# Patient Record
Sex: FEMALE | Race: WHITE | Employment: OTHER | URBAN - METROPOLITAN AREA
[De-identification: names, ages, dates, MRNs, and addresses within clinical notes are randomized per-mention and may not be internally consistent; named-entity substitution may affect disease eponyms.]

---

## 2017-03-27 ENCOUNTER — OFFICE VISIT (OUTPATIENT)
Dept: CARDIOLOGY CLINIC | Age: 82
End: 2017-03-27

## 2017-03-27 VITALS
SYSTOLIC BLOOD PRESSURE: 122 MMHG | OXYGEN SATURATION: 98 % | RESPIRATION RATE: 18 BRPM | WEIGHT: 164.3 LBS | DIASTOLIC BLOOD PRESSURE: 68 MMHG | HEIGHT: 63 IN | BODY MASS INDEX: 29.11 KG/M2 | HEART RATE: 94 BPM

## 2017-03-27 DIAGNOSIS — E78.00 HYPERCHOLESTEREMIA: ICD-10-CM

## 2017-03-27 DIAGNOSIS — I25.10 CORONARY ARTERY DISEASE INVOLVING NATIVE CORONARY ARTERY OF NATIVE HEART WITHOUT ANGINA PECTORIS: ICD-10-CM

## 2017-03-27 DIAGNOSIS — I47.29 PAROXYSMAL VENTRICULAR TACHYCARDIA: ICD-10-CM

## 2017-03-27 DIAGNOSIS — I48.91 ATRIAL FIBRILLATION, UNSPECIFIED TYPE (HCC): Primary | ICD-10-CM

## 2017-03-27 DIAGNOSIS — I10 ESSENTIAL HYPERTENSION: ICD-10-CM

## 2017-03-27 DIAGNOSIS — I48.0 PAF (PAROXYSMAL ATRIAL FIBRILLATION) (HCC): ICD-10-CM

## 2017-03-27 NOTE — PROGRESS NOTES
Julissa Green NP  Subjective/HPI:     Juan Brewster is a 80 y.o. female is here for routine f/u. The patient denies chest pain/ shortness of breath, orthopnea, PND, LE edema, palpitations, syncope, presyncope. Patient reports she has been feeling in her usual state of health with the exception of recurrent gastrointestinal irritation which she has been taking Carafate for long duration. SUMMARY:  Left ventricle: Systolic function was normal. Ejection fraction was  estimated in the range of 55 % to 60 %. Doppler parameters were consistent  with abnormal left ventricular relaxation (grade 1 diastolic dysfunction). Left atrium: The atrium was mildly to moderately dilated. Mitral valve: There was moderate annular calcification. The findings were  consistent with very mild stenosis. There was mild to moderate  regurgitation. Aortic valve: Cannot R/O bicuspid Aortic valve from this study due to  calcification. Bicuspid Aortic valve was noted on the study done 1/13/13. Leaflets exhibited mild to moderate calcification and sclerosis without  stenosis. Tricuspid valve: There was mild to moderate regurgitation. There was mild  to moderate pulmonary hypertension.     PCP Provider  Gemma Aggarwal MD  Past Medical History:   Diagnosis Date    Arthritis     Cancer [facility])     breast    (leg,ear,arm-skin cancer)    GERD (gastroesophageal reflux disease)     Hypertension     Other ill-defined conditions(799.89)     glaucoma    Other ill-defined conditions(799.89)     cellulitis left arm    Other ill-defined conditions(799.89)     carpal tunnel syndrome left hand    Other ill-defined conditions(799.89)     vertigo      Past Surgical History:   Procedure Laterality Date    CARDIAC CATHETERIZATION  1/10/2013         HX APPENDECTOMY      HX CARPAL TUNNEL RELEASE      left hand    HX CATARACT REMOVAL      bilateral    HX COLONOSCOPY  4/2012    HX KNEE ARTHROSCOPY      left    HX MASTECTOMY bilateral    HX OTHER SURGICAL      Basal cell skin cancer removed from left leg, left arm and neck    HX OTHER SURGICAL      cyst removed from left thigh    HX OREN AND BSO      HX TONSILLECTOMY      VASCULAR SURGERY PROCEDURE UNLIST      vascular blockages removed. Allergies   Allergen Reactions    Augmentin [Amoxicillin-Pot Clavulanate] Hives and Itching    Doxycycline Hives and Itching    Keflex [Cephalexin] Hives and Itching    Monodox [Doxycycline Monohydrate] Other (comments)    Sulfa (Sulfonamide Antibiotics) Rash      Family History   Problem Relation Age of Onset    Heart Disease Mother     Heart Disease Father       age 48    Heart Disease Brother     Stroke Brother     Cancer Sister      Colon Cancer    Diabetes Sister     Heart Disease Sister     Heart Disease Sister     Diabetes Sister     Heart Disease Son       age 52    Other Brother      MVA    Heart Disease Brother       Current Outpatient Prescriptions   Medication Sig    hydroCHLOROthiazide (MICROZIDE) 12.5 mg capsule TAKE 1 CAPSULE ORALLY DAILY    rosuvastatin (CRESTOR) 20 mg tablet TAKE 1 TABLET NIGHTLY    ALPHAGAN P 0.1 % ophthalmic solution INSTILL 1 DROP INTO BOTH EYES TWICE A DAY AS DIRECTED    sucralfate (CARAFATE) 100 mg/mL suspension Take  by mouth four (4) times daily.  nebivolol (BYSTOLIC) 5 mg tablet Take 1 Tab by mouth daily. Reduced due to slow HR    timolol (TIMOPTIC-XE) 0.5 % ophthalmic gel-forming Administer 1 Drop to both eyes daily.  CYANOCOBALAMIN, VITAMIN B-12, (VITAMIN B-12 PO) Take 1,000 mcg by mouth daily.  latanoprost (XALATAN) 0.005 % ophthalmic solution Administer 1 Drop to both eyes nightly.  cycloSPORINE (RESTASIS) 0.05 % ophthalmic emulsion Administer 1 Drop to both eyes two (2) times a day.  VIT C/E/ZN/COPPR/LUTEIN/ZEAXAN (PRESERVISION AREDS 2 PO) Take 2 capsules by mouth two (2) times a day.     esomeprazole (NEXIUM) 40 mg capsule Take 1 Cap by mouth Daily (before breakfast).  co-enzyme Q-10 (CO Q-10) 100 mg capsule Take 100 mg by mouth daily.  Cholecalciferol, Vitamin D3, (VITAMIN D3) 1,000 unit cap Take 1 Cap by mouth two (2) times a day.  acetaminophen (TYLENOL) 325 mg tablet Take 1 Tab by mouth two (2) times daily as needed for Pain.  aspirin 81 mg chewable tablet Take 81 mg by mouth daily.  omega-3 fatty acids-vitamin e (FISH OIL) 1,000 mg Cap Take 1 Cap by mouth two (2) times a day.  CALCIUM CARBONATE/VITAMIN D3 (CALCIUM 600 + D,3, PO) Take 1 Cap by mouth two (2) times a day.  multivitamins-minerals-lutein (CENTRUM SILVER) Tab Take 1 Tab by mouth daily.  apixaban (ELIQUIS) 5 mg tablet Take 1 Tab by mouth every twelve (12) hours. To prevent stroke in atrial fib     No current facility-administered medications for this visit. Vitals:    03/27/17 1346 03/27/17 1355   BP: 122/70 122/68   Pulse: 94    Resp: 18    SpO2: 98%    Weight: 164 lb 4.8 oz (74.5 kg)    Height: 5' 3\" (1.6 m)      Social History     Social History    Marital status:      Spouse name: N/A    Number of children: N/A    Years of education: N/A     Occupational History    Not on file. Social History Main Topics    Smoking status: Former Smoker     Quit date: 1/1/1955    Smokeless tobacco: Never Used    Alcohol use Yes      Comment: rare    Drug use: No    Sexual activity: No     Other Topics Concern    Not on file     Social History Narrative       I have reviewed the nurses notes, vitals, problem list, allergy list, medical history, family, social history and medications. Review of Symptoms:    General: Pt denies excessive weight gain or loss. Pt is able to conduct ADL's  HEENT: Denies blurred vision, headaches, epistaxis and difficulty swallowing. Respiratory: Denies shortness of breath, ALLAN, wheezing or stridor.   Cardiovascular: Denies precordial pain, palpitations, edema or PND  Gastrointestinal: Denies poor appetite, indigestion, abdominal pain or blood in stool  Musculoskeletal: Denies pain or swelling from muscles or joints  Neurologic: Denies tremor, paresthesias, or sensory motor disturbance  Skin: Denies rash, itching or texture change. Physical Exam:      General: Well developed, in no acute distress, cooperative and alert  HEENT: No carotid bruits, no JVD, trach is midline. Neck Supple, PEERL, EOM intact. Heart: Irregular, 2/6 systolic murmur, gallop or rub.   Respiratory: Clear bilaterally x 4, no wheezing or rales  Abdomen:   Soft, non-tender, no masses, bowel sounds are active.   Extremities:  No edema, normal cap refill, no cyanosis, atraumatic. Neuro: A&Ox3, speech clear, gait stable. Skin: Skin color is normal. No rashes or lesions. Non diaphoretic  Vascular: 2+ pulses symmetric in all extremities    Cardiographics    ECG: Rate controlled atrial fibrillation, this is a new finding  Results for orders placed or performed during the hospital encounter of 11/25/14   EKG, 12 LEAD, INITIAL   Result Value Ref Range    Ventricular Rate 58 BPM    Atrial Rate 58 BPM    P-R Interval 198 ms    QRS Duration 90 ms    Q-T Interval 414 ms    QTC Calculation (Bezet) 406 ms    Calculated P Axis 84 degrees    Calculated R Axis 44 degrees    Calculated T Axis 88 degrees    Diagnosis       Sinus bradycardia  Nonspecific ST abnormality  No previous ECGs available  Confirmed by Lissett Lundy ([id number]) on 11/26/2014 7:41:36 AM   Results for orders placed or performed in visit on 11/25/14   CARDIAC HOLTER MONITOR, 24 HOURS    Narrative    ECG Monitor/24 hours, Complete    Reason for Holter Monitor   LIGHTHEADED    Heartbeat    Slowest 52  Average 62  Fastest  98          Results:   Underlying Rhythm: Normal sinus rhythm      Atrial Arrhythmias: none            AV Conduction: normal    Ventricular Arrhythmias: premature ventricular contractions;  frequent. Frequent bigeminy.     ST Segment Analysis:normal     Symptom Correlation:  none    Comment:   Frequent PVC's, ventricular bigeminy. No symptom correlation. Jimbo Lebron MD                   Cardiology Labs:  Lab Results   Component Value Date/Time    Cholesterol, total 137 09/08/2016 08:51 AM    HDL Cholesterol 45 09/08/2016 08:51 AM    LDL,Direct 85 10/30/2014 09:36 AM    LDL, calculated 59 09/08/2016 08:51 AM    Triglyceride 164 09/08/2016 08:51 AM       Lab Results   Component Value Date/Time    Sodium 136 03/04/2016 02:38 PM    Potassium 4.5 03/04/2016 02:38 PM    Chloride 97 03/04/2016 02:38 PM    CO2 27 03/04/2016 02:38 PM    Anion gap 8 02/24/2016 03:30 AM    Glucose 91 03/04/2016 02:38 PM    BUN 14 03/04/2016 02:38 PM    Creatinine 1.11 03/04/2016 02:38 PM    BUN/Creatinine ratio 13 03/04/2016 02:38 PM    GFR est AA 51 03/04/2016 02:38 PM    GFR est non-AA 44 03/04/2016 02:38 PM    Calcium 9.7 03/04/2016 02:38 PM    Bilirubin, total 0.3 03/04/2016 02:38 PM    AST (SGOT) 24 03/04/2016 02:38 PM    Alk. phosphatase 71 03/04/2016 02:38 PM    Protein, total 7.0 03/04/2016 02:38 PM    Albumin 4.4 03/04/2016 02:38 PM    Globulin 3.9 11/25/2014 12:00 PM    A-G Ratio 1.7 03/04/2016 02:38 PM    ALT (SGPT) 25 03/04/2016 02:38 PM           Assessment:     Assessment:     Hao Pittman was seen today for hypertension. Diagnoses and all orders for this visit:    Atrial fibrillation, unspecified type ([facility]    Essential hypertension  -     AMB POC EKG ROUTINE W/ 12 LEADS, INTER & REP    Coronary artery disease involving native coronary artery of native heart without angina pectoris    Paroxysmal ventricular tachycardia (HCC)    Hypercholesteremia    PAF (paroxysmal atrial fibrillation) (HCC)    Other orders  -     Discontinue: apixaban (ELIQUIS) 5 mg tablet; Take 1 Tab by mouth every twelve (12) hours. To prevent stroke in atrial fib        ICD-10-CM ICD-9-CM    1. Atrial fibrillation, unspecified type ([facility] I48.91 427.31    2.  Essential hypertension I10 401.9 AMB POC EKG ROUTINE W/ 12 LEADS, INTER & REP   3. Coronary artery disease involving native coronary artery of native heart without angina pectoris I25.10 414.01    4. Paroxysmal ventricular tachycardia (HCC) I47.2 427.1    5. Hypercholesteremia E78.00 272.0    6. PAF (paroxysmal atrial fibrillation) (HCC) I48.0 427.31      Orders Placed This Encounter    AMB POC EKG ROUTINE W/ 12 LEADS, INTER & REP     Order Specific Question:   Reason for Exam:     Answer:   ROUTINE    DISCONTD: apixaban (ELIQUIS) 5 mg tablet     Sig: Take 1 Tab by mouth every twelve (12) hours. To prevent stroke in atrial fib     Dispense:  180 Tab     Refill:  1        Plan:     1. New onset rate controlled atrial fibrillation: Start Eliquis 5 mg twice daily. Discussed risks and benefits of anticoagulation, signs and symptoms of bleeding, and to call if any concerns. She will call if she cannot afford the medication. As she is asymptomatic and 80years old, she is not interested in pursuing AURORA and cardioversion. She understands that the medicine is renally metabolized so if she ever becomes sick or dehydrated kidney function needs to be tested to determine if medication adjustment is necessary. 2.  High cholesterol: LDL at goal September 2016. 3.   nonobstructive coronary artery disease via cath 2013. If doing well, otherwise continue same and follow-up in 6 months.     Carmen Martell MD

## 2017-03-27 NOTE — MR AVS SNAPSHOT
Visit Information Date & Time Provider Department Dept. Phone Encounter #  
 3/27/2017  1:45 PM Selvin Lima, [facility] [phone] [id number] Upcoming Health Maintenance Date Due ZOSTER VACCINE AGE 60> 1/18/1987 INFLUENZA AGE 9 TO ADULT 8/1/2016 MEDICARE YEARLY EXAM 9/8/2017 GLAUCOMA SCREENING Q2Y 12/1/2018 DTaP/Tdap/Td series (2 - Td) 2/12/2026 Allergies as of 3/27/2017  Review Complete On: 3/27/2017 By: Selvin Liam MD  
  
 Severity Noted Reaction Type Reactions Augmentin [Amoxicillin-pot Clavulanate] Medium 02/22/2016   Systemic Hives, Itching Doxycycline Medium 02/22/2016   Systemic Hives, Itching Keflex [Cephalexin] Medium 02/22/2016   Systemic Hives, Itching Monodox [Doxycycline Monohydrate]  03/04/2016    Other (comments) Sulfa (Sulfonamide Antibiotics)  04/10/2012   Side Effect Rash Current Immunizations  Reviewed on 11/7/2012 Name Date Influenza Vaccine PF 10/17/2013  2:56 PM  
 Influenza Vaccine Split 9/1/2012 Pneumococcal Conjugate (PCV-13) 8/15/2015 Pneumococcal Polysaccharide (PPSV-23) 10/17/2013  2:58 PM  
 Pneumococcal Vaccine (Unspecified Type) 1/1/2009 Tdap 2/12/2016 Not reviewed this visit You Were Diagnosed With   
  
 Codes Comments Atrial fibrillation, unspecified type ([facility]    -  Primary ICD-10-CM: I48.91 
ICD-9-CM: 427.31 Essential hypertension     ICD-10-CM: I10 
ICD-9-CM: 401.9 Coronary artery disease involving native coronary artery of native heart without angina pectoris     ICD-10-CM: I25.10 ICD-9-CM: 414.01 Paroxysmal ventricular tachycardia (HCC)     ICD-10-CM: I47.2 ICD-9-CM: 427.1 Hypercholesteremia     ICD-10-CM: E78.00 ICD-9-CM: 272.0   
 PAF (paroxysmal atrial fibrillation) (HCC)     ICD-10-CM: I48.0 ICD-9-CM: 427.31 Vitals BP Pulse Resp Height(growth percentile) Weight(growth percentile) SpO2 122/68 (BP 1 Location: Right arm, BP Patient Position: Sitting) 94 18 5' 3\" (1.6 m) 164 lb 4.8 oz (74.5 kg) 98% BMI OB Status Smoking Status 29.1 kg/m2 Hysterectomy Former Smoker Vitals History BMI and BSA Data Body Mass Index Body Surface Area  
 29.1 kg/m 2 1.82 m 2 Preferred Pharmacy Pharmacy Name Phone [facility][id number] [place] [id number] [phone] Your Updated Medication List  
  
   
This list is accurate as of: 3/27/17  2:47 PM.  Always use your most recent med list.  
  
  
  
  
 acetaminophen 325 mg tablet Commonly known as:  TYLENOL Take 1 Tab by mouth two (2) times daily as needed for Pain. ALPHAGAN P 0.1 % ophthalmic solution Generic drug:  brimonidine INSTILL 1 DROP INTO BOTH EYES TWICE A DAY AS DIRECTED  
  
 apixaban 5 mg tablet Commonly known as:  Alayna Samaniego Take 1 Tab by mouth every twelve (12) hours. To prevent stroke in atrial fib  
  
 aspirin 81 mg chewable tablet Take 81 mg by mouth daily. CALCIUM 600 + D(3) PO Take 1 Cap by mouth two (2) times a day. CARAFATE 100 mg/mL suspension Generic drug:  sucralfate Take  by mouth four (4) times daily. CENTRUM SILVER Tab tablet Generic drug:  multivitamins-minerals-lutein Take 1 Tab by mouth daily. CO Q-10 100 mg capsule Generic drug:  co-enzyme Q-10 Take 100 mg by mouth daily. esomeprazole 40 mg capsule Commonly known as:  NexIUM Take 1 Cap by mouth Daily (before breakfast). FISH OIL 1,000 mg Cap Generic drug:  omega-3 fatty acids-vitamin e Take 1 Cap by mouth two (2) times a day. hydroCHLOROthiazide 12.5 mg capsule Commonly known as:  Brannon Mendes TAKE 1 CAPSULE ORALLY DAILY  
  
 latanoprost 0.005 % ophthalmic solution Commonly known as:  Lj Marin Administer 1 Drop to both eyes nightly. nebivolol 5 mg tablet Commonly known as:  BYSTOLIC  
 Take 1 Tab by mouth daily. Reduced due to slow HR  
  
 PRESERVISION AREDS 2 PO Take 2 capsules by mouth two (2) times a day. RESTASIS 0.05 % ophthalmic emulsion Generic drug:  cycloSPORINE Administer 1 Drop to both eyes two (2) times a day. rosuvastatin 20 mg tablet Commonly known as:  CRESTOR  
TAKE 1 TABLET NIGHTLY  
  
 timolol 0.5 % ophthalmic gel-forming Commonly known as:  TIMOPTIC-XE Administer 1 Drop to both eyes daily. VITAMIN B-12 PO Take 1,000 mcg by mouth daily. VITAMIN D3 1,000 unit Cap Generic drug:  cholecalciferol Take 1 Cap by mouth two (2) times a day. Prescriptions Printed Refills  
 apixaban (ELIQUIS) 5 mg tablet 1 Sig: Take 1 Tab by mouth every twelve (12) hours. To prevent stroke in atrial fib Class: Print Route: Oral  
  
We Performed the Following AMB POC EKG ROUTINE W/ 12 LEADS, INTER & REP [67827 CPT(R)] Introducing [facility]! Dear Keshawn Negron: 
Thank you for requesting a [employer] account. Our records indicate that you have previously registered for a [employer]oyal[employer] account but its currently inactive. Please call our [employer] support line at [phone]. Additional Information If you have questions, please visit the Frequently Asked Questions section of the [employer] website at [email or web address] Remember, [employer] is NOT to be used for urgent needs. For medical emergencies, dial 911. Now available from your iPhone and Android! Please provide this summary of care documentation to your next provider. Your primary care clinician is listed as Karen Aleman. If you have any questions after today's visit, please call [phone].

## 2017-04-05 ENCOUNTER — TELEPHONE (OUTPATIENT)
Dept: CARDIOLOGY CLINIC | Age: 82
End: 2017-04-05

## 2017-04-05 NOTE — TELEPHONE ENCOUNTER
Please call patient in regards to Eliquis. Dr. Aziza Lea put her on this med lst week. Patient states this medication is making her really weak and wants to know if she can cut back to 1 tab a day.       Thanks

## 2017-04-06 NOTE — TELEPHONE ENCOUNTER
5 mg twice a day is the dose proven to help prevent stroke for her age, weight, and kidney function. Recommend we check a CBC to make sure her blood count has not decreased. If that is stable, I would continue on the current dose. If she feels strongly, 2.5 mg twice a day would probably provide more protection against stroke than nothing.

## 2017-04-10 NOTE — TELEPHONE ENCOUNTER
Spoke with pt using 2 identifiers informing her of information as per Dr. Vy Foss. Pt states understanding and states she is better today but states she had only taken one tablet on Saturday and Sunday. Pt states she will discuss during appointment on Wednesday and states she will go back to taking 2 tablets on today.     Bayron Ramey RN

## 2017-04-12 ENCOUNTER — HOSPITAL ENCOUNTER (OUTPATIENT)
Dept: LAB | Age: 82
Discharge: HOME | End: 2017-04-12
Payer: MEDICARE

## 2017-04-12 ENCOUNTER — OFFICE VISIT (OUTPATIENT)
Dept: CARDIOLOGY CLINIC | Age: 82
End: 2017-04-12

## 2017-04-12 VITALS
HEIGHT: 63 IN | DIASTOLIC BLOOD PRESSURE: 62 MMHG | SYSTOLIC BLOOD PRESSURE: 138 MMHG | WEIGHT: 163.4 LBS | HEART RATE: 56 BPM | BODY MASS INDEX: 28.95 KG/M2 | RESPIRATION RATE: 18 BRPM | OXYGEN SATURATION: 98 %

## 2017-04-12 DIAGNOSIS — I47.29 PAROXYSMAL VENTRICULAR TACHYCARDIA: ICD-10-CM

## 2017-04-12 DIAGNOSIS — R06.09 DOE (DYSPNEA ON EXERTION): Primary | ICD-10-CM

## 2017-04-12 DIAGNOSIS — I10 ESSENTIAL HYPERTENSION: ICD-10-CM

## 2017-04-12 DIAGNOSIS — I48.0 PAF (PAROXYSMAL ATRIAL FIBRILLATION) (HCC): ICD-10-CM

## 2017-04-12 DIAGNOSIS — I25.10 CORONARY ARTERY DISEASE INVOLVING NATIVE CORONARY ARTERY OF NATIVE HEART WITHOUT ANGINA PECTORIS: ICD-10-CM

## 2017-04-12 DIAGNOSIS — E78.00 HYPERCHOLESTEREMIA: ICD-10-CM

## 2017-04-12 DIAGNOSIS — I44.0 FIRST DEGREE AV BLOCK: ICD-10-CM

## 2017-04-12 PROCEDURE — 83735 ASSAY OF MAGNESIUM: CPT

## 2017-04-12 PROCEDURE — 80048 BASIC METABOLIC PNL TOTAL CA: CPT

## 2017-04-12 PROCEDURE — 36415 COLL VENOUS BLD VENIPUNCTURE: CPT

## 2017-04-12 PROCEDURE — 85027 COMPLETE CBC AUTOMATED: CPT

## 2017-04-12 NOTE — MR AVS SNAPSHOT
Visit Information Date & Time Provider Department Dept. Phone Encounter #  
 4/12/2017  1:15 PM [place] [address] [facility] [phone] [id number] Your Appointments 4/18/2017 12:30 PM  
ECHO CARDIOGRAMS 2D with [address], [facility] Appt Note: Per Emily $0CP REM 2D ECHO COMPLETE ADULT (TTE) W OR WO CONTR [93988 CPT(R)] CARDIAC HOLTER MONITOR, 24 HOURS [[id number] CPT(R)]  
 [address]  
[phone] [address]  
  
    
 4/18/2017  1:30 PM  
HOLTER MONITOR with [address], [facility] Appt Note: Per Emily $0CP REM 2D ECHO COMPLETE ADULT (TTE) W OR WO CONTR [95875 CPT(R)] CARDIAC HOLTER MONITOR, 24 HOURS [31116.44762 CPT(R)]  
 [address]  
[phone] [address]  
  
    
 6/27/2017  2:15 PM  
3 MONTH with Mauricio Holt MD  
[place] [facility]) Appt Note: Per Dr Emely Perales $0CP REM  
 [address]  
[phone] [address] Upcoming Health Maintenance Date Due ZOSTER VACCINE AGE 60> 1/18/1987 INFLUENZA AGE 9 TO ADULT 8/1/2016 MEDICARE YEARLY EXAM 9/8/2017 GLAUCOMA SCREENING Q2Y 12/1/2018 DTaP/Tdap/Td series (2 - Td) 2/12/2026 Allergies as of 4/12/2017  Review Complete On: 4/12/2017 By: Saundra Marquez NP Severity Noted Reaction Type Reactions Augmentin [Amoxicillin-pot Clavulanate] Medium 02/22/2016   Systemic Hives, Itching Doxycycline Medium 02/22/2016   Systemic Hives, Itching Keflex [Cephalexin] Medium 02/22/2016   Systemic Hives, Itching Monodox [Doxycycline Monohydrate]  03/04/2016    Other (comments) Sulfa (Sulfonamide Antibiotics)  04/10/2012   Side Effect Rash Current Immunizations  Reviewed on 11/7/2012 Name Date Influenza Vaccine PF 10/17/2013  2:56 PM  
 Influenza Vaccine Split 9/1/2012 Pneumococcal Conjugate (PCV-13) 8/15/2015 Pneumococcal Polysaccharide (PPSV-23) 10/17/2013  2:58 PM  
 Pneumococcal Vaccine (Unspecified Type) 1/1/2009 Tdap 2/12/2016 Not reviewed this visit You Were Diagnosed With   
  
 Codes Comments ALLAN (dyspnea on exertion)    -  Primary ICD-10-CM: R06.09 
ICD-9-CM: 786.09   
 PAF (paroxysmal atrial fibrillation) ([facility])     ICD-10-CM: I48.0 ICD-9-CM: 427.31 Paroxysmal ventricular tachycardia (HCC)     ICD-10-CM: I47.2 ICD-9-CM: 427.1 Hypercholesteremia     ICD-10-CM: E78.00 ICD-9-CM: 272.0 Essential hypertension     ICD-10-CM: I10 
ICD-9-CM: 401.9 Coronary artery disease involving native coronary artery of native heart without angina pectoris     ICD-10-CM: I25.10 ICD-9-CM: 414.01 First degree AV block     ICD-10-CM: I44.0 ICD-9-CM: 426.11 Vitals BP Pulse Resp Height(growth percentile) Weight(growth percentile) SpO2  
 138/62 (BP 1 Location: Right arm, BP Patient Position: Sitting) (!) 56 18 5' 3\" (1.6 m) 163 lb 6.4 oz (74.1 kg) 98% BMI OB Status Smoking Status 28.95 kg/m2 Hysterectomy Former Smoker Vitals History BMI and BSA Data Body Mass Index Body Surface Area  
 28.95 kg/m 2 1.81 m 2 Preferred Pharmacy Pharmacy Name Phone [facility] [address] [phone] Your Updated Medication List  
  
   
This list is accurate as of: 4/12/17  2:29 PM.  Always use your most recent med list.  
  
  
  
  
 acetaminophen 325 mg tablet Commonly known as:  TYLENOL Take 1 Tab by mouth two (2) times daily as needed for Pain. ALPHAGAN P 0.1 % ophthalmic solution Generic drug:  brimonidine INSTILL 1 DROP INTO BOTH EYES TWICE A DAY AS DIRECTED  
 apixaban 5 mg tablet Commonly known as:  Kar Lopez Take 1 Tab by mouth every twelve (12) hours. To prevent stroke in atrial fib  
  
 aspirin 81 mg chewable tablet Take 81 mg by mouth daily. CALCIUM 600 + D(3) PO Take 1 Cap by mouth two (2) times a day. CARAFATE 100 mg/mL suspension Generic drug:  sucralfate Take  by mouth four (4) times daily. CENTRUM SILVER Tab tablet Generic drug:  multivitamins-minerals-lutein Take 1 Tab by mouth daily. CO Q-10 100 mg capsule Generic drug:  co-enzyme Q-10 Take 100 mg by mouth daily. esomeprazole 40 mg capsule Commonly known as:  NexIUM Take 1 Cap by mouth Daily (before breakfast). FISH OIL 1,000 mg Cap Generic drug:  omega-3 fatty acids-vitamin e Take 1 Cap by mouth two (2) times a day. hydroCHLOROthiazide 12.5 mg capsule Commonly known as:  Jenna Fells TAKE 1 CAPSULE ORALLY DAILY  
  
 latanoprost 0.005 % ophthalmic solution Commonly known as:  Melissa Sicilian Administer 1 Drop to both eyes nightly. nebivolol 5 mg tablet Commonly known as:  BYSTOLIC Take 1 Tab by mouth daily. Reduced due to slow HR  
  
 PRESERVISION AREDS 2 PO Take 2 capsules by mouth two (2) times a day. RESTASIS 0.05 % ophthalmic emulsion Generic drug:  cycloSPORINE Administer 1 Drop to both eyes two (2) times a day. rosuvastatin 20 mg tablet Commonly known as:  CRESTOR  
TAKE 1 TABLET NIGHTLY  
  
 timolol 0.5 % ophthalmic gel-forming Commonly known as:  TIMOPTIC-XE Administer 1 Drop to both eyes daily. VITAMIN B-12 PO Take 1,000 mcg by mouth daily. VITAMIN D3 1,000 unit Cap Generic drug:  cholecalciferol Take 1 Cap by mouth two (2) times a day. We Performed the Following AMB POC EKG ROUTINE W/ 12 LEADS, INTER & REP [50749 CPT(R)] CBC W/O DIFF [08736 CPT(R)] MAGNESIUM [id number] CPT(R)] METABOLIC PANEL, BASIC [07078 CPT(R)] To-Do List   
 04/12/2017 ECHO:  2D ECHO COMPLETE ADULT (TTE) W OR WO CONTR   
  
 04/12/2017 ECG:  CARDIAC HOLTER MONITOR, 24 HOURS Introducing [facility]! Dear Keisha Epstein: 
Thank you for requesting a [employer] account. Our records indicate that you have previously registered for a [employer] account but its currently inactive. Please call our [employer] support line at [phone]. Additional Information If you have questions, please visit the Frequently Asked Questions section of the [employer] website at [email or web address] Remember, [employer] is NOT to be used for urgent needs. For medical emergencies, dial 911. Now available from your iPhone and Android! Please provide this summary of care documentation to your next provider. Your primary care clinician is listed as Olayinka Mcgowan. If you have any questions after today's visit, please call [phone].

## 2017-04-12 NOTE — PROGRESS NOTES
Subjective/HPI:     Mena Ayala is a 80 y.o. female is here for symptom based appt. She was started on Eliquis and was having weakness and fatigue which has improved some. Now she is mainly noticing sob on exertion. She can walk around her apartment and become sob which is a change for her. She has occasional swelling in her legs which isnt worsening. The patient denies chest pain, orthopnea, PND, palpitations, syncope, or presyncope. PCP Provider  Lanie Thao MD  Past Medical History:   Diagnosis Date    Arthritis     Cancer [facility])     breast    (leg,ear,arm-skin cancer)    GERD (gastroesophageal reflux disease)     Hypertension     Other ill-defined conditions     glaucoma    Other ill-defined conditions     cellulitis left arm    Other ill-defined conditions     carpal tunnel syndrome left hand    Other ill-defined conditions     vertigo      Past Surgical History:   Procedure Laterality Date    CARDIAC CATHETERIZATION  1/10/2013         HX APPENDECTOMY      HX CARPAL TUNNEL RELEASE      left hand    HX CATARACT REMOVAL      bilateral    HX COLONOSCOPY  2012    HX KNEE ARTHROSCOPY      left    HX MASTECTOMY      bilateral    HX OTHER SURGICAL      Basal cell skin cancer removed from left leg, left arm and neck    HX OTHER SURGICAL      cyst removed from left thigh    HX OREN AND BSO      HX TONSILLECTOMY      VASCULAR SURGERY PROCEDURE UNLIST      vascular blockages removed.      Allergies   Allergen Reactions    Augmentin [Amoxicillin-Pot Clavulanate] Hives and Itching    Doxycycline Hives and Itching    Keflex [Cephalexin] Hives and Itching    Monodox [Doxycycline Monohydrate] Other (comments)    Sulfa (Sulfonamide Antibiotics) Rash      Family History   Problem Relation Age of Onset    Heart Disease Mother     Heart Disease Father       age 48    Heart Disease Brother     Stroke Brother     Cancer Sister      Colon Cancer    Diabetes Sister    Felipa Duverney Heart Disease Sister     Heart Disease Sister     Diabetes Sister     Heart Disease Son       age 54    Other Brother      MVA    Heart Disease Brother       Current Outpatient Prescriptions   Medication Sig    apixaban (ELIQUIS) 5 mg tablet Take 1 Tab by mouth every twelve (12) hours. To prevent stroke in atrial fib    hydroCHLOROthiazide (MICROZIDE) 12.5 mg capsule TAKE 1 CAPSULE ORALLY DAILY    rosuvastatin (CRESTOR) 20 mg tablet TAKE 1 TABLET NIGHTLY    ALPHAGAN P 0.1 % ophthalmic solution INSTILL 1 DROP INTO BOTH EYES TWICE A DAY AS DIRECTED    sucralfate (CARAFATE) 100 mg/mL suspension Take  by mouth four (4) times daily.  nebivolol (BYSTOLIC) 5 mg tablet Take 1 Tab by mouth daily. Reduced due to slow HR    timolol (TIMOPTIC-XE) 0.5 % ophthalmic gel-forming Administer 1 Drop to both eyes daily.  CYANOCOBALAMIN, VITAMIN B-12, (VITAMIN B-12 PO) Take 1,000 mcg by mouth daily.  latanoprost (XALATAN) 0.005 % ophthalmic solution Administer 1 Drop to both eyes nightly.  cycloSPORINE (RESTASIS) 0.05 % ophthalmic emulsion Administer 1 Drop to both eyes two (2) times a day.  VIT C/E/ZN/COPPR/LUTEIN/ZEAXAN (PRESERVISION AREDS 2 PO) Take 2 capsules by mouth two (2) times a day.  esomeprazole (NEXIUM) 40 mg capsule Take 1 Cap by mouth Daily (before breakfast).  co-enzyme Q-10 (CO Q-10) 100 mg capsule Take 100 mg by mouth daily.  Cholecalciferol, Vitamin D3, (VITAMIN D3) 1,000 unit cap Take 1 Cap by mouth two (2) times a day.  acetaminophen (TYLENOL) 325 mg tablet Take 1 Tab by mouth two (2) times daily as needed for Pain.  aspirin 81 mg chewable tablet Take 81 mg by mouth daily.  omega-3 fatty acids-vitamin e (FISH OIL) 1,000 mg Cap Take 1 Cap by mouth two (2) times a day.  CALCIUM CARBONATE/VITAMIN D3 (CALCIUM 600 + D,3, PO) Take 1 Cap by mouth two (2) times a day.  multivitamins-minerals-lutein (CENTRUM SILVER) Tab Take 1 Tab by mouth daily.      No current facility-administered medications for this visit. Vitals:    04/12/17 1321 04/12/17 1333   BP: 140/62 138/62   Pulse: (!) 56    Resp: 18    SpO2: 98%    Weight: 163 lb 6.4 oz (74.1 kg)    Height: 5' 3\" (1.6 m)      Social History     Social History    Marital status:      Spouse name: N/A    Number of children: N/A    Years of education: N/A     Occupational History    Not on file. Social History Main Topics    Smoking status: Former Smoker     Quit date: 1/1/1955    Smokeless tobacco: Never Used    Alcohol use Yes      Comment: rare    Drug use: No    Sexual activity: No     Other Topics Concern    Not on file     Social History Narrative       I have reviewed the nurses notes, vitals, problem list, allergy list, medical history, family, social history and medications. Review of Symptoms:    General: Pt denies excessive weight gain or loss. Pt is able to conduct ADL's.+fatigue  HEENT: Denies blurred vision, headaches, epistaxis and difficulty swallowing. Respiratory: Denies shortness of breath, +ALLAN, denies wheezing or stridor. Cardiovascular: Denies precordial pain, palpitations, edema or PND  Gastrointestinal: Denies poor appetite, indigestion, abdominal pain or blood in stool  Urinary: Denies dysuria, pyuria  Musculoskeletal: Denies pain or swelling from muscles or joints  Neurologic: Denies tremor, paresthesias, or sensory motor disturbance  Skin: Denies rash, itching or texture change. Psych: Denies depression        Physical Exam:      General: Well developed, in no acute distress, cooperative and alert  HEENT: No carotid bruits, no JVD, trach is midline. Neck Supple, PEERL, EOM intact. Heart:  Normal S1/S2 negative S3 or S4. Regular,+KERRIE,no gallop or rub.   Respiratory: Clear bilaterally x 4, no wheezing or rales  Abdomen:   Soft, non-tender, no masses, bowel sounds are active.   Extremities:  No edema, normal cap refill, no cyanosis, atraumatic.    Neuro: A&Ox3, speech clear, gait stable. Skin: Skin color is normal. No rashes or lesions. Non diaphoretic  Vascular: 2+ pulses symmetric in all extremities    Cardiographics    ECG: Sinus  Bradycardia  -First degree A-V block rate 54    Results for orders placed or performed during the hospital encounter of 11/25/14   EKG, 12 LEAD, INITIAL   Result Value Ref Range    Ventricular Rate 58 BPM    Atrial Rate 58 BPM    P-R Interval 198 ms    QRS Duration 90 ms    Q-T Interval 414 ms    QTC Calculation (Bezet) 406 ms    Calculated P Axis 84 degrees    Calculated R Axis 44 degrees    Calculated T Axis 88 degrees    Diagnosis       Sinus bradycardia  Nonspecific ST abnormality  No previous ECGs available  Confirmed by Honey Wilson ([id number]) on 11/26/2014 7:41:36 AM   Results for orders placed or performed in visit on 11/25/14   CARDIAC HOLTER MONITOR, 24 HOURS    Narrative    ECG Monitor/24 hours, Complete    Reason for Holter Monitor   LIGHTHEADED    Heartbeat    Slowest 52  Average 62  Fastest  98          Results:   Underlying Rhythm: Normal sinus rhythm      Atrial Arrhythmias: none            AV Conduction: normal    Ventricular Arrhythmias: premature ventricular contractions;  frequent. Frequent bigeminy. ST Segment Analysis:normal     Symptom Correlation:  none    Comment:   Frequent PVC's, ventricular bigeminy. No symptom correlation.      Autumn Sanchez MD                   Cardiology Labs:  Lab Results   Component Value Date/Time    Cholesterol, total 137 09/08/2016 08:51 AM    HDL Cholesterol 45 09/08/2016 08:51 AM    LDL,Direct 85 10/30/2014 09:36 AM    LDL, calculated 59 09/08/2016 08:51 AM    Triglyceride 164 09/08/2016 08:51 AM       Lab Results   Component Value Date/Time    Sodium 136 03/04/2016 02:38 PM    Potassium 4.5 03/04/2016 02:38 PM    Chloride 97 03/04/2016 02:38 PM    CO2 27 03/04/2016 02:38 PM    Anion gap 8 02/24/2016 03:30 AM    Glucose 91 03/04/2016 02:38 PM    BUN 14 03/04/2016 02:38 PM Creatinine 1.11 03/04/2016 02:38 PM    BUN/Creatinine ratio 13 03/04/2016 02:38 PM    GFR est AA 51 03/04/2016 02:38 PM    GFR est non-AA 44 03/04/2016 02:38 PM    Calcium 9.7 03/04/2016 02:38 PM    AST (SGOT) 24 03/04/2016 02:38 PM    Alk. phosphatase 71 03/04/2016 02:38 PM    Protein, total 7.0 03/04/2016 02:38 PM    Albumin 4.4 03/04/2016 02:38 PM    Globulin 3.9 11/25/2014 12:00 PM    A-G Ratio 1.7 03/04/2016 02:38 PM    ALT (SGPT) 25 03/04/2016 02:38 PM           Assessment:     Assessment:     Rose Mary Villatoro was seen today for irregular heart beat. Diagnoses and all orders for this visit:    ALLAN (dyspnea on exertion)    PAF (paroxysmal atrial fibrillation) (HCC)    Paroxysmal ventricular tachycardia (HCC)  -     AMB POC EKG ROUTINE W/ 12 LEADS, INTER & REP  -     CBC W/O DIFF  -     METABOLIC PANEL, BASIC  -     MAGNESIUM  -     2D ECHO COMPLETE ADULT (TTE) W OR WO CONTR; Future  -     CARDIAC HOLTER MONITOR, 24 HOURS; Future    Hypercholesteremia    Essential hypertension    Coronary artery disease involving native coronary artery of native heart without angina pectoris    First degree AV block        ICD-10-CM ICD-9-CM    1. ALLAN (dyspnea on exertion) R06.09 786.09    2. PAF (paroxysmal atrial fibrillation) (HCC) I48.0 427.31    3. Paroxysmal ventricular tachycardia (HCC) I47.2 427.1 AMB POC EKG ROUTINE W/ 12 LEADS, INTER & REP      CBC W/O DIFF      METABOLIC PANEL, BASIC      MAGNESIUM      2D ECHO COMPLETE ADULT (TTE) W OR WO CONTR      CARDIAC HOLTER MONITOR, 24 HOURS   4. Hypercholesteremia E78.00 272.0    5. Essential hypertension I10 401.9    6. Coronary artery disease involving native coronary artery of native heart without angina pectoris I25.10 414.01    7.  First degree AV block I44.0 426.11      Orders Placed This Encounter    CBC W/O DIFF    METABOLIC PANEL, BASIC    MAGNESIUM    AMB POC EKG ROUTINE W/ 12 LEADS, INTER & REP     Order Specific Question:   Reason for Exam:     Answer:   ROUTINE    CARDIAC HOLTER MONITOR, 24 HOURS     Standing Status:   Future     Standing Expiration Date:   10/10/2017     Order Specific Question:   Reason for Exam:     Answer:   allan    2D ECHO COMPLETE ADULT (TTE) W OR WO CONTR     Standing Status:   Future     Standing Expiration Date:   10/12/2017     Order Specific Question:   Reason for Exam:     Answer:   ALLAN     Order Specific Question:   Contrast Enhancement (Bubble Study, Definity, Optison) may be used if criteria listed in established evidence-based protocol has been identified. Answer:   Yes        Plan:     Patient presents today for c/o fatigue and ALLAN. She is in SB today rate 54. BP is normotensive. She is currently taking Eliquis 5mg po bid. I will evaluate with a CBC and BMP/Mg, holter and event monitor. F/U and adjustment of Eliquis based on results.      Shakir Pantoja NP

## 2017-04-13 LAB
BUN SERPL-MCNC: 12 MG/DL (ref 10–36)
BUN/CREAT SERPL: 13 (ref 12–28)
CALCIUM SERPL-MCNC: 9.7 MG/DL (ref 8.7–10.3)
CHLORIDE SERPL-SCNC: 96 MMOL/L (ref 96–106)
CO2 SERPL-SCNC: 26 MMOL/L (ref 18–29)
CREAT SERPL-MCNC: 0.94 MG/DL (ref 0.57–1)
ERYTHROCYTE [DISTWIDTH] IN BLOOD BY AUTOMATED COUNT: 14.4 % (ref 12.3–15.4)
GLUCOSE SERPL-MCNC: 92 MG/DL (ref 65–99)
HCT VFR BLD AUTO: 38.6 % (ref 34–46.6)
HGB BLD-MCNC: 13.2 G/DL (ref 11.1–15.9)
INTERPRETATION: NORMAL
MAGNESIUM SERPL-MCNC: 2.1 MG/DL (ref 1.6–2.3)
MCH RBC QN AUTO: 29.3 PG (ref 26.6–33)
MCHC RBC AUTO-ENTMCNC: 34.2 G/DL (ref 31.5–35.7)
MCV RBC AUTO: 86 FL (ref 79–97)
PLATELET # BLD AUTO: 184 X10E3/UL (ref 150–379)
POTASSIUM SERPL-SCNC: 4.5 MMOL/L (ref 3.5–5.2)
RBC # BLD AUTO: 4.51 X10E6/UL (ref 3.77–5.28)
SODIUM SERPL-SCNC: 137 MMOL/L (ref 134–144)
WBC # BLD AUTO: 6.4 X10E3/UL (ref 3.4–10.8)

## 2017-04-13 NOTE — PROGRESS NOTES
Spoke with patient  Verified patient with 2 patient identifier  Informed per Dr Felix Garnica she is not anemic, her kidney function and electrolytes are normal. Continue with w/u as planned. She can continue on Eliquis at 5mg bid. Patient verbalized understanding.

## 2017-04-13 NOTE — PROGRESS NOTES
Please let Ms Mala Walker know she is not anemic, her kidney function and electrolytes are normal. Continue with w/u as planned. She can continue on Eliquis at 5mg bid.

## 2017-04-18 ENCOUNTER — CLINICAL SUPPORT (OUTPATIENT)
Dept: CARDIOLOGY CLINIC | Age: 82
End: 2017-04-18

## 2017-04-18 DIAGNOSIS — I49.3 PVC'S (PREMATURE VENTRICULAR CONTRACTIONS): ICD-10-CM

## 2017-04-18 DIAGNOSIS — I47.29 PAROXYSMAL VENTRICULAR TACHYCARDIA: ICD-10-CM

## 2017-04-18 DIAGNOSIS — I44.0 FIRST DEGREE AV BLOCK: ICD-10-CM

## 2017-04-18 DIAGNOSIS — R06.02 SHORTNESS OF BREATH: ICD-10-CM

## 2017-04-18 NOTE — PROGRESS NOTES
See cardiology for results      Patient received a 24 hour holter monitor. Instructions given. Pt verbalized understanding.

## 2017-04-20 NOTE — PROGRESS NOTES
Spoke with patient  Verified patient with 2 patient identifier  Informed per Padmini Guzman NP her heart strength is normal. Her aortic valve looks stable. No changes. Her monitor showed no abnormalities. F/U with PCP regarding symptoms. Patient verbalized understanding.

## 2017-04-20 NOTE — PROGRESS NOTES
Please let pt know that her heart strength is normal. Her aortic valve looks stable. No changes. Her monitor showed no abnormalities. F/U with PCP regarding symptoms.

## 2017-04-21 RX ORDER — ROSUVASTATIN CALCIUM 20 MG/1
TABLET, COATED ORAL
Qty: 90 TAB | Refills: 1 | Status: SHIPPED | OUTPATIENT
Start: 2017-04-21 | End: 2017-11-16 | Stop reason: SDUPTHER

## 2017-04-27 ENCOUNTER — TELEPHONE (OUTPATIENT)
Dept: CARDIOLOGY CLINIC | Age: 82
End: 2017-04-27

## 2017-04-27 NOTE — TELEPHONE ENCOUNTER
Verified patient with two identifiers. Patient informed she has appt scheduled 6-27-17 with Dr Monserrat iL to discuss since discontinue stomach medication her C/O have improved.

## 2017-04-27 NOTE — TELEPHONE ENCOUNTER
----- Message from Eben Rizo NP sent at 4/27/2017  9:39 AM EDT -----  Regarding: results  Her event monitor and echo turned out fine, if she is still having symptoms have her schedule and appt with Yi to discuss    Thanks  [address] [place] [address]    ----- Message -----     From: Tai Falk MD     Sent: 4/26/2017   6:46 PM       To: Eben Rizo NP

## 2017-05-01 RX ORDER — HYDROCHLOROTHIAZIDE 12.5 MG/1
CAPSULE ORAL
Qty: 30 CAP | Refills: 3 | Status: SHIPPED | OUTPATIENT
Start: 2017-05-01 | End: 2017-06-02 | Stop reason: SDUPTHER

## 2017-06-02 RX ORDER — HYDROCHLOROTHIAZIDE 12.5 MG/1
CAPSULE ORAL
Qty: 90 CAP | Refills: 3 | Status: SHIPPED | OUTPATIENT
Start: 2017-06-02 | End: 2018-08-30 | Stop reason: SDUPTHER

## 2017-06-07 ENCOUNTER — OFFICE VISIT (OUTPATIENT)
Dept: INTERNAL MEDICINE CLINIC | Age: 82
End: 2017-06-07

## 2017-06-07 VITALS
HEIGHT: 63 IN | RESPIRATION RATE: 15 BRPM | WEIGHT: 159.2 LBS | SYSTOLIC BLOOD PRESSURE: 160 MMHG | DIASTOLIC BLOOD PRESSURE: 65 MMHG | TEMPERATURE: 97.7 F | OXYGEN SATURATION: 96 % | HEART RATE: 58 BPM | BODY MASS INDEX: 28.21 KG/M2

## 2017-06-07 DIAGNOSIS — R73.09 ELEVATED HEMOGLOBIN A1C: ICD-10-CM

## 2017-06-07 DIAGNOSIS — E78.00 HYPERCHOLESTEREMIA: Primary | ICD-10-CM

## 2017-06-07 DIAGNOSIS — I10 ESSENTIAL HYPERTENSION: ICD-10-CM

## 2017-06-07 NOTE — PROGRESS NOTES
SUBJECTIVE:   Ms. Anthony Samson is a 80 y.o. female who is here for follow up of htn and hld. Pt's BP is elevated at 189/66 and 160/65 upon recheck today. Pt states the BP taken today is high. Pt denies HA. Pt's BP on 4/12/17 was 138/62. Pt denies checking her BP at home today. Pt states her home SBP is normally under 140. Pt reports emesis and fever (104.2) while on vacation. Pt states the EMTs said she had flu like symptoms. Pt claims she broke out with blisters from nostrils to upper lip after having the fever. Pt denies cold like symptoms or blisters in her nose. Pt states she was very scared because she has not had a temperature that high before. Pt report taking Carafate due to diverticulitis symptoms. Pt states Carafate has been offering some relief of symptoms. Pt notes she had lost 10 lbs, but has gained about 8 lbs of this back with sports drinks, protein shakes, and eating more than broth. Pt states she had a friend come down with encephalitis in his brain. Pt denies contact with persons with similar symptoms. Pt states her stomach is not back to baseline, but is better. Pt states she received a letter stating she was due to Dexa. Pt notes she was never told the results of last Dexa on 10/4/16 at Radiology on [facility]. Pt reports Actonel was discontinued because she was told she no longer needed it. Osteopenia, no significant chance compared to prior study. Pt endorses taking Calcium and Vitamin D. Pt denies exercise. Pt reports having injections in bilateral knees with Dr. Cynthia Lozano (orthopedics) recently. Pt reports having an echocardiogram and heart monitor with Dr. Toni Echevarria (cardiology) about a month ago that did not have significant results. Pt states she will f/u in a few weeks. Pt endorses suffering from seasonal allergies. Pt's Hgb A1C was 6.6 on 12/7/16.  Pt claims she tried to stop eating ice cream.     Pt reports her oncologist said her PCP could do blood work and refills. Pt reports she last had cancer in 1998. At this time, she is otherwise doing well and has brought no other complaints to my attention today. For a list of the medical issues addressed today, see the assessment and plan below. PMH:   Past Medical History:   Diagnosis Date    Arthritis     Cancer ([facility] [address]     breast    (leg,ear,arm-skin cancer)    GERD (gastroesophageal reflux disease)     Hypertension     Other ill-defined conditions     glaucoma    Other ill-defined conditions     cellulitis left arm    Other ill-defined conditions     carpal tunnel syndrome left hand    Other ill-defined conditions     vertigo     PSH:  has a past surgical history that includes mastectomy; efren and bso; cataract removal; tonsillectomy; knee arthroscopy; appendectomy; other surgical; colonoscopy (4/2012); other surgical; carpal tunnel release; cardiac catheterization (1/10/2013); and vascular surgery procedure unlist.    All: is allergic to augmentin [amoxicillin-pot clavulanate]; doxycycline; keflex [cephalexin]; monodox [doxycycline monohydrate]; and sulfa (sulfonamide antibiotics). MEDS:   Current Outpatient Prescriptions   Medication Sig    hydroCHLOROthiazide (MICROZIDE) 12.5 mg capsule TAKE 1 CAPSULE ORALLY DAILY    rosuvastatin (CRESTOR) 20 mg tablet TAKE 1 TABLET NIGHTLY    apixaban (ELIQUIS) 5 mg tablet Take 1 Tab by mouth every twelve (12) hours. To prevent stroke in atrial fib    ALPHAGAN P 0.1 % ophthalmic solution INSTILL 1 DROP INTO BOTH EYES TWICE A DAY AS DIRECTED    sucralfate (CARAFATE) 100 mg/mL suspension Take  by mouth four (4) times daily.  nebivolol (BYSTOLIC) 5 mg tablet Take 1 Tab by mouth daily. Reduced due to slow HR    timolol (TIMOPTIC-XE) 0.5 % ophthalmic gel-forming Administer 1 Drop to both eyes daily.  CYANOCOBALAMIN, VITAMIN B-12, (VITAMIN B-12 PO) Take 1,000 mcg by mouth daily.     latanoprost (XALATAN) 0.005 % ophthalmic solution Administer 1 Drop to both eyes nightly.  cycloSPORINE (RESTASIS) 0.05 % ophthalmic emulsion Administer 1 Drop to both eyes two (2) times a day.  VIT C/E/ZN/COPPR/LUTEIN/ZEAXAN (PRESERVISION AREDS 2 PO) Take 2 capsules by mouth two (2) times a day.  esomeprazole (NEXIUM) 40 mg capsule Take 1 Cap by mouth Daily (before breakfast).  co-enzyme Q-10 (CO Q-10) 100 mg capsule Take 100 mg by mouth daily.  Cholecalciferol, Vitamin D3, (VITAMIN D3) 1,000 unit cap Take 1 Cap by mouth two (2) times a day.  acetaminophen (TYLENOL) 325 mg tablet Take 1 Tab by mouth two (2) times daily as needed for Pain.  aspirin 81 mg chewable tablet Take 81 mg by mouth daily.  omega-3 fatty acids-vitamin e (FISH OIL) 1,000 mg Cap Take 1 Cap by mouth two (2) times a day.  CALCIUM CARBONATE/VITAMIN D3 (CALCIUM 600 + D,3, PO) Take 1 Cap by mouth two (2) times a day.  multivitamins-minerals-lutein (CENTRUM SILVER) Tab Take 1 Tab by mouth daily. No current facility-administered medications for this visit. FH: family history includes Cancer in her sister; Diabetes in her sister and sister; Heart Disease in her brother, brother, father, mother, sister, sister, and son; Other in her brother; Stroke in her brother. SH:  reports that she quit smoking about 62 years ago. She has never used smokeless tobacco. She reports that she drinks alcohol. She reports that she does not use illicit drugs.      Review of Systems - History obtained from the patient  General ROS: negative  Psychological ROS: negative  Ophthalmic ROS: negative  ENT ROS: negative  Respiratory ROS: no cough, shortness of breath, or wheezing  Cardiovascular ROS: no chest pain or dyspnea on exertion  Gastrointestinal ROS: no abdominal pain, change in bowel habits, or black or bloody stools  Genito-Urinary ROS: negative  Musculoskeletal ROS: negative  Neurological ROS: negative  Dermatological ROS: negative    OBJECTIVE:   Vitals:   Visit Vitals    /65    Pulse (!) 58  Temp 97.7 °F (36.5 °C) (Oral)    Resp 15    Ht 5' 3\" (1.6 m)    Wt 159 lb 3.2 oz (72.2 kg)    SpO2 96%    BMI 28.2 kg/m2      Gen: Pleasant 80 y.o.  female in NAD. HEENT: NC/AT. HEART: RRR, No M/G/R. LUNGS: CTAB No W/R. EXTREMITIES: Warm. No C/C/E. NEURO: Alert and oriented x 3. Cranial nerves grossly intact. No focal sensory or motor deficits noted. SKIN: Warm. Dry. No rashes or other lesions noted. ASSESSMENT/ PLAN:   [facility] was seen today for cholesterol problem, hypertension and results. Diagnoses and all orders for this visit:    Hypercholesteremia  -     LIPID PANEL    Essential hypertension    Elevated hemoglobin A1c  -     HEMOGLOBIN A1C WITH EAG      Increase Calcium and Vitamin D. I advised patient to exercise 3x/week. I ordered an Hgb A1C lab for monitoring of elevated Hgb A1C. I ordered a lipid panel for monitoring of hld. Pt will return tomorrow morning to have labs done fasting. I advised the pt to start checking her home BP twice daily at different time intervals and to report this data back to me so I can monitor her BP to see if it is trending down. I want pt's SBP to be under 140. Pt will f/u in 6 months for htn and hld. Pt may have a BP check tomorrow when she comes in for labs. Follow-up Disposition:  Return in about 6 months (around 12/7/2017) for follow up htn. I have reviewed the patient's medications and risks/side effects/benefits were discussed. Diagnosis(-es) explained to patient and questions answered. Literature provided where appropriate.      Written by Sarai Schmid, as dictated by Michael Cavazos MD.

## 2017-06-07 NOTE — MR AVS SNAPSHOT
Visit Information Date & Time Provider Department Dept. Phone Encounter #  
 6/7/2017 10:00 AM Erika Knutson, [address] [id number] Follow-up Instructions Return in about 6 months (around 12/7/2017) for follow up htn. Your Appointments 6/26/2017 10:45 AM  
3 MONTH with MD [facility]) Appt Note: Per Dr Garcia Bar $0CP REM; 5/16/17 LM w/ new appt info   [address] [id number] [facility]  
[phone] [id number] [facility] Upcoming Health Maintenance Date Due ZOSTER VACCINE AGE 60> 1/18/1987 INFLUENZA AGE 9 TO ADULT 8/1/2017 MEDICARE YEARLY EXAM 9/8/2017 GLAUCOMA SCREENING Q2Y 12/1/2018 DTaP/Tdap/Td series (2 - Td) 2/12/2026 Allergies as of 6/7/2017  Review Complete On: 6/7/2017 By: Erika Knutson MD  
  
 Severity Noted Reaction Type Reactions Augmentin [Amoxicillin-pot Clavulanate] Medium 02/22/2016   Systemic Hives, Itching Doxycycline Medium 02/22/2016   Systemic Hives, Itching Keflex [Cephalexin] Medium 02/22/2016   Systemic Hives, Itching Monodox [Doxycycline Monohydrate]  03/04/2016    Other (comments) Sulfa (Sulfonamide Antibiotics)  04/10/2012   Side Effect Rash Current Immunizations  Reviewed on 11/7/2012 Name Date Influenza Vaccine PF 10/17/2013  2:56 PM  
 Influenza Vaccine Split 9/1/2012 Pneumococcal Conjugate (PCV-13) 8/15/2015 Pneumococcal Polysaccharide (PPSV-23) 10/17/2013  2:58 PM  
 Pneumococcal Vaccine (Unspecified Type) 1/1/2009 Tdap 2/12/2016 Not reviewed this visit You Were Diagnosed With   
  
 Codes Comments Hypercholesteremia    -  Primary ICD-10-CM: E78.00 ICD-9-CM: 272.0 Essential hypertension     ICD-10-CM: I10 
ICD-9-CM: 401.9 Elevated hemoglobin A1c     ICD-10-CM: R73.09 
ICD-9-CM: 790.29 Vitals BP Pulse Temp Resp Height(growth percentile) Weight(growth percentile) 160/65 (!) 58 97.7 °F (36.5 °C) (Oral) 15 5' 3\" (1.6 m) 159 lb 3.2 oz (72.2 kg) SpO2 BMI OB Status Smoking Status 96% 28.2 kg/m2 Hysterectomy Former Smoker Vitals History BMI and BSA Data Body Mass Index Body Surface Area  
 28.2 kg/m 2 1.79 m 2 Preferred Pharmacy Pharmacy Name Phone [id number] [place] [id number] [facility] [phone] Your Updated Medication List  
  
   
This list is accurate as of: 6/7/17 11:11 AM.  Always use your most recent med list.  
  
  
  
  
 acetaminophen 325 mg tablet Commonly known as:  TYLENOL Take 1 Tab by mouth two (2) times daily as needed for Pain. ALPHAGAN P 0.1 % ophthalmic solution Generic drug:  brimonidine INSTILL 1 DROP INTO BOTH EYES TWICE A DAY AS DIRECTED  
  
 apixaban 5 mg tablet Commonly known as:  Mike Sabillon Take 1 Tab by mouth every twelve (12) hours. To prevent stroke in atrial fib  
  
 aspirin 81 mg chewable tablet Take 81 mg by mouth daily. CALCIUM 600 + D(3) PO Take 1 Cap by mouth two (2) times a day. CARAFATE 100 mg/mL suspension Generic drug:  sucralfate Take  by mouth four (4) times daily. CENTRUM SILVER Tab tablet Generic drug:  multivitamins-minerals-lutein Take 1 Tab by mouth daily. CO Q-10 100 mg capsule Generic drug:  co-enzyme Q-10 Take 100 mg by mouth daily. esomeprazole 40 mg capsule Commonly known as:  NexIUM Take 1 Cap by mouth Daily (before breakfast). FISH OIL 1,000 mg Cap Generic drug:  omega-3 fatty acids-vitamin e Take 1 Cap by mouth two (2) times a day. hydroCHLOROthiazide 12.5 mg capsule Commonly known as:  Dorethea Ekron TAKE 1 CAPSULE ORALLY DAILY  
  
 latanoprost 0.005 % ophthalmic solution Commonly known as:  Travis Bryant Administer 1 Drop to both eyes nightly. nebivolol 5 mg tablet Commonly known as:  BYSTOLIC Take 1 Tab by mouth daily. Reduced due to slow HR  
  
 PRESERVISION AREDS 2 PO Take 2 capsules by mouth two (2) times a day. RESTASIS 0.05 % ophthalmic emulsion Generic drug:  cycloSPORINE Administer 1 Drop to both eyes two (2) times a day. rosuvastatin 20 mg tablet Commonly known as:  CRESTOR  
TAKE 1 TABLET NIGHTLY  
  
 timolol 0.5 % ophthalmic gel-forming Commonly known as:  TIMOPTIC-XE Administer 1 Drop to both eyes daily. VITAMIN B-12 PO Take 1,000 mcg by mouth daily. VITAMIN D3 1,000 unit Cap Generic drug:  cholecalciferol Take 1 Cap by mouth two (2) times a day. We Performed the Following HEMOGLOBIN A1C WITH EAG [29903 CPT(R)] LIPID PANEL [20066 CPT(R)] Follow-up Instructions Return in about 6 months (around 12/7/2017) for follow up htn. Introducing [facility]! Dear Antolin Linda: 
Thank you for requesting a [email or web address] account. Our records indicate that you have previously registered for a [email or web address] account but its currently inactive. Please call our [email or web address] support line at [phone]. Additional Information If you have questions, please visit the Frequently Asked Questions section of the [email or web address] website at [email or web address] Remember, [email or web address] is NOT to be used for urgent needs. For medical emergencies, dial 911. Now available from your iPhone and Android! Please provide this summary of care documentation to your next provider. Your primary care clinician is listed as Neelam Davis. If you have any questions after today's visit, please call [phone].

## 2017-06-07 NOTE — PROGRESS NOTES
1. Have you been to the ER, urgent care clinic since your last visit? Hospitalized since your last visit? No    2. Have you seen or consulted any other health care providers outside of the [address] [place] [address] since your last visit? Include any pap smears or colon screening.  No

## 2017-06-08 ENCOUNTER — OFFICE VISIT (OUTPATIENT)
Dept: INTERNAL MEDICINE CLINIC | Age: 82
End: 2017-06-08

## 2017-06-08 ENCOUNTER — APPOINTMENT (OUTPATIENT)
Dept: INTERNAL MEDICINE CLINIC | Age: 82
End: 2017-06-08

## 2017-06-08 ENCOUNTER — HOSPITAL ENCOUNTER (OUTPATIENT)
Dept: LAB | Age: 82
Discharge: HOME | End: 2017-06-08
Payer: MEDICARE

## 2017-06-08 VITALS
RESPIRATION RATE: 18 BRPM | WEIGHT: 159 LBS | HEIGHT: 63 IN | OXYGEN SATURATION: 99 % | SYSTOLIC BLOOD PRESSURE: 160 MMHG | BODY MASS INDEX: 28.17 KG/M2 | HEART RATE: 70 BPM | DIASTOLIC BLOOD PRESSURE: 60 MMHG | TEMPERATURE: 98.1 F

## 2017-06-08 DIAGNOSIS — I10 ESSENTIAL HYPERTENSION: Primary | ICD-10-CM

## 2017-06-08 PROCEDURE — 36415 COLL VENOUS BLD VENIPUNCTURE: CPT

## 2017-06-08 PROCEDURE — 83036 HEMOGLOBIN GLYCOSYLATED A1C: CPT

## 2017-06-08 PROCEDURE — 80061 LIPID PANEL: CPT

## 2017-06-08 NOTE — PROGRESS NOTES
CC: Hypertension      HPI:    She is a 80 y.o. female who presents for evaluation of HTN    She las seen 06/07 yesterday and blood pressure was high but improved towards end of visit to 160/65 at end of visit. Patient came in for blood work and BP check with nurse visit -given high blood pressure requested to see MD.   Today BP was 190/70 leading to scheduling acute visit  initially then repeat 170 /60  Typically blood pressure is 140s   Currently takign the bystolic at bedtime and HCTZ in the morning   Recently started on blood thinner apixaban for atrial fibrillation   Repeat blood pressure today is 160/60  Reviewed diet and noted that  Eating canned soups daily - discussed this causes elevation in blood pressure     ROS:  Denies chest pain , dizziness   10 systems reviewed and positive for chronic knee pain otherwise negative    Past Medical History:   Diagnosis Date    Arthritis     Cancer ([facility] [address]     breast    (leg,ear,arm-skin cancer)    GERD (gastroesophageal reflux disease)     Hypertension     Other ill-defined conditions     glaucoma    Other ill-defined conditions     cellulitis left arm    Other ill-defined conditions     carpal tunnel syndrome left hand    Other ill-defined conditions     vertigo       Current Outpatient Prescriptions on File Prior to Visit   Medication Sig Dispense Refill    hydroCHLOROthiazide (MICROZIDE) 12.5 mg capsule TAKE 1 CAPSULE ORALLY DAILY 90 Cap 3    rosuvastatin (CRESTOR) 20 mg tablet TAKE 1 TABLET NIGHTLY 90 Tab 1    apixaban (ELIQUIS) 5 mg tablet Take 1 Tab by mouth every twelve (12) hours. To prevent stroke in atrial fib 180 Tab 3    ALPHAGAN P 0.1 % ophthalmic solution INSTILL 1 DROP INTO BOTH EYES TWICE A DAY AS DIRECTED  1    sucralfate (CARAFATE) 100 mg/mL suspension Take  by mouth four (4) times daily.  nebivolol (BYSTOLIC) 5 mg tablet Take 1 Tab by mouth daily.  Reduced due to slow HR 90 Tab 1    timolol (TIMOPTIC-XE) 0.5 % ophthalmic gel-forming Administer 1 Drop to both eyes daily.  CYANOCOBALAMIN, VITAMIN B-12, (VITAMIN B-12 PO) Take 1,000 mcg by mouth daily.  latanoprost (XALATAN) 0.005 % ophthalmic solution Administer 1 Drop to both eyes nightly.  cycloSPORINE (RESTASIS) 0.05 % ophthalmic emulsion Administer 1 Drop to both eyes two (2) times a day.  VIT C/E/ZN/COPPR/LUTEIN/ZEAXAN (PRESERVISION AREDS 2 PO) Take 2 capsules by mouth two (2) times a day.  esomeprazole (NEXIUM) 40 mg capsule Take 1 Cap by mouth Daily (before breakfast). 90 Cap 1    co-enzyme Q-10 (CO Q-10) 100 mg capsule Take 100 mg by mouth daily.  Cholecalciferol, Vitamin D3, (VITAMIN D3) 1,000 unit cap Take 1 Cap by mouth two (2) times a day.  acetaminophen (TYLENOL) 325 mg tablet Take 1 Tab by mouth two (2) times daily as needed for Pain. 30 Tab 0    aspirin 81 mg chewable tablet Take 81 mg by mouth daily.  omega-3 fatty acids-vitamin e (FISH OIL) 1,000 mg Cap Take 1 Cap by mouth two (2) times a day.  CALCIUM CARBONATE/VITAMIN D3 (CALCIUM 600 + D,3, PO) Take 1 Cap by mouth two (2) times a day.  multivitamins-minerals-lutein (CENTRUM SILVER) Tab Take 1 Tab by mouth daily. No current facility-administered medications on file prior to visit. Past Surgical History:   Procedure Laterality Date    CARDIAC CATHETERIZATION  1/10/2013         HX APPENDECTOMY      HX CARPAL TUNNEL RELEASE      left hand    HX CATARACT REMOVAL      bilateral    HX COLONOSCOPY  2012    HX KNEE ARTHROSCOPY      left    HX MASTECTOMY      bilateral    HX OTHER SURGICAL      Basal cell skin cancer removed from left leg, left arm and neck    HX OTHER SURGICAL      cyst removed from left thigh    HX OREN AND BSO      HX TONSILLECTOMY      VASCULAR SURGERY PROCEDURE UNLIST      vascular blockages removed.        Family History   Problem Relation Age of Onset    Heart Disease Mother     Heart Disease Father       age 48    Heart Disease Brother     Stroke Brother     Cancer Sister      Colon Cancer    Diabetes Sister     Heart Disease Sister     Heart Disease Sister     Diabetes Sister     Other Brother      MVA    Heart Disease Brother     Heart Disease Son       age 52     Reviewed and no changes     Social History     Social History    Marital status:      Spouse name: N/A    Number of children: N/A    Years of education: N/A     Occupational History    Not on file.      Social History Main Topics    Smoking status: Former Smoker     Quit date: 1955    Smokeless tobacco: Never Used    Alcohol use Yes      Comment: rare    Drug use: No    Sexual activity: No     Other Topics Concern    Not on file     Social History Narrative            Visit Vitals    /60    Pulse 70    Temp 98.1 °F (36.7 °C) (Oral)    Resp 18    Ht 5' 3\" (1.6 m)    Wt 159 lb (72.1 kg)    SpO2 99%    BMI 28.17 kg/m2       Physical Examination:   General - Well appearing female  HEENT - PERRL, TM no erythema/opacification, normal nasal turbinates, oropharynx no erythema or exudate, MMM  Neck - supple, no bruits, no TMG, no LAD  Pulm - clear to auscultation bilaterally  Cardio - RRR, normal S1 S2, no murmur gallops or rubs  Abd - soft, nontender, no masses, no HSM  Extrem - no edema, +2 distal pulses  Psych - normal affect, appropriate mood    Lab Results   Component Value Date/Time    WBC 6.4 2017 02:42 PM    HGB 13.2 2017 02:42 PM    HCT 38.6 2017 02:42 PM    PLATELET 347  02:42 PM    MCV 86 2017 02:42 PM     Lab Results   Component Value Date/Time    Sodium 137 2017 02:42 PM    Potassium 4.5 2017 02:42 PM    Chloride 96 2017 02:42 PM    CO2 26 2017 02:42 PM    Anion gap 8 2016 03:30 AM    Glucose 92 2017 02:42 PM    BUN 12 2017 02:42 PM    Creatinine 0.94 2017 02:42 PM    BUN/Creatinine ratio 13 2017 02:42 PM    GFR est AA 62 2017 02:42 PM GFR est non-AA 54 04/12/2017 02:42 PM    Calcium 9.7 04/12/2017 02:42 PM     Lab Results   Component Value Date/Time    Cholesterol, total 137 09/08/2016 08:51 AM    HDL Cholesterol 45 09/08/2016 08:51 AM    LDL,Direct 85 10/30/2014 09:36 AM    LDL, calculated 59 09/08/2016 08:51 AM    VLDL, calculated 33 09/08/2016 08:51 AM    Triglyceride 164 09/08/2016 08:51 AM     Lab Results   Component Value Date/Time    TSH 3.320 02/06/2014 09:11 AM     No results found for: PSA, PSA2, PSAR1, Amber Makos, [id number], [id number], PSALT  Lab Results   Component Value Date/Time    Hemoglobin A1c 6.6 12/07/2016 09:11 AM    Hemoglobin A1c (POC) 5.5 04/16/2013 03:58 PM     Lab Results   Component Value Date/Time    VITAMIN D, 25-HYDROXY 35.6 10/30/2014 09:36 AM       Lab Results   Component Value Date/Time    ALT (SGPT) 25 03/04/2016 02:38 PM    AST (SGOT) 24 03/04/2016 02:38 PM    Alk. phosphatase 71 03/04/2016 02:38 PM    Bilirubin, direct 0.13 03/12/2014 09:44 AM    Bilirubin, total 0.3 03/04/2016 02:38 PM           Assessment/Plan:    1. Essential hypertension  - initially blood pressure was 190 /70 then repeat after resting is 160/60. Patient is asymptomatic  We identified that she is eating more canned soups due to recent diverticulitis and this is likely causing increase in BP  Advised to stop canned soups  Continue HCTZ and Bystolic for blood pressure  Advised to monitor BP at home and if higher than 160/70 - advised to call       Follow-up Disposition:  Return in about 3 months (around 9/8/2017) for with Dr Tucker Lloyd for blood pressure.     Rory Harrington MD

## 2017-06-08 NOTE — PATIENT INSTRUCTIONS
DASH Diet: Care Instructions  Your Care Instructions  The DASH diet is an eating plan that can help lower your blood pressure. DASH stands for Dietary Approaches to Stop Hypertension. Hypertension is high blood pressure. The DASH diet focuses on eating foods that are high in calcium, potassium, and magnesium. These nutrients can lower blood pressure. The foods that are highest in these nutrients are fruits, vegetables, low-fat dairy products, nuts, seeds, and legumes. But taking calcium, potassium, and magnesium supplements instead of eating foods that are high in those nutrients does not have the same effect. The DASH diet also includes whole grains, fish, and poultry. The DASH diet is one of several lifestyle changes your doctor may recommend to lower your high blood pressure. Your doctor may also want you to decrease the amount of sodium in your diet. Lowering sodium while following the DASH diet can lower blood pressure even further than just the DASH diet alone. Follow-up care is a key part of your treatment and safety. Be sure to make and go to all appointments, and call your doctor if you are having problems. It's also a good idea to know your test results and keep a list of the medicines you take. How can you care for yourself at home? Following the DASH diet  · Eat 4 to 5 servings of fruit each day. A serving is 1 medium-sized piece of fruit, ½ cup chopped or canned fruit, 1/4 cup dried fruit, or 4 ounces (½ cup) of fruit juice. Choose fruit more often than fruit juice. · Eat 4 to 5 servings of vegetables each day. A serving is 1 cup of lettuce or raw leafy vegetables, ½ cup of chopped or cooked vegetables, or 4 ounces (½ cup) of vegetable juice. Choose vegetables more often than vegetable juice. · Get 2 to 3 servings of low-fat and fat-free dairy each day. A serving is 8 ounces of milk, 1 cup of yogurt, or 1 ½ ounces of cheese. · Eat 6 to 8 servings of grains each day.  A serving is 1 slice of bread, 1 ounce of dry cereal, or ½ cup of cooked rice, pasta, or cooked cereal. Try to choose whole-grain products as much as possible. · Limit lean meat, poultry, and fish to 2 servings each day. A serving is 3 ounces, about the size of a deck of cards. · Eat 4 to 5 servings of nuts, seeds, and legumes (cooked dried beans, lentils, and split peas) each week. A serving is 1/3 cup of nuts, 2 tablespoons of seeds, or ½ cup of cooked beans or peas. · Limit fats and oils to 2 to 3 servings each day. A serving is 1 teaspoon of vegetable oil or 2 tablespoons of salad dressing. · Limit sweets and added sugars to 5 servings or less a week. A serving is 1 tablespoon jelly or jam, ½ cup sorbet, or 1 cup of lemonade. · Eat less than 2,300 milligrams (mg) of sodium a day. If you limit your sodium to 1,500 mg a day, you can lower your blood pressure even more. Tips for success  · Start small. Do not try to make dramatic changes to your diet all at once. You might feel that you are missing out on your favorite foods and then be more likely to not follow the plan. Make small changes, and stick with them. Once those changes become habit, add a few more changes. · Try some of the following:  ¨ Make it a goal to eat a fruit or vegetable at every meal and at snacks. This will make it easy to get the recommended amount of fruits and vegetables each day. ¨ Try yogurt topped with fruit and nuts for a snack or healthy dessert. ¨ Add lettuce, tomato, cucumber, and onion to sandwiches. ¨ Combine a ready-made pizza crust with low-fat mozzarella cheese and lots of vegetable toppings. Try using tomatoes, squash, spinach, broccoli, carrots, cauliflower, and onions. ¨ Have a variety of cut-up vegetables with a low-fat dip as an appetizer instead of chips and dip. ¨ Sprinkle sunflower seeds or chopped almonds over salads. Or try adding chopped walnuts or almonds to cooked vegetables. ¨ Try some vegetarian meals using beans and peas. Add garbanzo or kidney beans to salads. Make burritos and tacos with mashed franklin beans or black beans. Where can you learn more? Go to http://kristy-sammi.info/. Enter Y440 in the search box to learn more about \"DASH Diet: Care Instructions. \"  Current as of: March 23, 2016  Content Version: 11.2  © 5330-1840 [employer]. Care instructions adapted under license by [employer] (which disclaims liability or warranty for this information). If you have questions about a medical condition or this instruction, always ask your healthcare professional. [place] [address] any warranty or liability for your use of this information. DASH Diet: Care Instructions  Your Care Instructions  The DASH diet is an eating plan that can help lower your blood pressure. DASH stands for Dietary Approaches to Stop Hypertension. Hypertension is high blood pressure. The DASH diet focuses on eating foods that are high in calcium, potassium, and magnesium. These nutrients can lower blood pressure. The foods that are highest in these nutrients are fruits, vegetables, low-fat dairy products, nuts, seeds, and legumes. But taking calcium, potassium, and magnesium supplements instead of eating foods that are high in those nutrients does not have the same effect. The DASH diet also includes whole grains, fish, and poultry. The DASH diet is one of several lifestyle changes your doctor may recommend to lower your high blood pressure. Your doctor may also want you to decrease the amount of sodium in your diet. Lowering sodium while following the DASH diet can lower blood pressure even further than just the DASH diet alone. Follow-up care is a key part of your treatment and safety. Be sure to make and go to all appointments, and call your doctor if you are having problems. It's also a good idea to know your test results and keep a list of the medicines you take.   How can you care for yourself at home? Following the DASH diet  · Eat 4 to 5 servings of fruit each day. A serving is 1 medium-sized piece of fruit, ½ cup chopped or canned fruit, 1/4 cup dried fruit, or 4 ounces (½ cup) of fruit juice. Choose fruit more often than fruit juice. · Eat 4 to 5 servings of vegetables each day. A serving is 1 cup of lettuce or raw leafy vegetables, ½ cup of chopped or cooked vegetables, or 4 ounces (½ cup) of vegetable juice. Choose vegetables more often than vegetable juice. · Get 2 to 3 servings of low-fat and fat-free dairy each day. A serving is 8 ounces of milk, 1 cup of yogurt, or 1 ½ ounces of cheese. · Eat 6 to 8 servings of grains each day. A serving is 1 slice of bread, 1 ounce of dry cereal, or ½ cup of cooked rice, pasta, or cooked cereal. Try to choose whole-grain products as much as possible. · Limit lean meat, poultry, and fish to 2 servings each day. A serving is 3 ounces, about the size of a deck of cards. · Eat 4 to 5 servings of nuts, seeds, and legumes (cooked dried beans, lentils, and split peas) each week. A serving is 1/3 cup of nuts, 2 tablespoons of seeds, or ½ cup of cooked beans or peas. · Limit fats and oils to 2 to 3 servings each day. A serving is 1 teaspoon of vegetable oil or 2 tablespoons of salad dressing. · Limit sweets and added sugars to 5 servings or less a week. A serving is 1 tablespoon jelly or jam, ½ cup sorbet, or 1 cup of lemonade. · Eat less than 2,300 milligrams (mg) of sodium a day. If you limit your sodium to 1,500 mg a day, you can lower your blood pressure even more. Tips for success  · Start small. Do not try to make dramatic changes to your diet all at once. You might feel that you are missing out on your favorite foods and then be more likely to not follow the plan. Make small changes, and stick with them. Once those changes become habit, add a few more changes.   · Try some of the following:  ¨ Make it a goal to eat a fruit or vegetable at every meal and at snacks. This will make it easy to get the recommended amount of fruits and vegetables each day. ¨ Try yogurt topped with fruit and nuts for a snack or healthy dessert. ¨ Add lettuce, tomato, cucumber, and onion to sandwiches. ¨ Combine a ready-made pizza crust with low-fat mozzarella cheese and lots of vegetable toppings. Try using tomatoes, squash, spinach, broccoli, carrots, cauliflower, and onions. ¨ Have a variety of cut-up vegetables with a low-fat dip as an appetizer instead of chips and dip. ¨ Sprinkle sunflower seeds or chopped almonds over salads. Or try adding chopped walnuts or almonds to cooked vegetables. ¨ Try some vegetarian meals using beans and peas. Add garbanzo or kidney beans to salads. Make burritos and tacos with mashed franklin beans or black beans. Where can you learn more? Go to http://kristy-sammi.info/. Enter O069 in the search box to learn more about \"DASH Diet: Care Instructions. \"  Current as of: March 23, 2016  Content Version: 11.2  © 3881-7899 [employer]. Care instructions adapted under license by [employer] (which disclaims liability or warranty for this information). If you have questions about a medical condition or this instruction, always ask your healthcare professional. [place] [address] any warranty or liability for your use of this information. DASH Diet: Care Instructions  Your Care Instructions  The DASH diet is an eating plan that can help lower your blood pressure. DASH stands for Dietary Approaches to Stop Hypertension. Hypertension is high blood pressure. The DASH diet focuses on eating foods that are high in calcium, potassium, and magnesium. These nutrients can lower blood pressure. The foods that are highest in these nutrients are fruits, vegetables, low-fat dairy products, nuts, seeds, and legumes.  But taking calcium, potassium, and magnesium supplements instead of eating foods that are high in those nutrients does not have the same effect. The DASH diet also includes whole grains, fish, and poultry. The DASH diet is one of several lifestyle changes your doctor may recommend to lower your high blood pressure. Your doctor may also want you to decrease the amount of sodium in your diet. Lowering sodium while following the DASH diet can lower blood pressure even further than just the DASH diet alone. Follow-up care is a key part of your treatment and safety. Be sure to make and go to all appointments, and call your doctor if you are having problems. It's also a good idea to know your test results and keep a list of the medicines you take. How can you care for yourself at home? Following the DASH diet  · Eat 4 to 5 servings of fruit each day. A serving is 1 medium-sized piece of fruit, ½ cup chopped or canned fruit, 1/4 cup dried fruit, or 4 ounces (½ cup) of fruit juice. Choose fruit more often than fruit juice. · Eat 4 to 5 servings of vegetables each day. A serving is 1 cup of lettuce or raw leafy vegetables, ½ cup of chopped or cooked vegetables, or 4 ounces (½ cup) of vegetable juice. Choose vegetables more often than vegetable juice. · Get 2 to 3 servings of low-fat and fat-free dairy each day. A serving is 8 ounces of milk, 1 cup of yogurt, or 1 ½ ounces of cheese. · Eat 6 to 8 servings of grains each day. A serving is 1 slice of bread, 1 ounce of dry cereal, or ½ cup of cooked rice, pasta, or cooked cereal. Try to choose whole-grain products as much as possible. · Limit lean meat, poultry, and fish to 2 servings each day. A serving is 3 ounces, about the size of a deck of cards. · Eat 4 to 5 servings of nuts, seeds, and legumes (cooked dried beans, lentils, and split peas) each week. A serving is 1/3 cup of nuts, 2 tablespoons of seeds, or ½ cup of cooked beans or peas. · Limit fats and oils to 2 to 3 servings each day.  A serving is 1 teaspoon of vegetable oil or 2 tablespoons of salad dressing. · Limit sweets and added sugars to 5 servings or less a week. A serving is 1 tablespoon jelly or jam, ½ cup sorbet, or 1 cup of lemonade. · Eat less than 2,300 milligrams (mg) of sodium a day. If you limit your sodium to 1,500 mg a day, you can lower your blood pressure even more. Tips for success  · Start small. Do not try to make dramatic changes to your diet all at once. You might feel that you are missing out on your favorite foods and then be more likely to not follow the plan. Make small changes, and stick with them. Once those changes become habit, add a few more changes. · Try some of the following:  ¨ Make it a goal to eat a fruit or vegetable at every meal and at snacks. This will make it easy to get the recommended amount of fruits and vegetables each day. ¨ Try yogurt topped with fruit and nuts for a snack or healthy dessert. ¨ Add lettuce, tomato, cucumber, and onion to sandwiches. ¨ Combine a ready-made pizza crust with low-fat mozzarella cheese and lots of vegetable toppings. Try using tomatoes, squash, spinach, broccoli, carrots, cauliflower, and onions. ¨ Have a variety of cut-up vegetables with a low-fat dip as an appetizer instead of chips and dip. ¨ Sprinkle sunflower seeds or chopped almonds over salads. Or try adding chopped walnuts or almonds to cooked vegetables. ¨ Try some vegetarian meals using beans and peas. Add garbanzo or kidney beans to salads. Make burritos and tacos with mashed franklin beans or black beans. Where can you learn more? Go to http://kristy-sammi.info/. Enter O213 in the search box to learn more about \"DASH Diet: Care Instructions. \"  Current as of: March 23, 2016  Content Version: 11.2  © 7419-7108 [employer]. Care instructions adapted under license by [employer] (which disclaims liability or warranty for this information).  If you have questions about a medical condition or this instruction, always ask your healthcare professional. Nilsa Hebert disclaims any warranty or liability for your use of this information.  ----------------------------------  Decrease salt intake - stop the canned soups     Measure BP daily and if higher than 160 ( the top number) advised to call

## 2017-06-08 NOTE — PROGRESS NOTES
Chief Complaint   Patient presents with    Hypertension     1. Have you been to the ER, urgent care clinic since your last visit? Hospitalized since your last visit? No    2. Have you seen or consulted any other health care providers outside of the [address] [place] [address] since your last visit? Include any pap smears or colon screening.  No

## 2017-06-08 NOTE — MR AVS SNAPSHOT
Visit Information Date & Time Provider Department Dept. Phone Encounter #  
 6/8/2017  8:45 AM Gm Amato, [address] [phone] [id number] Follow-up Instructions Return in about 6 months (around 12/8/2017) for with Dr Van Mcneil for blood pressure. Your Appointments 6/26/2017 10:45 AM  
3 MONTH with MD [facility]) Appt Note: Per Dr Villarreal Deal $0CP REM; 5/16/17 LM w/ new appt info   [address]  
[phone] [address] Upcoming Health Maintenance Date Due ZOSTER VACCINE AGE 60> 1/18/1987 INFLUENZA AGE 9 TO ADULT 8/1/2017 MEDICARE YEARLY EXAM 9/8/2017 GLAUCOMA SCREENING Q2Y 12/1/2018 DTaP/Tdap/Td series (2 - Td) 2/12/2026 Allergies as of 6/8/2017  Review Complete On: 6/8/2017 By: Cristiana Chavira Severity Noted Reaction Type Reactions Augmentin [Amoxicillin-pot Clavulanate] Medium 02/22/2016   Systemic Hives, Itching Doxycycline Medium 02/22/2016   Systemic Hives, Itching Keflex [Cephalexin] Medium 02/22/2016   Systemic Hives, Itching Monodox [Doxycycline Monohydrate]  03/04/2016    Other (comments) Sulfa (Sulfonamide Antibiotics)  04/10/2012   Side Effect Rash Current Immunizations  Reviewed on 11/7/2012 Name Date Influenza Vaccine PF 10/17/2013  2:56 PM  
 Influenza Vaccine Split 9/1/2012 Pneumococcal Conjugate (PCV-13) 8/15/2015 Pneumococcal Polysaccharide (PPSV-23) 10/17/2013  2:58 PM  
 Pneumococcal Vaccine (Unspecified Type) 1/1/2009 Tdap 2/12/2016 Not reviewed this visit You Were Diagnosed With   
  
 Codes Comments Essential hypertension    -  Primary ICD-10-CM: I10 
ICD-9-CM: 401.9 Vitals BP Pulse Temp Resp Height(growth percentile) Weight(growth percentile) 160/60 70 98.1 °F (36.7 °C) (Oral) 18 5' 3\" (1.6 m) 159 lb (72.1 kg) SpO2 BMI OB Status Smoking Status 99% 28.17 kg/m2 Hysterectomy Former Smoker Vitals History BMI and BSA Data Body Mass Index Body Surface Area  
 28.17 kg/m 2 1.79 m 2 Preferred Pharmacy Pharmacy Name Phone [id number]- [place], [facility] [phone] Your Updated Medication List  
  
   
This list is accurate as of: 6/8/17  9:32 AM.  Always use your most recent med list.  
  
  
  
  
 acetaminophen 325 mg tablet Commonly known as:  TYLENOL Take 1 Tab by mouth two (2) times daily as needed for Pain. ALPHAGAN P 0.1 % ophthalmic solution Generic drug:  brimonidine INSTILL 1 DROP INTO BOTH EYES TWICE A DAY AS DIRECTED  
  
 apixaban 5 mg tablet Commonly known as:  Crystal Lint Take 1 Tab by mouth every twelve (12) hours. To prevent stroke in atrial fib  
  
 aspirin 81 mg chewable tablet Take 81 mg by mouth daily. CALCIUM 600 + D(3) PO Take 1 Cap by mouth two (2) times a day. CARAFATE 100 mg/mL suspension Generic drug:  sucralfate Take  by mouth four (4) times daily. CENTRUM SILVER Tab tablet Generic drug:  multivitamins-minerals-lutein Take 1 Tab by mouth daily. CO Q-10 100 mg capsule Generic drug:  co-enzyme Q-10 Take 100 mg by mouth daily. esomeprazole 40 mg capsule Commonly known as:  NexIUM Take 1 Cap by mouth Daily (before breakfast). FISH OIL 1,000 mg Cap Generic drug:  omega-3 fatty acids-vitamin e Take 1 Cap by mouth two (2) times a day. hydroCHLOROthiazide 12.5 mg capsule Commonly known as:  Lesa [place] TAKE 1 CAPSULE ORALLY DAILY  
  
 latanoprost 0.005 % ophthalmic solution Commonly known as:  David [place] Administer 1 Drop to both eyes nightly. nebivolol 5 mg tablet Commonly known as:  BYSTOLIC  
 Take 1 Tab by mouth daily. Reduced due to slow HR  
  
 PRESERVISION AREDS 2 PO Take 2 capsules by mouth two (2) times a day. RESTASIS 0.05 % ophthalmic emulsion Generic drug:  cycloSPORINE Administer 1 Drop to both eyes two (2) times a day. rosuvastatin 20 mg tablet Commonly known as:  CRESTOR  
TAKE 1 TABLET NIGHTLY  
  
 timolol 0.5 % ophthalmic gel-forming Commonly known as:  TIMOPTIC-XE Administer 1 Drop to both eyes daily. VITAMIN B-12 PO Take 1,000 mcg by mouth daily. VITAMIN D3 1,000 unit Cap Generic drug:  cholecalciferol Take 1 Cap by mouth two (2) times a day. Follow-up Instructions Return in about 6 months (around 12/8/2017) for with Dr Van Mcneil for blood pressure. Patient Instructions DASH Diet: Care Instructions Your Care Instructions The DASH diet is an eating plan that can help lower your blood pressure. DASH stands for Dietary Approaches to Stop Hypertension. Hypertension is high blood pressure. The DASH diet focuses on eating foods that are high in calcium, potassium, and magnesium. These nutrients can lower blood pressure. The foods that are highest in these nutrients are fruits, vegetables, low-fat dairy products, nuts, seeds, and legumes. But taking calcium, potassium, and magnesium supplements instead of eating foods that are high in those nutrients does not have the same effect. The DASH diet also includes whole grains, fish, and poultry. The DASH diet is one of several lifestyle changes your doctor may recommend to lower your high blood pressure. Your doctor may also want you to decrease the amount of sodium in your diet. Lowering sodium while following the DASH diet can lower blood pressure even further than just the DASH diet alone. Follow-up care is a key part of your treatment and safety.  Be sure to make and go to all appointments, and call your doctor if you are having problems. It's also a good idea to know your test results and keep a list of the medicines you take. How can you care for yourself at home? Following the DASH diet · Eat 4 to 5 servings of fruit each day. A serving is 1 medium-sized piece of fruit, ½ cup chopped or canned fruit, 1/4 cup dried fruit, or 4 ounces (½ cup) of fruit juice. Choose fruit more often than fruit juice. · Eat 4 to 5 servings of vegetables each day. A serving is 1 cup of lettuce or raw leafy vegetables, ½ cup of chopped or cooked vegetables, or 4 ounces (½ cup) of vegetable juice. Choose vegetables more often than vegetable juice. · Get 2 to 3 servings of low-fat and fat-free dairy each day. A serving is 8 ounces of milk, 1 cup of yogurt, or 1 ½ ounces of cheese. · Eat 6 to 8 servings of grains each day. A serving is 1 slice of bread, 1 ounce of dry cereal, or ½ cup of cooked rice, pasta, or cooked cereal. Try to choose whole-grain products as much as possible. · Limit lean meat, poultry, and fish to 2 servings each day. A serving is 3 ounces, about the size of a deck of cards. · Eat 4 to 5 servings of nuts, seeds, and legumes (cooked dried beans, lentils, and split peas) each week. A serving is 1/3 cup of nuts, 2 tablespoons of seeds, or ½ cup of cooked beans or peas. · Limit fats and oils to 2 to 3 servings each day. A serving is 1 teaspoon of vegetable oil or 2 tablespoons of salad dressing. · Limit sweets and added sugars to 5 servings or less a week. A serving is 1 tablespoon jelly or jam, ½ cup sorbet, or 1 cup of lemonade. · Eat less than 2,300 milligrams (mg) of sodium a day. If you limit your sodium to 1,500 mg a day, you can lower your blood pressure even more. Tips for success · Start small. Do not try to make dramatic changes to your diet all at once. You might feel that you are missing out on your favorite foods and then be more likely to not follow the plan.  Make small changes, and stick with them. Once those changes become habit, add a few more changes. · Try some of the following: ¨ Make it a goal to eat a fruit or vegetable at every meal and at snacks. This will make it easy to get the recommended amount of fruits and vegetables each day. ¨ Try yogurt topped with fruit and nuts for a snack or healthy dessert. ¨ Add lettuce, tomato, cucumber, and onion to sandwiches. ¨ Combine a ready-made pizza crust with low-fat mozzarella cheese and lots of vegetable toppings. Try using tomatoes, squash, spinach, broccoli, carrots, cauliflower, and onions. ¨ Have a variety of cut-up vegetables with a low-fat dip as an appetizer instead of chips and dip. ¨ Sprinkle sunflower seeds or chopped almonds over salads. Or try adding chopped walnuts or almonds to cooked vegetables. ¨ Try some vegetarian meals using beans and peas. Add garbanzo or kidney beans to salads. Make burritos and tacos with mashed franklin beans or black beans. Where can you learn more? Go to http://kristyBoomTownsammi.info/. Enter Q166 in the search box to learn more about \"DASH Diet: Care Instructions. \" Current as of: March 23, 2016 Content Version: 11.2 © 4995-0923 Ever[employer]. Care instructions adapted under license by [employer] (which disclaims liability or warranty for this information). If you have questions about a medical condition or this instruction, always ask your healthcare professional. [place] [address] any warranty or liability for your use of this information. DASH Diet: Care Instructions Your Care Instructions The DASH diet is an eating plan that can help lower your blood pressure. DASH stands for Dietary Approaches to Stop Hypertension. Hypertension is high blood pressure. The DASH diet focuses on eating foods that are high in calcium, potassium, and magnesium. These nutrients can lower blood pressure.  The foods that are highest in these nutrients are fruits, vegetables, low-fat dairy products, nuts, seeds, and legumes. But taking calcium, potassium, and magnesium supplements instead of eating foods that are high in those nutrients does not have the same effect. The DASH diet also includes whole grains, fish, and poultry. The DASH diet is one of several lifestyle changes your doctor may recommend to lower your high blood pressure. Your doctor may also want you to decrease the amount of sodium in your diet. Lowering sodium while following the DASH diet can lower blood pressure even further than just the DASH diet alone. Follow-up care is a key part of your treatment and safety. Be sure to make and go to all appointments, and call your doctor if you are having problems. It's also a good idea to know your test results and keep a list of the medicines you take. How can you care for yourself at home? Following the DASH diet · Eat 4 to 5 servings of fruit each day. A serving is 1 medium-sized piece of fruit, ½ cup chopped or canned fruit, 1/4 cup dried fruit, or 4 ounces (½ cup) of fruit juice. Choose fruit more often than fruit juice. · Eat 4 to 5 servings of vegetables each day. A serving is 1 cup of lettuce or raw leafy vegetables, ½ cup of chopped or cooked vegetables, or 4 ounces (½ cup) of vegetable juice. Choose vegetables more often than vegetable juice. · Get 2 to 3 servings of low-fat and fat-free dairy each day. A serving is 8 ounces of milk, 1 cup of yogurt, or 1 ½ ounces of cheese. · Eat 6 to 8 servings of grains each day. A serving is 1 slice of bread, 1 ounce of dry cereal, or ½ cup of cooked rice, pasta, or cooked cereal. Try to choose whole-grain products as much as possible. · Limit lean meat, poultry, and fish to 2 servings each day. A serving is 3 ounces, about the size of a deck of cards.  
· Eat 4 to 5 servings of nuts, seeds, and legumes (cooked dried beans, lentils, and split peas) each week. A serving is 1/3 cup of nuts, 2 tablespoons of seeds, or ½ cup of cooked beans or peas. · Limit fats and oils to 2 to 3 servings each day. A serving is 1 teaspoon of vegetable oil or 2 tablespoons of salad dressing. · Limit sweets and added sugars to 5 servings or less a week. A serving is 1 tablespoon jelly or jam, ½ cup sorbet, or 1 cup of lemonade. · Eat less than 2,300 milligrams (mg) of sodium a day. If you limit your sodium to 1,500 mg a day, you can lower your blood pressure even more. Tips for success · Start small. Do not try to make dramatic changes to your diet all at once. You might feel that you are missing out on your favorite foods and then be more likely to not follow the plan. Make small changes, and stick with them. Once those changes become habit, add a few more changes. · Try some of the following: ¨ Make it a goal to eat a fruit or vegetable at every meal and at snacks. This will make it easy to get the recommended amount of fruits and vegetables each day. ¨ Try yogurt topped with fruit and nuts for a snack or healthy dessert. ¨ Add lettuce, tomato, cucumber, and onion to sandwiches. ¨ Combine a ready-made pizza crust with low-fat mozzarella cheese and lots of vegetable toppings. Try using tomatoes, squash, spinach, broccoli, carrots, cauliflower, and onions. ¨ Have a variety of cut-up vegetables with a low-fat dip as an appetizer instead of chips and dip. ¨ Sprinkle sunflower seeds or chopped almonds over salads. Or try adding chopped walnuts or almonds to cooked vegetables. ¨ Try some vegetarian meals using beans and peas. Add garbanzo or kidney beans to salads. Make burritos and tacos with mashed franklin beans or black beans. Where can you learn more? Go to http://kristy-sammi.info/. Enter M595 in the search box to learn more about \"DASH Diet: Care Instructions. \" Current as of: March 23, 2016 Content Version: 11.2 © 1696-6846 [employer]wise[employer]. Care instructions adapted under license by [employer] (which disclaims liability or warranty for this information). If you have questions about a medical condition or this instruction, always ask your healthcare professional. [address] any warranty or liability for your use of this information. DASH Diet: Care Instructions Your Care Instructions The DASH diet is an eating plan that can help lower your blood pressure. DASH stands for Dietary Approaches to Stop Hypertension. Hypertension is high blood pressure. The DASH diet focuses on eating foods that are high in calcium, potassium, and magnesium. These nutrients can lower blood pressure. The foods that are highest in these nutrients are fruits, vegetables, low-fat dairy products, nuts, seeds, and legumes. But taking calcium, potassium, and magnesium supplements instead of eating foods that are high in those nutrients does not have the same effect. The DASH diet also includes whole grains, fish, and poultry. The DASH diet is one of several lifestyle changes your doctor may recommend to lower your high blood pressure. Your doctor may also want you to decrease the amount of sodium in your diet. Lowering sodium while following the DASH diet can lower blood pressure even further than just the DASH diet alone. Follow-up care is a key part of your treatment and safety. Be sure to make and go to all appointments, and call your doctor if you are having problems. It's also a good idea to know your test results and keep a list of the medicines you take. How can you care for yourself at home? Following the DASH diet · Eat 4 to 5 servings of fruit each day. A serving is 1 medium-sized piece of fruit, ½ cup chopped or canned fruit, 1/4 cup dried fruit, or 4 ounces (½ cup) of fruit juice. Choose fruit more often than fruit juice. · Eat 4 to 5 servings of vegetables each day. A serving is 1 cup of lettuce or raw leafy vegetables, ½ cup of chopped or cooked vegetables, or 4 ounces (½ cup) of vegetable juice. Choose vegetables more often than vegetable juice. · Get 2 to 3 servings of low-fat and fat-free dairy each day. A serving is 8 ounces of milk, 1 cup of yogurt, or 1 ½ ounces of cheese. · Eat 6 to 8 servings of grains each day. A serving is 1 slice of bread, 1 ounce of dry cereal, or ½ cup of cooked rice, pasta, or cooked cereal. Try to choose whole-grain products as much as possible. · Limit lean meat, poultry, and fish to 2 servings each day. A serving is 3 ounces, about the size of a deck of cards. · Eat 4 to 5 servings of nuts, seeds, and legumes (cooked dried beans, lentils, and split peas) each week. A serving is 1/3 cup of nuts, 2 tablespoons of seeds, or ½ cup of cooked beans or peas. · Limit fats and oils to 2 to 3 servings each day. A serving is 1 teaspoon of vegetable oil or 2 tablespoons of salad dressing. · Limit sweets and added sugars to 5 servings or less a week. A serving is 1 tablespoon jelly or jam, ½ cup sorbet, or 1 cup of lemonade. · Eat less than 2,300 milligrams (mg) of sodium a day. If you limit your sodium to 1,500 mg a day, you can lower your blood pressure even more. Tips for success · Start small. Do not try to make dramatic changes to your diet all at once. You might feel that you are missing out on your favorite foods and then be more likely to not follow the plan. Make small changes, and stick with them. Once those changes become habit, add a few more changes. · Try some of the following: ¨ Make it a goal to eat a fruit or vegetable at every meal and at snacks. This will make it easy to get the recommended amount of fruits and vegetables each day. ¨ Try yogurt topped with fruit and nuts for a snack or healthy dessert. ¨ Add lettuce, tomato, cucumber, and onion to sandwiches. ¨ Combine a ready-made pizza crust with low-fat mozzarella cheese and lots of vegetable toppings. Try using tomatoes, squash, spinach, broccoli, carrots, cauliflower, and onions. ¨ Have a variety of cut-up vegetables with a low-fat dip as an appetizer instead of chips and dip. ¨ Sprinkle sunflower seeds or chopped almonds over salads. Or try adding chopped walnuts or almonds to cooked vegetables. ¨ Try some vegetarian meals using beans and peas. Add garbanzo or kidney beans to salads. Make burritos and tacos with mashed franklin beans or black beans. Where can you learn more? Go to http://kristy-sammi.info/. Enter I100 in the search box to learn more about \"DASH Diet: Care Instructions. \" Current as of: March 23, 2016 Content Version: 11.2 © 1116-9104 Sealed. Care instructions adapted under license by [employer] (which disclaims liability or warranty for this information). If you have questions about a medical condition or this instruction, always ask your healthcare professional. [place] [address] any warranty or liability for your use of this information. 
---------------------------------- 
Decrease salt intake - stop the canned soups Measure BP daily and if higher than 160 ( the top number) advised to call [facility]! Dear Kathryn Sandoval: 
Thank you for requesting a Nexstim account. Our records indicate that you have previously registered for a Nexstim account but its currently inactive. Please call our Nexstim support line at [phone]. Additional Information If you have questions, please visit the Frequently Asked Questions section of the Nexstim website at [email or web address] Remember, Nexstim is NOT to be used for urgent needs. For medical emergencies, dial 911. Now available from your iPhone and Android! Please provide this summary of care documentation to your next provider. Your primary care clinician is listed as Erika Knutson. If you have any questions after today's visit, please call [phone].

## 2017-06-09 LAB
CHOLEST SERPL-MCNC: 156 MG/DL (ref 100–199)
EST. AVERAGE GLUCOSE BLD GHB EST-MCNC: 131 MG/DL
HBA1C MFR BLD: 6.2 % (ref 4.8–5.6)
HDLC SERPL-MCNC: 53 MG/DL
LDLC SERPL CALC-MCNC: 79 MG/DL (ref 0–99)
TRIGL SERPL-MCNC: 120 MG/DL (ref 0–149)
VLDLC SERPL CALC-MCNC: 24 MG/DL (ref 5–40)

## 2017-07-14 ENCOUNTER — OFFICE VISIT (OUTPATIENT)
Dept: CARDIOLOGY CLINIC | Age: 82
End: 2017-07-14

## 2017-07-14 VITALS
BODY MASS INDEX: 28.46 KG/M2 | DIASTOLIC BLOOD PRESSURE: 60 MMHG | HEIGHT: 63 IN | WEIGHT: 160.6 LBS | OXYGEN SATURATION: 97 % | RESPIRATION RATE: 18 BRPM | HEART RATE: 54 BPM | SYSTOLIC BLOOD PRESSURE: 150 MMHG

## 2017-07-14 DIAGNOSIS — I48.0 PAROXYSMAL ATRIAL FIBRILLATION (HCC): Primary | ICD-10-CM

## 2017-07-14 DIAGNOSIS — I49.3 PVC'S (PREMATURE VENTRICULAR CONTRACTIONS): ICD-10-CM

## 2017-07-14 DIAGNOSIS — I10 ESSENTIAL HYPERTENSION: ICD-10-CM

## 2017-07-14 DIAGNOSIS — E78.00 HYPERCHOLESTEREMIA: ICD-10-CM

## 2017-07-14 NOTE — PROGRESS NOTES
NAME:  Padma Kohler   :   1927   MRN:   [id number]   PCP:  Marcina Mortimer, MD           Subjective: The patient is a 80y.o. year old female  who returns for a routine follow-up. Since the last visit, patient reports no change in exercise tolerance, chest pain, edema, medication intolerance, palpitations, shortness of breath, PND/orthopnea wheezing, sputum, syncope, dizziness or light headedness. Doing well. Denies blood in urine or stool, denies any falls. Home Bps run 140-150s/50-60s.      Patient Active Problem List   Diagnosis Code    Glaucoma H40.9    Vitamin D deficiency E55.9    Hypercholesteremia E78.00    Arthritis M19.90    History  of basal cell carcinoma     History of breast cancer Z85.3    Vertigo R42    Wears hearing aid Z97.4    HTN (hypertension) I10    Angina, class III ([address] I20.9    PVC's (premature ventricular contractions) I49.3    Colitis K52.9    First degree AV block I44.0    Paroxysmal ventricular tachycardia (HCC) I47.2    Shortness of breath R06.02    PAD (peripheral artery disease) (HCC) I73.9    Atherosclerosis of native arteries of the extremities with intermittent claudication I70.219    BRBPR (bright red blood per rectum) K62.5    Hemorrhoids K64.9    Leg edema R60.0    CAD (coronary artery disease), native coronary artery I25.10    Orthostatic hypotension I95.1    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       Past Medical History:   Diagnosis Date    Arthritis     Cancer ([facility] [address]     breast    (leg,ear,arm-skin cancer)    GERD (gastroesophageal reflux disease)     Hypertension     Other ill-defined conditions     glaucoma    Other ill-defined conditions     cellulitis left arm    Other ill-defined conditions     carpal tunnel syndrome left hand    Other ill-defined conditions     vertigo       Social History   Substance Use Topics    Smoking status: Former Smoker     Quit date: 1955    Smokeless tobacco: Never Used    Alcohol use Yes Comment: rare      Family History   Problem Relation Age of Onset    Heart Disease Mother     Heart Disease Father       age 48    Heart Disease Brother     Stroke Brother     Cancer Sister      Colon Cancer    Diabetes Sister     Heart Disease Sister     Heart Disease Sister     Diabetes Sister     Other Brother      MVA    Heart Disease Brother     Heart Disease Son       age 52        Review of Systems  Constitutional: Negative for fever, chills, and diaphoresis. Respiratory: Negative for cough, hemoptysis, sputum production, shortness of breath and wheezing. Cardiovascular: Negative for chest pain, palpitations, orthopnea, claudication, leg swelling and PND. Gastrointestinal: Negative for heartburn, nausea, vomiting, blood in stool and melena. Genitourinary: Negative for dysuria and flank pain. Musculoskeletal: Negative for joint pain and back pain. Skin: Negative for rash. Neurological: Negative for focal weakness, seizures, loss of consciousness, weakness and headaches. Endo/Heme/Allergies: Does not bruise/bleed easily. Psychiatric/Behavioral: Negative for memory loss. The patient does not have insomnia. Objective:       Vitals:    17 1049 17 1102   BP: 158/58 150/60   Pulse: (!) 54    Resp: 18    SpO2: 97%    Weight: 160 lb 9.6 oz (72.8 kg)    Height: 5' 3\" (1.6 m)     Body mass index is 28.45 kg/(m^2). General PE    Gen: NAD     Mental Status - Alert. General Appearance - Not in acute distress. Neck - no JVD     Chest and Lung Exam     Inspection: Accessory muscles - No use of accessory muscles in breathing. Auscultation:   Breath sounds: - Normal.     Cardiovascular   Inspection: Jugular vein - Bilateral - Inspection Normal.   Palpation/Percussion:   Apical Impulse: - Normal.   Auscultation: Rhythm - Regular. Heart Sounds - S1 WNL and S2 WNL. No S3 or S4. Murmurs & Other Heart Sounds: Auscultation of the heart reveals - No Murmurs. Peripheral Vascular   Upper Extremity: Inspection - Bilateral - No Cyanotic nailbeds or Digital clubbing. Lower Extremity:   Palpation: Edema - Bilateral - No edema. Abdomen: Soft, non-tender, bowel sounds are active. Neuro: A&O times 3, CN and motor grossly WNL      Data Review:     EKG -  Sinus  Bradycardia  -First degree A-V block   D[place] = 222  Possible left ventricular hypertrophy on non-voltage basis. -ST depression     Allergies reviewed  Allergies   Allergen Reactions    Augmentin [Amoxicillin-Pot Clavulanate] Hives and Itching    Doxycycline Hives and Itching    Keflex [Cephalexin] Hives and Itching    Monodox [Doxycycline Monohydrate] Other (comments)    Sulfa (Sulfonamide Antibiotics) Rash       Medications reviewed  Current Outpatient Prescriptions   Medication Sig    hydroCHLOROthiazide (MICROZIDE) 12.5 mg capsule TAKE 1 CAPSULE ORALLY DAILY    rosuvastatin (CRESTOR) 20 mg tablet TAKE 1 TABLET NIGHTLY    apixaban (ELIQUIS) 5 mg tablet Take 1 Tab by mouth every twelve (12) hours. To prevent stroke in atrial fib    ALPHAGAN P 0.1 % ophthalmic solution INSTILL 1 DROP INTO BOTH EYES TWICE A DAY AS DIRECTED    sucralfate (CARAFATE) 100 mg/mL suspension Take  by mouth four (4) times daily.  nebivolol (BYSTOLIC) 5 mg tablet Take 1 Tab by mouth daily. Reduced due to slow HR    timolol (TIMOPTIC-XE) 0.5 % ophthalmic gel-forming Administer 1 Drop to both eyes daily.  CYANOCOBALAMIN, VITAMIN B-12, (VITAMIN B-12 PO) Take 1,000 mcg by mouth daily.  latanoprost (XALATAN) 0.005 % ophthalmic solution Administer 1 Drop to both eyes nightly.  cycloSPORINE (RESTASIS) 0.05 % ophthalmic emulsion Administer 1 Drop to both eyes two (2) times a day.  VIT C/E/ZN/COPPR/LUTEIN/ZEAXAN (PRESERVISION AREDS 2 PO) Take 2 capsules by mouth two (2) times a day.  esomeprazole (NEXIUM) 40 mg capsule Take 1 Cap by mouth Daily (before breakfast).     co-enzyme Q-10 (CO Q-10) 100 mg capsule Take 100 mg by mouth daily.  Cholecalciferol, Vitamin D3, (VITAMIN D3) 1,000 unit cap Take 1 Cap by mouth two (2) times a day.  acetaminophen (TYLENOL) 325 mg tablet Take 1 Tab by mouth two (2) times daily as needed for Pain.  omega-3 fatty acids-vitamin e (FISH OIL) 1,000 mg Cap Take 1 Cap by mouth two (2) times a day.  CALCIUM CARBONATE/VITAMIN D3 (CALCIUM 600 + D,3, PO) Take 1 Cap by mouth two (2) times a day.  multivitamins-minerals-lutein (CENTRUM SILVER) Tab Take 1 Tab by mouth daily. No current facility-administered medications for this visit. Assessment:       ICD-10-CM ICD-9-CM    1. Paroxysmal atrial fibrillation (HCC) I48.0 427.31    2. PVC's (premature ventricular contractions) I49.3 427.69 AMB POC EKG ROUTINE W/ 12 LEADS, INTER & REP   3. Essential hypertension I10 401.9    4. Hypercholesteremia E78.00 272.0         Orders Placed This Encounter    AMB POC EKG ROUTINE W/ 12 LEADS, INTER & REP     Order Specific Question:   Reason for Exam:     Answer:   routine           Plan:     Patient presents for follow up, feeling well and stable from cardiac standpoint. Follow up in 6 mo. 1. PAF: on Eliquis 5 mg twice daily. In sinus today. 2.  High cholesterol: LDL at goal 6/8/17. 3.  Nonobstructive coronary artery disease via cath 2013.     Jakob Mcclendon MD

## 2017-07-20 RX ORDER — ROSUVASTATIN CALCIUM 20 MG/1
TABLET, FILM COATED ORAL
Qty: 90 TAB | Refills: 1 | Status: SHIPPED | OUTPATIENT
Start: 2017-07-20 | End: 2017-12-06

## 2017-08-18 RX ORDER — NEBIVOLOL HYDROCHLORIDE 2.5 MG/1
TABLET ORAL
Qty: 90 TAB | Refills: 3 | Status: SHIPPED | OUTPATIENT
Start: 2017-08-18 | End: 2018-07-06 | Stop reason: DRUGHIGH

## 2017-10-16 ENCOUNTER — TELEPHONE (OUTPATIENT)
Dept: CARDIOLOGY CLINIC | Age: 82
End: 2017-10-16

## 2017-11-16 DIAGNOSIS — R11.0 NAUSEA: ICD-10-CM

## 2017-11-16 DIAGNOSIS — K21.9 GASTROESOPHAGEAL REFLUX DISEASE, ESOPHAGITIS PRESENCE NOT SPECIFIED: ICD-10-CM

## 2017-11-16 RX ORDER — ROSUVASTATIN CALCIUM 20 MG/1
TABLET, COATED ORAL
Qty: 90 TAB | Refills: 1 | Status: SHIPPED | OUTPATIENT
Start: 2017-11-16 | End: 2017-12-06

## 2017-11-16 NOTE — TELEPHONE ENCOUNTER
Patient needs refill done thru [facility] mail order. Patient requests immediate approval as she is going out of town & needs to get processed as soon as possible.  Thank you

## 2017-12-06 ENCOUNTER — HOSPITAL ENCOUNTER (EMERGENCY)
Age: 82
Discharge: HOME | End: 2017-12-06
Attending: FAMILY MEDICINE

## 2017-12-06 VITALS
WEIGHT: 153 LBS | RESPIRATION RATE: 20 BRPM | SYSTOLIC BLOOD PRESSURE: 142 MMHG | OXYGEN SATURATION: 97 % | HEART RATE: 80 BPM | DIASTOLIC BLOOD PRESSURE: 65 MMHG | TEMPERATURE: 97.9 F | HEIGHT: 64 IN | BODY MASS INDEX: 26.12 KG/M2

## 2017-12-06 DIAGNOSIS — L03.114 CELLULITIS OF ARM, LEFT: Primary | ICD-10-CM

## 2017-12-06 RX ORDER — CLINDAMYCIN HYDROCHLORIDE 300 MG/1
300 CAPSULE ORAL 3 TIMES DAILY
Qty: 30 CAP | Refills: 0 | Status: SHIPPED | OUTPATIENT
Start: 2017-12-06 | End: 2017-12-16

## 2017-12-06 NOTE — DISCHARGE INSTRUCTIONS
Cellulitis: Care Instructions  Your Care Instructions    Cellulitis is a skin infection. It often occurs after a break in the skin from a scrape, cut, bite, or puncture, or after a rash. The doctor has checked you carefully, but problems can develop later. If you notice any problems or new symptoms, get medical treatment right away. Follow-up care is a key part of your treatment and safety. Be sure to make and go to all appointments, and call your doctor if you are having problems. It's also a good idea to know your test results and keep a list of the medicines you take. How can you care for yourself at home? · Take your antibiotics as directed. Do not stop taking them just because you feel better. You need to take the full course of antibiotics. · Prop up the infected area on pillows to reduce pain and swelling. Try to keep the area above the level of your heart as often as you can. · If your doctor told you how to care for your wound, follow your doctor's instructions. If you did not get instructions, follow this general advice:  ¨ Wash the wound with clean water 2 times a day. Don't use hydrogen peroxide or alcohol, which can slow healing. ¨ You may cover the wound with a thin layer of petroleum jelly, such as Vaseline, and a nonstick bandage. ¨ Apply more petroleum jelly and replace the bandage as needed. · Be safe with medicines. Take pain medicines exactly as directed. ¨ If the doctor gave you a prescription medicine for pain, take it as prescribed. ¨ If you are not taking a prescription pain medicine, ask your doctor if you can take an over-the-counter medicine. To prevent cellulitis in the future  · Try to prevent cuts, scrapes, or other injuries to your skin. Cellulitis most often occurs where there is a break in the skin. · If you get a scrape, cut, mild burn, or bite, wash the wound with clean water as soon as you can to help avoid infection.  Don't use hydrogen peroxide or alcohol, which can slow healing. · If you have swelling in your legs (edema), support stockings and good skin care may help prevent leg sores and cellulitis. · Take care of your feet, especially if you have diabetes or other conditions that increase the risk of infection. Wear shoes and socks. Do not go barefoot. If you have athlete's foot or other skin problems on your feet, talk to your doctor about how to treat them. When should you call for help? Call your doctor now or seek immediate medical care if:  ? · You have signs that your infection is getting worse, such as:  ¨ Increased pain, swelling, warmth, or redness. ¨ Red streaks leading from the area. ¨ Pus draining from the area. ¨ A fever. ? · You get a rash. ? Watch closely for changes in your health, and be sure to contact your doctor if:  ? · You are not getting better after 1 day (24 hours). ? · You do not get better as expected. Where can you learn more? Go to http://kristy-sammi.info/. Abbey Barney in the search box to learn more about \"Cellulitis: Care Instructions. \"  Current as of: October 13, 2016  Content Version: 11.4  © 4417-2225 [employer]. Care instructions adapted under license by [employer] (which disclaims liability or warranty for this information). If you have questions about a medical condition or this instruction, always ask your healthcare professional. [place] [address] any warranty or liability for your use of this information.

## 2017-12-06 NOTE — UC PROVIDER NOTE
Patient is a 80 y.o. female presenting with arm swelling. The history is provided by the patient. Arm swelling    This is a new problem. The current episode started yesterday. The problem occurs constantly. The problem has been gradually worsening. The pain is present in the left arm. The quality of the pain is described as aching. The pain is at a severity of 4/10. The pain is moderate. Associated symptoms comments: Local redness and swelling of arm . The symptoms are aggravated by movement and palpation. She has tried nothing for the symptoms. There has been no history of extremity trauma. Past Medical History:   Diagnosis Date    Arthritis     Cancer ([facility] [address]     breast    (leg,ear,arm-skin cancer)    GERD (gastroesophageal reflux disease)     Hypertension     Other ill-defined conditions(799.89)     glaucoma    Other ill-defined conditions(799.89)     cellulitis left arm    Other ill-defined conditions(799.89)     carpal tunnel syndrome left hand    Other ill-defined conditions(799.89)     vertigo        Past Surgical History:   Procedure Laterality Date    CARDIAC CATHETERIZATION  1/10/2013         HX APPENDECTOMY      HX CARPAL TUNNEL RELEASE      left hand    HX CATARACT REMOVAL      bilateral    HX COLONOSCOPY  2012    HX KNEE ARTHROSCOPY      left    HX MASTECTOMY      bilateral    HX OTHER SURGICAL      Basal cell skin cancer removed from left leg, left arm and neck    HX OTHER SURGICAL      cyst removed from left thigh    HX OREN AND BSO      HX TONSILLECTOMY      VASCULAR SURGERY PROCEDURE UNLIST      vascular blockages removed.          Family History   Problem Relation Age of Onset    Heart Disease Mother     Heart Disease Father       age 48    Heart Disease Brother     Stroke Brother     Cancer Sister      Colon Cancer    Diabetes Sister     Heart Disease Sister     Heart Disease Sister     Diabetes Sister     Other Brother      MVA    Heart Disease Brother  Heart Disease Son       age 52        Social History     Social History    Marital status:      Spouse name: N/A    Number of children: N/A    Years of education: N/A     Occupational History    Not on file. Social History Main Topics    Smoking status: Former Smoker     Quit date: 1955    Smokeless tobacco: Never Used    Alcohol use Yes      Comment: rare    Drug use: No    Sexual activity: No     Other Topics Concern    Not on file     Social History Narrative                ALLERGIES: Augmentin [amoxicillin-pot clavulanate]; Doxycycline; Keflex [cephalexin]; Monodox [doxycycline monohydrate]; and Sulfa (sulfonamide antibiotics)    Review of Systems   Constitutional: Positive for appetite change and chills. Negative for fever. Neurological: Positive for dizziness. All other systems reviewed and are negative. Vitals:    17 1056 17 1133   BP: (!) 219/86 142/65   Pulse: 65 80   Resp: 22 20   Temp: 97.9 °F (36.6 °C)    SpO2: 97%    Weight: 69.4 kg (153 lb)    Height: 5' 4\" (1.626 m)        Physical Exam   Constitutional: She is oriented to person, place, and time. She appears well-developed and well-nourished. HENT:   Head: Normocephalic and atraumatic. Right Ear: External ear normal.   Left Ear: External ear normal.   Mouth/Throat: Oropharynx is clear and moist. No oropharyngeal exudate. Eyes: Conjunctivae and EOM are normal. Pupils are equal, round, and reactive to light. Right eye exhibits no discharge. Left eye exhibits no discharge. No scleral icterus. Neck: Normal range of motion. No tracheal deviation present. No thyromegaly present. Cardiovascular: Normal rate, regular rhythm and normal heart sounds. No murmur heard. Pulmonary/Chest: Effort normal and breath sounds normal. No respiratory distress. She has no wheezes. She has no rales. She exhibits no tenderness. Abdominal: Soft. Bowel sounds are normal. She exhibits no distension.  There is no tenderness. There is no rebound and no guarding. Musculoskeletal: Normal range of motion. She exhibits no edema. Left upper arm: She exhibits tenderness and swelling (with erythema and warm to touch ). She exhibits no edema. Arms:  Lymphadenopathy:     She has no cervical adenopathy. Neurological: She is alert and oriented to person, place, and time. No cranial nerve deficit. Coordination normal.   Skin: Skin is warm. No erythema. Psychiatric: She has a normal mood and affect. Her behavior is normal. Judgment and thought content normal.   Nursing note and vitals reviewed. MDM     Differential Diagnosis; Clinical Impression; Plan:     CLINICAL IMPRESSION:  Cellulitis of arm, left  (primary encounter diagnosis)      DDX    Plan:    Start clindamycin- with probiotics  If not better in 24 hrs or worsen - go to ED   Amount and/or Complexity of Data Reviewed:    Review and summarize past medical records:  Yes  Risk of Significant Complications, Morbidity, and/or Mortality:   Presenting problems: Moderate  Management options:   Moderate  Progress:   Patient progress:  Stable      Procedures

## 2018-01-22 ENCOUNTER — HOSPITAL ENCOUNTER (OUTPATIENT)
Dept: LAB | Age: 83
Discharge: HOME | End: 2018-01-22
Payer: MEDICARE

## 2018-01-22 ENCOUNTER — OFFICE VISIT (OUTPATIENT)
Dept: INTERNAL MEDICINE CLINIC | Age: 83
End: 2018-01-22

## 2018-01-22 VITALS
HEIGHT: 64 IN | WEIGHT: 161.6 LBS | DIASTOLIC BLOOD PRESSURE: 65 MMHG | BODY MASS INDEX: 27.59 KG/M2 | OXYGEN SATURATION: 96 % | HEART RATE: 62 BPM | RESPIRATION RATE: 18 BRPM | TEMPERATURE: 97.8 F | SYSTOLIC BLOOD PRESSURE: 149 MMHG

## 2018-01-22 DIAGNOSIS — I10 ESSENTIAL HYPERTENSION: ICD-10-CM

## 2018-01-22 DIAGNOSIS — Z85.3 PERSONAL HISTORY OF MALIGNANT NEOPLASM OF BREAST: ICD-10-CM

## 2018-01-22 DIAGNOSIS — E78.00 HYPERCHOLESTEREMIA: ICD-10-CM

## 2018-01-22 DIAGNOSIS — Z13.31 SCREENING FOR DEPRESSION: ICD-10-CM

## 2018-01-22 DIAGNOSIS — R73.03 PREDIABETES: ICD-10-CM

## 2018-01-22 DIAGNOSIS — Z13.39 SCREENING FOR ALCOHOLISM: ICD-10-CM

## 2018-01-22 DIAGNOSIS — Z00.00 MEDICARE ANNUAL WELLNESS VISIT, SUBSEQUENT: Primary | ICD-10-CM

## 2018-01-22 DIAGNOSIS — E55.9 VITAMIN D DEFICIENCY: ICD-10-CM

## 2018-01-22 PROCEDURE — 85025 COMPLETE CBC W/AUTO DIFF WBC: CPT

## 2018-01-22 PROCEDURE — 36415 COLL VENOUS BLD VENIPUNCTURE: CPT

## 2018-01-22 PROCEDURE — 83036 HEMOGLOBIN GLYCOSYLATED A1C: CPT

## 2018-01-22 PROCEDURE — 80053 COMPREHEN METABOLIC PANEL: CPT

## 2018-01-22 PROCEDURE — 86300 IMMUNOASSAY TUMOR CA 15-3: CPT

## 2018-01-22 PROCEDURE — 82306 VITAMIN D 25 HYDROXY: CPT

## 2018-01-22 RX ORDER — GUAIFENESIN 100 MG/5ML
81 LIQUID (ML) ORAL DAILY
COMMUNITY
End: 2018-12-19 | Stop reason: ALTCHOICE

## 2018-01-22 NOTE — PATIENT INSTRUCTIONS

## 2018-01-22 NOTE — PROGRESS NOTES
SUBJECTIVE:   Ms. Keren Machuca is a 80 y.o. female who is here for follow up of routine medical issues. Pt reports she is feeling well today. Pt reports in November, pt had a non-productive cough (resolved) and in December she had cellulitis and was given antibiotics (resolved). HTN: Pt is compliant in taking HCTZ. Patient denies chest pain, ALLAN/SOB, headache, visual changes, dizziness, palpitations or syncope. Pt's BP in the office today was elevated at 149/65. When pt checks their BP at home they typically get 140s/50s-60s. Pt sees Dr. Viktoria Huerta on 2/1/2018. HLD: Pt is compliant in taking Crestor. Patient denies abdominal pain, nausea, vomiting, diarrhea, arthralgias/myalgias due to the medication. Last lipid panel discussed with pt showed her lipids were normal.     GI: Pt denies any changes to her bowel habits or urination habits. She has occasional diverticulitis flare ups, but takes medication to treat her sx. Pt recently followed up with her oncologist, who requested labs to be done by the pcp. Medicare Wellness Visit: Pt denies any changes to their hearing (wears hearing aids), vision, or memory. Pt reports they feel safe in their home and does not have any safety equipment. Pt has an ACP on file. PREVENTIVE:  Zostavax: due, pt will schedule   Flu: current 9/26/2017  Ophthalmology: scheduled - 1/23/2018    At this time, she is otherwise doing well and has brought no other complaints to my attention today. For a list of the medical issues addressed today, see the assessment and plan below.     PMH:   Past Medical History:   Diagnosis Date    Arthritis     Cancer ([facility] [address]     breast    (leg,ear,arm-skin cancer)    GERD (gastroesophageal reflux disease)     Hypertension     Other ill-defined conditions(799.89)     glaucoma    Other ill-defined conditions(799.89)     cellulitis left arm    Other ill-defined conditions(799.89)     carpal tunnel syndrome left hand    Other ill-defined conditions(799.89)     vertigo     PSH:  has a past surgical history that includes hx mastectomy; hx efren and bso; hx cataract removal; hx tonsillectomy; hx knee arthroscopy; hx appendectomy; hx other surgical; hx colonoscopy (4/2012); hx other surgical; hx carpal tunnel release; cardiac catheterization (1/10/2013); and vascular surgery procedure unlist.    All: is allergic to augmentin [amoxicillin-pot clavulanate]; doxycycline; keflex [cephalexin]; monodox [doxycycline monohydrate]; and sulfa (sulfonamide antibiotics). MEDS:   Current Outpatient Prescriptions   Medication Sig    aspirin 81 mg chewable tablet Take 81 mg by mouth daily.  apixaban (ELIQUIS) 5 mg tablet Take 1 Tab by mouth every twelve (12) hours. To prevent stroke in atrial fib    BYSTOLIC 2.5 mg tablet TAKE 1 TABLET BY MOUTH EVERY DAY    CRESTOR 20 mg tablet TAKE 1 TABLET NIGHTLY    hydroCHLOROthiazide (MICROZIDE) 12.5 mg capsule TAKE 1 CAPSULE ORALLY DAILY    sucralfate (CARAFATE) 100 mg/mL suspension Take  by mouth four (4) times daily.  timolol (TIMOPTIC-XE) 0.5 % ophthalmic gel-forming Administer 1 Drop to both eyes daily.  CYANOCOBALAMIN, VITAMIN B-12, (VITAMIN B-12 PO) Take 1,000 mcg by mouth daily.  latanoprost (XALATAN) 0.005 % ophthalmic solution Administer 1 Drop to both eyes nightly.  cycloSPORINE (RESTASIS) 0.05 % ophthalmic emulsion Administer 1 Drop to both eyes two (2) times a day.  VIT C/E/ZN/COPPR/LUTEIN/ZEAXAN (PRESERVISION AREDS 2 PO) Take 2 capsules by mouth two (2) times a day.  esomeprazole (NEXIUM) 40 mg capsule Take 1 Cap by mouth Daily (before breakfast).  co-enzyme Q-10 (CO Q-10) 100 mg capsule Take 100 mg by mouth daily.  Cholecalciferol, Vitamin D3, (VITAMIN D3) 1,000 unit cap Take 1 Cap by mouth two (2) times a day.  acetaminophen (TYLENOL) 325 mg tablet Take 1 Tab by mouth two (2) times daily as needed for Pain.     omega-3 fatty acids-vitamin e (FISH OIL) 1,000 mg Cap Take 1 Cap by mouth two (2) times a day.  CALCIUM CARBONATE/VITAMIN D3 (CALCIUM 600 + D,3, PO) Take 1 Cap by mouth two (2) times a day.  multivitamins-minerals-lutein (CENTRUM SILVER) Tab Take 1 Tab by mouth daily. No current facility-administered medications for this visit. FH: family history includes Cancer in her sister; Diabetes in her sister and sister; Heart Disease in her brother, brother, father, mother, sister, sister, and son; Other in her brother; Stroke in her brother. SH:  reports that she quit smoking about 63 years ago. She has never used smokeless tobacco. She reports that she drinks alcohol. She reports that she does not use illicit drugs. Review of Systems - History obtained from the patient  General ROS: no fever, chills, fatigue, body aches  Psychological ROS: no change in anxiety, depression, SI/HI  Ophthalmic ROS: no blurred vision, myopia, double vision  ENT ROS: no dysphagia, otalgia, otorrhea, rhinorrhea, post nasal drip  Respiratory ROS: no cough, shortness of breath, or wheezing  Cardiovascular ROS: no chest pain or dyspnea on exertion  Gastrointestinal ROS: no abdominal pain, change in bowel habits, or black or bloody stools  Genito-Urinary ROS: no frequency, urgency, incontinence, dysuria, hematouria  Musculoskeletal ROS: no arthralagia, myalgia  Neurological ROS: no headaches, dizziness, lightheadedness, tremors, seizures  Dermatological ROS: no rash or lesions    OBJECTIVE:   Vitals:   Visit Vitals    /65 (BP 1 Location: Left arm, BP Patient Position: Sitting)    Pulse 62    Temp 97.8 °F (36.6 °C) (Oral)    Resp 18    Ht 5' 4\" (1.626 m)    Wt 161 lb 9.6 oz (73.3 kg)    SpO2 96%    BMI 27.74 kg/m2      Gen: Pleasant 80 y.o.  female in NAD. HEENT: PERRLA. EOMI. OP moist and pink. Neck: Supple. No LAD. HEART: RRR, No M/G/R.      LUNGS: CTAB No W/R. ABDOMEN: S, NT, ND, BS+. EXTREMITIES: Warm.  No C/C/E.    MUSCULOSKELETAL: Normal ROM, muscle strength 5/5 all groups. NEURO: Alert and oriented x 3. Cranial nerves grossly intact. No focal sensory or motor deficits noted. SKIN: Warm. Dry. No rashes or other lesions noted. ASSESSMENT/ PLAN: Diagnoses and all orders for this visit:    1. Personal history of malignant neoplasm of breast  -     CBC WITH AUTOMATED DIFF  -     METABOLIC PANEL, COMPREHENSIVE  -     CA 27.29  -     VITAMIN D, 25 HYDROXY    2. Vitamin D deficiency  -     VITAMIN D, 25 HYDROXY    3. Hypercholesteremia  -     METABOLIC PANEL, COMPREHENSIVE    4. Essential hypertension  -     METABOLIC PANEL, COMPREHENSIVE    5. Prediabetes  -     HEMOGLOBIN A1C WITH EAG    6. Medicare annual wellness visit, subsequent    7. Screening for alcoholism  -     Annual  Alcohol Screen 15 min ()    8. Screening for depression  -     Depression Screen Annual        ICD-10-CM ICD-9-CM    1. Personal history of malignant neoplasm of breast Z85.3 V10.3 CBC WITH AUTOMATED DIFF      METABOLIC PANEL, COMPREHENSIVE      CA 27.29      VITAMIN D, 25 HYDROXY   2. Vitamin D deficiency E55.9 268.9 VITAMIN D, 25 HYDROXY   3. Hypercholesteremia W38.92 614.8 METABOLIC PANEL, COMPREHENSIVE   4. Essential hypertension E52 853.0 METABOLIC PANEL, COMPREHENSIVE   5. Prediabetes R73.03 790.29 HEMOGLOBIN A1C WITH EAG   6. Medicare annual wellness visit, subsequent Z00.00 V70.0    7. Screening for alcoholism Z13.89 V79.1 CO ANNUAL ALCOHOL SCREEN 15 MIN   8. Screening for depression Z13.89 V79.0 DEPRESSION SCREEN ANNUAL      1. Hx of Malignant neoplasm of breast  Recheck CBC, CMP, CA 27.29, and Vit D.     2. Vit D Deficiency  I advised pt to continue on current dose of Vit D supplement. Recheck Vit D.     3. Hypercholesteremia  I recommended continuing current dose of Crestor, eating a low fat diet, and increasing exercise. 4. Hypertension  I recommended continuing current dose of HCTZ, eating a low sodium diet, and increasing exercise. Recheck CMP.      5. Pre-DM type 2  I advised them to avoid sugars and starches and to increase exercise when possible. Recheck hemoglobin A1c.     6. Medicare Annual Wellness Visit  See attached note. 7. Screening for alcoholism  Pt denies drinking alcohol. 8. Screening for depression  Pt denies any feelings of depression. Follow-up Disposition:  Return in about 6 months (around 7/22/2018) for follow up htn. I have reviewed the patient's medications and risks/side effects/benefits were discussed. Diagnosis(-es) explained to patient and questions answered. Literature provided where appropriate.      Written by Tone Del Toro, as dictated by Ollie Martino MD.

## 2018-01-22 NOTE — MR AVS SNAPSHOT
[facility] [address] 
[phone] Patient: Jung Higginbotham MRN: [id number] Visit Information Date & Time Provider Department Dept. Phone Encounter #  
 2018 11:30 AM Angelica Craig, [address] [id number] Follow-up Instructions Return in about 6 months (around 2018) for follow up htn. Your Appointments 2018 10:45 AM  
6 MONTH with Kathy Donnelly MD  
[facility] Appt Note: Per Dr. Marleni Dash [address]  
[phone] [address] [place] [address] [address] Upcoming Health Maintenance Date Due ZOSTER VACCINE AGE 60> 1986 MEDICARE YEARLY EXAM 2017 GLAUCOMA SCREENING Q2Y 2019 DTaP/Tdap/Td series (2 - Td) 2026 Allergies as of 2018  Review Complete On: 2017 By: Douglas Salazar RN Severity Noted Reaction Type Reactions Augmentin [Amoxicillin-pot Clavulanate] Medium 2016   Systemic Hives, Itching Doxycycline Medium 2016   Systemic Hives, Itching Keflex [Cephalexin] Medium 2016   Systemic Hives, Itching Monodox [Doxycycline Monohydrate]  2016    Other (comments) Sulfa (Sulfonamide Antibiotics)  04/10/2012   Side Effect Rash Current Immunizations  Reviewed on 2012 Name Date Influenza High Dose Vaccine PF 2017 12:00 AM  
 Influenza Vaccine PF 10/17/2013  2:56 PM  
 Influenza Vaccine Split 2012 Pneumococcal Conjugate (PCV-13) 8/15/2015 Pneumococcal Polysaccharide (PPSV-23) 10/17/2013  2:58 PM  
 Pneumococcal Vaccine (Unspecified Type) 2009 Tdap 2016 Not reviewed this visit You Were Diagnosed With   
  
 Codes Comments Personal history of malignant neoplasm of breast    -  Primary ICD-10-CM: Z85.3 ICD-9-CM: V10.3 Vitamin D deficiency     ICD-10-CM: E55.9 ICD-9-CM: 268.9 Hypercholesteremia     ICD-10-CM: E78.00 ICD-9-CM: 272.0 Essential hypertension     ICD-10-CM: I10 
ICD-9-CM: 401.9 Prediabetes     ICD-10-CM: R73.03 
ICD-9-CM: 790.29 Medicare annual wellness visit, subsequent     ICD-10-CM: Z00.00 ICD-9-CM: V70.0 Screening for alcoholism     ICD-10-CM: Z13.89 ICD-9-CM: V79.1 Screening for depression     ICD-10-CM: Z13.89 ICD-9-CM: V79.0 Vitals BP Pulse Temp Resp Height(growth percentile) Weight(growth percentile) 149/65 (BP 1 Location: Left arm, BP Patient Position: Sitting) 62 97.8 °F (36.6 °C) (Oral) 18 5' 4\" (1.626 m) 161 lb 9.6 oz (73.3 kg) SpO2 BMI OB Status Smoking Status 96% 27.74 kg/m2 Hysterectomy Former Smoker Vitals History BMI and BSA Data Body Mass Index Body Surface Area  
 27.74 kg/m 2 1.82 m 2 Preferred Pharmacy Pharmacy Name Phone [id number] [address] [phone] Your Updated Medication List  
  
   
This list is accurate as of: 1/22/18 12:15 PM.  Always use your most recent med list.  
  
  
  
  
 acetaminophen 325 mg tablet Commonly known as:  TYLENOL Take 1 Tab by mouth two (2) times daily as needed for Pain. apixaban 5 mg tablet Commonly known as:  [place] Mulligan Take 1 Tab by mouth every twelve (12) hours. To prevent stroke in atrial fib  
  
 aspirin 81 mg chewable tablet Take 81 mg by mouth daily. BYSTOLIC 2.5 mg tablet Generic drug:  nebivolol TAKE 1 TABLET BY MOUTH EVERY DAY  
  
 CALCIUM 600 + D(3) PO Take 1 Cap by mouth two (2) times a day. CARAFATE 100 mg/mL suspension Generic drug:  sucralfate Take  by mouth four (4) times daily. CENTRUM SILVER Tab tablet Generic drug:  multivitamins-minerals-lutein Take 1 Tab by mouth daily. CO Q-10 100 mg capsule Generic drug:  co-enzyme Q-10 Take 100 mg by mouth daily. CRESTOR 20 mg tablet Generic drug:  rosuvastatin TAKE 1 TABLET NIGHTLY  
  
 esomeprazole 40 mg capsule Commonly known as:  NexIUM Take 1 Cap by mouth Daily (before breakfast). FISH OIL 1,000 mg Cap Generic drug:  omega-3 fatty acids-vitamin e Take 1 Cap by mouth two (2) times a day. hydroCHLOROthiazide 12.5 mg capsule Commonly known as:  Dickson Gaunt TAKE 1 CAPSULE ORALLY DAILY  
  
 latanoprost 0.005 % ophthalmic solution Commonly known as:  Yuvonne Skillern Administer 1 Drop to both eyes nightly. PRESERVISION AREDS 2 PO Take 2 capsules by mouth two (2) times a day. RESTASIS 0.05 % ophthalmic emulsion Generic drug:  cycloSPORINE Administer 1 Drop to both eyes two (2) times a day. timolol 0.5 % ophthalmic gel-forming Commonly known as:  TIMOPTIC-XE Administer 1 Drop to both eyes daily. VITAMIN B-12 PO Take 1,000 mcg by mouth daily. VITAMIN D3 1,000 unit Cap Generic drug:  cholecalciferol Take 1 Cap by mouth two (2) times a day. We Performed the Following CA 27.29 [69762 CPT(R)] CBC WITH AUTOMATED DIFF [53776 CPT(R)] [address] [[id number] [facility]] HEMOGLOBIN A1C WITH EAG [41253 CPT(R)] METABOLIC PANEL, COMPREHENSIVE [25004 CPT(R)] NM ANNUAL ALCOHOL SCREEN 15 MIN [id number] [facility]] VITAMIN D, 25 HYDROXY [id number] CPT(R)] Follow-up Instructions Return in about 6 months (around 7/22/2018) for follow up htn. Patient Instructions Medicare Wellness Visit, Female The best way to live healthy is to have a healthy lifestyle by eating a well-balanced diet, exercising regularly, limiting alcohol and stopping smoking. Regular physical exams and screening tests are another way to keep healthy. Preventive exams provided by your health care provider can find health problems before they become diseases or illnesses.  Preventive services including immunizations, screening tests, monitoring and exams can help you take care of your own health. All people over age 72 should have a pneumovax  and and a prevnar shot to prevent pneumonia. These are once in a lifetime unless you and your provider decide differently. All people over 65 should have a yearly flu shot and a tetanus vaccine every 10 years. A bone mass density to screen for osteoporosis or thinning of the bones should be done every 2 years after 65. Screening for diabetes mellitus with a blood sugar test should be done every year. Glaucoma is a disease of the eye due to increased ocular pressure that can lead to blindness and it should be done every year by an eye professional. 
 
Cardiovascular screening tests that check for elevated lipids (fatty part of blood) which can lead to heart disease and strokes should be done every 5 years. Colorectal screening that evaluates for blood or polyps in your colon should be done yearly as a stool test or every five years as a flexible sigmoidoscope or every 10 years as a colonoscopy up to age 76. Breast cancer screening with a mammogram is recommended biennially  for women age 54-69. Screening for cervical cancer with a pap smear and pelvic exam is recommended for women after age 72 years every 2 years up to age 79 or when the provider and patient decide to stop. If there is a history of cervical abnormalities or other increased risk for cancer then the test is recommended yearly. Hepatitis C screening is also recommended for anyone born between [birth year] through [address]. A shingles vaccine is also recommended once in a lifetime after age 61. Your Medicare Wellness Exam is recommended annually. Here is a list of your current Health Maintenance items with a due date: 
Health Maintenance Due Topic Date Due  Shingles Vaccine  [facility]  09/08/2017 Introducing [facility]! Dear [place]: 
Thank you for requesting a [employer] account. Our records indicate that you have previously registered for a [employer] account but its currently inactive. Please call our [employer] support line at [phone]. Additional Information If you have questions, please visit the Frequently Asked Questions section of the [employer] website at [email or web address] Remember, [employer] is NOT to be used for urgent needs. For medical emergencies, dial 911. Now available from your iPhone and Android! Please provide this summary of care documentation to your next provider. Your primary care clinician is listed as Diane Ontiveros. If you have any questions after today's visit, please call [phone].

## 2018-01-22 NOTE — PROGRESS NOTES
This is a Subsequent Medicare Annual Wellness Exam (AWV) (Performed 12 months after IPPE or effective date of Medicare Part B enrollment)    I have reviewed the patient's medical history in detail and updated the computerized patient record. History     Past Medical History:   Diagnosis Date    Arthritis     Cancer ([address]     breast    (leg,ear,arm-skin cancer)    GERD (gastroesophageal reflux disease)     Hypertension     Other ill-defined conditions(799.89)     glaucoma    Other ill-defined conditions(799.89)     cellulitis left arm    Other ill-defined conditions(799.89)     carpal tunnel syndrome left hand    Other ill-defined conditions(799.89)     vertigo      Past Surgical History:   Procedure Laterality Date    CARDIAC CATHETERIZATION  1/10/2013         HX APPENDECTOMY      HX CARPAL TUNNEL RELEASE      left hand    HX CATARACT REMOVAL      bilateral    HX COLONOSCOPY  4/2012    HX KNEE ARTHROSCOPY      left    HX MASTECTOMY      bilateral    HX OTHER SURGICAL      Basal cell skin cancer removed from left leg, left arm and neck    HX OTHER SURGICAL      cyst removed from left thigh    HX OREN AND BSO      HX TONSILLECTOMY      VASCULAR SURGERY PROCEDURE UNLIST      vascular blockages removed. Current Outpatient Prescriptions   Medication Sig Dispense Refill    aspirin 81 mg chewable tablet Take 81 mg by mouth daily.  apixaban (ELIQUIS) 5 mg tablet Take 1 Tab by mouth every twelve (12) hours. To prevent stroke in atrial fib 289 Tab 3    BYSTOLIC 2.5 mg tablet TAKE 1 TABLET BY MOUTH EVERY DAY 90 Tab 3    CRESTOR 20 mg tablet TAKE 1 TABLET NIGHTLY 90 Tab 3    hydroCHLOROthiazide (MICROZIDE) 12.5 mg capsule TAKE 1 CAPSULE ORALLY DAILY 90 Cap 3    sucralfate (CARAFATE) 100 mg/mL suspension Take  by mouth four (4) times daily.  timolol (TIMOPTIC-XE) 0.5 % ophthalmic gel-forming Administer 1 Drop to both eyes daily.       CYANOCOBALAMIN, VITAMIN B-12, (VITAMIN B-12 PO) Take 1,000 mcg by mouth daily.  latanoprost (XALATAN) 0.005 % ophthalmic solution Administer 1 Drop to both eyes nightly.  cycloSPORINE (RESTASIS) 0.05 % ophthalmic emulsion Administer 1 Drop to both eyes two (2) times a day.  VIT C/E/ZN/COPPR/LUTEIN/ZEAXAN (PRESERVISION AREDS 2 PO) Take 2 capsules by mouth two (2) times a day.  esomeprazole (NEXIUM) 40 mg capsule Take 1 Cap by mouth Daily (before breakfast). 90 Cap 1    co-enzyme Q-10 (CO Q-10) 100 mg capsule Take 100 mg by mouth daily.  Cholecalciferol, Vitamin D3, (VITAMIN D3) 1,000 unit cap Take 1 Cap by mouth two (2) times a day.  acetaminophen (TYLENOL) 325 mg tablet Take 1 Tab by mouth two (2) times daily as needed for Pain. 30 Tab 0    omega-3 fatty acids-vitamin e (FISH OIL) 1,000 mg Cap Take 1 Cap by mouth two (2) times a day.  CALCIUM CARBONATE/VITAMIN D3 (CALCIUM 600 + D,3, PO) Take 1 Cap by mouth two (2) times a day.  multivitamins-minerals-lutein (CENTRUM SILVER) Tab Take 1 Tab by mouth daily.        Allergies   Allergen Reactions    Augmentin [Amoxicillin-Pot Clavulanate] Hives and Itching    Doxycycline Hives and Itching    Keflex [Cephalexin] Hives and Itching    Monodox [Doxycycline Monohydrate] Other (comments)    Sulfa (Sulfonamide Antibiotics) Rash     Family History   Problem Relation Age of Onset    Heart Disease Mother     Heart Disease Father       age 48    Heart Disease Brother     Stroke Brother     Cancer Sister      Colon Cancer    Diabetes Sister     Heart Disease Sister     Heart Disease Sister     Diabetes Sister     Other Brother      MVA    Heart Disease Brother     Heart Disease Son       age 52     Social History   Substance Use Topics    Smoking status: Former Smoker     Quit date: 1955    Smokeless tobacco: Never Used    Alcohol use Yes      Comment: rare     Patient Active Problem List   Diagnosis Code    Glaucoma H40.9    Vitamin D deficiency E55.9  Hypercholesteremia E78.00    Arthritis M19.90    History  of basal cell carcinoma     History of breast cancer Z85.3    Vertigo R42    Wears hearing aid Z97.4    HTN (hypertension) I10    Angina, class III (HCC) I20.9    PVC's (premature ventricular contractions) I49.3    Colitis K52.9    First degree AV block I44.0    Paroxysmal ventricular tachycardia (HCC) I47.2    Shortness of breath R06.02    PAD (peripheral artery disease) (HCC) I73.9    Atherosclerosis of native arteries of the extremities with intermittent claudication I70.219    BRBPR (bright red blood per rectum) K62.5    Hemorrhoids K64.9    Leg edema R60.0    CAD (coronary artery disease), native coronary artery I25.10    Orthostatic hypotension I95.1    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 Functional Ability and Level of Safety:   Hearing Loss  Hearing is good. The patient wears hearing aids. Activities of Daily Living  The home contains: grab [employer]  Patient does total self care    Fall Risk  Fall Risk Assessment, last 12 mths 6/8/2017   Able to walk? Yes   Fall in past 12 months?  No       Abuse Screen  Patient is not abused    Cognitive Screening   Evaluation of Cognitive Function:  Has your family/caregiver stated any concerns about your memory: no  Normal    Patient Care Team   Patient Care Team:  Avinash Caro MD as PCP - General (Family Practice)  Anna To MD as Physician (Cardiology)  Meliton Handley MD (Ophthalmology)  Alva Richards MD (Optometry)  Jennifer Josue MD (Orthopedic Surgery)  Ganga Harris MD (Otolaryngology)  Asha Gillis RN as Ambulatory Care Navigator  Sachi Webster MD (Gastroenterology)    Assessment/Plan   Education and counseling provided:  Are appropriate based on today's review and evaluation  End-of-Life planning (with patient's consent)    Diagnoses and all orders for this visit:    1. Personal history of malignant neoplasm of breast  -     CBC WITH AUTOMATED DIFF  -     METABOLIC PANEL, COMPREHENSIVE  -     CA 27.29  -     VITAMIN D, 25 HYDROXY    2. Vitamin D deficiency  -     VITAMIN D, 25 HYDROXY    3. Hypercholesteremia  -     METABOLIC PANEL, COMPREHENSIVE    4. Essential hypertension  -     METABOLIC PANEL, COMPREHENSIVE    5. Prediabetes  -     HEMOGLOBIN A1C WITH EAG    6. Medicare annual wellness visit, subsequent    7. Screening for alcoholism  -     Annual  Alcohol Screen 15 min ()    8. Screening for depression  -     Depression Screen Annual        Health Maintenance Due   Topic Date Due    ZOSTER VACCINE AGE 60>  11/18/1986    MEDICARE YEARLY EXAM  09/08/2017       Encounter Diagnoses   Name Primary?  Routine general medical examination at a health care facility Yes    Screening for depression        1. ADL's and support. Patient lives independently at home. States that she  has friends and family nearby for support when needed. ADL Assessment 8/1/2016   Feeding yourself No Help Needed   Getting from bed to chair No Help Needed   Getting dressed No Help Needed   Bathing or showering No Help Needed   Walk across the room (includes cane/walker) No Help Needed   Using the telphone No Help Needed   Taking your medications No Help Needed   Preparing meals No Help Needed   Managing money (expenses/bills) No Help Needed   Moderately strenuous housework (laundry) No Help Needed   Shopping for personal items (toiletries/medicines) No Help Needed   Shopping for groceries No Help Needed   Driving No Help Needed   Climbing a flight of stairs No Help Needed   Getting to places beyond walking distances No Help Needed      2.  Cardiovascular blood tests. None  3. Colorectal cancer screening. N/A      4.  Diabetes screening tests. .  Repeat A1c today     5.  Glaucoma screenings. Ophthalmology: scheduled - 1/23/2018         6. Flu vaccine. Last done fall 201.     7. Pneumonia vaccine.       8. N/A     9. Advance care planning. Patient confirmed that advance medical directive in chart     10. Bone density. DEXA 10/4/16       11. Mammography. N/A      12. Other immunizations. Zostavax: due, pt will schedule                                          Flu: current 9/26/2017     Brief history and med reconciliation completed by MA/LPN. Reviewed by MD.     Medicare Wellness Visit: Pt denies any changes to their hearing (wears hearing aids), vision, or memory. Pt reports they feel safe in their home and does not have any safety equipment. Pt has an ACP on file.        Ophthalmology: scheduled - 1/23/2018

## 2018-01-23 LAB
25(OH)D3+25(OH)D2 SERPL-MCNC: 53.8 NG/ML (ref 30–100)
ALBUMIN SERPL-MCNC: 4.1 G/DL (ref 3.2–4.6)
ALBUMIN/GLOB SERPL: 1.6 {RATIO} (ref 1.2–2.2)
ALP SERPL-CCNC: 74 IU/L (ref 39–117)
ALT SERPL-CCNC: 19 IU/L (ref 0–32)
AST SERPL-CCNC: 24 IU/L (ref 0–40)
BASOPHILS # BLD AUTO: 0 X10E3/UL (ref 0–0.2)
BASOPHILS NFR BLD AUTO: 1 %
BILIRUB SERPL-MCNC: 0.3 MG/DL (ref 0–1.2)
BUN SERPL-MCNC: 17 MG/DL (ref 10–36)
BUN/CREAT SERPL: 16 (ref 12–28)
CALCIUM SERPL-MCNC: 9.6 MG/DL (ref 8.7–10.3)
CANCER AG27-29 SERPL-ACNC: 21.6 U/ML (ref 0–38.6)
CHLORIDE SERPL-SCNC: 96 MMOL/L (ref 96–106)
CO2 SERPL-SCNC: 24 MMOL/L (ref 18–29)
CREAT SERPL-MCNC: 1.08 MG/DL (ref 0.57–1)
EOSINOPHIL # BLD AUTO: 0.2 X10E3/UL (ref 0–0.4)
EOSINOPHIL NFR BLD AUTO: 3 %
ERYTHROCYTE [DISTWIDTH] IN BLOOD BY AUTOMATED COUNT: 14.3 % (ref 12.3–15.4)
EST. AVERAGE GLUCOSE BLD GHB EST-MCNC: 126 MG/DL
GLOBULIN SER CALC-MCNC: 2.6 G/DL (ref 1.5–4.5)
GLUCOSE SERPL-MCNC: 83 MG/DL (ref 65–99)
HBA1C MFR BLD: 6 % (ref 4.8–5.6)
HCT VFR BLD AUTO: 37.6 % (ref 34–46.6)
HGB BLD-MCNC: 12.7 G/DL (ref 11.1–15.9)
IMM GRANULOCYTES # BLD: 0 X10E3/UL (ref 0–0.1)
IMM GRANULOCYTES NFR BLD: 0 %
LYMPHOCYTES # BLD AUTO: 2.2 X10E3/UL (ref 0.7–3.1)
LYMPHOCYTES NFR BLD AUTO: 38 %
MCH RBC QN AUTO: 28.8 PG (ref 26.6–33)
MCHC RBC AUTO-ENTMCNC: 33.8 G/DL (ref 31.5–35.7)
MCV RBC AUTO: 85 FL (ref 79–97)
MONOCYTES # BLD AUTO: 0.9 X10E3/UL (ref 0.1–0.9)
MONOCYTES NFR BLD AUTO: 15 %
NEUTROPHILS # BLD AUTO: 2.5 X10E3/UL (ref 1.4–7)
NEUTROPHILS NFR BLD AUTO: 43 %
PLATELET # BLD AUTO: 165 X10E3/UL (ref 150–379)
POTASSIUM SERPL-SCNC: 4.3 MMOL/L (ref 3.5–5.2)
PROT SERPL-MCNC: 6.7 G/DL (ref 6–8.5)
RBC # BLD AUTO: 4.41 X10E6/UL (ref 3.77–5.28)
SODIUM SERPL-SCNC: 135 MMOL/L (ref 134–144)
WBC # BLD AUTO: 5.8 X10E3/UL (ref 3.4–10.8)

## 2018-02-01 ENCOUNTER — OFFICE VISIT (OUTPATIENT)
Dept: CARDIOLOGY CLINIC | Age: 83
End: 2018-02-01

## 2018-02-01 VITALS
DIASTOLIC BLOOD PRESSURE: 76 MMHG | HEIGHT: 64 IN | RESPIRATION RATE: 20 BRPM | HEART RATE: 63 BPM | WEIGHT: 162 LBS | OXYGEN SATURATION: 96 % | SYSTOLIC BLOOD PRESSURE: 138 MMHG | BODY MASS INDEX: 27.66 KG/M2

## 2018-02-01 DIAGNOSIS — Q23.1 BICUSPID AORTIC VALVE: ICD-10-CM

## 2018-02-01 DIAGNOSIS — E78.00 HYPERCHOLESTEREMIA: ICD-10-CM

## 2018-02-01 DIAGNOSIS — I10 ESSENTIAL HYPERTENSION: ICD-10-CM

## 2018-02-01 DIAGNOSIS — I48.0 PAF (PAROXYSMAL ATRIAL FIBRILLATION) (HCC): Primary | ICD-10-CM

## 2018-02-01 RX ORDER — TIMOLOL MALEATE 5 MG/ML
1 SOLUTION/ DROPS OPHTHALMIC DAILY
Refills: 1 | COMMUNITY
Start: 2018-01-16 | End: 2018-02-01 | Stop reason: SDUPTHER

## 2018-02-01 NOTE — PROGRESS NOTES
Socrates Rodriguez DNP, ANP-BC  Subjective/HPI:     Darylene Payor is a 80 y.o. female is here for routine f/u. The patient denies chest pain/ shortness of breath, orthopnea, PND, LE edema, palpitations, syncope, presyncope or fatigue. Tolerating Eliquis well without bruising bleeding or darkening of stool.     Patient Active Problem List   Diagnosis Code    Glaucoma H40.9    Vitamin D deficiency E55.9    Hypercholesteremia E78.00    Arthritis M19.90    History  of basal cell carcinoma     History of breast cancer Z85.3    Vertigo R42    Wears hearing aid Z97.4    HTN (hypertension) I10    Angina, class III ([address] I20.9    PVC's (premature ventricular contractions) I49.3    Colitis K52.9    First degree AV block I44.0    Paroxysmal ventricular tachycardia (HCC) I47.2    Shortness of breath R06.02    PAD (peripheral artery disease) (HCC) I73.9    Atherosclerosis of native arteries of the extremities with intermittent claudication I70.219    BRBPR (bright red blood per rectum) K62.5    Hemorrhoids K64.9    Leg edema R60.0    CAD (coronary artery disease), native coronary artery I25.10    Orthostatic hypotension I95.1    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    Bicuspid aortic valve Q23.1    PAF (paroxysmal atrial fibrillation) (HCC) I48.0         PCP Provider  Laurel Zapien MD  Past Medical History:   Diagnosis Date    Arthritis     Cancer ([address]     breast    (leg,ear,arm-skin cancer)    GERD (gastroesophageal reflux disease)     Hypertension     Other ill-defined conditions(799.89)     glaucoma    Other ill-defined conditions(799.89)     cellulitis left arm    Other ill-defined conditions(799.89)     carpal tunnel syndrome left hand    Other ill-defined conditions(799.89)     vertigo      Past Surgical History:   Procedure Laterality Date    CARDIAC CATHETERIZATION  1/10/2013         HX APPENDECTOMY      HX CARPAL TUNNEL RELEASE      left hand    HX CATARACT REMOVAL      bilateral  HX COLONOSCOPY  2012    HX KNEE ARTHROSCOPY      left    HX MASTECTOMY      bilateral    HX OTHER SURGICAL      Basal cell skin cancer removed from left leg, left arm and neck    HX OTHER SURGICAL      cyst removed from left thigh    HX OREN AND BSO      HX TONSILLECTOMY      VASCULAR SURGERY PROCEDURE UNLIST      vascular blockages removed. Allergies   Allergen Reactions    Augmentin [Amoxicillin-Pot Clavulanate] Hives and Itching    Doxycycline Hives and Itching    Keflex [Cephalexin] Hives and Itching    Monodox [Doxycycline Monohydrate] Other (comments)    Sulfa (Sulfonamide Antibiotics) Rash      Family History   Problem Relation Age of Onset    Heart Disease Mother     Heart Disease Father       age 48    Heart Disease Brother     Stroke Brother     Cancer Sister      Colon Cancer    Diabetes Sister     Heart Disease Sister     Heart Disease Sister     Diabetes Sister     Other Brother      MVA    Heart Disease Brother     Heart Disease Son       age 52      Current Outpatient Prescriptions   Medication Sig    aspirin 81 mg chewable tablet Take 81 mg by mouth daily.  apixaban (ELIQUIS) 5 mg tablet Take 1 Tab by mouth every twelve (12) hours. To prevent stroke in atrial fib    BYSTOLIC 2.5 mg tablet TAKE 1 TABLET BY MOUTH EVERY DAY (Patient taking differently: PT TAKING 5 MG DAILY)    CRESTOR 20 mg tablet TAKE 1 TABLET NIGHTLY    hydroCHLOROthiazide (MICROZIDE) 12.5 mg capsule TAKE 1 CAPSULE ORALLY DAILY    sucralfate (CARAFATE) 100 mg/mL suspension Take  by mouth four (4) times daily.  timolol (TIMOPTIC-XE) 0.5 % ophthalmic gel-forming Administer 1 Drop to both eyes daily.  CYANOCOBALAMIN, VITAMIN B-12, (VITAMIN B-12 PO) Take 1,000 mcg by mouth daily.  latanoprost (XALATAN) 0.005 % ophthalmic solution Administer 1 Drop to both eyes nightly.  cycloSPORINE (RESTASIS) 0.05 % ophthalmic emulsion Administer 1 Drop to both eyes two (2) times a day.     VIT C/E/ZN/COPPR/LUTEIN/ZEAXAN (PRESERVISION AREDS 2 PO) Take 2 capsules by mouth two (2) times a day.  esomeprazole (NEXIUM) 40 mg capsule Take 1 Cap by mouth Daily (before breakfast).  co-enzyme Q-10 (CO Q-10) 100 mg capsule Take 100 mg by mouth daily.  Cholecalciferol, Vitamin D3, (VITAMIN D3) 1,000 unit cap Take 1 Cap by mouth two (2) times a day.  acetaminophen (TYLENOL) 325 mg tablet Take 1 Tab by mouth two (2) times daily as needed for Pain.  omega-3 fatty acids-vitamin e (FISH OIL) 1,000 mg Cap Take 1 Cap by mouth two (2) times a day.  CALCIUM CARBONATE/VITAMIN D3 (CALCIUM 600 + D,3, PO) Take 1 Cap by mouth two (2) times a day.  multivitamins-minerals-lutein (CENTRUM SILVER) Tab Take 1 Tab by mouth daily. No current facility-administered medications for this visit. Vitals:    02/01/18 1043 02/01/18 1054 02/01/18 1136   BP: 170/70 168/68 138/76   Pulse: 63     Resp: 20     SpO2: 96%     Weight: 162 lb (73.5 kg)     Height: 5' 4\" (1.626 m)       Social History     Social History    Marital status:      Spouse name: N/A    Number of children: N/A    Years of education: N/A     Occupational History    Not on file. Social History Main Topics    Smoking status: Former Smoker     Quit date: 1/1/1955    Smokeless tobacco: Never Used    Alcohol use Yes      Comment: rare    Drug use: No    Sexual activity: No     Other Topics Concern    Not on file     Social History Narrative       I have reviewed the nurses notes, vitals, problem list, allergy list, medical history, family, social history and medications. Review of Symptoms:    General: Pt denies excessive weight gain or loss. Pt is able to conduct ADL's  HEENT: Denies blurred vision, headaches, epistaxis and difficulty swallowing. Respiratory: Denies shortness of breath, ALLAN, wheezing or stridor.   Cardiovascular: Denies precordial pain, palpitations, edema or PND  Gastrointestinal: Denies poor appetite, indigestion, abdominal pain or blood in stool  Musculoskeletal: Denies pain or swelling from muscles or joints  Neurologic: Denies tremor, paresthesias, or sensory motor disturbance  Skin: Denies rash, itching or texture change. Physical Exam:      General: Well developed, in no acute distress, cooperative and alert  HEENT: No carotid bruits, no JVD, trach is midline. Neck Supple, PEERL, EOM intact. Heart:  Normal S1/S2 negative S3 or S4. Regular, 2/6 systolic aortic murmur, gallop or rub.   Respiratory: Clear bilaterally x 4, no wheezing or rales  Abdomen:   Soft, non-tender, no masses, bowel sounds are active.   Extremities:  No edema, normal cap refill, no cyanosis, atraumatic. Neuro: A&Ox3, speech clear, gait stable. Skin: Skin color is normal. No rashes or lesions. Non diaphoretic  Vascular: 2+ pulses symmetric in all extremities    Cardiographics    ECG: Sinus rhythm  Results for orders placed or performed in visit on 04/18/17   CARDIAC HOLTER MONITOR, 24 HOURS    Narrative    ECG Monitor/24 hours, Complete    Reason for Holter Monitor   FIRST DEGREE HEART BLOCK    Heartbeat    Slowest 47  Average 58  Fastest  88      Results:   Underlying Rhythm: Normal sinus rhythm      Atrial Arrhythmias: premature atrial contractions; rare            AV Conduction: normal    Ventricular Arrhythmias: premature ventricular contractions; occasional     ST Segment Analysis:non-specific changes     Symptom Correlation:  Specific symptoms not reported.      Glenn Lane MD         Results for orders placed or performed during the hospital encounter of 11/25/14   EKG, 12 LEAD, INITIAL   Result Value Ref Range    Ventricular Rate 58 BPM    Atrial Rate 58 BPM    P-R Interval 198 ms    QRS Duration 90 ms    Q-T Interval 414 ms    QTC Calculation (Bezet) 406 ms    Calculated P Axis 84 degrees    Calculated R Axis 44 degrees    Calculated T Axis 88 degrees    Diagnosis       Sinus bradycardia  Nonspecific ST abnormality  No previous ECGs available  Confirmed by Radha Cobian ([id number]) on 11/26/2014 7:41:36 AM         Cardiology Labs:  Lab Results   Component Value Date/Time    Cholesterol, total 156 06/08/2017 09:47 AM    HDL Cholesterol 53 06/08/2017 09:47 AM    LDL,Direct 85 10/30/2014 09:36 AM    LDL, calculated 79 06/08/2017 09:47 AM    Triglyceride 120 06/08/2017 09:47 AM       Lab Results   Component Value Date/Time    Sodium 135 01/22/2018 12:42 PM    Potassium 4.3 01/22/2018 12:42 PM    Chloride 96 01/22/2018 12:42 PM    CO2 24 01/22/2018 12:42 PM    Anion gap 8 02/24/2016 03:30 AM    Glucose 83 01/22/2018 12:42 PM    BUN 17 01/22/2018 12:42 PM    Creatinine 1.08 01/22/2018 12:42 PM    BUN/Creatinine ratio 16 01/22/2018 12:42 PM    GFR est AA 52 01/22/2018 12:42 PM    GFR est non-AA 45 01/22/2018 12:42 PM    Calcium 9.6 01/22/2018 12:42 PM    Bilirubin, total 0.3 01/22/2018 12:42 PM    AST (SGOT) 24 01/22/2018 12:42 PM    Alk. phosphatase 74 01/22/2018 12:42 PM    Protein, total 6.7 01/22/2018 12:42 PM    Albumin 4.1 01/22/2018 12:42 PM    Globulin 3.9 11/25/2014 12:00 PM    A-G Ratio 1.6 01/22/2018 12:42 PM    ALT (SGPT) 19 01/22/2018 12:42 PM           Assessment:     Assessment:     Diagnoses and all orders for this visit:    1. PAF (paroxysmal atrial fibrillation) (HCC)  -     2D ECHO COMPLETE ADULT (TTE) W OR WO CONTR; Future    2. Essential hypertension  -     AMB POC EKG ROUTINE W/ 12 LEADS, INTER & REP  -     2D ECHO COMPLETE ADULT (TTE) W OR WO CONTR; Future    3. Hypercholesteremia  -     2D ECHO COMPLETE ADULT (TTE) W OR WO CONTR; Future    4. Bicuspid aortic valve  -     2D ECHO COMPLETE ADULT (TTE) W OR WO CONTR; Future        ICD-10-CM ICD-9-CM    1. PAF (paroxysmal atrial fibrillation) (HCC) I48.0 427.31 2D ECHO COMPLETE ADULT (TTE) W OR WO CONTR   2. Essential hypertension I10 401.9 AMB POC EKG ROUTINE W/ 12 LEADS, INTER & REP      2D ECHO COMPLETE ADULT (TTE) W OR WO CONTR   3. Hypercholesteremia E78.00 272.0 2D ECHO COMPLETE ADULT (TTE) W OR WO CONTR   4. Bicuspid aortic valve Q23.1 746.4 2D ECHO COMPLETE ADULT (TTE) W OR WO CONTR     Orders Placed This Encounter    AMB POC EKG ROUTINE W/ 12 LEADS, INTER & REP     Order Specific Question:   Reason for Exam:     Answer:   routine    2D ECHO COMPLETE ADULT (TTE) W OR WO CONTR     Standing Status:   Future     Standing Expiration Date:   8/1/2018     Order Specific Question:   Reason for Exam:     Answer:   HF    DISCONTD: timolol (TIMOPTIC) 0.5 % ophthalmic solution     Sig: Administer 1 Drop to both eyes daily. Refill:  1        Plan:     1. Paroxysmal atrial fibrillation: In sinus rhythm, continue Eliquis  2. Systolic murmur with questionably bicuspid aortic valve: Clinically asymptomatic, will repeat echo this year. It is extremely unlikely that she has a congenitally bicuspid aortic valve without at least moderate stenosis at the age of 80. It may be functionally bicuspid due to calcification, etc.  3.  Hyperlipidemia: On statin therapy LDL 79 2017. 4.  Hypertension: repeat 138/76 after sitting. Continue current medications. Follow up in 6 months, sooner PRN. Jose F Diaz MD    This note was created using voice recognition software. Despite editing, there may be syntax errors.

## 2018-02-01 NOTE — PROGRESS NOTES
1. Have you been to the ER, urgent care clinic since your last visit? Hospitalized since your last visit? NO    2. Have you seen or consulted any other health care providers outside of the [address] [place] [address] since your last visit? Include any pap smears or colon screening. YES, DR. DURAN      6 month follow up. NO CARDIAC C/O.

## 2018-02-01 NOTE — MR AVS SNAPSHOT
[address] [facility] 
[phone] Patient: Keren Machuca MRN: [id number] Visit Information Date & Time Provider Department Dept. Phone Encounter #  
 2018 10:45 AM Judy Villalta, [address] [place] [address] [facility] [phone] Follow-up Instructions Routing History Your Appointments 2018 11:00 AM  
ECHO CARDIOGRAMS 2D with ECHO, [facility] [address] [facility]) Appt Note: Dr. Renato Burt 2D ECHO COMPLETE ADULT (TTE) W OR WO CONTR [[id number]] (Order [id number]) ,99 [facility] 37 [facility]  
[phone] [address] [facility]  
  
    
 2018 10:15 AM  
ESTABLISHED PATIENT with MD Evan Mayton [facility] [address] [place] [address]) [address] [facility]  
[phone] [address] [facility] Upcoming Health Maintenance Date Due ZOSTER VACCINE AGE 60> 1986 MEDICARE YEARLY EXAM 2019 GLAUCOMA SCREENING Q2Y 2019 DTaP/Tdap/Td series (2 - Td) 2026 Allergies as of 2018  Review Complete On: 2018 By: Judy Villalta MD  
  
 Severity Noted Reaction Type Reactions Augmentin [Amoxicillin-pot Clavulanate] Medium 2016   Systemic Hives, Itching Doxycycline Medium 2016   Systemic Hives, Itching Keflex [Cephalexin] Medium 2016   Systemic Hives, Itching Monodox [Doxycycline Monohydrate]  2016    Other (comments) Sulfa (Sulfonamide Antibiotics)  04/10/2012   Side Effect Rash Current Immunizations  Reviewed on 2012 Name Date Influenza High Dose Vaccine PF 2017 12:00 AM  
 Influenza Vaccine PF 10/17/2013  2:56 PM  
 Influenza Vaccine Split 2012 Pneumococcal Conjugate (PCV-13) 8/15/2015 Pneumococcal Polysaccharide (PPSV-23) 10/17/2013  2:58 PM  
 Pneumococcal Vaccine (Unspecified Type) 1/1/2009 Tdap 2/12/2016 Not reviewed this visit You Were Diagnosed With   
  
 Codes Comments PAF (paroxysmal atrial fibrillation) ([facility] 75.)    -  Primary ICD-10-CM: I48.0 ICD-9-CM: 427.31 Essential hypertension     ICD-10-CM: I10 
ICD-9-CM: 401.9 Hypercholesteremia     ICD-10-CM: E78.00 ICD-9-CM: 272.0 Bicuspid aortic valve     ICD-10-CM: Q23.1 ICD-9-CM: 228. 4 Vitals BP Pulse Resp Height(growth percentile) Weight(growth percentile) SpO2  
 138/76 (BP 1 Location: Left arm) 63 20 5' 4\" (1.626 m) 162 lb (73.5 kg) 96% BMI OB Status Smoking Status 27.81 kg/m2 Hysterectomy Former Smoker Vitals History BMI and BSA Data Body Mass Index Body Surface Area  
 27.81 kg/m 2 1.82 m 2 Preferred Pharmacy Pharmacy Name Phone [id number] [place], [facility] [phone] Your Updated Medication List  
  
   
This list is accurate as of: 2/1/18 11:58 AM.  Always use your most recent med list.  
  
  
  
  
 acetaminophen 325 mg tablet Commonly known as:  TYLENOL Take 1 Tab by mouth two (2) times daily as needed for Pain. apixaban 5 mg tablet Commonly known as:  Palmer Nipper Take 1 Tab by mouth every twelve (12) hours. To prevent stroke in atrial fib  
  
 aspirin 81 mg chewable tablet Take 81 mg by mouth daily. BYSTOLIC 2.5 mg tablet Generic drug:  nebivolol TAKE 1 TABLET BY MOUTH EVERY DAY  
  
 CALCIUM 600 + D(3) PO Take 1 Cap by mouth two (2) times a day. CARAFATE 100 mg/mL suspension Generic drug:  sucralfate Take  by mouth four (4) times daily. CENTRUM SILVER Tab tablet Generic drug:  multivitamins-minerals-lutein Take 1 Tab by mouth daily. CO Q-10 100 mg capsule Generic drug:  co-enzyme Q-10 Take 100 mg by mouth daily. CRESTOR 20 mg tablet Generic drug:  rosuvastatin TAKE 1 TABLET NIGHTLY  
  
 esomeprazole 40 mg capsule Commonly known as:  NexIUM Take 1 Cap by mouth Daily (before breakfast). FISH OIL 1,000 mg Cap Generic drug:  omega-3 fatty acids-vitamin e Take 1 Cap by mouth two (2) times a day. hydroCHLOROthiazide 12.5 mg capsule Commonly known as:  Birder Mura TAKE 1 CAPSULE ORALLY DAILY  
  
 latanoprost 0.005 % ophthalmic solution Commonly known as:  Mary Norm Administer 1 Drop to both eyes nightly. PRESERVISION AREDS 2 PO Take 2 capsules by mouth two (2) times a day. RESTASIS 0.05 % ophthalmic emulsion Generic drug:  cycloSPORINE Administer 1 Drop to both eyes two (2) times a day. timolol 0.5 % ophthalmic gel-forming Commonly known as:  TIMOPTIC-XE Administer 1 Drop to both eyes daily. VITAMIN B-12 PO Take 1,000 mcg by mouth daily. VITAMIN D3 1,000 unit Cap Generic drug:  cholecalciferol Take 1 Cap by mouth two (2) times a day. We Performed the Following AMB POC EKG ROUTINE W/ 12 LEADS, INTER & REP [31976 CPT(R)] To-Do List   
 02/01/2018 ECHO:  2D ECHO COMPLETE ADULT (TTE) W OR WO CONTR Introducing [facility] & HEALTH SERVICES! Dear Layne Melendrez: 
Thank you for requesting a ANPI account. Our records indicate that you have previously registered for a ANPI account but its currently inactive. Please call our ANPI support line at [phone]. Additional Information If you have questions, please visit the Frequently Asked Questions section of the ANPI website at [email or web address] Remember, ANPI is NOT to be used for urgent needs. For medical emergencies, dial 911. Now available from your iPhone and Android! Please provide this summary of care documentation to your next provider.  
  
  
 Your primary care clinician is listed as Shadi Otto. If you have any questions after today's visit, please call [phone].

## 2018-02-02 NOTE — PROGRESS NOTES
A1c-Your current hgbA1c is lower than your last level of 6.2. Keep up the good work! Continue to work on following a diabetic diet and exercise. Recheck this test: hgbA1c  in  3 months. Continue with current  Medications. CMP-Normal electrolyte levels, and liver function. You have mild renal insufficiency when compared to prior results.

## 2018-04-16 ENCOUNTER — TELEPHONE (OUTPATIENT)
Dept: INTERNAL MEDICINE CLINIC | Age: 83
End: 2018-04-16

## 2018-04-16 DIAGNOSIS — E78.00 HYPERCHOLESTEREMIA: Primary | ICD-10-CM

## 2018-04-16 DIAGNOSIS — R73.09 ELEVATED HEMOGLOBIN A1C: ICD-10-CM

## 2018-04-16 DIAGNOSIS — I10 ESSENTIAL HYPERTENSION: ICD-10-CM

## 2018-04-16 NOTE — TELEPHONE ENCOUNTER
Patient states she needs a call back in reference to getting an order put into the system for her to have fasting labs done Prior to her upcoming appt on June 18, 2018. Please call.  Thank you

## 2018-05-01 ENCOUNTER — CLINICAL SUPPORT (OUTPATIENT)
Dept: CARDIOLOGY CLINIC | Age: 83
End: 2018-05-01

## 2018-05-01 DIAGNOSIS — I48.0 PAF (PAROXYSMAL ATRIAL FIBRILLATION) (HCC): ICD-10-CM

## 2018-05-01 DIAGNOSIS — E78.00 HYPERCHOLESTEREMIA: ICD-10-CM

## 2018-05-01 DIAGNOSIS — Q23.1 BICUSPID AORTIC VALVE: ICD-10-CM

## 2018-05-01 DIAGNOSIS — I10 ESSENTIAL HYPERTENSION: ICD-10-CM

## 2018-05-08 ENCOUNTER — TELEPHONE (OUTPATIENT)
Dept: CARDIOLOGY CLINIC | Age: 83
End: 2018-05-08

## 2018-05-08 NOTE — TELEPHONE ENCOUNTER
----- Message from Marisa Kennedy MD sent at 5/7/2018  8:32 PM EDT -----  Echo stablecontinue same and follow-up in 6 months.

## 2018-05-09 ENCOUNTER — HOSPITAL ENCOUNTER (OUTPATIENT)
Dept: LAB | Age: 83
Discharge: HOME | End: 2018-05-09
Payer: MEDICARE

## 2018-05-09 ENCOUNTER — APPOINTMENT (OUTPATIENT)
Dept: INTERNAL MEDICINE CLINIC | Age: 83
End: 2018-05-09

## 2018-05-09 PROCEDURE — 83036 HEMOGLOBIN GLYCOSYLATED A1C: CPT

## 2018-05-09 PROCEDURE — 80061 LIPID PANEL: CPT

## 2018-05-09 PROCEDURE — 36415 COLL VENOUS BLD VENIPUNCTURE: CPT

## 2018-05-09 PROCEDURE — 80053 COMPREHEN METABOLIC PANEL: CPT

## 2018-05-10 LAB
ALBUMIN SERPL-MCNC: 4.1 G/DL (ref 3.2–4.6)
ALBUMIN/GLOB SERPL: 1.5 {RATIO} (ref 1.2–2.2)
ALP SERPL-CCNC: 88 IU/L (ref 39–117)
ALT SERPL-CCNC: 32 IU/L (ref 0–32)
AST SERPL-CCNC: 34 IU/L (ref 0–40)
BILIRUB SERPL-MCNC: 0.5 MG/DL (ref 0–1.2)
BUN SERPL-MCNC: 12 MG/DL (ref 10–36)
BUN/CREAT SERPL: 11 (ref 12–28)
CALCIUM SERPL-MCNC: 9.1 MG/DL (ref 8.7–10.3)
CHLORIDE SERPL-SCNC: 99 MMOL/L (ref 96–106)
CHOLEST SERPL-MCNC: 137 MG/DL (ref 100–199)
CO2 SERPL-SCNC: 23 MMOL/L (ref 18–29)
CREAT SERPL-MCNC: 1.06 MG/DL (ref 0.57–1)
EST. AVERAGE GLUCOSE BLD GHB EST-MCNC: 134 MG/DL
GFR SERPLBLD CREATININE-BSD FMLA CKD-EPI: 46 ML/MIN/1.73
GFR SERPLBLD CREATININE-BSD FMLA CKD-EPI: 53 ML/MIN/1.73
GLOBULIN SER CALC-MCNC: 2.7 G/DL (ref 1.5–4.5)
GLUCOSE SERPL-MCNC: 132 MG/DL (ref 65–99)
HBA1C MFR BLD: 6.3 % (ref 4.8–5.6)
HDLC SERPL-MCNC: 43 MG/DL
LDLC SERPL CALC-MCNC: 60 MG/DL (ref 0–99)
POTASSIUM SERPL-SCNC: 4.2 MMOL/L (ref 3.5–5.2)
PROT SERPL-MCNC: 6.8 G/DL (ref 6–8.5)
SODIUM SERPL-SCNC: 139 MMOL/L (ref 134–144)
TRIGL SERPL-MCNC: 171 MG/DL (ref 0–149)
VLDLC SERPL CALC-MCNC: 34 MG/DL (ref 5–40)

## 2018-05-14 NOTE — TELEPHONE ENCOUNTER
A1c-Your current hgbA1c is higher than your last level of 6.3..  Work on following a diabetic diet and exercise. Recheck this test: hgbA1c  in  3 months. Continue with current  medications. Lipid panel-Your current lab results reveal a elevated triglyceride level. Your total cholesterol should be under 200, triglycerides under 150, and your ldl under 100. Work on following a low fat diet and exercise at least three times a week. CMP-Normal electrolyte levels except for a elevation in glucose levels, and normal liver function. You have some mild renal insufficiency.

## 2018-06-06 ENCOUNTER — OFFICE VISIT (OUTPATIENT)
Dept: URGENT CARE | Age: 83
End: 2018-06-06

## 2018-06-06 VITALS
WEIGHT: 162 LBS | BODY MASS INDEX: 27.66 KG/M2 | RESPIRATION RATE: 18 BRPM | OXYGEN SATURATION: 97 % | DIASTOLIC BLOOD PRESSURE: 73 MMHG | HEART RATE: 66 BPM | TEMPERATURE: 97.8 F | HEIGHT: 64 IN | SYSTOLIC BLOOD PRESSURE: 190 MMHG

## 2018-06-06 DIAGNOSIS — S80.12XA TRAUMATIC ECCHYMOSIS OF LEFT LOWER LEG, INITIAL ENCOUNTER: Primary | ICD-10-CM

## 2018-06-06 NOTE — PATIENT INSTRUCTIONS
On exam today you have bruising. You are more prone as you are on a blood thinner  This will likely take many weeks to completely resolve. Follow up with PCP in 1 week for re evaluation. Sooner/immediatly for any new, worsening or other signs/symptoms of bleeding     Bruises: Care Instructions  Your Care Instructions    Bruises occur when small blood vessels under the skin tear or rupture, most often from a twist, bump, or fall. Blood leaks into tissues under the skin and causes a black-and-blue spot that often turns colors, including purplish black, reddish blue, or yellowish green, as the bruise heals. Bruises hurt, but most are not serious and will go away on their own within 2 to 4 weeks. Sometimes, gravity causes them to spread down the body. A leg bruise usually will take longer to heal than a bruise on the face or arms. Follow-up care is a key part of your treatment and safety. Be sure to make and go to all appointments, and call your doctor if you are having problems. It's also a good idea to know your test results and keep a list of the medicines you take. How can you care for yourself at home? · Take pain medicines exactly as directed. ¨ If the doctor gave you a prescription medicine for pain, take it as prescribed. ¨ If you are not taking a prescription pain medicine, ask your doctor if you can take an over-the-counter medicine. · Put ice or a cold pack on the area for 10 to 20 minutes at a time. Put a thin cloth between the ice and your skin. · If you can, prop up the bruised area on pillows as much as possible for the next few days. Try to keep the bruise above the level of your heart. When should you call for help? Call your doctor now or seek immediate medical care if:  ? · You have signs of infection, such as:  ¨ Increased pain, swelling, warmth, or redness. ¨ Red streaks leading from the bruise. ¨ Pus draining from the bruise. ¨ A fever.    ? · You have a bruise on your leg and signs of a blood clot, such as:  ¨ Increasing redness and swelling along with warmth, tenderness, and pain in the bruised area. ¨ Pain in your calf, back of the knee, thigh, or groin. ¨ Redness and swelling in your leg or groin. ? · Your pain gets worse. ? Watch closely for changes in your health, and be sure to contact your doctor if:  ? · You do not get better as expected. Where can you learn more? Go to http://kristy-sammi.info/. Enter ([phone] [phone] in the search box to learn more about \"Bruises: Care Instructions. \"  Current as of: March 20, 2017  Content Version: 11.4  © 7699-9460 [employer]. Care instructions adapted under license by [employer] (which disclaims liability or warranty for this information). If you have questions about a medical condition or this instruction, always ask your healthcare professional. [address] any warranty or liability for your use of this information.

## 2018-06-06 NOTE — MR AVS SNAPSHOT
[address] [facility] [address] 
[phone] Patient: Avery Hightower MRN: [id number] Visit Information Date & Time Provider Department Dept. Phone Encounter #  
 2018  3:15 PM [address] [facility] [phone] [id number] Your Appointments 2018 11:30 AM  
ROUTINE CARE with Yuniel Harrington MD  
[facility]) Appt Note: 6 mos F/U//BAM-18  
 [facility] [address] [facility]  
[phone]  
  
   
 [facility] [address] [facility] [address] [address]  
  
    
 2018 10:45 AM  
ESTABLISHED PATIENT with Lisa Mccray MD  
[facility]) Appt Note: r/d from , Dr. Alex Bradley out of office, [facility] [place]  
[phone] [address] Upcoming Health Maintenance Date Due ZOSTER VACCINE AGE 60> 1986 Influenza Age 5 to Adult 2018 MEDICARE YEARLY EXAM 2019 GLAUCOMA SCREENING Q2Y 2019 DTaP/Tdap/Td series (2 - Td) 2026 Allergies as of 2018  Review Complete On: 2018 By: Cathy Jacobs RN Severity Noted Reaction Type Reactions Augmentin [Amoxicillin-pot Clavulanate] Medium 2016   Systemic Hives, Itching Doxycycline Medium 2016   Systemic Hives, Itching Keflex [Cephalexin] Medium 2016   Systemic Hives, Itching Monodox [Doxycycline Monohydrate]  2016    Other (comments) Sulfa (Sulfonamide Antibiotics)  04/10/2012   Side Effect Rash Current Immunizations  Reviewed on 2012 Name Date Influenza High Dose Vaccine PF 2017 12:00 AM  
 Influenza Vaccine PF 10/17/2013  2:56 PM  
 Influenza Vaccine Split 2012 Pneumococcal Conjugate (PCV-13) 8/15/2015 Pneumococcal Polysaccharide (PPSV-23) 10/17/2013  2:58 PM  
 Pneumococcal Vaccine (Unspecified Type) 1/1/2009 Tdap 2/12/2016 Not reviewed this visit You Were Diagnosed With   
  
 Codes Comments Traumatic ecchymosis of left lower leg, initial encounter    -  Primary ICD-10-CM: O70.12YZ ICD-9-CM: 924.10 Vitals BP Pulse Temp Resp Height(growth percentile) Weight(growth percentile) 190/73 66 97.8 °F (36.6 °C) 18 5' 4\" (1.626 m) 162 lb (73.5 kg) SpO2 BMI OB Status Smoking Status 97% 27.81 kg/m2 Hysterectomy Former Smoker BMI and BSA Data Body Mass Index Body Surface Area  
 27.81 kg/m 2 1.82 m 2 Preferred Pharmacy Pharmacy Name Phone [id number] [place], [facility] [phone] Your Updated Medication List  
  
   
This list is accurate as of 6/6/18  4:08 PM.  Always use your most recent med list.  
  
  
  
  
 acetaminophen 325 mg tablet Commonly known as:  TYLENOL Take 1 Tab by mouth two (2) times daily as needed for Pain. apixaban 5 mg tablet Commonly known as:  Tommy Gentleman Take 1 Tab by mouth every twelve (12) hours. To prevent stroke in atrial fib  
  
 aspirin 81 mg chewable tablet Take 81 mg by mouth daily. BYSTOLIC 2.5 mg tablet Generic drug:  nebivolol TAKE 1 TABLET BY MOUTH EVERY DAY  
  
 CALCIUM 600 + D(3) PO Take 1 Cap by mouth two (2) times a day. CARAFATE 100 mg/mL suspension Generic drug:  sucralfate Take  by mouth four (4) times daily. CENTRUM SILVER Tab tablet Generic drug:  multivitamins-minerals-lutein Take 1 Tab by mouth daily. CO Q-10 100 mg capsule Generic drug:  co-enzyme Q-10 Take 100 mg by mouth daily. CRESTOR 20 mg tablet Generic drug:  rosuvastatin TAKE 1 TABLET NIGHTLY  
  
 esomeprazole 40 mg capsule Commonly known as:  NexIUM Take 1 Cap by mouth Daily (before breakfast). FISH OIL 1,000 mg Cap Generic drug:  omega-3 fatty acids-vitamin e Take 1 Cap by mouth two (2) times a day. hydroCHLOROthiazide 12.5 mg capsule Commonly known as:  Torie Rutledge TAKE 1 CAPSULE ORALLY DAILY  
  
 latanoprost 0.005 % ophthalmic solution Commonly known as:  Adelbert Holding Administer 1 Drop to both eyes nightly. PRESERVISION AREDS 2 PO Take 2 capsules by mouth two (2) times a day. RESTASIS 0.05 % ophthalmic emulsion Generic drug:  cycloSPORINE Administer 1 Drop to both eyes two (2) times a day. timolol 0.5 % ophthalmic gel-forming Commonly known as:  TIMOPTIC-XE Administer 1 Drop to both eyes daily. VITAMIN B-12 PO Take 1,000 mcg by mouth daily. VITAMIN D3 1,000 unit Cap Generic drug:  cholecalciferol Take 1 Cap by mouth two (2) times a day. Patient Instructions On exam today you have bruising. You are more prone as you are on a blood thinner This will likely take many weeks to completely resolve. Follow up with PCP in 1 week for re evaluation. Sooner/immediatly for any new, worsening or other signs/symptoms of bleeding Bruises: Care Instructions Your Care Instructions Bruises occur when small blood vessels under the skin tear or rupture, most often from a twist, bump, or fall. Blood leaks into tissues under the skin and causes a black-and-blue spot that often turns colors, including purplish black, reddish blue, or yellowish green, as the bruise heals. Bruises hurt, but most are not serious and will go away on their own within 2 to 4 weeks. Sometimes, gravity causes them to spread down the body. A leg bruise usually will take longer to heal than a bruise on the face or arms. Follow-up care is a key part of your treatment and safety. Be sure to make and go to all appointments, and call your doctor if you are having problems. It's also a good idea to know your test results and keep a list of the medicines you take. How can you care for yourself at home? · Take pain medicines exactly as directed. ¨ If the doctor gave you a prescription medicine for pain, take it as prescribed. ¨ If you are not taking a prescription pain medicine, ask your doctor if you can take an over-the-counter medicine. · Put ice or a cold pack on the area for 10 to 20 minutes at a time. Put a thin cloth between the ice and your skin. · If you can, prop up the bruised area on pillows as much as possible for the next few days. Try to keep the bruise above the level of your heart. When should you call for help? Call your doctor now or seek immediate medical care if: 
? · You have signs of infection, such as: 
¨ Increased pain, swelling, warmth, or redness. ¨ Red streaks leading from the bruise. ¨ Pus draining from the bruise. ¨ A fever. ? · You have a bruise on your leg and signs of a blood clot, such as: 
¨ Increasing redness and swelling along with warmth, tenderness, and pain in the bruised area. ¨ Pain in your calf, back of the knee, thigh, or groin. ¨ Redness and swelling in your leg or groin. ? · Your pain gets worse. ? Watch closely for changes in your health, and be sure to contact your doctor if: 
? · You do not get better as expected. Where can you learn more? Go to http://kristy-sammi.info/. Enter ([phone] in the search box to learn more about \"Bruises: Care Instructions. \" Current as of: March 20, 2017 Content Version: 11.4 © 1070-4639 [employer]. Care instructions adapted under license by [employer] (which disclaims liability or warranty for this information). If you have questions about a medical condition or this instruction, always ask your healthcare professional. [address] any warranty or liability for your use of this information. Introducing [facility] & HEALTH SERVICES!    
 Naseem Lora introduces [employer] patient portal. Now you can access parts of your medical record, email your doctor's office, and request medication refills online. 1. In your internet browser, go to [email or web address] 2. Click on the First Time User? Click Here link in the Sign In box. You will see the New Member Sign Up page. 3. Enter your AgFlow Access Code exactly as it appears below. You will not need to use this code after youve completed the sign-up process. If you do not sign up before the expiration date, you must request a new code. · AgFlow Access Code: [email or web address] Expires: 7/30/2018 10:57 AM 
 
4. Enter the last four digits of your Social Security Number (xxxx) and Date of Birth (mm/dd/yyyy) as indicated and click Submit. You will be taken to the next sign-up page. 5. Create a AgFlow ID. This will be your AgFlow login ID and cannot be changed, so think of one that is secure and easy to remember. 6. Create a AgFlow password. You can change your password at any time. 7. Enter your Password Reset Question and Answer. This can be used at a later time if you forget your password. 8. Enter your e-mail address. You will receive e-mail notification when new information is available in [address] 9. Click Sign Up. You can now view and download portions of your medical record. 10. Click the Download Summary menu link to download a portable copy of your medical information. If you have questions, please visit the Frequently Asked Questions section of the AgFlow website. Remember, AgFlow is NOT to be used for urgent needs. For medical emergencies, dial 911. Now available from your iPhone and Android! Please provide this summary of care documentation to your next provider. Your primary care clinician is listed as Jordan Sommer. If you have any questions after today's visit, please call [phone].

## 2018-06-18 ENCOUNTER — OFFICE VISIT (OUTPATIENT)
Dept: INTERNAL MEDICINE CLINIC | Age: 83
End: 2018-06-18

## 2018-06-18 VITALS
HEART RATE: 60 BPM | WEIGHT: 160 LBS | RESPIRATION RATE: 16 BRPM | TEMPERATURE: 98 F | BODY MASS INDEX: 27.31 KG/M2 | HEIGHT: 64 IN | DIASTOLIC BLOOD PRESSURE: 77 MMHG | SYSTOLIC BLOOD PRESSURE: 146 MMHG | OXYGEN SATURATION: 95 %

## 2018-06-18 DIAGNOSIS — R79.89 ELEVATED SERUM CREATININE: Primary | ICD-10-CM

## 2018-06-18 DIAGNOSIS — R73.09 ELEVATED HEMOGLOBIN A1C: ICD-10-CM

## 2018-06-18 DIAGNOSIS — R82.90 BAD ODOR OF URINE: ICD-10-CM

## 2018-06-18 DIAGNOSIS — I10 ESSENTIAL HYPERTENSION: ICD-10-CM

## 2018-06-18 LAB
BILIRUB UR QL STRIP: NEGATIVE
GLUCOSE UR-MCNC: NEGATIVE MG/DL
KETONES P FAST UR STRIP-MCNC: NEGATIVE MG/DL
PH UR STRIP: 6 [PH] (ref 4.6–8)
PROT UR QL STRIP: NEGATIVE
SP GR UR STRIP: 1.01 (ref 1–1.03)
UA UROBILINOGEN AMB POC: NORMAL (ref 0.2–1)
URINALYSIS CLARITY POC: CLEAR
URINALYSIS COLOR POC: YELLOW
URINE BLOOD POC: NORMAL
URINE LEUKOCYTES POC: NEGATIVE
URINE NITRITES POC: NEGATIVE

## 2018-06-18 NOTE — MR AVS SNAPSHOT
[address] [facility] 
[phone] Patient: Nuria Encarnacion MRN: [id number] Visit Information Date & Time Provider Department Dept. Phone Encounter #  
 2018 11:30 AM Roxy Russell, [address] [phone] [id number] Follow-up Instructions Return in about 6 months (around 2018) for follow up. Your Appointments 2018 10:45 AM  
ESTABLISHED PATIENT with Loree Stout MD  
[facility] [address]) Appt Note: r/d from , Dr. Eve Anne out of office, [facility]  
[phone] [id number] [facility] Upcoming Health Maintenance Date Due ZOSTER VACCINE AGE 60> 1986 Influenza Age 5 to Adult 2018 MEDICARE YEARLY EXAM 2019 GLAUCOMA SCREENING Q2Y 2019 DTaP/Tdap/Td series (2 - Td) 2026 Allergies as of 2018  Review Complete On: 2018 By: Jon Jim LPN Severity Noted Reaction Type Reactions Augmentin [Amoxicillin-pot Clavulanate] Medium 2016   Systemic Hives, Itching Doxycycline Medium 2016   Systemic Hives, Itching Keflex [Cephalexin] Medium 2016   Systemic Hives, Itching Monodox [Doxycycline Monohydrate]  2016    Other (comments) Sulfa (Sulfonamide Antibiotics)  04/10/2012   Side Effect Rash Current Immunizations  Reviewed on 2012 Name Date Influenza High Dose Vaccine PF 2017 12:00 AM  
 Influenza Vaccine PF 10/17/2013  2:56 PM  
 Influenza Vaccine Split 2012 Pneumococcal Conjugate (PCV-13) 8/15/2015 Pneumococcal Polysaccharide (PPSV-23) 10/17/2013  2:58 PM  
 Pneumococcal Vaccine (Unspecified Type) 2009 Tdap 2016 Not reviewed this visit You Were Diagnosed With   
  
 Codes Comments Elevated serum creatinine    -  Primary ICD-10-CM: R79.89 ICD-9-CM: 790.99 Elevated hemoglobin A1c     ICD-10-CM: R73.09 
ICD-9-CM: 790.29 Bad odor of urine     ICD-10-CM: R82.90 ICD-9-CM: 791.9 Essential hypertension     ICD-10-CM: I10 
ICD-9-CM: 401.9 Vitals BP Pulse Temp Resp Height(growth percentile) Weight(growth percentile) 146/77 (BP 1 Location: Left arm, BP Patient Position: Sitting) 60 98 °F (36.7 °C) (Oral) 16 5' 4\" (1.626 m) 160 lb (72.6 kg) SpO2 BMI OB Status Smoking Status 95% 27.46 kg/m2 Hysterectomy Former Smoker Vitals History BMI and BSA Data Body Mass Index Body Surface Area  
 27.46 kg/m 2 1.81 m 2 Preferred Pharmacy Pharmacy Name Phone [id number] [place], [address] [phone] Your Updated Medication List  
  
   
This list is accurate as of 6/18/18 12:33 PM.  Always use your most recent med list.  
  
  
  
  
 acetaminophen 325 mg tablet Commonly known as:  TYLENOL Take 1 Tab by mouth two (2) times daily as needed for Pain. apixaban 5 mg tablet Commonly known as:  Philippe Royals Take 1 Tab by mouth every twelve (12) hours. To prevent stroke in atrial fib  
  
 aspirin 81 mg chewable tablet Take 81 mg by mouth daily. BYSTOLIC 2.5 mg tablet Generic drug:  nebivolol TAKE 1 TABLET BY MOUTH EVERY DAY  
  
 CALCIUM 600 + D(3) PO Take 1 Cap by mouth two (2) times a day. CARAFATE 100 mg/mL suspension Generic drug:  sucralfate Take  by mouth four (4) times daily. CENTRUM SILVER Tab tablet Generic drug:  multivitamins-minerals-lutein Take 1 Tab by mouth daily. CO Q-10 100 mg capsule Generic drug:  co-enzyme Q-10 Take 100 mg by mouth daily. CRESTOR 20 mg tablet Generic drug:  rosuvastatin TAKE 1 TABLET NIGHTLY  
  
 esomeprazole 40 mg capsule Commonly known as:  NexIUM Take 1 Cap by mouth Daily (before breakfast). FISH OIL 1,000 mg Cap Generic drug:  omega-3 fatty acids-vitamin e Take 1 Cap by mouth two (2) times a day. hydroCHLOROthiazide 12.5 mg capsule Commonly known as:  Don Brand TAKE 1 CAPSULE ORALLY DAILY  
  
 latanoprost 0.005 % ophthalmic solution Commonly known as:  Truong Nanny Administer 1 Drop to both eyes nightly. PRESERVISION AREDS 2 PO Take 2 capsules by mouth two (2) times a day. RESTASIS 0.05 % ophthalmic emulsion Generic drug:  cycloSPORINE Administer 1 Drop to both eyes two (2) times a day. timolol 0.5 % ophthalmic gel-forming Commonly known as:  TIMOPTIC-XE Administer 1 Drop to both eyes daily. VITAMIN B-12 PO Take 1,000 mcg by mouth daily. VITAMIN D3 1,000 unit Cap Generic drug:  cholecalciferol Take 1 Cap by mouth two (2) times a day. We Performed the Following AMB POC URINALYSIS DIP STICK AUTO W/O MICRO [28164 CPT(R)] HEMOGLOBIN A1C WITH EAG [14328 CPT(R)] METABOLIC PANEL, COMPREHENSIVE [57713 CPT(R)] Follow-up Instructions Return in about 6 months (around 12/18/2018) for follow up. Introducing [facility]! [employer] introduces [employer] patient portal. Now you can access parts of your medical record, email your doctor's office, and request medication refills online. 1. In your internet browser, go to [email or web address]. "[employer] 2. Click on the First Time User? Click Here link in the Sign In box. You will see the New Member Sign Up page. 3. Enter your [employer] Access Code exactly as it appears below. You will not need to use this code after youve completed the sign-up process. If you do not sign up before the expiration date, you must request a new code. · [employer] Access Code: [email or web address] Expires: 7/30/2018 10:57 AM 
 
4.  Enter the last four digits of your Social Security Number (xxxx) and Date of Birth (mm/dd/yyyy) as indicated and click Submit. You will be taken to the next sign-up page. 5. Create a [employer] ID. This will be your [employer] login ID and cannot be changed, so think of one that is secure and easy to remember. 6. Create a [employer] password. You can change your password at any time. 7. Enter your Password Reset Question and Answer. This can be used at a later time if you forget your password. 8. Enter your e-mail address. You will receive e-mail notification when new information is available in [address] 9. Click Sign Up. You can now view and download portions of your medical record. 10. Click the Download Summary menu link to download a portable copy of your medical information. If you have questions, please visit the Frequently Asked Questions section of the [employer] website. Remember, [employer] is NOT to be used for urgent needs. For medical emergencies, dial 911. Now available from your iPhone and Android! Please provide this summary of care documentation to your next provider. Your primary care clinician is listed as Gomez Araujo. If you have any questions after today's visit, please call [phone].

## 2018-06-18 NOTE — PROGRESS NOTES
SUBJECTIVE:   Ms. Mervin Ga is a 80 y.o. female who is here for follow up of routine medical issues. Pt reports two weeks ago she had a blood vessel rupture in lower left extremity. Pt notes it is itchy. Pt denies swelling. Pt reports tenderness and bruising of her right first finger after a trauma. Pt reports using neosporin with no relief. Pt reports dipping it in \"uttercream\" each night to moisturize it. Pt denies opaque color prior to incident. Pt denies warmth or swelling. Pt notes her urine is very light in color but has a strong odor. Pt denies dysuria. Pt's last A1c was 6.3%. Pt notes she eats a lot of cookies and fruits, but denies eating bread. Pt denies regular exercise. Pt reports pain in her knees that keeps her from exercising. She notes at home she occasionally does leg raises while sitting. Pt notes she uses a cane outside of the house for support. Pt's last CMP was discussed. Pt's glucose and creatinine were high. Pt's GFR and BUN/Creatinine ratio were low. Pt's last lipid panel was discussed. Pt's triglycerides were elevated. Last week, pt saw her retina specialist for injections, opthalmologist for glaucoma, and optometrist for new glasses. Pt reports deconditioning SOB that resolves with rest.    Pt requested an updated handicap placard. PREVENTIVE:  Shingrix: due, pt will discuss with pharmacist (Pt had zostavax)  Eye Exam: current    At this time, she is otherwise doing well and has brought no other complaints to my attention today. For a list of the medical issues addressed today, see the assessment and plan below.     PMH:   Past Medical History:   Diagnosis Date    Arthritis     Cancer ([facility] [address]     breast    (leg,ear,arm-skin cancer)    GERD (gastroesophageal reflux disease)     Hypertension     Other ill-defined conditions(799.89)     glaucoma    Other ill-defined conditions(799.89)     cellulitis left arm    Other ill-defined conditions(799.89) carpal tunnel syndrome left hand    Other ill-defined conditions(799.89)     vertigo     PSH:  has a past surgical history that includes hx mastectomy; hx efren and bso; hx cataract removal; hx tonsillectomy; hx knee arthroscopy; hx appendectomy; hx other surgical; hx colonoscopy (4/2012); hx other surgical; hx carpal tunnel release; cardiac catheterization (1/10/2013); and vascular surgery procedure unlist.    All: is allergic to augmentin [amoxicillin-pot clavulanate]; doxycycline; keflex [cephalexin]; monodox [doxycycline monohydrate]; and sulfa (sulfonamide antibiotics). MEDS:   Current Outpatient Prescriptions   Medication Sig    aspirin 81 mg chewable tablet Take 81 mg by mouth daily.  apixaban (ELIQUIS) 5 mg tablet Take 1 Tab by mouth every twelve (12) hours. To prevent stroke in atrial fib    BYSTOLIC 2.5 mg tablet TAKE 1 TABLET BY MOUTH EVERY DAY (Patient taking differently: PT TAKING 5 MG DAILY)    CRESTOR 20 mg tablet TAKE 1 TABLET NIGHTLY    hydroCHLOROthiazide (MICROZIDE) 12.5 mg capsule TAKE 1 CAPSULE ORALLY DAILY    sucralfate (CARAFATE) 100 mg/mL suspension Take  by mouth four (4) times daily.  timolol (TIMOPTIC-XE) 0.5 % ophthalmic gel-forming Administer 1 Drop to both eyes daily.  CYANOCOBALAMIN, VITAMIN B-12, (VITAMIN B-12 PO) Take 1,000 mcg by mouth daily.  latanoprost (XALATAN) 0.005 % ophthalmic solution Administer 1 Drop to both eyes nightly.  cycloSPORINE (RESTASIS) 0.05 % ophthalmic emulsion Administer 1 Drop to both eyes two (2) times a day.  VIT C/E/ZN/COPPR/LUTEIN/ZEAXAN (PRESERVISION AREDS 2 PO) Take 2 capsules by mouth two (2) times a day.  esomeprazole (NEXIUM) 40 mg capsule Take 1 Cap by mouth Daily (before breakfast).  co-enzyme Q-10 (CO Q-10) 100 mg capsule Take 100 mg by mouth daily.  Cholecalciferol, Vitamin D3, (VITAMIN D3) 1,000 unit cap Take 1 Cap by mouth two (2) times a day.     acetaminophen (TYLENOL) 325 mg tablet Take 1 Tab by mouth two (2) times daily as needed for Pain.  omega-3 fatty acids-vitamin e (FISH OIL) 1,000 mg Cap Take 1 Cap by mouth two (2) times a day.  CALCIUM CARBONATE/VITAMIN D3 (CALCIUM 600 + D,3, PO) Take 1 Cap by mouth two (2) times a day.  multivitamins-minerals-lutein (CENTRUM SILVER) Tab Take 1 Tab by mouth daily. No current facility-administered medications for this visit. FH: family history includes Cancer in her sister; Diabetes in her sister and sister; Heart Disease in her brother, brother, father, mother, sister, sister, and son; Other in her brother; Stroke in her brother. SH:  reports that she quit smoking about 63 years ago. She has never used smokeless tobacco. She reports that she drinks alcohol. She reports that she does not use illicit drugs. Review of Systems - History obtained from the patient  General ROS: tender right first finger, no fever, chills, fatigue, body aches  Psychological ROS: no change in anxiety, depression, SI/HI  Ophthalmic ROS: no blurred vision, myopia, double vision  ENT ROS: no dysphagia, otalgia, otorrhea, rhinorrhea, post nasal drip  Respiratory ROS: no cough, shortness of breath, or wheezing  Cardiovascular ROS: no chest pain or dyspnea on exertion  Gastrointestinal ROS: no abdominal pain, change in bowel habits, or black or bloody stools  Genito-Urinary ROS: urine odor, no frequency, urgency, incontinence, dysuria, hematouria  Musculoskeletal ROS: no arthralagia, myalgia  Neurological ROS: no headaches, dizziness, lightheadedness, tremors, seizures  Dermatological ROS: lower left extremity contusion    OBJECTIVE:   Vitals:   Visit Vitals    /77 (BP 1 Location: Left arm, BP Patient Position: Sitting)    Pulse 60    Temp 98 °F (36.7 °C) (Oral)    Resp 16    Ht 5' 4\" (1.626 m)    Wt 160 lb (72.6 kg)    SpO2 95%    BMI 27.46 kg/m2      Gen: Pleasant 80 y.o.  female in NAD. HEENT: PERRLA. EOMI. OP moist and pink. Neck: Supple. No LAD. HEART: RRR, No M/G/R.      LUNGS: CTAB No W/R. ABDOMEN: S, NT, ND, BS+. EXTREMITIES: Warm. No C/C/E.    MUSCULOSKELETAL: Normal ROM, muscle strength 5/5 all groups. NEURO: Alert and oriented x 3. Cranial nerves grossly intact. No focal sensory or motor deficits noted. SKIN: + diffuse ecchymosis on anterior left lower leg, slight tenderness, otherwise normal     ASSESSMENT/ PLAN: Diagnoses and all orders for this visit:    1. Elevated serum creatinine  -     METABOLIC PANEL, COMPREHENSIVE    2. Elevated hemoglobin F6T  -     METABOLIC PANEL, COMPREHENSIVE  -     HEMOGLOBIN A1C WITH EAG    3. Bad odor of urine  -     AMB POC URINALYSIS DIP STICK AUTO W/O MICRO    4. Essential hypertension        ICD-10-CM ICD-9-CM    1. Elevated serum creatinine X43.87 864.40 METABOLIC PANEL, COMPREHENSIVE   2. Elevated hemoglobin A1c W59.01 088.55 METABOLIC PANEL, COMPREHENSIVE      HEMOGLOBIN A1C WITH EAG   3. Bad odor of urine R82.90 791.9 AMB POC URINALYSIS DIP STICK AUTO W/O MICRO   4. Essential hypertension I10 401.9         1. Elevated serum creatinine  CMP recheck. 2. Elevated HgA1c  CMP recheck. A1c recheck. I advised pt to avoid sugars, carbohydrates, and to increase exercise. 3. Bad odor of urine  I ordered an UA for further assessment. 4. Essential hypertension  BP seems to be controlled. I recommended continuing current dose of HCTZ and bystolic, eating a low sodium diet, and increasing exercise. I encouraged pt to try walking in place, recumbent biking, or exercising in a pool to reduce joint impact. Recheck CMP. Follow-up Disposition:  Return in about 6 months (around 12/18/2018) for follow up. I have reviewed the patient's medications and risks/side effects/benefits were discussed. Diagnosis(-es) explained to patient and questions answered. Literature provided where appropriate.      Written by Rosendo Ness, as dictated by Alvarez Reyes MD.

## 2018-06-18 NOTE — PROGRESS NOTES
Reviewed record in preparation for visit and have obtained necessary documentation. Identified pt with two pt identifiers(name and ). Chief Complaint   Patient presents with    Hypertension       Health Maintenance Due   Topic Date Due    Shingles Vaccine  1986       Ms. Greta Primrose has a reminder for a \"due or due soon\" health maintenance. I have asked that she discuss this further with her primary care provider for follow-up on this health maintenance. Coordination of Care Questionnaire:  :     1) Have you been to an emergency room, urgent care clinic since your last visit? Yes      Hospitalized since your last visit? no             2) Have you seen or consulted any other health care providers outside of [address] [place] [address] since your last visit? no  (Include any pap smears or colon screenings in this section.)    3) In the event something were to happen to you and you were unable to speak on your behalf, do you have an Advance Directive/ Living Will in place stating your wishes? Yes    Do you have an Advance Directive on file? Yes     4) Are you interested in receiving information on Advance Directives? No     Patient is accompanied by self I have received verbal consent from Mervin Ga to discuss any/all medical information while they are present in the room.

## 2018-06-19 ENCOUNTER — TELEPHONE (OUTPATIENT)
Dept: INTERNAL MEDICINE CLINIC | Age: 83
End: 2018-06-19

## 2018-06-19 NOTE — PROGRESS NOTES
Please inform the patient she has a small amount of blood in her urine.  Otherwise the urine is normal.

## 2018-06-19 NOTE — TELEPHONE ENCOUNTER
----- Message from Farheen Raza MD sent at 6/18/2018 11:56 PM EDT -----  Please inform the patient she has a small amount of blood in her urine.  Otherwise the urine is normal.

## 2018-07-03 ENCOUNTER — TELEPHONE (OUTPATIENT)
Dept: CARDIOLOGY CLINIC | Age: 83
End: 2018-07-03

## 2018-07-03 NOTE — TELEPHONE ENCOUNTER
Patient call back. Please try to reach out to patient before 1:15PM, she has a doctors appt to attend.  If you cannot, please call her back after 2:00PM.

## 2018-07-03 NOTE — TELEPHONE ENCOUNTER
Patient C/O throat area tightness and loss of taste has been monitoring /110 7-1-18 160/82 7-2-18 & 166/91 7-3-18 pulse 64 weight 160# recommend she take her HCTZ hydrate well and try to calm down (seems very anxious on the phone) call back at noon with repeat pressure please advise further thanks.

## 2018-07-05 ENCOUNTER — TELEPHONE (OUTPATIENT)
Dept: CARDIOLOGY CLINIC | Age: 83
End: 2018-07-05

## 2018-07-05 NOTE — TELEPHONE ENCOUNTER
Patient states the BP has improved 164/78 pulse 61 regular the throat tightness and taste is very irritating scale of 3/10.  Jesus Echols will you call to schedule a BP with [facility] on Friday thanks

## 2018-07-05 NOTE — TELEPHONE ENCOUNTER
Patient calling back again-  Want to know if you have spoken with Yi-          Please advise -  Thanks-

## 2018-07-06 ENCOUNTER — OFFICE VISIT (OUTPATIENT)
Dept: CARDIOLOGY CLINIC | Age: 83
End: 2018-07-06

## 2018-07-06 VITALS
HEIGHT: 64 IN | HEART RATE: 60 BPM | DIASTOLIC BLOOD PRESSURE: 74 MMHG | RESPIRATION RATE: 16 BRPM | WEIGHT: 153.8 LBS | OXYGEN SATURATION: 97 % | BODY MASS INDEX: 26.26 KG/M2 | SYSTOLIC BLOOD PRESSURE: 155 MMHG

## 2018-07-06 DIAGNOSIS — I10 ESSENTIAL HYPERTENSION: ICD-10-CM

## 2018-07-06 DIAGNOSIS — I48.0 PAF (PAROXYSMAL ATRIAL FIBRILLATION) (HCC): Primary | ICD-10-CM

## 2018-07-06 RX ORDER — NEBIVOLOL 5 MG/1
2.5 TABLET ORAL DAILY
COMMUNITY
End: 2018-08-26 | Stop reason: ALTCHOICE

## 2018-07-06 NOTE — PROGRESS NOTES
[address] [address]  [phone]     Subjective:      Villa Emanuel is a 80 y.o. female is here for symptom based appointment. States BP was in the 190 range last weekend but has improved since, between 140s-150s. She takes her Bystolic at night an HCTZ during the day. Her main complaints are dry throat and decreased sense of taste. She had tried Biotin and Listerine without any help. Cardiac wise, the patient denies chest pain/ shortness of breath, orthopnea, PND, LE edema, palpitations, syncope, or presyncope.          Patient Active Problem List    Diagnosis Date Noted    Bicuspid aortic valve 02/01/2018    PAF (paroxysmal atrial fibrillation) ([facility] [address] 02/01/2018    Basilar artery aneurysm ([address] 09/15/2015    Orthostatic hypotension 06/05/2015    CAD (coronary artery disease), native coronary artery 04/30/2014    Leg edema 01/30/2014    Hemorrhoids 10/29/2013    BRBPR (bright red blood per rectum) 10/17/2013    PAD (peripheral artery disease) ([address] 06/04/2013    Atherosclerosis of native arteries of the extremities with intermittent claudication 06/04/2013    Paroxysmal ventricular tachycardia ([address] 01/10/2013    Shortness of breath 01/10/2013    Angina, class III ([address] 12/18/2012    PVC's (premature ventricular contractions) 12/18/2012    Colitis 12/18/2012    First degree AV block 12/18/2012    HTN (hypertension) 12/05/2012    Glaucoma 11/07/2012    Vitamin D deficiency 11/07/2012    Hypercholesteremia 11/07/2012    Arthritis 11/07/2012    History  of basal cell carcinoma 11/07/2012    History of breast cancer 11/07/2012    Vertigo 11/07/2012    Wears hearing aid 11/07/2012      Thais Couch MD  Past Medical History:   Diagnosis Date    Arthritis     Cancer [facility])     breast    (leg,ear,arm-skin cancer)    GERD (gastroesophageal reflux disease)     Hypertension     Other ill-defined conditions(799.89)     glaucoma    Other ill-defined conditions(799.89)     cellulitis left arm    Other ill-defined conditions(799.89)     carpal tunnel syndrome left hand    Other ill-defined conditions(799.89)     vertigo      Past Surgical History:   Procedure Laterality Date    CARDIAC CATHETERIZATION  1/10/2013         HX APPENDECTOMY      HX CARPAL TUNNEL RELEASE      left hand    HX CATARACT REMOVAL      bilateral    HX COLONOSCOPY  2012    HX KNEE ARTHROSCOPY      left    HX MASTECTOMY      bilateral    HX OTHER SURGICAL      Basal cell skin cancer removed from left leg, left arm and neck    HX OTHER SURGICAL      cyst removed from left thigh    HX OREN AND BSO      HX TONSILLECTOMY      VASCULAR SURGERY PROCEDURE UNLIST      vascular blockages removed. Allergies   Allergen Reactions    Augmentin [Amoxicillin-Pot Clavulanate] Hives and Itching    Doxycycline Hives and Itching    Keflex [Cephalexin] Hives and Itching    Monodox [Doxycycline Monohydrate] Other (comments)    Sulfa (Sulfonamide Antibiotics) Rash      Family History   Problem Relation Age of Onset    Heart Disease Mother     Heart Disease Father       age 48    Heart Disease Brother     Stroke Brother     Cancer Sister      Colon Cancer    Diabetes Sister     Heart Disease Sister     Heart Disease Sister     Diabetes Sister     Other Brother      MVA    Heart Disease Brother     Heart Disease Son       age 52      Social History     Social History    Marital status:      Spouse name: N/A    Number of children: N/A    Years of education: N/A     Occupational History    Not on file.      Social History Main Topics    Smoking status: Former Smoker     Quit date: 1955    Smokeless tobacco: Never Used    Alcohol use Yes      Comment: rare    Drug use: No    Sexual activity: No     Other Topics Concern    Not on file     Social History Narrative      Current Outpatient Prescriptions   Medication Sig    nebivolol (BYSTOLIC) 5 mg tablet Take  by mouth daily.  aspirin 81 mg chewable tablet Take 81 mg by mouth daily.  apixaban (ELIQUIS) 5 mg tablet Take 1 Tab by mouth every twelve (12) hours. To prevent stroke in atrial fib    CRESTOR 20 mg tablet TAKE 1 TABLET NIGHTLY    hydroCHLOROthiazide (MICROZIDE) 12.5 mg capsule TAKE 1 CAPSULE ORALLY DAILY    sucralfate (CARAFATE) 100 mg/mL suspension Take  by mouth four (4) times daily.  timolol (TIMOPTIC-XE) 0.5 % ophthalmic gel-forming Administer 1 Drop to both eyes daily.  CYANOCOBALAMIN, VITAMIN B-12, (VITAMIN B-12 PO) Take 1,000 mcg by mouth daily.  latanoprost (XALATAN) 0.005 % ophthalmic solution Administer 1 Drop to both eyes nightly.  cycloSPORINE (RESTASIS) 0.05 % ophthalmic emulsion Administer 1 Drop to both eyes two (2) times a day.  VIT C/E/ZN/COPPR/LUTEIN/ZEAXAN (PRESERVISION AREDS 2 PO) Take 2 capsules by mouth two (2) times a day.  esomeprazole (NEXIUM) 40 mg capsule Take 1 Cap by mouth Daily (before breakfast).  co-enzyme Q-10 (CO Q-10) 100 mg capsule Take 100 mg by mouth daily.  Cholecalciferol, Vitamin D3, (VITAMIN D3) 1,000 unit cap Take 1 Cap by mouth two (2) times a day.  omega-3 fatty acids-vitamin e (FISH OIL) 1,000 mg Cap Take 1 Cap by mouth two (2) times a day.  CALCIUM CARBONATE/VITAMIN D3 (CALCIUM 600 + D,3, PO) Take 1 Cap by mouth two (2) times a day.  multivitamins-minerals-lutein (CENTRUM SILVER) Tab Take 1 Tab by mouth daily.  acetaminophen (TYLENOL) 325 mg tablet Take 1 Tab by mouth two (2) times daily as needed for Pain. (Patient not taking: Reported on 7/6/2018)     No current facility-administered medications for this visit. Review of Symptoms:  11 systems reviewed, negative other than as stated in the HPI    Physical ExamPhysical Exam:    There were no vitals filed for this visit. There is no height or weight on file to calculate BMI. General PE   Gen:  NAD  Mental Status - Alert.  General Appearance - Not in acute distress. Chest and Lung Exam   Inspection: Accessory muscles - No use of accessory muscles in breathing. Auscultation:   Breath sounds: - Normal.   Cardiovascular   Inspection: Jugular vein - Bilateral - Inspection Normal.   Palpation/Percussion:   Apical Impulse: - Normal.   Auscultation: Rhythm - IRRegular. Heart Sounds - S1 WNL and S2 WNL. No S3 or S4. Murmurs & Other Heart Sounds: Auscultation of the heart reveals - No Murmurs. Peripheral Vascular   Upper Extremity: Inspection - Bilateral - No Cyanotic nailbeds or Digital clubbing. Lower Extremity:   Palpation: Edema - Bilateral - No edema. Abdomen:   Soft, non-tender, bowel sounds are active.   Neuro: A&O times 3, CN and motor grossly WNL    Labs:   Lab Results   Component Value Date/Time    Cholesterol, total 137 05/09/2018 09:19 AM    Cholesterol, total 156 06/08/2017 09:47 AM    Cholesterol, total 137 09/08/2016 08:51 AM    Cholesterol, total 171 10/30/2014 09:36 AM    Cholesterol, total 158 02/06/2014 09:11 AM    HDL Cholesterol 43 05/09/2018 09:19 AM    HDL Cholesterol 53 06/08/2017 09:47 AM    HDL Cholesterol 45 09/08/2016 08:51 AM    HDL Cholesterol 49 10/30/2014 09:36 AM    HDL Cholesterol 44 02/06/2014 09:11 AM    LDL,Direct 85 10/30/2014 09:36 AM    LDL,Direct 80 02/06/2014 09:11 AM    LDL, calculated 60 05/09/2018 09:19 AM    LDL, calculated 79 06/08/2017 09:47 AM    LDL, calculated 59 09/08/2016 08:51 AM    LDL, calculated 75 10/30/2014 09:36 AM    LDL, calculated 70 02/06/2014 09:11 AM    Triglyceride 171 (H) 05/09/2018 09:19 AM    Triglyceride 120 06/08/2017 09:47 AM    Triglyceride 164 (H) 09/08/2016 08:51 AM    Triglyceride 233 (H) 10/30/2014 09:36 AM    Triglyceride 221 (H) 02/06/2014 09:11 AM     Lab Results   Component Value Date/Time    CK 53 11/25/2014 12:00 PM     Lab Results   Component Value Date/Time    Sodium 139 05/09/2018 09:19 AM    Potassium 4.2 05/09/2018 09:19 AM    Chloride 99 05/09/2018 09:19 AM    CO2 23 05/09/2018 09:19 AM    Anion gap 8 02/24/2016 03:30 AM    Glucose 132 (H) 05/09/2018 09:19 AM    BUN 12 05/09/2018 09:19 AM    Creatinine 1.06 (H) 05/09/2018 09:19 AM    BUN/Creatinine ratio 11 (L) 05/09/2018 09:19 AM    GFR est AA 53 (L) 05/09/2018 09:19 AM    GFR est non-AA 46 (L) 05/09/2018 09:19 AM    Calcium 9.1 05/09/2018 09:19 AM    Bilirubin, total 0.5 05/09/2018 09:19 AM    AST (SGOT) 34 05/09/2018 09:19 AM    Alk. phosphatase 88 05/09/2018 09:19 AM    Protein, total 6.8 05/09/2018 09:19 AM    Albumin 4.1 05/09/2018 09:19 AM    Globulin 3.9 11/25/2014 12:00 PM    A-G Ratio 1.5 05/09/2018 09:19 AM    ALT (SGPT) 32 05/09/2018 09:19 AM       EKG:  AF     Assessment:     Assessment:      1. PAF (paroxysmal atrial fibrillation) ([facility] [address]    2. Essential hypertension        Orders Placed This Encounter    AMB POC EKG ROUTINE W/ 12 LEADS, INTER & REP     Order Specific Question:   Reason for Exam:     Answer:   routine    nebivolol (BYSTOLIC) 5 mg tablet     Sig: Take  by mouth daily. Plan:      Pt presents for symptom based appointment. HTN  States BP was in the 190 range last weekend but has improved since, between 140s-150s. She takes her Bystolic at night an HCTZ during the day. In clinic, BP controlled. Her main complaints are dry throat and decreased sense of taste. Denies using any anticholinergic medications. She had tried Biotin and Listerine without any help. I advised her not to use Listerine and to reach out to her PCP or dentist  For some prescription oral rinse. If her pharmacy carries bicarb based oral rinse, that might help. Continue current care and f/u in with Dr Collette Falcon as scheduled.     Ally Ha NP

## 2018-07-06 NOTE — PROGRESS NOTES
1. Have you been to the ER, urgent care clinic since your last visit? Hospitalized since your last visit? Yes Urgent Care    2. Have you seen or consulted any other health care providers outside of the [address] [place] [address] since your last visit? Include any pap smears or colon screening. Yes-Ortho/Eye     Patient C/O increase BP ,discomfort in throat and loss of taste home BP running 190/110 last 7-2-18 recent readings 166-131/91-68 pulse 59-73.

## 2018-07-24 ENCOUNTER — HOSPITAL ENCOUNTER (OUTPATIENT)
Dept: LAB | Age: 83
Discharge: HOME | End: 2018-07-24
Payer: MEDICARE

## 2018-07-24 ENCOUNTER — OFFICE VISIT (OUTPATIENT)
Dept: INTERNAL MEDICINE CLINIC | Age: 83
End: 2018-07-24

## 2018-07-24 VITALS
DIASTOLIC BLOOD PRESSURE: 68 MMHG | TEMPERATURE: 97.3 F | BODY MASS INDEX: 26.63 KG/M2 | OXYGEN SATURATION: 99 % | WEIGHT: 156 LBS | HEART RATE: 68 BPM | HEIGHT: 64 IN | SYSTOLIC BLOOD PRESSURE: 148 MMHG

## 2018-07-24 DIAGNOSIS — R82.90 BAD ODOR OF URINE: ICD-10-CM

## 2018-07-24 DIAGNOSIS — M54.50 ACUTE RIGHT-SIDED LOW BACK PAIN WITHOUT SCIATICA: Primary | ICD-10-CM

## 2018-07-24 DIAGNOSIS — M54.5 LOW BACK PAIN, UNSPECIFIED BACK PAIN LATERALITY, UNSPECIFIED CHRONICITY, WITH SCIATICA PRESENCE UNSPECIFIED: Primary | ICD-10-CM

## 2018-07-24 DIAGNOSIS — R31.29 MICROSCOPIC HEMATURIA: ICD-10-CM

## 2018-07-24 DIAGNOSIS — R31.29 MICROHEMATURIA: ICD-10-CM

## 2018-07-24 PROCEDURE — 81003 URINALYSIS AUTO W/O SCOPE: CPT

## 2018-07-24 NOTE — PROGRESS NOTES
Chief Complaint   Patient presents with    LOW BACK PAIN     right side onset x 1 week pain moves to right

## 2018-07-24 NOTE — PROGRESS NOTES
HISTORY OF PRESENT ILLNESS  Merary Bagley is a 80 y.o. female. HPI     Hx paf cad pad prediabetes        C/o right lower back lower back pain x 4-5 days  Dull pain constant, worse with movement  C/o strong odor to urine, no dysuria, no blood  Went to  a few days ago and some long drives x 2 days which got worse after the drive. Had 1 plus blood on uirne dip last month  No f/c n/v  Pt concerned about possible kidney stone  Patient Active Problem List    Diagnosis Date Noted    Bicuspid aortic valve 2018    PA[facility] ([facility]oxysmal [facility] fibrillation) ([facility] [address] 2018    Basilar artery aneurysm ([facility] [address] 09/15/2015    Orthostatic hypotension 2015    CAD (coronary artery disease), native coronary artery 2014    Leg edema 2014    Hemorrhoids 10/29/2013    BRBPR (bright red blood per rectum) 10/17/2013    PAD (peripheral artery disease) ([facility] [address] 2013    Atherosclerosis of native arteries of the extremities with intermittent claudication 2013    Paroxysmal ventricular tachycardia ([facility] [address] 01/10/2013    Shortness of breath 01/10/2013    Angina, class III ([facility] [address] 2012    PVC's (premature ventricular contractions) 2012    Colitis 2012    First degree AV block 2012    HTN (hypertension) 2012    Glaucoma 2012    Vitamin D deficiency 2012    Hypercholesteremia 2012    Arthritis 2012    History  of basal cell carcinoma 2012    History of breast cancer 2012    Vertigo 2012    Wears hearing aid 2012     Current Outpatient Prescriptions   Medication Sig Dispense Refill    nebivolol (BYSTOLIC) 5 mg tablet Take  by mouth daily.  aspirin 81 mg chewable tablet Take 81 mg by mouth daily.  apixaban (ELIQUIS) 5 mg tablet Take 1 Tab by mouth every twelve (12) hours.  To prevent stroke in atrial fib 180 Tab 3    CRESTOR 20 mg tablet TAKE 1 TABLET NIGHTLY 90 Tab 3    hydroCHLOROthiazide (MICROZIDE) 12.5 mg capsule TAKE 1 CAPSULE ORALLY DAILY 90 Cap 3    sucralfate (CARAFATE) 100 mg/mL suspension Take  by mouth four (4) times daily.  timolol (TIMOPTIC-XE) 0.5 % ophthalmic gel-forming Administer 1 Drop to both eyes daily.  CYANOCOBALAMIN, VITAMIN B-12, (VITAMIN B-12 PO) Take 1,000 mcg by mouth daily.  latanoprost (XALATAN) 0.005 % ophthalmic solution Administer 1 Drop to both eyes nightly.  cycloSPORINE (RESTASIS) 0.05 % ophthalmic emulsion Administer 1 Drop to both eyes two (2) times a day.  VIT C/E/ZN/COPPR/LUTEIN/ZEAXAN (PRESERVISION AREDS 2 PO) Take 2 capsules by mouth two (2) times a day.  esomeprazole (NEXIUM) 40 mg capsule Take 1 Cap by mouth Daily (before breakfast). 90 Cap 1    co-enzyme Q-10 (CO Q-10) 100 mg capsule Take 100 mg by mouth daily.  Cholecalciferol, Vitamin D3, (VITAMIN D3) 1,000 unit cap Take 1 Cap by mouth two (2) times a day.  omega-3 fatty acids-vitamin e (FISH OIL) 1,000 mg Cap Take 1 Cap by mouth two (2) times a day.  CALCIUM CARBONATE/VITAMIN D3 (CALCIUM 600 + D,3, PO) Take 1 Cap by mouth two (2) times a day.  multivitamins-minerals-lutein (CENTRUM SILVER) Tab Take 1 Tab by mouth daily.        Allergies   Allergen Reactions    Augmentin [Amoxicillin-Pot Clavulanate] Hives and Itching    Doxycycline Hives and Itching    Keflex [Cephalexin] Hives and Itching    Monodox [Doxycycline Monohydrate] Other (comments)    Sulfa (Sulfonamide Antibiotics) Rash      Lab Results  Component Value Date/Time   WBC 5.8 01/22/2018 12:42 PM   HGB 12.7 01/22/2018 12:42 PM   HCT 37.6 01/22/2018 12:42 PM   PLATELET 926 25/01/4204 12:42 PM   MCV 85 01/22/2018 12:42 PM     Lab Results  Component Value Date/Time   Hemoglobin A1c 6.3 (H) 05/09/2018 09:19 AM   Hemoglobin A1c 6.0 (H) 01/22/2018 12:42 PM   Hemoglobin A1c 6.2 (H) 06/08/2017 09:47 AM   Glucose 132 (H) 05/09/2018 09:19 AM   Glucose  04/16/2013 03:58 PM   LDL,Direct 85 10/30/2014 09:36 AM LDL, calculated 60 05/09/2018 09:19 AM   Creatinine (POC) 1.2 08/30/2016 12:39 PM   Creatinine 1.06 (H) 05/09/2018 09:19 AM      Lab Results  Component Value Date/Time   GFR est non-AA 46 (L) 05/09/2018 09:19 AM   GFRNA, POC 42 (L) 08/30/2016 12:39 PM   GFR est AA 53 (L) 05/09/2018 09:19 AM   GFRAA, POC 51 (L) 08/30/2016 12:39 PM   Creatinine 1.06 (H) 05/09/2018 09:19 AM   Creatinine (POC) 1.2 08/30/2016 12:39 PM   BUN 12 05/09/2018 09:19 AM   Sodium 139 05/09/2018 09:19 AM   Potassium 4.2 05/09/2018 09:19 AM   Chloride 99 05/09/2018 09:19 AM   CO2 23 05/09/2018 09:19 AM   Magnesium 2.1 04/12/2017 02:42 PM     Lab Results  Component Value Date/Time   TSH 3.320 02/06/2014 09:11 AM         ROS    Physical Exam   Constitutional: She appears well-developed and well-nourished. Appears stated age, elderly female walks with cane   Cardiovascular: Normal rate, regular rhythm and normal heart sounds. Exam reveals no gallop and no friction rub. No murmur heard. Pulmonary/Chest: Effort normal and breath sounds normal. No respiratory distress. She has no wheezes. Abdominal: Soft. Bowel sounds are normal.   Musculoskeletal: She exhibits no edema. SLR neg b/l. Slight TTP right lower lumbar area and upper gluteal area  Lack a few degress ROM     Neurological: She is alert. Psychiatric: She has a normal mood and affect. Nursing note and vitals reviewed. ASSESSMENT and PLAN  Diagnoses and all orders for this visit:    1. Acute right-sided low back pain without sciatica  -     XR ABD (KUB); Future   C/w lumbar strain   Heat, ice and tylenol  2. Microhematuria  -     XR ABD (KUB); Future  -     URINALYSIS W/ RFLX MICROSCOPIC    3. Bad odor of urine   Check UA    Follow-up Disposition:  Return if symptoms worsen or fail to improve.

## 2018-07-24 NOTE — MR AVS SNAPSHOT
[address] [facility] 
[phone] Patient: Chuy Beaver MRN: [id number] Visit Information Date & Time Provider Department Dept. Phone Encounter #  
 2018  8:30 AM Radha Browning, [address] [place] [address],[address] [phone] [id number] Follow-up Instructions Return if symptoms worsen or fail to improve. Your Appointments 2018 10:45 AM  
ESTABLISHED PATIENT with Katheryn Forrester MD  
[facility]) Appt Note: r/d from , Dr. Stef Mccall out of office, [facility] [place]  
[phone] [address] [place] [address]  
  
    
 2018 11:30 AM  
ROUTINE CARE with Pam Stockton MD  
[facility]) Appt Note: 6 mon f/u  
 [facility] [address] [address]  
[address] [place] [address] [address] [place] [address] Upcoming Health Maintenance Date Due ZOSTER VACCINE AGE 60> 1986 Influenza Age 5 to Adult 2018 MEDICARE YEARLY EXAM 2019 GLAUCOMA SCREENING Q2Y 2019 DTaP/Tdap/Td series (2 - Td) 2026 Allergies as of 2018  Review Complete On: 2018 By: Magdaleno Marr LPN Severity Noted Reaction Type Reactions Augmentin [Amoxicillin-pot Clavulanate] Medium 2016   Systemic Hives, Itching Doxycycline Medium 2016   Systemic Hives, Itching Keflex [Cephalexin] Medium 2016   Systemic Hives, Itching Monodox [Doxycycline Monohydrate]  2016    Other (comments) Sulfa (Sulfonamide Antibiotics)  04/10/2012   Side Effect Rash Current Immunizations  Reviewed on 2012 Name Date Influenza High Dose Vaccine PF 2017 12:00 AM  
 Influenza Vaccine PF 10/17/2013  2:56 PM  
 Influenza Vaccine Split 2012 Pneumococcal Conjugate (PCV-13) 8/15/2015 Pneumococcal Polysaccharide (PPSV-23) 10/17/2013  2:58 PM  
 Pneumococcal Vaccine (Unspecified Type) 1/1/2009 Tdap 2/12/2016 Not reviewed this visit You Were Diagnosed With   
  
 Codes Comments Acute right-sided low back pain without sciatica    -  Primary ICD-10-CM: M54.5 ICD-9-CM: 724.2 Microhematuria     ICD-10-CM: R31.29 ICD-9-CM: 599.72 Bad odor of urine     ICD-10-CM: R82.90 ICD-9-CM: 791.9 Vitals BP Pulse Temp Height(growth percentile) Weight(growth percentile) SpO2  
 148/68 (BP 1 Location: Left arm, BP Patient Position: Sitting) 68 97.3 °F (36.3 °C) (Oral) 5' 4\" (1.626 m) 156 lb (70.8 kg) 99% BMI OB Status Smoking Status 26.78 kg/m2 Hysterectomy Former Smoker BMI and BSA Data Body Mass Index Body Surface Area  
 26.78 kg/m 2 1.79 m 2 Preferred Pharmacy Pharmacy Name Phone [id number] [place], [address] [phone] Your Updated Medication List  
  
   
This list is accurate as of 7/24/18  8:41 AM.  Always use your most recent med list.  
  
  
  
  
 apixaban 5 mg tablet Commonly known as:  [place] Take 1 Tab by mouth every twelve (12) hours. To prevent stroke in atrial fib  
  
 aspirin 81 mg chewable tablet Take 81 mg by mouth daily. BYSTOLIC 5 mg tablet Generic drug:  nebivolol Take 2.5 mg by mouth daily. CALCIUM 600 + D(3) PO Take 1 Cap by mouth two (2) times a day. CARAFATE 100 mg/mL suspension Generic drug:  sucralfate Take  by mouth four (4) times daily. CENTRUM SILVER Tab tablet Generic drug:  multivitamins-minerals-lutein Take 1 Tab by mouth daily. CO Q-10 100 mg capsule Generic drug:  co-enzyme Q-10 Take 100 mg by mouth daily. CRESTOR 20 mg tablet Generic drug:  rosuvastatin TAKE 1 TABLET NIGHTLY  
  
 esomeprazole 40 mg capsule Commonly known as:  NexIUM Take 1 Cap by mouth Daily (before breakfast). FISH OIL 1,000 mg Cap Generic drug:  omega-3 fatty acids-vitamin e Take 1 Cap by mouth two (2) times a day. hydroCHLOROthiazide 12.5 mg capsule Commonly known as:  Kaitlin Sensor TAKE 1 CAPSULE ORALLY DAILY  
  
 latanoprost 0.005 % ophthalmic solution Commonly known as:  [place]hawn Administer 1 Drop to both eyes nightly. PRESERVISION AREDS 2 PO Take 2 capsules by mouth two (2) times a day. RESTASIS 0.05 % ophthalmic emulsion Generic drug:  cycloSPORINE Administer 1 Drop to both eyes two (2) times a day. timolol 0.5 % ophthalmic gel-forming Commonly known as:  TIMOPTIC-XE Administer 1 Drop to both eyes daily. VITAMIN B-12 PO Take 1,000 mcg by mouth daily. VITAMIN D3 1,000 unit Cap Generic drug:  cholecalciferol Take 1 Cap by mouth two (2) times a day. Follow-up Instructions Return if symptoms worsen or fail to improve. To-Do List   
 07/24/2018 Imaging:  XR ABD (KUB) Introducing [facility]! [facility] introduces [facility]ide patient portal. Now you can access parts of your medical record, email your doctor's office, and request medication refills online. 1. In your internet browser, go to [email or web address] 2. Click on the First Time User? Click Here link in the Sign In box. You will see the New Member Sign Up page. 3. Enter your Brideside Access Code exactly as it appears below. You will not need to use this code after youve completed the sign-up process. If you do not sign up before the expiration date, you must request a new code. · [facility]ide Access Code: [email or web address] Expires: 7/30/2018 10:57 AM 
 
4. Enter the last four digits of your Social Security Number (xxxx) and Date of Birth (mm/dd/yyyy) as indicated and click Submit. You will be taken to the next sign-up page. 5. Create a Proximiant ID. This will be your Proximiant login ID and cannot be changed, so think of one that is secure and easy to remember. 6. Create a Proximiant password. You can change your password at any time. 7. Enter your Password Reset Question and Answer. This can be used at a later time if you forget your password. 8. Enter your e-mail address. You will receive e-mail notification when new information is available in [address] 9. Click Sign Up. You can now view and download portions of your medical record. 10. Click the Download Summary menu link to download a portable copy of your medical information. If you have questions, please visit the Frequently Asked Questions section of the Proximiant website. Remember, Proximiant is NOT to be used for urgent needs. For medical emergencies, dial 911. Now available from your iPhone and Android! Please provide this summary of care documentation to your next provider. Your primary care clinician is listed as Maggie Frausto. If you have any questions after today's visit, please call [phone].

## 2018-07-25 LAB
APPEARANCE UR: CLEAR
BILIRUB UR QL STRIP: NEGATIVE
COLOR UR: YELLOW
GLUCOSE UR QL: NEGATIVE
HGB UR QL STRIP: NEGATIVE
KETONES UR QL STRIP: NEGATIVE
LEUKOCYTE ESTERASE UR QL STRIP: NEGATIVE
MICRO URNS: NORMAL
NITRITE UR QL STRIP: NEGATIVE
PH UR STRIP: 6.5 [PH] (ref 5–7.5)
PROT UR QL STRIP: NORMAL
SP GR UR: 1.01 (ref 1–1.03)
UROBILINOGEN UR STRIP-MCNC: 0.2 MG/DL (ref 0.2–1)

## 2018-07-25 NOTE — PROGRESS NOTES
Urine results reviewed with patient. Patient verbalized understanding and does not have any questions at this time.

## 2018-07-25 NOTE — PROGRESS NOTES
I have attempted to contact this patient by phone with the following results:   Urinalysis is negative --no uti . Results mailed to patient's home.

## 2018-07-30 ENCOUNTER — TELEPHONE (OUTPATIENT)
Dept: INTERNAL MEDICINE CLINIC | Age: 83
End: 2018-07-30

## 2018-07-30 DIAGNOSIS — M54.40 ACUTE LOW BACK PAIN WITH SCIATICA, SCIATICA LATERALITY UNSPECIFIED, UNSPECIFIED BACK PAIN LATERALITY: Primary | ICD-10-CM

## 2018-07-30 RX ORDER — ROSUVASTATIN CALCIUM 20 MG/1
TABLET, COATED ORAL
Qty: 90 TAB | Refills: 1 | Status: SHIPPED | OUTPATIENT
Start: 2018-07-30 | End: 2018-10-27 | Stop reason: SDUPTHER

## 2018-07-30 NOTE — TELEPHONE ENCOUNTER
Identified patient 2 identifiers verified. Patient  Saw Dr. Christofer Pa last week. Tylenol not working, patient requesting a prescription for a muscle relaxer as discussed with Dr. Christofer Pa at the office visit per patient.

## 2018-07-30 NOTE — TELEPHONE ENCOUNTER
patient called to speak with Dr. Delia Rizo about a muscle relaxer as the tylenol is not working to take away the pain in her lower back. Please contact patient at [phone]. ASAP.

## 2018-07-31 NOTE — TELEPHONE ENCOUNTER
Identified patient 2 identifiers verified. Patient having muscle spasms in lower right back, Tylenol not working, pain rate 6 now. Patient requwsting a prescription for a muscle relaxer.

## 2018-07-31 NOTE — TELEPHONE ENCOUNTER
Patient states she needs a call back today in reference to getting something prescribed for her back pain. Please call to advise.  Thank you

## 2018-08-02 RX ORDER — TIZANIDINE 2 MG/1
2 TABLET ORAL 2 TIMES DAILY
Qty: 30 TAB | Refills: 1 | Status: SHIPPED | OUTPATIENT
Start: 2018-08-02 | End: 2018-08-03 | Stop reason: CLARIF

## 2018-08-02 NOTE — TELEPHONE ENCOUNTER
Patient states she needs a call back today in reference to her medication request that she spoke with nurse about on Tuesday, 7/31/18 & is still waiting for an answer. Patient states she thinks it's ridiculous to have to wait this long for an answer. Please call to advise.  Thank you

## 2018-08-03 RX ORDER — TIZANIDINE 2 MG/1
2 TABLET ORAL 2 TIMES DAILY
Qty: 60 TAB | Refills: 0 | Status: SHIPPED | OUTPATIENT
Start: 2018-08-03 | End: 2018-09-13 | Stop reason: SDUPTHER

## 2018-08-03 NOTE — TELEPHONE ENCOUNTER
This patient saw Dr. Loretta Encarnacion last week. I have not evaluated her for this problem. A muscle relaxer was sent to her pharmacy.

## 2018-08-03 NOTE — TELEPHONE ENCOUNTER
Call completed to [address], cancelled Rx and re-submitted to local pharmacy with verbal with read back orders from Dr. Alissa Carey MD.

## 2018-08-10 NOTE — TELEPHONE ENCOUNTER
Last Refill: 04/21/2017    Last Office Visit: 06/08/2017 César Moreno    Upcoming Appointment: 01/22/2018 Patient is calling to get her x-ray results, results are relayed. She also could not get the zofran, nasal spray, or tizanidine due to coverage of insurance. Helga Olszewski notified and ok the change from tizanidine to cyclobenzaprine. Medicines were reordered to pharmacy of choice.   Patient is going to follow up with PT.

## 2018-08-30 ENCOUNTER — OFFICE VISIT (OUTPATIENT)
Dept: CARDIOLOGY CLINIC | Age: 83
End: 2018-08-30

## 2018-08-30 VITALS
HEIGHT: 64 IN | BODY MASS INDEX: 26.94 KG/M2 | HEART RATE: 64 BPM | OXYGEN SATURATION: 97 % | RESPIRATION RATE: 16 BRPM | WEIGHT: 157.8 LBS | SYSTOLIC BLOOD PRESSURE: 138 MMHG | DIASTOLIC BLOOD PRESSURE: 70 MMHG

## 2018-08-30 DIAGNOSIS — I10 ESSENTIAL HYPERTENSION: Primary | ICD-10-CM

## 2018-08-30 DIAGNOSIS — Q23.1 BICUSPID AORTIC VALVE: ICD-10-CM

## 2018-08-30 DIAGNOSIS — I48.0 PAF (PAROXYSMAL ATRIAL FIBRILLATION) (HCC): ICD-10-CM

## 2018-08-30 DIAGNOSIS — E78.2 MIXED HYPERLIPIDEMIA: ICD-10-CM

## 2018-08-30 RX ORDER — HYDROCHLOROTHIAZIDE 12.5 MG/1
CAPSULE ORAL
Qty: 90 CAP | Refills: 3 | Status: SHIPPED | OUTPATIENT
Start: 2018-08-30 | End: 2019-08-17 | Stop reason: SDUPTHER

## 2018-08-30 RX ORDER — CLINDAMYCIN PHOSPHATE 10 MG/G
GEL TOPICAL
COMMUNITY
Start: 2018-07-26 | End: 2019-02-28

## 2018-08-30 NOTE — PROGRESS NOTES
[address] [phone]     Subjective:      Promise Couch is a 80 y.o. female is here for routine f/u. The patient denies chest pain/ shortness of breath, orthopnea, PND, LE edema, palpitations, syncope, or presyncope. Doing well. States resolution of dry mouth/sore throat.     Patient Active Problem List    Diagnosis Date Noted    Bicuspid aortic valve 02/01/2018    PAF (paroxysmal atrial fibrillation) ([facility] 02/01/2018    Basilar artery aneurysm ([address] 09/15/2015    Orthostatic hypotension 06/05/2015    CAD (coronary artery disease), native coronary artery 04/30/2014    Leg edema 01/30/2014    Hemorrhoids 10/29/2013    BRBPR (bright red blood per rectum) 10/17/2013    PAD (peripheral artery disease) ([address] 06/04/2013    Atherosclerosis of native arteries of the extremities with intermittent claudication 06/04/2013    Paroxysmal ventricular tachycardia ([address] 01/10/2013    Shortness of breath 01/10/2013    Angina, class III ([address] 12/18/2012    PVC's (premature ventricular contractions) 12/18/2012    Colitis 12/18/2012    First degree AV block 12/18/2012    HTN (hypertension) 12/05/2012    Glaucoma 11/07/2012    Vitamin D deficiency 11/07/2012    Hypercholesteremia 11/07/2012    Arthritis 11/07/2012    History  of basal cell carcinoma 11/07/2012    History of breast cancer 11/07/2012    Vertigo 11/07/2012    Wears hearing aid 11/07/2012      Yosef Henriquez MD  Past Medical History:   Diagnosis Date    Arthritis     Cancer [facility])     breast    (leg,ear,arm-skin cancer)    GERD (gastroesophageal reflux disease)     Hypertension     Other ill-defined conditions(799.89)     glaucoma    Other ill-defined conditions(799.89)     cellulitis left arm    Other ill-defined conditions(799.89)     carpal tunnel syndrome left hand    Other ill-defined conditions(799.89)     vertigo      Past Surgical History:   Procedure Laterality Date    CARDIAC CATHETERIZATION  1/10/2013         HX APPENDECTOMY      HX CARPAL TUNNEL RELEASE      left hand    HX CATARACT REMOVAL      bilateral    HX COLONOSCOPY  2012    HX KNEE ARTHROSCOPY      left    HX MASTECTOMY      bilateral    HX OTHER SURGICAL      Basal cell skin cancer removed from left leg, left arm and neck    HX OTHER SURGICAL      cyst removed from left thigh    HX OREN AND BSO      HX TONSILLECTOMY      VASCULAR SURGERY PROCEDURE UNLIST      vascular blockages removed. Allergies   Allergen Reactions    Augmentin [Amoxicillin-Pot Clavulanate] Hives and Itching    Doxycycline Hives and Itching    Keflex [Cephalexin] Hives and Itching    Monodox [Doxycycline Monohydrate] Other (comments)    Sulfa (Sulfonamide Antibiotics) Rash      Family History   Problem Relation Age of Onset    Heart Disease Mother     Heart Disease Father       age 48    Heart Disease Brother     Stroke Brother     Cancer Sister      Colon Cancer    Diabetes Sister     Heart Disease Sister     Heart Disease Sister     Diabetes Sister     Other Brother      MVA    Heart Disease Brother     Heart Disease Son       age 52      Social History     Social History    Marital status:      Spouse name: N/A    Number of children: N/A    Years of education: N/A     Occupational History    Not on file.      Social History Main Topics    Smoking status: Former Smoker     Quit date: 1955    Smokeless tobacco: Never Used    Alcohol use Yes      Comment: rare    Drug use: Yes     Special: Prescription, OTC    Sexual activity: No     Other Topics Concern    Not on file     Social History Narrative      Current Outpatient Prescriptions   Medication Sig    clindamycin (CLINDAGEL) 1 % topical gel     hydroCHLOROthiazide (MICROZIDE) 12.5 mg capsule TAKE 1 CAPSULE ORALLY DAILY    BYSTOLIC 2.5 mg tablet TAKE 1 TABLET BY MOUTH EVERY DAY    tiZANidine (ZANAFLEX) 2 mg tablet Take 1 Tab by mouth two (2) times a day. (Patient taking differently: Take 2 mg by mouth as needed.)    rosuvastatin (CRESTOR) 20 mg tablet TAKE 1 TABLET NIGHTLY    apixaban (ELIQUIS) 5 mg tablet Take 1 Tab by mouth every twelve (12) hours. To prevent stroke in atrial fib    sucralfate (CARAFATE) 100 mg/mL suspension Take  by mouth as needed.  timolol (TIMOPTIC-XE) 0.5 % ophthalmic gel-forming Administer 1 Drop to both eyes daily.  CYANOCOBALAMIN, VITAMIN B-12, (VITAMIN B-12 PO) Take 1,000 mcg by mouth daily.  latanoprost (XALATAN) 0.005 % ophthalmic solution Administer 1 Drop to both eyes nightly.  cycloSPORINE (RESTASIS) 0.05 % ophthalmic emulsion Administer 1 Drop to both eyes two (2) times a day.  VIT C/E/ZN/COPPR/LUTEIN/ZEAXAN (PRESERVISION AREDS 2 PO) Take 2 capsules by mouth two (2) times a day.  esomeprazole (NEXIUM) 40 mg capsule Take 1 Cap by mouth Daily (before breakfast).  co-enzyme Q-10 (CO Q-10) 100 mg capsule Take 100 mg by mouth daily.  Cholecalciferol, Vitamin D3, (VITAMIN D3) 1,000 unit cap Take 1 Cap by mouth two (2) times a day.  omega-3 fatty acids-vitamin e (FISH OIL) 1,000 mg Cap Take 1 Cap by mouth two (2) times a day.  CALCIUM CARBONATE/VITAMIN D3 (CALCIUM 600 + D,3, PO) Take 1 Cap by mouth two (2) times a day.  multivitamins-minerals-lutein (CENTRUM SILVER) Tab Take 1 Tab by mouth daily.  aspirin 81 mg chewable tablet Take 81 mg by mouth daily. No current facility-administered medications for this visit. Review of Symptoms:  11 systems reviewed, negative other than as stated in the HPI    Physical ExamPhysical Exam:    Vitals:    08/30/18 1058 08/30/18 1125   BP: 160/70 138/70   Pulse: 64    Resp: 16    SpO2: 97%    Weight: 157 lb 12.8 oz (71.6 kg)    Height: 5' 4\" (1.626 m)      Body mass index is 27.09 kg/(m^2). General PE   Gen:  NAD  Mental Status - Alert. General Appearance - Not in acute distress.    Chest and Lung Exam   Inspection: Accessory muscles - No use of accessory muscles in breathing. Auscultation:   Breath sounds: - Normal.   Cardiovascular   Inspection: Jugular vein - Bilateral - Inspection Normal.   Palpation/Percussion:   Apical Impulse: - Normal.   Auscultation: Rhythm - Regular. Heart Sounds - S1 WNL and S2 WNL. No S3 or S4. Murmurs & Other Heart Sounds: Auscultation of the heart reveals - 1/6 KERRIE  Peripheral Vascular   Upper Extremity: Inspection - Bilateral - No Cyanotic nailbeds or Digital clubbing. Lower Extremity:   Palpation: Edema - Bilateral - No edema. Abdomen:   Soft, non-tender, bowel sounds are active.   Neuro: A&O times 3, CN and motor grossly WNL    Labs:   Lab Results   Component Value Date/Time    Cholesterol, total 137 05/09/2018 09:19 AM    Cholesterol, total 156 06/08/2017 09:47 AM    Cholesterol, total 137 09/08/2016 08:51 AM    Cholesterol, total 171 10/30/2014 09:36 AM    Cholesterol, total 158 02/06/2014 09:11 AM    HDL Cholesterol 43 05/09/2018 09:19 AM    HDL Cholesterol 53 06/08/2017 09:47 AM    HDL Cholesterol 45 09/08/2016 08:51 AM    HDL Cholesterol 49 10/30/2014 09:36 AM    HDL Cholesterol 44 02/06/2014 09:11 AM    LDL,Direct 85 10/30/2014 09:36 AM    LDL,Direct 80 02/06/2014 09:11 AM    LDL, calculated 60 05/09/2018 09:19 AM    LDL, calculated 79 06/08/2017 09:47 AM    LDL, calculated 59 09/08/2016 08:51 AM    LDL, calculated 75 10/30/2014 09:36 AM    LDL, calculated 70 02/06/2014 09:11 AM    Triglyceride 171 (H) 05/09/2018 09:19 AM    Triglyceride 120 06/08/2017 09:47 AM    Triglyceride 164 (H) 09/08/2016 08:51 AM    Triglyceride 233 (H) 10/30/2014 09:36 AM    Triglyceride 221 (H) 02/06/2014 09:11 AM     Lab Results   Component Value Date/Time    CK 53 11/25/2014 12:00 PM     Lab Results   Component Value Date/Time    Sodium 139 05/09/2018 09:19 AM    Potassium 4.2 05/09/2018 09:19 AM    Chloride 99 05/09/2018 09:19 AM    CO2 23 05/09/2018 09:19 AM    Anion gap 8 02/24/2016 03:30 AM    Glucose 132 (H) 05/09/2018 09:19 AM    BUN 12 05/09/2018 09:19 AM    Creatinine 1.06 (H) 05/09/2018 09:19 AM    BUN/Creatinine ratio 11 (L) 05/09/2018 09:19 AM    GFR est AA 53 (L) 05/09/2018 09:19 AM    GFR est non-AA 46 (L) 05/09/2018 09:19 AM    Calcium 9.1 05/09/2018 09:19 AM    Bilirubin, total 0.5 05/09/2018 09:19 AM    AST (SGOT) 34 05/09/2018 09:19 AM    Alk. phosphatase 88 05/09/2018 09:19 AM    Protein, total 6.8 05/09/2018 09:19 AM    Albumin 4.1 05/09/2018 09:19 AM    Globulin 3.9 11/25/2014 12:00 PM    A-G Ratio 1.5 05/09/2018 09:19 AM    ALT (SGPT) 32 05/09/2018 09:19 AM       EKG:  Rate controlled AF     Assessment:        1. Essential hypertension    2. PAF (paroxysmal atrial fibrillation) ([address]    3. Bicuspid aortic valve    4. Mixed hyperlipidemia        Orders Placed This Encounter    clindamycin (CLINDAGEL) 1 % topical gel    hydroCHLOROthiazide (MICROZIDE) 12.5 mg capsule     Sig: TAKE 1 CAPSULE ORALLY DAILY     Dispense:  90 Cap     Refill:  3        Plan:     Pt presents for 6 mos f/u, doing well and stable from cardiac standpoint    Paroxysmal atrial fibrillation  Holter showed NSR in 4/17  In AF today, asymptomatic- continue rate control and AC  Continue BB, Eliquis with no overt bleeding    Systolic murmur with questionably bicuspid aortic valve:   Normal EF, mild MR/AS, mod TR per echo in 05/18  Stable. Hyperlipidemia:   5/18 LDL at 69. On statin. Lipids and labs followed by PCP. Hypertension  Repeat 138/70  Home trend 110s-150s  Continue same    Counseled on diet and exercise- eventual goal of 30-60 minutes 5-7 times a week as per AHA guidelines. Continue current care and f/u in 6 months.     Ender Calle MD

## 2018-08-30 NOTE — MR AVS SNAPSHOT
[address] [place] [facility] 
[phone] Patient: Sebastian Calix MRN: [id number] Visit Information Date & Time Provider Department Dept. Phone Encounter #  
 2018 10:45 AM Maegan Ramachandran, [address] [facility] [phone] [id number] Your Appointments 2018 11:30 AM  
ROUTINE CARE with Lucero Peter MD  
[facility]) Appt Note: 6 mon f/u  
 [facility] [address] [facility]  
[phone]  
  
   
 [place] [address] [facility]  [address] [facility]  
  
    
 2019 11:30 AM  
ESTABLISHED PATIENT with Maegan Ramachandran MD  
[facility]) Appt Note: 6 month f/u  
 [address] [facility]  
[phone] [address] Upcoming Health Maintenance Date Due ZOSTER VACCINE AGE 60> 1986 Influenza Age 5 to Adult 2018 MEDICARE YEARLY EXAM 2019 GLAUCOMA SCREENING Q2Y 2019 DTaP/Tdap/Td series (2 - Td) 2026 Allergies as of 2018  Review Complete On: 2018 By: Maegan Ramachandran MD  
  
 Severity Noted Reaction Type Reactions Augmentin [Amoxicillin-pot Clavulanate] Medium 2016   Systemic Hives, Itching Doxycycline Medium 2016   Systemic Hives, Itching Keflex [Cephalexin] Medium 2016   Systemic Hives, Itching Monodox [Doxycycline Monohydrate]  2016    Other (comments) Sulfa (Sulfonamide Antibiotics)  04/10/2012   Side Effect Rash Current Immunizations  Reviewed on 2012 Name Date Influenza High Dose Vaccine PF 2017 12:00 AM  
 Influenza Vaccine PF 10/17/2013  2:56 PM  
 Influenza Vaccine Split 2012 Pneumococcal Conjugate (PCV-13) 8/15/2015  Pneumococcal Polysaccharide (PPSV-23) 10/17/2013  2:58 PM  
 Pneumococcal Vaccine (Unspecified Type) 1/1/2009 Tdap 2/12/2016 Not reviewed this visit You Were Diagnosed With   
  
 Codes Comments Essential hypertension    -  Primary ICD-10-CM: I10 
ICD-9-CM: 401.9 PAF (paroxysmal atrial fibrillation) (HCC)     ICD-10-CM: I48.0 ICD-9-CM: 427.31 Bicuspid aortic valve     ICD-10-CM: Q23.1 ICD-9-CM: 746.4 Mixed hyperlipidemia     ICD-10-CM: E78.2 ICD-9-CM: 272.2 Vitals BP Pulse Resp Height(growth percentile) Weight(growth percentile) SpO2  
 138/70 64 16 5' 4\" (1.626 m) 157 lb 12.8 oz (71.6 kg) 97% BMI OB Status Smoking Status 27.09 kg/m2 Hysterectomy Former Smoker Vitals History BMI and BSA Data Body Mass Index Body Surface Area  
 27.09 kg/m 2 1.8 m 2 Preferred Pharmacy Pharmacy Name Phone [id number] [id number] [address] [phone] Your Updated Medication List  
  
   
This list is accurate as of 8/30/18 11:42 AM.  Always use your most recent med list.  
  
  
  
  
 apixaban 5 mg tablet Commonly known as:  Jodelle Patient Take 1 Tab by mouth every twelve (12) hours. To prevent stroke in atrial fib  
  
 aspirin 81 mg chewable tablet Take 81 mg by mouth daily. BYSTOLIC 2.5 mg tablet Generic drug:  nebivolol TAKE 1 TABLET BY MOUTH EVERY DAY  
  
 CALCIUM 600 + D(3) PO Take 1 Cap by mouth two (2) times a day. CARAFATE 100 mg/mL suspension Generic drug:  sucralfate Take  by mouth as needed. CENTRUM SILVER Tab tablet Generic drug:  multivitamins-minerals-lutein Take 1 Tab by mouth daily. clindamycin 1 % topical gel Commonly known as:  CLINDAGEL  
  
 CO Q-10 100 mg capsule Generic drug:  co-enzyme Q-10 Take 100 mg by mouth daily. esomeprazole 40 mg capsule Commonly known as:  NexIUM Take 1 Cap by mouth Daily (before breakfast). FISH OIL 1,000 mg Cap Generic drug:  omega-3 fatty acids-vitamin e Take 1 Cap by mouth two (2) times a day. hydroCHLOROthiazide 12.5 mg capsule Commonly known as:  Karen Emmanuel TAKE 1 CAPSULE ORALLY DAILY  
  
 latanoprost 0.005 % ophthalmic solution Commonly known as:  Wero Jaguar Administer 1 Drop to both eyes nightly. PRESERVISION AREDS 2 PO Take 2 capsules by mouth two (2) times a day. RESTASIS 0.05 % ophthalmic emulsion Generic drug:  cycloSPORINE Administer 1 Drop to both eyes two (2) times a day. rosuvastatin 20 mg tablet Commonly known as:  CRESTOR  
TAKE 1 TABLET NIGHTLY  
  
 timolol 0.5 % ophthalmic gel-forming Commonly known as:  TIMOPTIC-XE Administer 1 Drop to both eyes daily. tiZANidine 2 mg tablet Commonly known as:  Kip Schirmer Take 1 Tab by mouth two (2) times a day. VITAMIN B-12 PO Take 1,000 mcg by mouth daily. VITAMIN D3 1,000 unit Cap Generic drug:  cholecalciferol Take 1 Cap by mouth two (2) times a day. Prescriptions Sent to Pharmacy Refills  
 hydroCHLOROthiazide (MICROZIDE) 12.5 mg capsule 3 Sig: TAKE 1 CAPSULE ORALLY DAILY Class: Normal  
 Pharmacy: [facility]/pharmacy #[id number]- [address] Ph #: [phone] Introducing [facility]! [employer] introduces [employer] patient portal. Now you can access parts of your medical record, email your doctor's office, and request medication refills online. 1. In your internet browser, go to [email or web address] 2. Click on the First Time User? Click Here link in the Sign In box. You will see the New Member Sign Up page. 3. Enter your [employer] Access Code exactly as it appears below. You will not need to use this code after youve completed the sign-up process. If you do not sign up before the expiration date, you must request a new code. · [employer] Access Code: [email or web address] Expires: 11/28/2018 11:42 AM 
 
4. Enter the last four digits of your Social Security Number (xxxx) and Date of Birth (mm/dd/yyyy) as indicated and click Submit. You will be taken to the next sign-up page. 5. Create a [employer] ID. This will be your [employer] login ID and cannot be changed, so think of one that is secure and easy to remember. 6. Create a [employer] password. You can change your password at any time. 7. Enter your Password Reset Question and Answer. This can be used at a later time if you forget your password. 8. Enter your e-mail address. You will receive e-mail notification when new information is available in [address] 9. Click Sign Up. You can now view and download portions of your medical record. 10. Click the Download Summary menu link to download a portable copy of your medical information. If you have questions, please visit the Frequently Asked Questions section of the [employer] website. Remember, [employer] is NOT to be used for urgent needs. For medical emergencies, dial 911. Now available from your iPhone and Android! Please provide this summary of care documentation to your next provider. Your primary care clinician is listed as Gianni Field. If you have any questions after today's visit, please call [phone].

## 2018-08-30 NOTE — PROGRESS NOTES
1. Have you been to the ER, urgent care clinic since your last visit? Hospitalized since your last visit? No    2. Have you seen or consulted any other health care providers outside of the [address] [place] [address] since your last visit? Include any pap smears or colon screening. Urgent Care left lower leg discoloration. Patient has been recording home -110/78-56 pulse 68-40. She is not taking ASA requesting refill on HCTZ. Fatuma Abdul

## 2018-09-13 DIAGNOSIS — M54.40 ACUTE LOW BACK PAIN WITH SCIATICA, SCIATICA LATERALITY UNSPECIFIED, UNSPECIFIED BACK PAIN LATERALITY: ICD-10-CM

## 2018-09-13 RX ORDER — TIZANIDINE 2 MG/1
TABLET ORAL
Qty: 60 TAB | Refills: 0 | Status: SHIPPED | OUTPATIENT
Start: 2018-09-13 | End: 2019-02-28

## 2018-10-28 RX ORDER — ROSUVASTATIN CALCIUM 20 MG/1
TABLET, COATED ORAL
Qty: 90 TAB | Refills: 1 | Status: SHIPPED | OUTPATIENT
Start: 2018-10-28 | End: 2019-05-20 | Stop reason: SDUPTHER

## 2018-10-31 ENCOUNTER — TELEPHONE (OUTPATIENT)
Dept: NEUROSURGERY | Age: 83
End: 2018-10-31

## 2018-10-31 DIAGNOSIS — I67.1 CEREBRAL ANEURYSM: Primary | ICD-10-CM

## 2018-10-31 NOTE — TELEPHONE ENCOUNTER
----- Message from Miranda Brooks CNA sent at 10/31/2018 10:28 AM EDT -----  Regarding: Images  Imaging order needed

## 2018-11-05 ENCOUNTER — OFFICE VISIT (OUTPATIENT)
Dept: INTERNAL MEDICINE CLINIC | Age: 83
End: 2018-11-05

## 2018-11-05 VITALS
HEART RATE: 60 BPM | OXYGEN SATURATION: 94 % | HEIGHT: 64 IN | SYSTOLIC BLOOD PRESSURE: 145 MMHG | DIASTOLIC BLOOD PRESSURE: 52 MMHG | RESPIRATION RATE: 16 BRPM | TEMPERATURE: 98.1 F | WEIGHT: 161 LBS | BODY MASS INDEX: 27.49 KG/M2

## 2018-11-05 DIAGNOSIS — G89.29 CHRONIC BILATERAL LOW BACK PAIN WITHOUT SCIATICA: ICD-10-CM

## 2018-11-05 DIAGNOSIS — I10 ESSENTIAL HYPERTENSION: ICD-10-CM

## 2018-11-05 DIAGNOSIS — M54.50 CHRONIC BILATERAL LOW BACK PAIN WITHOUT SCIATICA: ICD-10-CM

## 2018-11-05 DIAGNOSIS — M25.551 PAIN OF RIGHT HIP JOINT: Primary | ICD-10-CM

## 2018-11-05 RX ORDER — METHYLPREDNISOLONE 4 MG/1
TABLET ORAL
Qty: 1 DOSE PACK | Refills: 0 | Status: SHIPPED | OUTPATIENT
Start: 2018-11-05 | End: 2018-12-19 | Stop reason: ALTCHOICE

## 2018-11-05 RX ORDER — HYDROCODONE BITARTRATE AND ACETAMINOPHEN 5; 325 MG/1; MG/1
1 TABLET ORAL
Qty: 21 TAB | Refills: 0 | Status: SHIPPED | OUTPATIENT
Start: 2018-11-05 | End: 2020-01-01

## 2018-11-05 NOTE — PROGRESS NOTES
HISTORY OF PRESENT ILLNESS Danita Nazario is a 80 y.o. female. HPI Pt normally follows with Dr. Deneen Renee (PCP). Pt is here for acute care. In early 7/18, pt started having bad back pains She was seen by Dr Tito Urias 7/24/18, was not given medication UA was negative Reviewed XR abd 7/18: No acute findings. No apparent urinary tract stone. Pain worsened so she called Dr Deneen Renee and was given tizanidine that does not seem to be helping Pt has also been using tylenol which does not help much She is having pain on the R side and in her R hip Pain is not constant but hurts when she walks, and when she is lying down Pt is using a heating pad, which helps some Pain has been the same since 7/18 Denies F/C, N/V, incontinence, numbness/tingling in legs Pain keeps her up most nights - she wakes up with a sharp pain and is unable to fall back asleep Pt saw Dr Devona Soulier (ortho) in the past for knee pain Reviewed US abd 8/16: 1. 1. Sludge or small stones in the gallbladder. 2. No sonographic evidence of acute cholecystitis and no biliary duct Dilatation. 3. Echogenic liver consistent with hepatic steatosis. Pt has a CT of the head and neck scheduled, which she states is for a small aneurysm Pt will be going to [place] in the upcoming weeks and she would like to not have pain during the long car ride Ordered XR hip and pelvis and XR L spine Will give medrol dosepack Will provide limited rx of hydrocodone to use prn Discussed that seeing ortho would be the next step, provided information Pt sees Dr Katheryn Jensen for a fib, and is on eliquis for this Patient Active Problem List  
 Diagnosis Date Noted  Bicuspid aortic valve 02/01/2018  PAF (paroxysmal atrial fibrillation) ([facility] 02/01/2018  Basilar artery aneurysm ([facility] 09/15/2015  Orthostatic hypotension 06/05/2015  CAD (coronary artery disease), native coronary artery 04/30/2014  Leg edema 01/30/2014  Hemorrhoids 10/29/2013  BRBPR (bright red blood per rectum) 10/17/2013  PAD (peripheral artery disease) ([facility] 06/04/2013  Atherosclerosis of native arteries of the extremities with intermittent claudication 06/04/2013  Paroxysmal ventricular tachycardia ([facility] 01/10/2013  Shortness of breath 01/10/2013  Angina, class III ([facility] 12/18/2012  PVC's (premature ventricular contractions) 12/18/2012  Colitis 12/18/2012  First degree AV block 12/18/2012  
 HTN (hypertension) 12/05/2012  Glaucoma 11/07/2012  Vitamin D deficiency 11/07/2012  Hypercholesteremia 11/07/2012  Arthritis 11/07/2012  History  of basal cell carcinoma 11/07/2012  History of breast cancer 11/07/2012  Vertigo 11/07/2012  Wears hearing aid 11/07/2012 Current Outpatient Medications Medication Sig Dispense Refill  ELIQUIS 5 mg tablet TAKE 1 TABLET EVERY 12     HOURS TO PREVENT STROKE IN ATRIAL FIBRILLATION 180 Tab 2  
 rosuvastatin (CRESTOR) 20 mg tablet TAKE 1 TABLET NIGHTLY 90 Tab 1  
 tiZANidine (ZANAFLEX) 2 mg tablet TAKE 1 TABLET BY MOUTH TWICE A DAY 60 Tab 0  
 clindamycin (CLINDAGEL) 1 % topical gel  hydroCHLOROthiazide (MICROZIDE) 12.5 mg capsule TAKE 1 CAPSULE ORALLY DAILY 90 Cap 3  
 BYSTOLIC 2.5 mg tablet TAKE 1 TABLET BY MOUTH EVERY DAY 90 Tab 3  
 aspirin 81 mg chewable tablet Take 81 mg by mouth daily.  sucralfate (CARAFATE) 100 mg/mL suspension Take  by mouth as needed.  timolol (TIMOPTIC-XE) 0.5 % ophthalmic gel-forming Administer 1 Drop to both eyes daily.  CYANOCOBALAMIN, VITAMIN B-12, (VITAMIN B-12 PO) Take 1,000 mcg by mouth daily.  latanoprost (XALATAN) 0.005 % ophthalmic solution Administer 1 Drop to both eyes nightly.  cycloSPORINE (RESTASIS) 0.05 % ophthalmic emulsion Administer 1 Drop to both eyes two (2) times a day.  VIT C/E/ZN/COPPR/LUTEIN/ZEAXAN (PRESERVISION AREDS 2 PO) Take 2 capsules by mouth two (2) times a day.  esomeprazole (NEXIUM) 40 mg capsule Take 1 Cap by mouth Daily (before breakfast). 90 Cap 1  co-enzyme Q-10 (CO Q-10) 100 mg capsule Take 100 mg by mouth daily.  Cholecalciferol, Vitamin D3, (VITAMIN D3) 1,000 unit cap Take 1 Cap by mouth two (2) times a day.  omega-3 fatty acids-vitamin e (FISH OIL) 1,000 mg Cap Take 1 Cap by mouth two (2) times a day.  CALCIUM CARBONATE/VITAMIN D3 (CALCIUM 600 + D,3, PO) Take 1 Cap by mouth two (2) times a day.  multivitamins-minerals-lutein (CENTRUM SILVER) Tab Take 1 Tab by mouth daily. Past Surgical History:  
Procedure Laterality Date  CARDIAC CATHETERIZATION  1/10/2013  HX APPENDECTOMY  HX CARPAL TUNNEL RELEASE    
 left hand  HX CATARACT REMOVAL    
 bilateral  
 HX COLONOSCOPY  4/2012  HX KNEE ARTHROSCOPY    
 left  HX MASTECTOMY    
 bilateral  
 HX OTHER SURGICAL Basal cell skin cancer removed from left leg, left arm and neck  HX OTHER SURGICAL    
 cyst removed from left thigh  HX OREN AND BSO  HX TONSILLECTOMY  VASCULAR SURGERY PROCEDURE UNLIST    
 vascular blockages removed. Lab Results Component Value Date/Time WBC 5.8 01/22/2018 12:42 PM  
 HGB 12.7 01/22/2018 12:42 PM  
 HCT 37.6 01/22/2018 12:42 PM  
 PLATELET 531 98/48/0222 12:42 PM  
 MCV 85 01/22/2018 12:42 PM  
 
Lab Results Component Value Date/Time Cholesterol, total 137 05/09/2018 09:19 AM  
 HDL Cholesterol 43 05/09/2018 09:19 AM  
 LDL,Direct 85 10/30/2014 09:36 AM  
 LDL, calculated 60 05/09/2018 09:19 AM  
 Triglyceride 171 (H) 05/09/2018 09:19 AM  
 
Lab Results Component Value Date/Time  GFR est non-AA 46 (L) 05/09/2018 09:19 AM  
 GFRNA, POC 42 (L) 08/30/2016 12:39 PM  
 GFR est AA 53 (L) 05/09/2018 09:19 AM  
 GFRAA, POC 51 (L) 08/30/2016 12:39 PM  
 Creatinine 1.06 (H) 05/09/2018 09:19 AM  
 Creatinine (POC) 1.2 08/30/2016 12:39 PM  
 BUN 12 05/09/2018 09:19 AM  
 Sodium 139 05/09/2018 09:19 AM  
 Potassium 4.2 05/09/2018 09:19 AM  
 Chloride 99 05/09/2018 09:19 AM  
 CO2 23 05/09/2018 09:19 AM  
 Magnesium 2.1 04/12/2017 02:42 PM  
  
Review of Systems Constitutional: Negative for chills and fever. Gastrointestinal: Negative for constipation and diarrhea. Musculoskeletal: Positive for back pain and joint pain. Physical Exam  
Constitutional: She is oriented to person, place, and time. She appears well-developed and well-nourished. No distress. HENT:  
Head: Normocephalic and atraumatic. Mouth/Throat: Oropharynx is clear and moist. No oropharyngeal exudate. Eyes: Conjunctivae and EOM are normal. Right eye exhibits no discharge. Left eye exhibits no discharge. Neck: Normal range of motion. Neck supple. Cardiovascular: Normal rate and regular rhythm. Exam reveals no gallop and no friction rub. Murmur (2/6) heard. Pulmonary/Chest: Effort normal and breath sounds normal. No respiratory distress. She has no wheezes. She has no rales. She exhibits no tenderness. Musculoskeletal: She exhibits tenderness (TTP over R iliac crest). She exhibits no edema or deformity. 5/5 strength BLE Lymphadenopathy:  
  She has no cervical adenopathy. Neurological: She is alert and oriented to person, place, and time. Coordination abnormal.  
Skin: Skin is warm and dry. No rash noted. She is not diaphoretic. No erythema. No pallor. Psychiatric: She has a normal mood and affect. Her behavior is normal.  
 
 
ASSESSMENT and PLAN 
  ICD-10-CM ICD-9-CM 1. Pain of right hip joint Pt with pain over the R iliac crest, seems musculoskeletal, not related to kidneys or kidney stone as she had previously been concerned, will check plain film of R hip and pelvis and of her L spine, will give medrol dosepack to improve sx, and limited amount of hydrocodone which I discussed was nonrefillable, she expressed understanding, if not improving will see ortho next, provided referral 
 M25.551 719.45 XR HIP RT W OR WO PELV 2-3 VWS  
   XR SPINE LUMB 2 OR 3 V HYDROcodone-acetaminophen (NORCO) 5-325 mg per tablet REFERRAL TO ORTHOPEDICS 2. Chronic bilateral low back pain without sciatica See above M54.5 724.2 XR HIP RT W OR WO PELV 2-3 VWS  
 G89.29 338.29 XR SPINE LUMB 2 OR 3 V HYDROcodone-acetaminophen (NORCO) 5-325 mg per tablet REFERRAL TO ORTHOPEDICS 3. Essential hypertension Controlled on current regimen, no change to dose I10 401.9 HYDROcodone-acetaminophen (NORCO) 5-325 mg per tablet Scribed by Angelica Beal of [address] [place] [address] [address], as dictated by Dr. Sherrell Lockwood. Current diagnosis and concerns discussed with pt at length. Pt understands risks and benefits or current treatment plan and medications, and accepts the treatment and medication with any possible risks. Pt asks appropriate questions, which were answered. Pt was instructed to call with any concerns or problems. I have reviewed the note documented by the scribe. The services provided are my own. The documentation is accurate This note will not be viewable in [employer].

## 2018-11-12 ENCOUNTER — HOSPITAL ENCOUNTER (OUTPATIENT)
Dept: CT IMAGING | Age: 83
Discharge: HOME | End: 2018-11-12
Attending: RADIOLOGY
Payer: MEDICARE

## 2018-11-12 DIAGNOSIS — I67.1 CEREBRAL ANEURYSM: ICD-10-CM

## 2018-11-12 LAB — CREAT BLD-MCNC: 1 MG/DL (ref 0.6–1.3)

## 2018-11-12 PROCEDURE — 74011636320 HC RX REV CODE- 636/320: Performed by: RADIOLOGY

## 2018-11-12 PROCEDURE — 70498 CT ANGIOGRAPHY NECK: CPT

## 2018-11-12 PROCEDURE — 82565 ASSAY OF CREATININE: CPT

## 2018-11-12 PROCEDURE — 74011250636 HC RX REV CODE- 250/636: Performed by: RADIOLOGY

## 2018-11-12 RX ORDER — SODIUM CHLORIDE 0.9 % (FLUSH) 0.9 %
10 SYRINGE (ML) INJECTION
Status: COMPLETED | OUTPATIENT
Start: 2018-11-12 | End: 2018-11-12

## 2018-11-12 RX ORDER — SODIUM CHLORIDE 9 MG/ML
50 INJECTION, SOLUTION INTRAVENOUS
Status: COMPLETED | OUTPATIENT
Start: 2018-11-12 | End: 2018-11-12

## 2018-11-12 RX ADMIN — Medication 10 ML: at 13:45

## 2018-11-12 RX ADMIN — SODIUM CHLORIDE 50 ML/HR: 900 INJECTION, SOLUTION INTRAVENOUS at 13:45

## 2018-11-12 RX ADMIN — IOPAMIDOL 100 ML: 755 INJECTION, SOLUTION INTRAVENOUS at 13:45

## 2018-12-19 ENCOUNTER — HOSPITAL ENCOUNTER (OUTPATIENT)
Dept: LAB | Age: 83
Discharge: HOME | End: 2018-12-19
Payer: MEDICARE

## 2018-12-19 ENCOUNTER — OFFICE VISIT (OUTPATIENT)
Dept: INTERNAL MEDICINE CLINIC | Age: 83
End: 2018-12-19

## 2018-12-19 VITALS
TEMPERATURE: 97.6 F | SYSTOLIC BLOOD PRESSURE: 156 MMHG | HEIGHT: 64 IN | OXYGEN SATURATION: 96 % | RESPIRATION RATE: 18 BRPM | BODY MASS INDEX: 26.67 KG/M2 | DIASTOLIC BLOOD PRESSURE: 79 MMHG | HEART RATE: 55 BPM | WEIGHT: 156.2 LBS

## 2018-12-19 DIAGNOSIS — E04.1 THYROID NODULE: ICD-10-CM

## 2018-12-19 DIAGNOSIS — M54.50 ACUTE BILATERAL LOW BACK PAIN WITHOUT SCIATICA: Primary | ICD-10-CM

## 2018-12-19 DIAGNOSIS — E78.00 HYPERCHOLESTEREMIA: ICD-10-CM

## 2018-12-19 DIAGNOSIS — R73.09 ELEVATED HEMOGLOBIN A1C: ICD-10-CM

## 2018-12-19 DIAGNOSIS — I10 ESSENTIAL HYPERTENSION: ICD-10-CM

## 2018-12-19 PROCEDURE — 84439 ASSAY OF FREE THYROXINE: CPT

## 2018-12-19 PROCEDURE — 84443 ASSAY THYROID STIM HORMONE: CPT

## 2018-12-19 PROCEDURE — 83036 HEMOGLOBIN GLYCOSYLATED A1C: CPT

## 2018-12-19 PROCEDURE — 80053 COMPREHEN METABOLIC PANEL: CPT

## 2018-12-19 PROCEDURE — 36415 COLL VENOUS BLD VENIPUNCTURE: CPT

## 2018-12-19 PROCEDURE — 80061 LIPID PANEL: CPT

## 2018-12-19 RX ORDER — CELECOXIB 200 MG/1
200 CAPSULE ORAL 2 TIMES DAILY
COMMUNITY
Start: 2018-12-18 | End: 2019-01-17

## 2018-12-19 NOTE — PROGRESS NOTES
SUBJECTIVE:   Ms. Kirt Umanzor is a 80 y.o. female who is here for follow up of routine medical issues. Pt reports she had some cheerios this AM.      Pt c/o BL lower back pain. Pt saw Dr. Katie Jackson on 11/5 who ordered XRs and prescribed norco and prednisone. Pt's lumbar spine XR showed lumbar spondylosis. Pt saw Dr. Negrito Townsend (orthopedics) on 12/18. He prescribed celebrex for 5 days, followed by prednisone if there is no relief. Pt is picking up celebrex today. Pt has PT scheduled to start on 1/3/19. Pt reports a heating pad provides mild relief. Pt denies sciatica, trauma. Pt reports she recently saw ophthalmology and was told she has glaucoma and macular degeneration in her L eye. She states she was told she cannot be driving under those conditions and has been looking into  services. Pt reports her family wants her to move to [place] but she is unsure if she wants to. Pt endorses good energy levels. She notes she has someone who cleans her house for her but she is still going to the grocery store for herself. Pt inquired about the results of her 11/12/18 head and neck CT. Pt's CT showed unchanged 3 mm basilar tip artery aneurysm and three other questionable, unchanged tiny intracranial aneurysms. Pt's CT also showed a new 15 mm low-density R thyroid nodule. Elevated A1c: Pt's last A1c was 6.3%. HLD: Pt is compliant in taking rosuvastatin. Patient denies abdominal pain, nausea, vomiting, diarrhea due to the medication. Last lipid panel in 5/2018 was normal except for elevated triglyceride. HTN: Pt's BP in the office today was elevated at 156/79. Pt is compliant in taking HCTZ, bystolic, and timolol. Pt follows with Dr. Gia Potts and her next appointment is 2/2019. Pt reports ALLAN with walking longer distances. Patient denies chest pain, edema, headache, visual changes, dizziness, palpitations or syncope.      PREVENTIVE:  Flu: 9/24/18  Eye exam: current    At this time, she is otherwise doing well and has brought no other complaints to my attention today. For a list of the medical issues addressed today, see the assessment and plan below. PMH:   Past Medical History:   Diagnosis Date    Arthritis     Cancer ([facility]     breast    (leg,ear,arm-skin cancer)    GERD (gastroesophageal reflux disease)     Hypertension     Other ill-defined conditions(799.89)     glaucoma    Other ill-defined conditions(799.89)     cellulitis left arm    Other ill-defined conditions(799.89)     carpal tunnel syndrome left hand    Other ill-defined conditions(799.89)     vertigo     PSH:  has a past surgical history that includes hx mastectomy; hx efren and bso; hx cataract removal; hx tonsillectomy; hx knee arthroscopy; hx appendectomy; hx other surgical; hx colonoscopy (4/2012); hx other surgical; hx carpal tunnel release; cardiac catheterization (1/10/2013); vascular surgery procedure unlist; EGD (N/A, 5/20/2014); ESOPHAGOGASTRODUODENAL (EGD) BIOPSY (N/A, 5/20/2014); COLONOSCOPY (N/A, 4/11/2012); STOOL ASPIRATION (N/A, 4/11/2012); COLON BIOPSY (N/A, 4/11/2012); and ENDOSCOPIC POLYPECTOMY (N/A, 4/11/2012). All: is allergic to augmentin [amoxicillin-pot clavulanate]; doxycycline; keflex [cephalexin]; monodox [doxycycline monohydrate]; and sulfa (sulfonamide antibiotics). MEDS:   Current Outpatient Medications   Medication Sig    celecoxib (CELEBREX) 200 mg capsule Take 200 mg by mouth two (2) times a day.  HYDROcodone-acetaminophen (NORCO) 5-325 mg per tablet Take 1 Tab by mouth every eight (8) hours as needed for Pain. Max Daily Amount: 3 Tabs.     ELIQUIS 5 mg tablet TAKE 1 TABLET EVERY 12     HOURS TO PREVENT STROKE IN ATRIAL FIBRILLATION    rosuvastatin (CRESTOR) 20 mg tablet TAKE 1 TABLET NIGHTLY    tiZANidine (ZANAFLEX) 2 mg tablet TAKE 1 TABLET BY MOUTH TWICE A DAY    clindamycin (CLINDAGEL) 1 % topical gel     hydroCHLOROthiazide (MICROZIDE) 12.5 mg capsule TAKE 1 CAPSULE ORALLY DAILY    BYSTOLIC 2.5 mg tablet TAKE 1 TABLET BY MOUTH EVERY DAY    sucralfate (CARAFATE) 100 mg/mL suspension Take  by mouth as needed.  timolol (TIMOPTIC-XE) 0.5 % ophthalmic gel-forming Administer 1 Drop to both eyes daily.  CYANOCOBALAMIN, VITAMIN B-12, (VITAMIN B-12 PO) Take 1,000 mcg by mouth daily.  latanoprost (XALATAN) 0.005 % ophthalmic solution Administer 1 Drop to both eyes nightly.  cycloSPORINE (RESTASIS) 0.05 % ophthalmic emulsion Administer 1 Drop to both eyes two (2) times a day.  VIT C/E/ZN/COPPR/LUTEIN/ZEAXAN (PRESERVISION AREDS 2 PO) Take 2 capsules by mouth two (2) times a day.  esomeprazole (NEXIUM) 40 mg capsule Take 1 Cap by mouth Daily (before breakfast).  co-enzyme Q-10 (CO Q-10) 100 mg capsule Take 100 mg by mouth daily.  Cholecalciferol, Vitamin D3, (VITAMIN D3) 1,000 unit cap Take 1 Cap by mouth two (2) times a day.  omega-3 fatty acids-vitamin e (FISH OIL) 1,000 mg Cap Take 1 Cap by mouth two (2) times a day.  CALCIUM CARBONATE/VITAMIN D3 (CALCIUM 600 + D,3, PO) Take 1 Cap by mouth two (2) times a day.  multivitamins-minerals-lutein (CENTRUM SILVER) Tab Take 1 Tab by mouth daily. No current facility-administered medications for this visit. FH: family history includes Cancer in her sister; Diabetes in her sister and sister; Heart Disease in her brother, brother, father, mother, sister, sister, and son; Other in her brother; Stroke in her brother. SH:  reports that she quit smoking about 64 years ago. she has never used smokeless tobacco. She reports that she drinks alcohol. She reports that she uses drugs. Drugs: Prescription and OTC.      Review of Systems - History obtained from the patient  General ROS: no fever, chills, fatigue, body aches  Psychological ROS: no change in anxiety, depression, SI/HI  Ophthalmic ROS: no blurred vision, myopia, double vision  ENT ROS: no dysphagia, otalgia, otorrhea, rhinorrhea, post nasal drip  Respiratory ROS: no cough, shortness of breath, or wheezing  Cardiovascular ROS: no chest pain or dyspnea on exertion  Gastrointestinal ROS: no abdominal pain, change in bowel habits, or black or bloody stools  Genito-Urinary ROS: no frequency, urgency, incontinence, dysuria, hematuria  Musculoskeletal ROS: arthralgia (lower back) no myalgia  Neurological ROS: no headaches, dizziness, lightheadedness, tremors, seizures  Dermatological ROS: no rash or lesions    OBJECTIVE:   Vitals:   Visit Vitals  /79 (BP 1 Location: Left arm, BP Patient Position: Sitting)   Pulse (!) 55   Temp 97.6 °F (36.4 °C) (Oral)   Resp 18   Ht 5' 4\" (1.626 m)   Wt 156 lb 3.2 oz (70.9 kg)   SpO2 96%   BMI 26.81 kg/m²      Gen: Pleasant 80 y.o.  female in NAD. HEENT: PERRLA. EOMI. OP moist and pink. Neck: Supple. No LAD. No thyromegaly. HEART: RRR, No M/G/R.      LUNGS: CTAB No W/R. EXTREMITIES: Warm. No C/C/E.    MUSCULOSKELETAL: + R paravertebral lumbar TTP, Normal ROM, muscle strength 5/5 all groups. NEURO: Alert and oriented x 3. Cranial nerves grossly intact. No focal sensory or motor deficits noted. SKIN: Warm. Dry. No rashes or other lesions noted. ASSESSMENT/ PLAN: Diagnoses and all orders for this visit:    1. Acute bilateral low back pain without sciatica    2. Hypercholesteremia  -     LIPID PANEL  -     METABOLIC PANEL, COMPREHENSIVE    3. Essential hypertension  -     METABOLIC PANEL, COMPREHENSIVE    4. Elevated hemoglobin A1c  -     HEMOGLOBIN A1C WITH EAG    5. Thyroid nodule  -     T4, FREE  -     TSH 3RD GENERATION  -     US THYROID/PARATHYROID/SOFT TISS; Future        ICD-10-CM ICD-9-CM    1. Acute bilateral low back pain without sciatica M54.5 724.2      338.19    2. Hypercholesteremia E78.00 272.0 LIPID PANEL      METABOLIC PANEL, COMPREHENSIVE   3. Essential hypertension J05 079.1 METABOLIC PANEL, COMPREHENSIVE   4. Elevated hemoglobin A1c R73.09 790.29 HEMOGLOBIN A1C WITH EAG   5.  Thyroid nodule E04.1 241.0 T4, FREE TSH 3RD GENERATION      US THYROID/PARATHYROID/SOFT TISS      1. Acute BL low back pain  Pt is following with orthopedics and scheduled to start PT. Pt has not picked up her celebrex prescription but plans to do so today. 2. Hypercholesteremia  Lipid panel shows cholesterol seems to be well controlled except for elevated triglyceride. I recommended continuing current dose of rosuvastatin, eating a low fat diet, and increasing exercise. Recheck lipid panel. 3. Hypertension  I recommended continuing current dose of HCTZ, bystolic, and timolol, eating a low sodium diet, and increasing exercise. Recheck CMP. 4. Elevated hemoglobin A1c  I recommended pt avoid sugars and starches and increase exercise, when possible. Recheck A1c.     5. Thyroid nodule  I ordered labs for T4 and TSH to assess thyroid function and ordered a thyroid/parathyroid US for further evaluation. Follow-up Disposition:  Return in about 6 months (around 6/19/2019) for follow up. I have reviewed the patient's medications and risks/side effects/benefits were discussed. Diagnosis(-es) explained to patient and questions answered. Literature provided where appropriate.      Written by Hector Bob, as dictated by Argelia Thomson MD.

## 2018-12-20 LAB
ALBUMIN SERPL-MCNC: 4 G/DL (ref 3.2–4.6)
ALBUMIN/GLOB SERPL: 1.7 {RATIO} (ref 1.2–2.2)
ALP SERPL-CCNC: 72 IU/L (ref 39–117)
ALT SERPL-CCNC: 15 IU/L (ref 0–32)
AST SERPL-CCNC: 22 IU/L (ref 0–40)
BILIRUB SERPL-MCNC: 0.6 MG/DL (ref 0–1.2)
BUN SERPL-MCNC: 12 MG/DL (ref 10–36)
BUN/CREAT SERPL: 11 (ref 12–28)
CALCIUM SERPL-MCNC: 9.4 MG/DL (ref 8.7–10.3)
CHLORIDE SERPL-SCNC: 98 MMOL/L (ref 96–106)
CHOLEST SERPL-MCNC: 126 MG/DL (ref 100–199)
CO2 SERPL-SCNC: 25 MMOL/L (ref 20–29)
CREAT SERPL-MCNC: 1.06 MG/DL (ref 0.57–1)
EST. AVERAGE GLUCOSE BLD GHB EST-MCNC: 134 MG/DL
GLOBULIN SER CALC-MCNC: 2.4 G/DL (ref 1.5–4.5)
GLUCOSE SERPL-MCNC: 89 MG/DL (ref 65–99)
HBA1C MFR BLD: 6.3 % (ref 4.8–5.6)
HDLC SERPL-MCNC: 53 MG/DL
LDLC SERPL CALC-MCNC: 50 MG/DL (ref 0–99)
POTASSIUM SERPL-SCNC: 4.2 MMOL/L (ref 3.5–5.2)
PROT SERPL-MCNC: 6.4 G/DL (ref 6–8.5)
SODIUM SERPL-SCNC: 139 MMOL/L (ref 134–144)
T4 FREE SERPL-MCNC: 1.35 NG/DL (ref 0.82–1.77)
TRIGL SERPL-MCNC: 115 MG/DL (ref 0–149)
TSH SERPL DL<=0.005 MIU/L-ACNC: 1.89 UIU/ML (ref 0.45–4.5)
VLDLC SERPL CALC-MCNC: 23 MG/DL (ref 5–40)

## 2018-12-21 NOTE — PROGRESS NOTES
CMP-Normal electrolyte levels and normal liver function. You have some mild renal insufficiency. hgbA1c- Unchanged at 6.3 when compared to prior results.

## 2019-01-16 ENCOUNTER — HOSPITAL ENCOUNTER (OUTPATIENT)
Dept: ULTRASOUND IMAGING | Age: 84
Discharge: HOME | End: 2019-01-16
Attending: FAMILY MEDICINE
Payer: MEDICARE

## 2019-01-16 DIAGNOSIS — E04.1 THYROID NODULE: ICD-10-CM

## 2019-01-16 PROCEDURE — 76536 US EXAM OF HEAD AND NECK: CPT

## 2019-01-24 NOTE — PROGRESS NOTES
Please call patient and inform her that she has nodules and cysts noted on the thyroid ultrasound. The new nodule seen on her CT was actually a cyst. I would like for her to see the endocrinologist for further evaluation.

## 2019-01-29 ENCOUNTER — TELEPHONE (OUTPATIENT)
Dept: INTERNAL MEDICINE CLINIC | Age: 84
End: 2019-01-29

## 2019-01-29 NOTE — TELEPHONE ENCOUNTER
Call completed to patient, two identifiers verified. Patient advised of results and recommendations. Patient provided with contact information for Dr. Melissa Murphy. Results mailed at patients request.     ----- Message from Farheen Raza MD sent at 1/24/2019  5:17 PM EST -----  Please call patient and inform her that she has nodules and cysts noted on the thyroid ultrasound. The new nodule seen on her CT was actually a cyst. I would like for her to see the endocrinologist for further evaluation.

## 2019-02-28 ENCOUNTER — OFFICE VISIT (OUTPATIENT)
Dept: CARDIOLOGY CLINIC | Age: 84
End: 2019-02-28

## 2019-02-28 VITALS
HEIGHT: 64 IN | DIASTOLIC BLOOD PRESSURE: 70 MMHG | BODY MASS INDEX: 26.6 KG/M2 | HEART RATE: 54 BPM | WEIGHT: 155.8 LBS | SYSTOLIC BLOOD PRESSURE: 140 MMHG | RESPIRATION RATE: 16 BRPM | OXYGEN SATURATION: 94 %

## 2019-02-28 DIAGNOSIS — E78.2 MIXED HYPERLIPIDEMIA: ICD-10-CM

## 2019-02-28 DIAGNOSIS — I48.0 PAF (PAROXYSMAL ATRIAL FIBRILLATION) (HCC): ICD-10-CM

## 2019-02-28 DIAGNOSIS — I10 ESSENTIAL HYPERTENSION: Primary | ICD-10-CM

## 2019-02-28 DIAGNOSIS — Q23.1 BICUSPID AORTIC VALVE: ICD-10-CM

## 2019-02-28 NOTE — PROGRESS NOTES
1. Have you been to the ER, urgent care clinic since your last visit? Hospitalized since your last visit? No 
 
2. Have you seen or consulted any other health care providers outside of the [address] [place] [address] since your last visit? Include any pap smears or colon screening. No  
 
Chief Complaint Patient presents with  Follow-up Hx of Afib Pt has no cardiac complaints.

## 2019-02-28 NOTE — PROGRESS NOTES
[address] [facility]  [phone] Subjective:  
  
Heather Liang is a 80 y.o. female is here for routine f/u. The patient denies chest pain/ shortness of breath, orthopnea, PND, LE edema, palpitations, syncope, or presyncope. Patient Active Problem List  
 Diagnosis Date Noted  Bicuspid aortic valve 02/01/2018  PAF (paroxysmal atrial fibrillation) ([facility] [address] 02/01/2018  Basilar artery aneurysm ([facility] [address] 09/15/2015  Orthostatic hypotension 06/05/2015  CAD (coronary artery disease), native coronary artery 04/30/2014  Leg edema 01/30/2014  Hemorrhoids 10/29/2013  BRBPR (bright red blood per rectum) 10/17/2013  PAD (peripheral artery disease) ([address] 06/04/2013  Atherosclerosis of native arteries of the extremities with intermittent claudication 06/04/2013  Paroxysmal ventricular tachycardia ([facility] [address] 01/10/2013  Shortness of breath 01/10/2013  Angina, class III ([address] 12/18/2012  PVC's (premature ventricular contractions) 12/18/2012  Colitis 12/18/2012  First degree AV block 12/18/2012  
 HTN (hypertension) 12/05/2012  Glaucoma 11/07/2012  Vitamin D deficiency 11/07/2012  Hypercholesteremia 11/07/2012  Arthritis 11/07/2012  History  of basal cell carcinoma 11/07/2012  History of breast cancer 11/07/2012  Vertigo 11/07/2012  Wears hearing aid 11/07/2012 Stewart Werner MD 
Past Medical History:  
Diagnosis Date  Arthritis  Cancer ([facility] [address] breast    (leg,ear,arm-skin cancer)  GERD (gastroesophageal reflux disease)  Hypertension  Other ill-defined conditions(799.89)   
 glaucoma  Other ill-defined conditions(799.89)   
 cellulitis left arm  Other ill-defined conditions(799.89)   
 carpal tunnel syndrome left hand  Other ill-defined conditions(799.89)   
 vertigo Past Surgical History:  
Procedure Laterality Date  CARDIAC CATHETERIZATION  1/10/2013  HX APPENDECTOMY  HX CARPAL TUNNEL RELEASE    
 left hand  HX CATARACT REMOVAL    
 bilateral  
 HX COLONOSCOPY  2012  HX KNEE ARTHROSCOPY    
 left  HX MASTECTOMY    
 bilateral  
 HX OTHER SURGICAL Basal cell skin cancer removed from left leg, left arm and neck  HX OTHER SURGICAL    
 cyst removed from left thigh  HX OREN AND BSO  HX TONSILLECTOMY  VASCULAR SURGERY PROCEDURE UNLIST    
 vascular blockages removed. Allergies Allergen Reactions  Augmentin [Amoxicillin-Pot Clavulanate] Hives and Itching  Doxycycline Hives and Itching  Keflex [Cephalexin] Hives and Itching  Monodox [Doxycycline Monohydrate] Other (comments)  Sulfa (Sulfonamide Antibiotics) Rash Family History Problem Relation Age of Onset  Heart Disease Mother  Heart Disease Father   
      age 55  
 Heart Disease Brother  Stroke Brother  Cancer Sister Colon Cancer  Diabetes Sister  Heart Disease Sister  Heart Disease Sister  Diabetes Sister  Other Brother MVA  Heart Disease Brother  Heart Disease Son   
      age 52 Social History Socioeconomic History  Marital status:  Spouse name: Not on file  Number of children: Not on file  Years of education: Not on file  Highest education level: Not on file Social Needs  Financial resource strain: Not on file  Food insecurity - worry: Not on file  Food insecurity - inability: Not on file  Transportation needs - medical: Not on file  Transportation needs - non-medical: Not on file Occupational History  Not on file Tobacco Use  Smoking status: Former Smoker Last attempt to quit: 1955 Years since quittin.2  Smokeless tobacco: Never Used Substance and Sexual Activity  Alcohol use: Yes Comment: rare  Drug use: Yes Types: Prescription, OTC  Sexual activity: No  
Other Topics Concern  Not on file Social History Narrative  Not on file Current Outpatient Medications Medication Sig  
 HYDROcodone-acetaminophen (NORCO) 5-325 mg per tablet Take 1 Tab by mouth every eight (8) hours as needed for Pain. Max Daily Amount: 3 Tabs.  ELIQUIS 5 mg tablet TAKE 1 TABLET EVERY 12     HOURS TO PREVENT STROKE IN ATRIAL FIBRILLATION  
 rosuvastatin (CRESTOR) 20 mg tablet TAKE 1 TABLET NIGHTLY  hydroCHLOROthiazide (MICROZIDE) 12.5 mg capsule TAKE 1 CAPSULE ORALLY DAILY  BYSTOLIC 2.5 mg tablet TAKE 1 TABLET BY MOUTH EVERY DAY  sucralfate (CARAFATE) 100 mg/mL suspension Take  by mouth as needed.  timolol (TIMOPTIC-XE) 0.5 % ophthalmic gel-forming Administer 1 Drop to both eyes daily.  CYANOCOBALAMIN, VITAMIN B-12, (VITAMIN B-12 PO) Take 1,000 mcg by mouth daily.  latanoprost (XALATAN) 0.005 % ophthalmic solution Administer 1 Drop to both eyes nightly.  cycloSPORINE (RESTASIS) 0.05 % ophthalmic emulsion Administer 1 Drop to both eyes two (2) times a day.  VIT C/E/ZN/COPPR/LUTEIN/ZEAXAN (PRESERVISION AREDS 2 PO) Take 2 capsules by mouth two (2) times a day.  esomeprazole (NEXIUM) 40 mg capsule Take 1 Cap by mouth Daily (before breakfast).  co-enzyme Q-10 (CO Q-10) 100 mg capsule Take 100 mg by mouth daily.  Cholecalciferol, Vitamin D3, (VITAMIN D3) 1,000 unit cap Take 1 Cap by mouth two (2) times a day.  omega-3 fatty acids-vitamin e (FISH OIL) 1,000 mg Cap Take 1 Cap by mouth two (2) times a day.  CALCIUM CARBONATE/VITAMIN D3 (CALCIUM 600 + D,3, PO) Take 1 Cap by mouth two (2) times a day.  multivitamins-minerals-lutein (CENTRUM SILVER) Tab Take 1 Tab by mouth daily. No current facility-administered medications for this visit. Review of Symptoms: 
11 systems reviewed, negative other than as stated in the HPI Physical ExamPhysical Exam:   
Vitals:  
 02/28/19 1138 02/28/19 1140 BP: 135/75 140/70 Pulse: (!) 54 Resp: 16 SpO2: 94% Weight: 155 lb 12.8 oz (70.7 kg) Height: 5' 4\" (1.626 m) Body mass index is 26.74 kg/m². General PE Gen:  NAD Mental Status - Alert. General Appearance - Not in acute distress. Chest and Lung Exam  
Inspection: Accessory muscles - No use of accessory muscles in breathing. Auscultation:  
Breath sounds: - Normal.  
Cardiovascular Inspection: Jugular vein - Bilateral - Inspection Normal.  
Palpation/Percussion:  
Apical Impulse: - Normal.  
Auscultation: Rhythm - IRRegular. Heart Sounds - S1 WNL and S2 WNL. No S3 or S4. Murmurs & Other Heart Sounds: Auscultation of the heart reveals - No Murmurs. Peripheral Vascular Upper Extremity: Inspection - Bilateral - No Cyanotic nailbeds or Digital clubbing. Lower Extremity:  
Palpation: Edema - Bilateral - No edema. Abdomen:   Soft, non-tender, bowel sounds are active. Neuro: A&O times 3, CN and motor grossly WNL Labs:  
Lab Results Component Value Date/Time Cholesterol, total 126 12/19/2018 01:01 PM  
 Cholesterol, total 137 05/09/2018 09:19 AM  
 Cholesterol, total 156 06/08/2017 09:47 AM  
 Cholesterol, total 137 09/08/2016 08:51 AM  
 Cholesterol, total 171 10/30/2014 09:36 AM  
 HDL Cholesterol 53 12/19/2018 01:01 PM  
 HDL Cholesterol 43 05/09/2018 09:19 AM  
 HDL Cholesterol 53 06/08/2017 09:47 AM  
 HDL Cholesterol 45 09/08/2016 08:51 AM  
 HDL Cholesterol 49 10/30/2014 09:36 AM  
 LDL,Direct 85 10/30/2014 09:36 AM  
 LDL,Direct 80 02/06/2014 09:11 AM  
 LDL, calculated 50 12/19/2018 01:01 PM  
 LDL, calculated 60 05/09/2018 09:19 AM  
 LDL, calculated 79 06/08/2017 09:47 AM  
 LDL, calculated 59 09/08/2016 08:51 AM  
 LDL, calculated 75 10/30/2014 09:36 AM  
 Triglyceride 115 12/19/2018 01:01 PM  
 Triglyceride 171 (H) 05/09/2018 09:19 AM  
 Triglyceride 120 06/08/2017 09:47 AM  
 Triglyceride 164 (H) 09/08/2016 08:51 AM  
 Triglyceride 233 (H) 10/30/2014 09:36 AM  
 
Lab Results Component Value Date/Time CK 53 11/25/2014 12:00 PM  
 
Lab Results Component Value Date/Time Sodium 139 12/19/2018 01:01 PM  
 Potassium 4.2 12/19/2018 01:01 PM  
 Chloride 98 12/19/2018 01:01 PM  
 CO2 25 12/19/2018 01:01 PM  
 Anion gap 8 02/24/2016 03:30 AM  
 Glucose 89 12/19/2018 01:01 PM  
 BUN 12 12/19/2018 01:01 PM  
 Creatinine 1.06 (H) 12/19/2018 01:01 PM  
 BUN/Creatinine ratio 11 (L) 12/19/2018 01:01 PM  
 GFR est AA 53 (L) 12/19/2018 01:01 PM  
 GFR est non-AA 46 (L) 12/19/2018 01:01 PM  
 Calcium 9.4 12/19/2018 01:01 PM  
 Bilirubin, total 0.6 12/19/2018 01:01 PM  
 AST (SGOT) 22 12/19/2018 01:01 PM  
 Alk. phosphatase 72 12/19/2018 01:01 PM  
 Protein, total 6.4 12/19/2018 01:01 PM  
 Albumin 4.0 12/19/2018 01:01 PM  
 Globulin 3.9 11/25/2014 12:00 PM  
 A-G Ratio 1.7 12/19/2018 01:01 PM  
 ALT (SGPT) 15 12/19/2018 01:01 PM  
 
 
EKG: 
AF Assessment: 
 
 Assessment: 1. Essential hypertension 2. Bicuspid aortic valve 3. PAF (paroxysmal atrial fibrillation) ([facility] [address] 4. Mixed hyperlipidemia Orders Placed This Encounter  AMB POC EKG ROUTINE W/ 12 LEADS, INTER & REP Order Specific Question:   Reason for Exam: Answer:   ROUTINE Plan: Pt presents for 6 mos f/u, doing well and stable from cardiac standpoint. Lives independently. C/o arthritic pain only. 
  
Paroxysmal atrial fibrillation Holter showed NSR in 4/17 In AF today, asymptomatic- continue rate control and AC Continue BB, Eliquis with no overt bleeding 
  
Systolic murmur with questionably bicuspid aortic valve:  
Normal EF, mild MR/AS, mod TR per echo in 05/18 Stable. 
  
Hyperlipidemia:  
12/18 LDL at 50. On statin. Lipids and labs followed by PCP.  
  
Hypertension Controlled. Continue current therapy 
  
Counseled on diet and exercise- eventual goal of 30-60 minutes 5-7 times a week as per AHA guidelines.   
  
 
Continue current care and f/u in 6 months.  
 
Nataly Dhaliwal MD

## 2019-04-04 ENCOUNTER — OFFICE VISIT (OUTPATIENT)
Dept: ENDOCRINOLOGY | Age: 84
End: 2019-04-04

## 2019-04-04 VITALS
WEIGHT: 154.5 LBS | HEART RATE: 66 BPM | SYSTOLIC BLOOD PRESSURE: 165 MMHG | DIASTOLIC BLOOD PRESSURE: 70 MMHG | HEIGHT: 64 IN | BODY MASS INDEX: 26.38 KG/M2

## 2019-04-04 DIAGNOSIS — E04.1 THYROID NODULE: Primary | ICD-10-CM

## 2019-04-04 RX ORDER — CELECOXIB 200 MG/1
200 CAPSULE ORAL DAILY
COMMUNITY
End: 2020-01-01

## 2019-04-04 NOTE — PATIENT INSTRUCTIONS
Thyroid Nodules: Care Instructions  Your Care Instructions  Thyroid nodules are growths or lumps in the thyroid gland. Your thyroid is in the front of your neck. It controls how your body uses energy. You may have tests to see if the nodule is caused by cancer. Most nodules aren't cancer and don't cause problems. Many don't even need treatment. If you do have cancer, it can usually be cured. Treatment will probably include surgery. You may also get radioactive iodine treatment. If your thyroid can't make thyroid hormone after treatment, you can take a pill every day to replace the hormone. Follow-up care is a key part of your treatment and safety. Be sure to make and go to all appointments, and call your doctor if you are having problems. It's also a good idea to know your test results and keep a list of the medicines you take. How can you care for yourself at home? · Be safe with medicines. If you take thyroid hormone medicine:  ? Take it exactly as prescribed. Call your doctor if you think you are having a problem with your medicine. If you take the right amount and don't skip doses, you probably won't have side effects. ? Do not take it with calcium, vitamins, or iron. ? Try not to miss a dose. ? Do not take extra doses. This will not help you get better any faster. It may also cause side effects. ? Tell your doctor about any medicines you take. This includes over-the-counter medicines. ? Wear a medical alert bracelet or necklace that says you take thyroid hormones. You can buy these at most drugstores. When should you call for help? Call 911 anytime you think you may need emergency care. For example, call if:    · You lose consciousness.    Call your doctor now or seek immediate medical care if:    · You have shortness of breath.    Watch closely for changes in your health, and be sure to contact your doctor if:    · You have pain in your neck, jaw, or ear.     · You have problems swallowing.   · You feel weak and tired.     · You have nervousness, a fast heartbeat, hand tremors, problems sleeping, increased sweating, and weight loss.     · You do not feel better even though you are taking your medicine. Where can you learn more? Go to http://kristy-sammi.info/. Enter X891 in the search box to learn more about \"Thyroid Nodules: Care Instructions. \"  Current as of: March 14, 2018  Content Version: 11.9  © 5272-4767 [employer]. Care instructions adapted under license by [employer] (which disclaims liability or warranty for this information). If you have questions about a medical condition or this instruction, always ask your healthcare professional. [address] any warranty or liability for your use of this information.

## 2019-05-20 RX ORDER — ROSUVASTATIN CALCIUM 20 MG/1
TABLET, COATED ORAL
Qty: 90 TAB | Refills: 1 | Status: SHIPPED | OUTPATIENT
Start: 2019-05-20 | End: 2020-02-07

## 2019-05-20 RX ORDER — ROSUVASTATIN CALCIUM 20 MG/1
TABLET, COATED ORAL
Qty: 90 TAB | Refills: 1 | OUTPATIENT
Start: 2019-05-20

## 2019-06-27 ENCOUNTER — TELEPHONE (OUTPATIENT)
Dept: INTERNAL MEDICINE CLINIC | Age: 84
End: 2019-06-27

## 2019-06-27 NOTE — TELEPHONE ENCOUNTER
Pt is requesting to know if Dr. Lynda Phillips has received her referral for her blood work from Dr. Denise Lowery office (oncologist). Pt's best contact number is [phone].        Message received & copied from [address] [place] [address]

## 2019-06-28 ENCOUNTER — TELEPHONE (OUTPATIENT)
Dept: INTERNAL MEDICINE CLINIC | Age: 84
End: 2019-06-28

## 2019-06-28 NOTE — TELEPHONE ENCOUNTER
Patient states she needs a call back to be advised if Lab Orders were received that were re-faxed over today, 6/28/19 from Dr. Sulema Hager office that is requested to be done at our office. Please call to advise.  Thank you

## 2019-07-08 ENCOUNTER — HOSPITAL ENCOUNTER (OUTPATIENT)
Dept: LAB | Age: 84
Discharge: HOME | End: 2019-07-08
Payer: MEDICARE

## 2019-07-08 ENCOUNTER — OFFICE VISIT (OUTPATIENT)
Dept: INTERNAL MEDICINE CLINIC | Age: 84
End: 2019-07-08

## 2019-07-08 DIAGNOSIS — I10 ESSENTIAL HYPERTENSION: ICD-10-CM

## 2019-07-08 DIAGNOSIS — E78.00 HYPERCHOLESTEREMIA: ICD-10-CM

## 2019-07-08 DIAGNOSIS — R73.03 PRE-DIABETES: ICD-10-CM

## 2019-07-08 DIAGNOSIS — E55.9 VITAMIN D DEFICIENCY: ICD-10-CM

## 2019-07-08 DIAGNOSIS — Z85.3 HISTORY OF BREAST CANCER: Primary | ICD-10-CM

## 2019-07-08 PROCEDURE — 86300 IMMUNOASSAY TUMOR CA 15-3: CPT

## 2019-07-08 PROCEDURE — 36415 COLL VENOUS BLD VENIPUNCTURE: CPT

## 2019-07-08 PROCEDURE — 85025 COMPLETE CBC W/AUTO DIFF WBC: CPT

## 2019-07-08 PROCEDURE — 83036 HEMOGLOBIN GLYCOSYLATED A1C: CPT

## 2019-07-08 PROCEDURE — 82306 VITAMIN D 25 HYDROXY: CPT

## 2019-07-08 PROCEDURE — 80053 COMPREHEN METABOLIC PANEL: CPT

## 2019-07-08 PROCEDURE — 80061 LIPID PANEL: CPT

## 2019-07-08 NOTE — TELEPHONE ENCOUNTER
Identified patient 2 identifiers verified. Patient informed that lab orders from [facility] were placed in the system.

## 2019-07-09 LAB
ALBUMIN SERPL-MCNC: 4.3 G/DL (ref 3.2–4.6)
ALBUMIN/GLOB SERPL: 1.7 {RATIO} (ref 1.2–2.2)
ALP SERPL-CCNC: 81 IU/L (ref 39–117)
ALT SERPL-CCNC: 10 IU/L (ref 0–32)
AST SERPL-CCNC: 15 IU/L (ref 0–40)
BASOPHILS # BLD AUTO: 0.1 X10E3/UL (ref 0–0.2)
BASOPHILS NFR BLD AUTO: 1 %
BILIRUB SERPL-MCNC: 0.6 MG/DL (ref 0–1.2)
BUN SERPL-MCNC: 15 MG/DL (ref 10–36)
BUN/CREAT SERPL: 15 (ref 12–28)
CALCIUM SERPL-MCNC: 9.7 MG/DL (ref 8.7–10.3)
CHLORIDE SERPL-SCNC: 100 MMOL/L (ref 96–106)
CHOLEST SERPL-MCNC: 130 MG/DL (ref 100–199)
CO2 SERPL-SCNC: 25 MMOL/L (ref 20–29)
CREAT SERPL-MCNC: 1.03 MG/DL (ref 0.57–1)
EOSINOPHIL # BLD AUTO: 0.3 X10E3/UL (ref 0–0.4)
EOSINOPHIL NFR BLD AUTO: 6 %
ERYTHROCYTE [DISTWIDTH] IN BLOOD BY AUTOMATED COUNT: 13.9 % (ref 12.3–15.4)
EST. AVERAGE GLUCOSE BLD GHB EST-MCNC: 134 MG/DL
GLOBULIN SER CALC-MCNC: 2.5 G/DL (ref 1.5–4.5)
GLUCOSE SERPL-MCNC: 120 MG/DL (ref 65–99)
HBA1C MFR BLD: 6.3 % (ref 4.8–5.6)
HCT VFR BLD AUTO: 42.5 % (ref 34–46.6)
HDLC SERPL-MCNC: 44 MG/DL
HGB BLD-MCNC: 13.9 G/DL (ref 11.1–15.9)
IMM GRANULOCYTES # BLD AUTO: 0 X10E3/UL (ref 0–0.1)
IMM GRANULOCYTES NFR BLD AUTO: 0 %
LDLC SERPL CALC-MCNC: 55 MG/DL (ref 0–99)
LYMPHOCYTES # BLD AUTO: 1.9 X10E3/UL (ref 0.7–3.1)
LYMPHOCYTES NFR BLD AUTO: 35 %
MCH RBC QN AUTO: 28.6 PG (ref 26.6–33)
MCHC RBC AUTO-ENTMCNC: 32.7 G/DL (ref 31.5–35.7)
MCV RBC AUTO: 87 FL (ref 79–97)
MONOCYTES # BLD AUTO: 0.7 X10E3/UL (ref 0.1–0.9)
MONOCYTES NFR BLD AUTO: 12 %
NEUTROPHILS # BLD AUTO: 2.5 X10E3/UL (ref 1.4–7)
NEUTROPHILS NFR BLD AUTO: 46 %
PLATELET # BLD AUTO: 135 X10E3/UL (ref 150–450)
POTASSIUM SERPL-SCNC: 4.7 MMOL/L (ref 3.5–5.2)
PROT SERPL-MCNC: 6.8 G/DL (ref 6–8.5)
RBC # BLD AUTO: 4.86 X10E6/UL (ref 3.77–5.28)
SODIUM SERPL-SCNC: 140 MMOL/L (ref 134–144)
TRIGL SERPL-MCNC: 157 MG/DL (ref 0–149)
VLDLC SERPL CALC-MCNC: 31 MG/DL (ref 5–40)
WBC # BLD AUTO: 5.4 X10E3/UL (ref 3.4–10.8)

## 2019-07-15 ENCOUNTER — OFFICE VISIT (OUTPATIENT)
Dept: INTERNAL MEDICINE CLINIC | Age: 84
End: 2019-07-15

## 2019-07-15 VITALS
BODY MASS INDEX: 24.41 KG/M2 | TEMPERATURE: 97.6 F | DIASTOLIC BLOOD PRESSURE: 68 MMHG | HEIGHT: 64 IN | OXYGEN SATURATION: 96 % | WEIGHT: 143 LBS | HEART RATE: 60 BPM | SYSTOLIC BLOOD PRESSURE: 138 MMHG | RESPIRATION RATE: 16 BRPM

## 2019-07-15 DIAGNOSIS — I10 ESSENTIAL HYPERTENSION: ICD-10-CM

## 2019-07-15 DIAGNOSIS — R10.31 RLQ ABDOMINAL PAIN: ICD-10-CM

## 2019-07-15 DIAGNOSIS — E78.00 HYPERCHOLESTEREMIA: Primary | ICD-10-CM

## 2019-07-15 NOTE — PROGRESS NOTES
Identified pt with two pt identifiers(name and ). Reviewed record in preparation for visit and have obtained necessary documentation. Chief Complaint   Patient presents with    Hypertension     6 month f/u     Diabetes     6 month f/u       Visit Vitals  /68 (BP 1 Location: Left arm, BP Patient Position: Sitting)   Pulse 60   Temp 97.6 °F (36.4 °C) (Oral)   Resp 16   Ht 5' 4\" (1.626 m)   Wt 143 lb (64.9 kg)   SpO2 96%   BMI 24.55 kg/m²     Health Maintenance Due   Topic    Shingrix Vaccine Age 50> (1 of 2)    MEDICARE YEARLY EXAM        Coordination of Care Questionnaire:  :   1) Have you been to an emergency room, urgent care, or hospitalized since your last visit? If yes, where when, and reason for visit? No         2. Have seen or consulted any other health care provider since your last visit? If yes, where when, and reason for visit? No       3) Do you have an Advanced Directive/ Living Will in place? Yes  If yes, do we have a copy on file Yes  If no, would you like information     Patient is accompanied by self I have received verbal consent from Rosalia Lyn to discuss any/all medical information while they are present in the room.

## 2019-07-15 NOTE — PROGRESS NOTES
SUBJECTIVE:   Ms. Lima Gallardo is a 80 y.o. female who is here for follow up of routine medical issues. HTN: Pt's BP in the office today was elevated at 138/68. Pt is compliant in taking HCTZ 33.2GI and bystolic 5.8OH. Patient denies chest pain, ALLAN/SOB, edema, headache, visual changes, dizziness, palpitations or syncope. HLD: Pt is compliant in taking rosuvastatin 20mg. She denies abdominal pain, nausea, vomiting, diarrhea, arthralgias/myalgias due to the medication. Last lipid panel drawn on 07/08/2019 was discussed with pt and showed elevated triglycerides at 157. It was otherwise within normal limits. She had a CBC, CMP, and Hgb A1c drawn on 07/08/2019. These results were discussed and were largely unchanged from previous values. She also needs vitamin D and CA 27.29 levels per her oncologist.    Glaucoma: She notes that she feels that here eyesight is worsening. Her weight has decreased from 154lbs on 04/04/2019 to 143lbs today. She reports decreased appetite over the past 6-7 months, which she attributes to getting older. She denies N/V/D, constipation, urinary sx. She also denies fatigue or change in her mood. She takes carafate daily in the morning for GERD. She also reports intermittent RLQ pain when she sits for a long period of time. It is relieved by position change. She denies any other GI sx. She has PSHx appendectomy. At this time, she is otherwise doing well and has brought no other complaints to my attention today. For a list of the medical issues addressed today, see the assessment and plan below.     PMH:   Past Medical History:   Diagnosis Date    Arthritis     Cancer ([facility])     breast    (leg,ear,arm-skin cancer)    GERD (gastroesophageal reflux disease)     Hypertension     Other ill-defined conditions(799.89)     glaucoma    Other ill-defined conditions(799.89)     cellulitis left arm    Other ill-defined conditions(799.89)     carpal tunnel syndrome left hand    Other ill-defined conditions(799.89)     vertigo     PSH:  has a past surgical history that includes hx mastectomy; hx efren and bso; hx cataract removal; hx tonsillectomy; hx knee arthroscopy; hx appendectomy; hx other surgical; hx colonoscopy (4/2012); hx other surgical; hx carpal tunnel release; cardiac catheterization (1/10/2013); and vascular surgery procedure unlist.    All: is allergic to augmentin [amoxicillin-pot clavulanate]; doxycycline; keflex [cephalexin]; monodox [doxycycline monohydrate]; and sulfa (sulfonamide antibiotics). MEDS:   Current Outpatient Medications   Medication Sig    rosuvastatin (CRESTOR) 20 mg tablet TAKE 1 TABLET NIGHTLY    ELIQUIS 5 mg tablet TAKE 1 TABLET EVERY 12     HOURS TO PREVENT STROKE IN ATRIAL FIBRILLATION    hydroCHLOROthiazide (MICROZIDE) 12.5 mg capsule TAKE 1 CAPSULE ORALLY DAILY    BYSTOLIC 2.5 mg tablet TAKE 1 TABLET BY MOUTH EVERY DAY    sucralfate (CARAFATE) 100 mg/mL suspension Take  by mouth as needed.  timolol (TIMOPTIC-XE) 0.5 % ophthalmic gel-forming Administer 1 Drop to both eyes daily.  CYANOCOBALAMIN, VITAMIN B-12, (VITAMIN B-12 PO) Take 1,000 mcg by mouth daily.  latanoprost (XALATAN) 0.005 % ophthalmic solution Administer 1 Drop to both eyes nightly.  cycloSPORINE (RESTASIS) 0.05 % ophthalmic emulsion Administer 1 Drop to both eyes two (2) times a day.  VIT C/E/ZN/COPPR/LUTEIN/ZEAXAN (PRESERVISION AREDS 2 PO) Take 2 capsules by mouth two (2) times a day.  esomeprazole (NEXIUM) 40 mg capsule Take 1 Cap by mouth Daily (before breakfast).  co-enzyme Q-10 (CO Q-10) 100 mg capsule Take 100 mg by mouth daily.  Cholecalciferol, Vitamin D3, (VITAMIN D3) 1,000 unit cap Take 1 Cap by mouth two (2) times a day.  omega-3 fatty acids-vitamin e (FISH OIL) 1,000 mg Cap Take 1 Cap by mouth two (2) times a day.  CALCIUM CARBONATE/VITAMIN D3 (CALCIUM 600 + D,3, PO) Take 1 Cap by mouth two (2) times a day.     multivitamins-minerals-lutein (CENTRUM SILVER) Tab Take 1 Tab by mouth daily.  celecoxib (CELEBREX) 200 mg capsule Take 200 mg by mouth daily.  HYDROcodone-acetaminophen (NORCO) 5-325 mg per tablet Take 1 Tab by mouth every eight (8) hours as needed for Pain. Max Daily Amount: 3 Tabs. (Patient not taking: Reported on 7/15/2019)     No current facility-administered medications for this visit. FH: family history includes Cancer in her sister; Diabetes in her sister and sister; Heart Disease in her brother, brother, father, mother, sister, sister, and son; Other in her brother; Stroke in her brother. SH:  reports that she quit smoking about 64 years ago. She has never used smokeless tobacco. She reports that she drinks alcohol. She reports that . Drugs: Prescription and OTC. Review of Systems - History obtained from the patient  General ROS: no fever, chills, fatigue, body aches  Psychological ROS: no change in anxiety, depression, SI/HI  Ophthalmic ROS: no blurred vision, myopia, double vision  ENT ROS: no dysphagia, otalgia, otorrhea, rhinorrhea, post nasal drip  Respiratory ROS: no cough, shortness of breath, or wheezing  Cardiovascular ROS: no chest pain or dyspnea on exertion  Gastrointestinal ROS: no change in bowel habits, or black or bloody stools, +RLQ pain  Genito-Urinary ROS: no frequency, urgency, incontinence, dysuria, hematuria  Musculoskeletal ROS: no arthralgia, myalgia  Neurological ROS: no headaches, dizziness, lightheadedness, tremors, seizures  Dermatological ROS: no rash or lesions    OBJECTIVE:   Vitals:   Visit Vitals  /68 (BP 1 Location: Left arm, BP Patient Position: Sitting)   Pulse 60   Temp 97.6 °F (36.4 °C) (Oral)   Resp 16   Ht 5' 4\" (1.626 m)   Wt 143 lb (64.9 kg)   SpO2 96%   BMI 24.55 kg/m²      Gen: Pleasant 80 y.o.  female in NAD. HEENT: PERRLA. EOMI. OP moist and pink. Neck: Supple. No LAD. HEART: RRR, No M/G/R.      LUNGS: CTAB No W/R.     ABDOMEN: S, ND, BS+. +Mild RLQ tenderness. EXTREMITIES: Warm. No C/C/E.    MUSCULOSKELETAL: Normal ROM, muscle strength 5/5 all groups. NEURO: Alert and oriented x 3. Cranial nerves grossly intact. No focal sensory or motor deficits noted. SKIN: Warm. Dry. No rashes or other lesions noted. ASSESSMENT/ PLAN: Diagnoses and all orders for this visit:    1. Hypercholesteremia    2. Essential hypertension    3. RLQ abdominal pain  -     US ABD COMP; Future        ICD-10-CM ICD-9-CM    1. Hypercholesteremia E78.00 272.0 CANCELED: LIPID PANEL   2. Essential hypertension I10 401.9 CANCELED: METABOLIC PANEL, COMPREHENSIVE      CANCELED: CBC WITH AUTOMATED DIFF   3. RLQ abdominal pain R10.31 789.03 US ABD COMP        1. Hyperlipidemia   Lipid panel shows cholesterol seems to be well controlled. I recommended continuing current dose of rosuvastatin, eating a low fat diet, and increasing exercise. Discussed lipid panel from 07/08/2019.    2. Hypertension  BP seems to be well controlled. I recommended continuing current dose of HCTZ and bystolic, eating a low sodium diet, and increasing exercise. Discussed results of CMP and CBC from 07/08/2019. 3. RLQ pain  Ordered US abdomen complete    Follow-up and Dispositions    · Return in about 6 months (around 1/15/2020) for follow up. I have reviewed the patient's medications and risks/side effects/benefits were discussed. Diagnosis(-es) explained to patient and questions answered. Literature provided where appropriate.      Written by Mikaela Yanes, as dictated by Colette Steele MD.

## 2019-07-18 NOTE — PROGRESS NOTES
A1c-Your current hgbA1c is stable at 6.3. Keep up the good work! Continue to work on following a diabetic diet and exercise. Recheck this test: hgbA1c  in  3 months. Continue with current  medications. CMP-Normal electrolyte levels except for a elevation in glucose levels, and normal liver function. You have some mild renal insufficiency. Lipids-Your current lab results reveal a elevated triglyceride. Your total cholesterol should be under 200, triglycerides under 150, and your ldl under 100. Work on following a low fat diet and exercise at least three times a week.    CBC- Normal except for a mild decrease in platelets  Repeat CBC in one month

## 2019-07-19 ENCOUNTER — TELEPHONE (OUTPATIENT)
Dept: INTERNAL MEDICINE CLINIC | Age: 84
End: 2019-07-19

## 2019-07-19 NOTE — TELEPHONE ENCOUNTER
Patient return call     Caller's first and last name and relationship (if not the patient):       Best contact number(s):   [phone]     Whose call is being returned:   Shyann     Details to clarify the request:   To discuss Lab work     [employer] received & copied from [facility]

## 2019-07-24 ENCOUNTER — HOSPITAL ENCOUNTER (OUTPATIENT)
Dept: ULTRASOUND IMAGING | Age: 84
Discharge: HOME | End: 2019-07-24
Attending: FAMILY MEDICINE
Payer: MEDICARE

## 2019-07-24 DIAGNOSIS — R10.31 RLQ ABDOMINAL PAIN: ICD-10-CM

## 2019-07-24 PROCEDURE — 76700 US EXAM ABDOM COMPLETE: CPT

## 2019-07-25 ENCOUNTER — TELEPHONE (OUTPATIENT)
Dept: INTERNAL MEDICINE CLINIC | Age: 84
End: 2019-07-25

## 2019-07-25 NOTE — TELEPHONE ENCOUNTER
----- Message from Loi Diaz MD sent at 7/24/2019  7:23 PM EDT -----  No findings on the ultrasound explain her right lower quadrant pain. Is she still having the pain?

## 2019-07-30 RX ORDER — APIXABAN 5 MG/1
TABLET, FILM COATED ORAL
Qty: 180 TAB | Refills: 2 | Status: SHIPPED | OUTPATIENT
Start: 2019-07-30 | End: 2020-04-13

## 2019-08-03 LAB
25(OH)D3+25(OH)D2 SERPL-MCNC: 49.7 NG/ML (ref 30–100)
CANCER AG27-29 SERPL-ACNC: 20.1 U/ML (ref 0–38.6)
SPECIMEN STATUS REPORT, ROLRST: NORMAL

## 2019-08-05 ENCOUNTER — TELEPHONE (OUTPATIENT)
Dept: INTERNAL MEDICINE CLINIC | Age: 84
End: 2019-08-05

## 2019-08-05 NOTE — TELEPHONE ENCOUNTER
Identified patient 2 identifiers verified. Not a constant pain, experienced  Pain on Sunday, noticed more with standing that sitting, pain level was 6 or 7. BM daily.

## 2019-08-05 NOTE — PROGRESS NOTES
Please inform the patient that lio CA 27 and Vid levels are in the normal range.  Please forward these results to her oncologist.

## 2019-08-06 NOTE — PROGRESS NOTES
Called, spoke with Pt. Two pt identifiers confirmed. Pt informed per Dr. Odette Francis that her CA 27 and Vid levels are in the normal range. Pt informed, lab results will be faxed to her oncologist. Dr. Ramon Samaniego. Pt verbalized understanding of information discussed w/ no further questions at this time.

## 2019-08-19 RX ORDER — NEBIVOLOL HYDROCHLORIDE 2.5 MG/1
TABLET ORAL
Qty: 90 TAB | Refills: 3 | Status: SHIPPED | OUTPATIENT
Start: 2019-08-19 | End: 2020-02-18 | Stop reason: SDUPTHER

## 2019-10-07 ENCOUNTER — OFFICE VISIT (OUTPATIENT)
Dept: CARDIOLOGY CLINIC | Age: 84
End: 2019-10-07

## 2019-10-07 VITALS
HEART RATE: 57 BPM | WEIGHT: 150.5 LBS | BODY MASS INDEX: 25.7 KG/M2 | RESPIRATION RATE: 18 BRPM | SYSTOLIC BLOOD PRESSURE: 130 MMHG | DIASTOLIC BLOOD PRESSURE: 72 MMHG | HEIGHT: 64 IN | OXYGEN SATURATION: 97 %

## 2019-10-07 DIAGNOSIS — Q23.1 BICUSPID AORTIC VALVE: ICD-10-CM

## 2019-10-07 DIAGNOSIS — I10 ESSENTIAL HYPERTENSION: ICD-10-CM

## 2019-10-07 DIAGNOSIS — E78.2 MIXED HYPERLIPIDEMIA: ICD-10-CM

## 2019-10-07 DIAGNOSIS — I48.0 PAF (PAROXYSMAL ATRIAL FIBRILLATION) (HCC): Primary | ICD-10-CM

## 2019-10-07 RX ORDER — ASPIRIN 81 MG/1
TABLET ORAL DAILY
COMMUNITY
End: 2020-01-01

## 2019-10-07 NOTE — PROGRESS NOTES
Verified patient with 2 identifiers Reviewed record in preparation for visit and obtained necessary documentation. Chief Complaint Patient presents with  Irregular Heart Beat  
  6 Month follow up  Ankle swelling  
  every now and again 1. Have you been to the ER, urgent care clinic since your last visit? Hospitalized since your last visit? No  
 
2. Have you seen or consulted any other health care providers outside of the [address] [place] [address] since your last visit? Include any pap smears or colon screening.   Yes Dr Susana Cleveland

## 2019-10-07 NOTE — LETTER
10/7/19 Patient: Ana Judd YOB: 1927 Date of Visit: 10/7/2019 Wesley Hinojosa MD 
[address] [facility] [address] [address] VIA In Basket Dear Wesley Hinojosa MD, Thank you for referring Ms. Pablo Huerta to [address] [place] [address] for evaluation. My notes for this consultation are attached. If you have questions, please do not hesitate to call me. I look forward to following your patient along with you.  
 
 
Sincerely, 
 
Kirk Durand MD

## 2019-10-07 NOTE — PROGRESS NOTES
[address] [place] [address], [address] [facility]  [phone] Subjective:  
  
Michael Espinoza is a 80 y.o. female is here for routine f/u on PAF HTN HLD. Has occasional ankle swelling, improves with leg elevation She continues live independently at her one level town house. Has some mild arthritic pain only. Gave up driving in July d/t poor eyesight (glaucoma). Has friends to help her. She is maintain on Eliquis for CVA risk reduction. Denies any fall/trauma/injury. Walks with cane The patient denies chest pain/ shortness of breath, orthopnea, PND, palpitations, syncope, or presyncope. Patient Active Problem List  
 Diagnosis Date Noted  Bicuspid aortic valve 02/01/2018  PAF (paroxysmal atrial fibrillation) ([address] 02/01/2018  Basilar artery aneurysm ([address] 09/15/2015  Orthostatic hypotension 06/05/2015  CAD (coronary artery disease), native coronary artery 04/30/2014  Leg edema 01/30/2014  Hemorrhoids 10/29/2013  BRBPR (bright red blood per rectum) 10/17/2013  PAD (peripheral artery disease) ([address] 06/04/2013  Atherosclerosis of native arteries of the extremities with intermittent claudication 06/04/2013  Paroxysmal ventricular tachycardia ([address] 01/10/2013  Shortness of breath 01/10/2013  Angina, class III ([address] 12/18/2012  PVC's (premature ventricular contractions) 12/18/2012  Colitis 12/18/2012  First degree AV block 12/18/2012  
 HTN (hypertension) 12/05/2012  Glaucoma 11/07/2012  Vitamin D deficiency 11/07/2012  Hypercholesteremia 11/07/2012  Arthritis 11/07/2012  History  of basal cell carcinoma 11/07/2012  History of breast cancer 11/07/2012  Vertigo 11/07/2012  Wears hearing aid 11/07/2012 Donald Minor MD 
Past Medical History:  
Diagnosis Date  Arthritis  Cancer ([address] breast    (leg,ear,arm-skin cancer)  GERD (gastroesophageal reflux disease)  Hypertension  Other ill-defined conditions(799.89)   
 glaucoma  Other ill-defined conditions(799.89)   
 cellulitis left arm  Other ill-defined conditions(799.89)   
 carpal tunnel syndrome left hand  Other ill-defined conditions(799.89)   
 vertigo Past Surgical History:  
Procedure Laterality Date  CARDIAC CATHETERIZATION  1/10/2013  HX APPENDECTOMY  HX CARPAL TUNNEL RELEASE    
 left hand  HX CATARACT REMOVAL    
 bilateral  
 HX COLONOSCOPY  2012  HX KNEE ARTHROSCOPY    
 left  HX MASTECTOMY    
 bilateral  
 HX OTHER SURGICAL Basal cell skin cancer removed from left leg, left arm and neck  HX OTHER SURGICAL    
 cyst removed from left thigh  HX OREN AND BSO  HX TONSILLECTOMY  VASCULAR SURGERY PROCEDURE UNLIST    
 vascular blockages removed. Allergies Allergen Reactions  Augmentin [Amoxicillin-Pot Clavulanate] Hives and Itching  Doxycycline Hives and Itching  Keflex [Cephalexin] Hives and Itching  Monodox [Doxycycline Monohydrate] Other (comments)  Sulfa (Sulfonamide Antibiotics) Rash Family History Problem Relation Age of Onset  Heart Disease Mother  Heart Disease Father   
      age 55  
 Heart Disease Brother  Stroke Brother  Cancer Sister Colon Cancer  Diabetes Sister  Heart Disease Sister  Heart Disease Sister  Diabetes Sister  Other Brother MVA  Heart Disease Brother  Heart Disease Son   
      age 52 Social History Socioeconomic History  Marital status:  Spouse name: Not on file  Number of children: Not on file  Years of education: Not on file  Highest education level: Not on file Occupational History  Not on file Social Needs  Financial resource strain: Not on file  Food insecurity:  
  Worry: Not on file Inability: Not on file  Transportation needs:  
  Medical: Not on file Non-medical: Not on file Tobacco Use  Smoking status: Former Smoker Last attempt to quit: 1955 Years since quittin.8  Smokeless tobacco: Never Used Substance and Sexual Activity  Alcohol use: Yes Comment: rare  Drug use: Not on file  Sexual activity: Not Currently Lifestyle  Physical activity:  
  Days per week: Not on file Minutes per session: Not on file  Stress: Not on file Relationships  Social connections:  
  Talks on phone: Not on file Gets together: Not on file Attends [employer] service: Not on file Active member of club or organization: Not on file Attends meetings of clubs or organizations: Not on file Relationship status: Not on file  Intimate partner violence:  
  Fear of current or ex partner: Not on file Emotionally abused: Not on file Physically abused: Not on file Forced sexual activity: Not on file Other Topics Concern  Not on file Social History Narrative  Not on file Current Outpatient Medications Medication Sig  
 aspirin delayed-release 81 mg tablet Take  by mouth daily.  hydroCHLOROthiazide (MICROZIDE) 12.5 mg capsule TAKE 1 CAPSULE BY MOUTH EVERY DAY  
 BYSTOLIC 2.5 mg tablet TAKE 1 TABLET BY MOUTH EVERY DAY  ELIQUIS 5 mg tablet TAKE 1 TABLET EVERY 12     HOURS TO PREVENT STROKE IN ATRIAL FIBRILLATION  
 rosuvastatin (CRESTOR) 20 mg tablet TAKE 1 TABLET NIGHTLY  celecoxib (CELEBREX) 200 mg capsule Take 200 mg by mouth daily.  HYDROcodone-acetaminophen (NORCO) 5-325 mg per tablet Take 1 Tab by mouth every eight (8) hours as needed for Pain. Max Daily Amount: 3 Tabs.  sucralfate (CARAFATE) 100 mg/mL suspension Take  by mouth as needed.  timolol (TIMOPTIC-XE) 0.5 % ophthalmic gel-forming Administer 1 Drop to both eyes daily.  CYANOCOBALAMIN, VITAMIN B-12, (VITAMIN B-12 PO) Take 1,000 mcg by mouth daily.   
 latanoprost (XALATAN) 0.005 % ophthalmic solution Administer 1 Drop to both eyes nightly.  cycloSPORINE (RESTASIS) 0.05 % ophthalmic emulsion Administer 1 Drop to both eyes two (2) times a day.  VIT C/E/ZN/COPPR/LUTEIN/ZEAXAN (PRESERVISION AREDS 2 PO) Take 2 capsules by mouth two (2) times a day.  esomeprazole (NEXIUM) 40 mg capsule Take 1 Cap by mouth Daily (before breakfast).  co-enzyme Q-10 (CO Q-10) 100 mg capsule Take 100 mg by mouth daily.  Cholecalciferol, Vitamin D3, (VITAMIN D3) 1,000 unit cap Take 1 Cap by mouth two (2) times a day.  omega-3 fatty acids-vitamin e (FISH OIL) 1,000 mg Cap Take 1 Cap by mouth two (2) times a day.  CALCIUM CARBONATE/VITAMIN D3 (CALCIUM 600 + D,3, PO) Take 1 Cap by mouth two (2) times a day.  multivitamins-minerals-lutein (CENTRUM SILVER) Tab Take 1 Tab by mouth daily. No current facility-administered medications for this visit. Review of Symptoms: 
11 systems reviewed, negative other than as stated in the HPI Physical ExamPhysical Exam:   
Vitals:  
 10/07/19 1054 10/07/19 1058 BP: 130/72 130/72 Pulse: (!) 57 Resp: 18 SpO2: 97% Weight: 150 lb 8 oz (68.3 kg) Height: 5' 4\" (1.626 m) Body mass index is 25.83 kg/m². General PE Gen:  NAD uses cane Mental Status - Alert. General Appearance - Not in acute distress. HEENT: 
PERRL, no carotid bruits or JVD Chest and Lung Exam  
Inspection: Accessory muscles - No use of accessory muscles in breathing. Auscultation:  
Breath sounds: - Normal.  
Cardiovascular Inspection: Jugular vein - Bilateral - Inspection Normal.  
Palpation/Percussion:  
Apical Impulse: - Normal.  
Auscultation: Rhythm - IRRegular. Heart Sounds - S1 WNL and S2 WNL. No S3 or S4. Murmurs & Other Heart Sounds: Auscultation of the heart reveals - No Murmurs. Peripheral Vascular Upper Extremity: Inspection - Bilateral - No Cyanotic nailbeds or Digital clubbing. Lower Extremity:  
Palpation: Edema - Bilateral - No edema. Abdomen:   Soft, non-tender, bowel sounds are active. Neuro: A&O times 3, CN and motor grossly WNL Labs:  
Lab Results Component Value Date/Time Cholesterol, total 130 07/08/2019 09:33 AM  
 Cholesterol, total 126 12/19/2018 01:01 PM  
 Cholesterol, total 137 05/09/2018 09:19 AM  
 Cholesterol, total 156 06/08/2017 09:47 AM  
 Cholesterol, total 137 09/08/2016 08:51 AM  
 HDL Cholesterol 44 07/08/2019 09:33 AM  
 HDL Cholesterol 53 12/19/2018 01:01 PM  
 HDL Cholesterol 43 05/09/2018 09:19 AM  
 HDL Cholesterol 53 06/08/2017 09:47 AM  
 HDL Cholesterol 45 09/08/2016 08:51 AM  
 LDL,Direct 85 10/30/2014 09:36 AM  
 LDL,Direct 80 02/06/2014 09:11 AM  
 LDL, calculated 55 07/08/2019 09:33 AM  
 LDL, calculated 50 12/19/2018 01:01 PM  
 LDL, calculated 60 05/09/2018 09:19 AM  
 LDL, calculated 79 06/08/2017 09:47 AM  
 LDL, calculated 59 09/08/2016 08:51 AM  
 Triglyceride 157 (H) 07/08/2019 09:33 AM  
 Triglyceride 115 12/19/2018 01:01 PM  
 Triglyceride 171 (H) 05/09/2018 09:19 AM  
 Triglyceride 120 06/08/2017 09:47 AM  
 Triglyceride 164 (H) 09/08/2016 08:51 AM  
 
Lab Results Component Value Date/Time CK 53 11/25/2014 12:00 PM  
 
Lab Results Component Value Date/Time Sodium 140 07/08/2019 09:33 AM  
 Potassium 4.7 07/08/2019 09:33 AM  
 Chloride 100 07/08/2019 09:33 AM  
 CO2 25 07/08/2019 09:33 AM  
 Anion gap 8 02/24/2016 03:30 AM  
 Glucose 120 (H) 07/08/2019 09:33 AM  
 BUN 15 07/08/2019 09:33 AM  
 Creatinine 1.03 (H) 07/08/2019 09:33 AM  
 BUN/Creatinine ratio 15 07/08/2019 09:33 AM  
 GFR est AA 55 (L) 07/08/2019 09:33 AM  
 GFR est non-AA 47 (L) 07/08/2019 09:33 AM  
 Calcium 9.7 07/08/2019 09:33 AM  
 Bilirubin, total 0.6 07/08/2019 09:33 AM  
 AST (SGOT) 15 07/08/2019 09:33 AM  
 Alk.  phosphatase 81 07/08/2019 09:33 AM  
 Protein, total 6.8 07/08/2019 09:33 AM  
 Albumin 4.3 07/08/2019 09:33 AM  
 Globulin 3.9 11/25/2014 12:00 PM  
 A-G Ratio 1.7 07/08/2019 09:33 AM  
 ALT (SGPT) 10 07/08/2019 09:33 AM  
 
 
EKG: 
AF Assessment: 1. PAF (paroxysmal atrial fibrillation) ([facility] 2. Essential hypertension 3. Bicuspid aortic valve 4. Mixed hyperlipidemia Orders Placed This Encounter  AMB POC EKG ROUTINE W/ 12 LEADS, INTER & REP Order Specific Question:   Reason for Exam: Answer:   routine  aspirin delayed-release 81 mg tablet Sig: Take  by mouth daily. Plan: Pt presents for 6 mos f/u, doing well and stable from cardiac standpoint. Has occasional ankle swelling, improves with leg elevation She continues live independently at her one level town house. Has some mild arthritic pain only. Gave up driving in July d/t poor eyesight (glaucoma). Has friends to help her. She is maintain on Eliquis for CVA risk reduction. Denies any fall/trauma/injury. Walks with cane. 
  
Permanent atrial fibrillation Holter showed NSR in 4/17 In AF today, asymptomatic- continue rate control and AC Continue BB, Eliquis with no overt bleeding She knows that Eliquis is a renally dosed medication and if she ever gets dehydrated or creatinine becomes elevated she will need dose adjustment. 
  
Systolic murmur with questionably bicuspid aortic valve:  
Normal EF, mild MR/AS, mod TR per echo in 05/18 Stable. 
  
Hyperlipidemia:  
7/19 LDL at 55. On statin.  
Lipids and labs followed by PCP.  
  
Hypertension Controlled. Continue current therapy Stable kidney fxn 7/19 
  
Counseled on diet and exercise- eventual goal of 30-60 minutes 5-7 times a week as per AHA guidelines.   
  
  
Continue current care and f/u in 1 year, sooner PRN.  
 
 
 
Opal Michaud MD

## 2019-12-03 ENCOUNTER — OFFICE VISIT (OUTPATIENT)
Dept: URGENT CARE | Age: 84
End: 2019-12-03

## 2019-12-03 VITALS
OXYGEN SATURATION: 98 % | SYSTOLIC BLOOD PRESSURE: 152 MMHG | HEART RATE: 68 BPM | BODY MASS INDEX: 25.1 KG/M2 | DIASTOLIC BLOOD PRESSURE: 65 MMHG | HEIGHT: 64 IN | RESPIRATION RATE: 18 BRPM | TEMPERATURE: 97.9 F | WEIGHT: 147 LBS

## 2019-12-03 DIAGNOSIS — J18.9 PNEUMONIA OF LEFT UPPER LOBE DUE TO INFECTIOUS ORGANISM: ICD-10-CM

## 2019-12-03 DIAGNOSIS — R05.9 COUGH: ICD-10-CM

## 2019-12-03 DIAGNOSIS — R10.12 LEFT UPPER QUADRANT PAIN: ICD-10-CM

## 2019-12-03 RX ORDER — CLARITHROMYCIN 250 MG/1
250 TABLET, FILM COATED ORAL 2 TIMES DAILY
Qty: 14 TAB | Refills: 0 | Status: SHIPPED | OUTPATIENT
Start: 2019-12-03 | End: 2019-12-10

## 2019-12-03 NOTE — PROGRESS NOTES
The history is provided by the patient. Cold Symptoms The history is provided by the patient. This is a new problem. Episode onset: 3 days. The problem occurs constantly. The problem has not changed since onset. The cough is non-productive. There has been no fever. Pertinent negatives include no chest pain, no chills, no sweats, no ear congestion, no ear pain, no headaches, no rhinorrhea, no sore throat, no shortness of breath, no wheezing, no nausea and no vomiting. Associated symptoms comments: Cough and congestion. She has tried nothing for the symptoms. Past medical history comments: GERD, Hypertension and diverticulitis. Abdominal Pain The history is provided by the patient. This is a new problem. Episode onset: 3 days. Episode frequency: intermittently. The problem has not changed since onset. The pain is at a severity of 3/10. The pain is mild. Associated symptoms include belching. Pertinent negatives include no fever, no diarrhea, no nausea, no vomiting, no constipation, no headaches and no chest pain. Nothing worsens the pain. The pain is relieved by nothing. Her past medical history is significant for GERD and diverticulitis. Past medical history comments: GERD, Hypertension and diverticulitis. Past Medical History:  
Diagnosis Date  Arthritis  Cancer ([facility] breast    (leg,ear,arm-skin cancer)  GERD (gastroesophageal reflux disease)  Hypertension  Other ill-defined conditions(799.89)   
 glaucoma  Other ill-defined conditions(799.89)   
 cellulitis left arm  Other ill-defined conditions(799.89)   
 carpal tunnel syndrome left hand  Other ill-defined conditions(799.89)   
 vertigo Past Surgical History:  
Procedure Laterality Date  CARDIAC CATHETERIZATION  1/10/2013  HX APPENDECTOMY  HX CARPAL TUNNEL RELEASE    
 left hand  HX CATARACT REMOVAL    
 bilateral  
 HX COLONOSCOPY  4/2012  HX KNEE ARTHROSCOPY    
 left  HX MASTECTOMY    
 bilateral  
 HX OTHER SURGICAL Basal cell skin cancer removed from left leg, left arm and neck  HX OTHER SURGICAL    
 cyst removed from left thigh  HX OREN AND BSO  HX TONSILLECTOMY  VASCULAR SURGERY PROCEDURE UNLIST    
 vascular blockages removed. Family History Problem Relation Age of Onset  Heart Disease Mother  Heart Disease Father   
      age 55  
 Heart Disease Brother  Stroke Brother  Cancer Sister Colon Cancer  Diabetes Sister  Heart Disease Sister  Heart Disease Sister  Diabetes Sister  Other Brother MVA  Heart Disease Brother  Heart Disease Son   
      age 52 Social History Socioeconomic History  Marital status:  Spouse name: Not on file  Number of children: Not on file  Years of education: Not on file  Highest education level: Not on file Occupational History  Not on file Social Needs  Financial resource strain: Not on file  Food insecurity:  
  Worry: Not on file Inability: Not on file  Transportation needs:  
  Medical: Not on file Non-medical: Not on file Tobacco Use  Smoking status: Former Smoker Last attempt to quit: 1955 Years since quittin.9  Smokeless tobacco: Never Used Substance and Sexual Activity  Alcohol use: Yes Comment: rare  Drug use: Not on file  Sexual activity: Not Currently Lifestyle  Physical activity:  
  Days per week: Not on file Minutes per session: Not on file  Stress: Not on file Relationships  Social connections:  
  Talks on phone: Not on file Gets together: Not on file Attends [employer] service: Not on file Active member of club or organization: Not on file Attends meetings of clubs or organizations: Not on file Relationship status: Not on file  Intimate partner violence:  
  Fear of current or ex partner: Not on file Emotionally abused: Not on file Physically abused: Not on file Forced sexual activity: Not on file Other Topics Concern  Not on file Social History Narrative  Not on file ALLERGIES: Augmentin [amoxicillin-pot clavulanate]; Doxycycline; Keflex [cephalexin]; Monodox [doxycycline monohydrate]; and Sulfa (sulfonamide antibiotics) Review of Systems Constitutional: Negative for chills, fatigue and fever. HENT: Positive for congestion and postnasal drip. Negative for ear pain, rhinorrhea and sore throat. Eyes: Negative. Respiratory: Positive for cough. Negative for chest tightness, shortness of breath and wheezing. Cardiovascular: Negative for chest pain and leg swelling. Gastrointestinal: Positive for abdominal pain (LUQ). Negative for abdominal distention, constipation, diarrhea, nausea and vomiting. Genitourinary: Negative. Musculoskeletal: Negative. Skin: Negative. Neurological: Negative for dizziness, weakness, light-headedness, numbness and headaches. Vitals:  
 12/03/19 1145 BP: 155/70 Pulse: 68 Resp: 18 Temp: 97.9 °F (36.6 °C) SpO2: 98% Weight: 147 lb (66.7 kg) Height: 5' 4\" (1.626 m) Physical Exam 
Constitutional:   
   Appearance: She is well-developed. HENT:  
   Right Ear: Tympanic membrane normal.  
   Left Ear: Tympanic membrane normal.  
   Nose: Congestion and rhinorrhea present. Mouth/Throat:  
   Pharynx: No oropharyngeal exudate or posterior oropharyngeal erythema. Comments: Post nasal drainage Eyes:  
   Conjunctiva/sclera: Conjunctivae normal.  
   Pupils: Pupils are equal, round, and reactive to light. Neck: Musculoskeletal: Normal range of motion. Cardiovascular:  
   Rate and Rhythm: Normal rate and regular rhythm. Heart sounds: Normal heart sounds. Pulmonary:  
   Effort: Pulmonary effort is normal.  
   Breath sounds: Normal breath sounds. Abdominal: General: Bowel sounds are normal. There is no distension. Palpations: Abdomen is soft. Tenderness: There is no tenderness. There is no guarding. Musculoskeletal: Normal range of motion. Lymphadenopathy:  
   Cervical: No cervical adenopathy. Skin: 
   General: Skin is warm and dry. Neurological:  
   Mental Status: She is alert and oriented to person, place, and time. MDM Differential Diagnosis; Clinical Impression; Plan:  
  (J18.1) Pneumonia of left upper lobe due to infectious organism (HCC) 
(R05) Cough 
(R10.12) Left upper quadrant pain Orders Placed This Encounter 
    clarithromycin (BIAXIN) 250 mg tablet Sig: Take 1 Tab by mouth two (2) times a day for 7 days. Dispense:  14 Tab Refill:  0 Reviewed imaging with patient. Will treat patient for pneumonia and advised to follow up with PCP. The patients condition was discussed with the patient and they understand. If signs and symptoms become worse the pt is to go to the ER. The patient is to take medications as prescribed. AVS given with patient instructions upon discharge. Procedures

## 2019-12-11 ENCOUNTER — OFFICE VISIT (OUTPATIENT)
Dept: INTERNAL MEDICINE CLINIC | Age: 84
End: 2019-12-11

## 2019-12-11 VITALS
TEMPERATURE: 97.3 F | HEART RATE: 67 BPM | RESPIRATION RATE: 18 BRPM | SYSTOLIC BLOOD PRESSURE: 145 MMHG | BODY MASS INDEX: 25.1 KG/M2 | DIASTOLIC BLOOD PRESSURE: 66 MMHG | HEIGHT: 64 IN | WEIGHT: 147 LBS | OXYGEN SATURATION: 96 %

## 2019-12-11 DIAGNOSIS — Z87.01 PERSONAL HISTORY OF PNEUMONIA (RECURRENT): Primary | ICD-10-CM

## 2019-12-11 DIAGNOSIS — Z87.01 HISTORY OF PNEUMONIA: Primary | ICD-10-CM

## 2019-12-11 RX ORDER — BENZONATATE 200 MG/1
200 CAPSULE ORAL
Qty: 21 CAP | Refills: 0 | Status: SHIPPED | OUTPATIENT
Start: 2019-12-11 | End: 2019-12-18

## 2019-12-11 NOTE — PROGRESS NOTES
SUBJECTIVE:  
Ms. Criss Krause is a 80 y.o. female who is here for follow up of routine medical issues. Pt presents ambulatory with a cane. Pt went to an Urgent Care facility on 12/03/2019 and was diagnosed with pneumonia of the left upper lobe. She was prescribed clarithromycin 250 mg tablet BID for 7 days, which she finished yesterday. She notes that the sx started the day before Thanksgiving. She reports that she feels much better today then she did when it started. Pt endorses a dry cough with a sore throat and difficulty breathing. She feels a little dizzy when she takes a deep breath. She states that she has been sleeping in a recliner after using a cough drop to relieve her throat. Pt denies wheezing, drainage, pharyngitis, diaphoresis, fever, or chills. Pt notes that she was told over the phone to make an appointment for 1 week. Pt asked if it was necessary for her to take all of the vitamins she is currently taking. We discussed the importance of calcium and Vitamin D and various areas where she can cut back. At this time, she is otherwise doing well and has brought no other complaints to my attention today. For a list of the medical issues addressed today, see the assessment and plan below. PMH:  
Past Medical History:  
Diagnosis Date  Arthritis  Cancer ([facility] [address] breast    (leg,ear,arm-skin cancer)  GERD (gastroesophageal reflux disease)  Hypertension  Other ill-defined conditions(799.89)   
 glaucoma  Other ill-defined conditions(799.89)   
 cellulitis left arm  Other ill-defined conditions(799.89)   
 carpal tunnel syndrome left hand  Other ill-defined conditions(799.89)   
 vertigo PSH:  has a past surgical history that includes hx mastectomy; hx efren and bso; hx cataract removal; hx tonsillectomy; hx knee arthroscopy; hx appendectomy; hx other surgical; hx colonoscopy (4/2012); hx other surgical; hx carpal tunnel release; cardiac catheterization (1/10/2013); and vascular surgery procedure unlist. 
 
All: is allergic to augmentin [amoxicillin-pot clavulanate]; doxycycline; keflex [cephalexin]; monodox [doxycycline monohydrate]; and sulfa (sulfonamide antibiotics). MEDS:  
Current Outpatient Medications Medication Sig  
 benzonatate (TESSALON) 200 mg capsule Take 1 Cap by mouth three (3) times daily as needed for Cough for up to 7 days.  hydroCHLOROthiazide (MICROZIDE) 12.5 mg capsule TAKE 1 CAPSULE BY MOUTH EVERY DAY  
 BYSTOLIC 2.5 mg tablet TAKE 1 TABLET BY MOUTH EVERY DAY  ELIQUIS 5 mg tablet TAKE 1 TABLET EVERY 12     HOURS TO PREVENT STROKE IN ATRIAL FIBRILLATION  
 rosuvastatin (CRESTOR) 20 mg tablet TAKE 1 TABLET NIGHTLY  sucralfate (CARAFATE) 100 mg/mL suspension Take  by mouth as needed.  timolol (TIMOPTIC-XE) 0.5 % ophthalmic gel-forming Administer 1 Drop to both eyes daily.  CYANOCOBALAMIN, VITAMIN B-12, (VITAMIN B-12 PO) Take 1,000 mcg by mouth daily.  latanoprost (XALATAN) 0.005 % ophthalmic solution Administer 1 Drop to both eyes nightly.  cycloSPORINE (RESTASIS) 0.05 % ophthalmic emulsion Administer 1 Drop to both eyes two (2) times a day.  VIT C/E/ZN/COPPR/LUTEIN/ZEAXAN (PRESERVISION AREDS 2 PO) Take 2 capsules by mouth two (2) times a day.  esomeprazole (NEXIUM) 40 mg capsule Take 1 Cap by mouth Daily (before breakfast).  co-enzyme Q-10 (CO Q-10) 100 mg capsule Take 100 mg by mouth daily.  Cholecalciferol, Vitamin D3, (VITAMIN D3) 1,000 unit cap Take 1 Cap by mouth two (2) times a day.  omega-3 fatty acids-vitamin e (FISH OIL) 1,000 mg Cap Take 1 Cap by mouth two (2) times a day.  CALCIUM CARBONATE/VITAMIN D3 (CALCIUM 600 + D,3, PO) Take 1 Cap by mouth two (2) times a day.  multivitamins-minerals-lutein (CENTRUM SILVER) Tab Take 1 Tab by mouth daily.  aspirin delayed-release 81 mg tablet Take  by mouth daily.  celecoxib (CELEBREX) 200 mg capsule Take 200 mg by mouth daily.  HYDROcodone-acetaminophen (NORCO) 5-325 mg per tablet Take 1 Tab by mouth every eight (8) hours as needed for Pain. Max Daily Amount: 3 Tabs. (Patient not taking: Reported on 12/11/2019) No current facility-administered medications for this visit. FH: family history includes Cancer in her sister; Diabetes in her sister and sister; Heart Disease in her brother, brother, father, mother, sister, sister, and son; Other in her brother; Stroke in her brother. SH:  reports that she quit smoking about 64 years ago. She has never used smokeless tobacco. She reports current alcohol use. Review of Systems - History obtained from the patient General ROS: no fever, chills, fatigue, body aches Psychological ROS: no change in anxiety, depression, SI/HI Ophthalmic ROS: no blurred vision, myopia, double vision ENT ROS: +irritated throat. no dysphagia, otalgia, otorrhea, rhinorrhea, post nasal drip Respiratory ROS: +dry cough. no shortness of breath, or wheezing Cardiovascular ROS: no chest pain or dyspnea on exertion Gastrointestinal ROS: no abdominal pain, change in bowel habits, or black or bloody stools Genito-Urinary ROS: no frequency, urgency, incontinence, dysuria, hematuria Musculoskeletal ROS: no arthralgia, myalgia Neurological ROS: no headaches, dizziness, lightheadedness, tremors, seizures Dermatological ROS: no rash or lesions OBJECTIVE:  
Vitals:  
Visit Vitals /66 (BP 1 Location: Left arm, BP Patient Position: Sitting) Pulse 67 Temp 97.3 °F (36.3 °C) (Oral) Resp 18 Ht 5' 4\" (1.626 m) Wt 147 lb (66.7 kg) SpO2 96% BMI 25.23 kg/m² Gen: Pleasant 80 y.o.  female in NAD. HEENT: NC/AT  
HEART: RRR, No M/G/R.     
LUNGS: CTAB No W/R.   
NEURO: Alert and oriented x 3. Cranial nerves grossly intact. No focal sensory or motor deficits noted. SKIN: Warm. Dry. No rashes or other lesions noted. ASSESSMENT/ PLAN: Diagnoses and all orders for this visit: 
 
1. History of pneumonia -     XR CHEST PA LAT; Future Other orders 
-     benzonatate (TESSALON) 200 mg capsule; Take 1 Cap by mouth three (3) times daily as needed for Cough for up to 7 days. ICD-10-CM ICD-9-CM 1. History of pneumonia Z87.01 V12.61 XR CHEST PA LAT 1. History of Pneumonia I ordered a Chest XR to evaluate pneumonia recovery. I prescribed benzonatate 200 mg capsule TID for 7 days to relieve her cough. Follow-up and Dispositions · Return if symptoms worsen or fail to improve. I have reviewed the patient's medications and risks/side effects/benefits were discussed. Diagnosis(-es) explained to patient and questions answered. Literature provided where appropriate.   
 
Written by Neftali Linares, as dictated by Curt Centeno MD.

## 2019-12-11 NOTE — PROGRESS NOTES
Identified pt with two pt identifiers(name and ). Reviewed record in preparation for visit and have obtained necessary documentation. Chief Complaint Patient presents with  Cough Pt went to Urgent Care on 2019 and was Dx: Pneumonia Visit Vitals /66 (BP 1 Location: Left arm, BP Patient Position: Sitting) Pulse 67 Temp 97.3 °F (36.3 °C) (Oral) Resp 18 Ht 5' 4\" (1.626 m) Wt 147 lb (66.7 kg) SpO2 96% BMI 25.23 kg/m² Health Maintenance Due Topic  Shingrix Vaccine Age 50> (1 of 2)  MEDICARE YEARLY EXAM   
 Influenza Age 5 to Adult  GLAUCOMA SCREENING Q2Y Med Reconciliation: Completed Coordination of Care Questionnaire: 
:  
1) Have you been to an emergency room, urgent care, or hospitalized since your last visit? If yes, where when, and reason for visit? Yes, 2019, [facility] due to cough, See Encounters. 2. Have seen or consulted any other health care provider since your last visit? If yes, where when, and reason for visit? No 
  
 
3) Do you have an Advanced Directive/ Living Will in place? No 
If yes, do we have a copy on file If no, would you like information Patient is accompanied by self I have received verbal consent from Aileen Denver to discuss any/all medical information while they are present in the room.

## 2019-12-12 ENCOUNTER — APPOINTMENT (OUTPATIENT)
Dept: CT IMAGING | Age: 84
End: 2019-12-12
Attending: EMERGENCY MEDICINE
Payer: MEDICARE

## 2019-12-12 ENCOUNTER — HOSPITAL ENCOUNTER (EMERGENCY)
Age: 84
Discharge: HOME | End: 2019-12-12
Attending: EMERGENCY MEDICINE
Payer: MEDICARE

## 2019-12-12 VITALS
HEART RATE: 76 BPM | SYSTOLIC BLOOD PRESSURE: 173 MMHG | WEIGHT: 145.06 LBS | BODY MASS INDEX: 28.48 KG/M2 | TEMPERATURE: 97.5 F | OXYGEN SATURATION: 95 % | DIASTOLIC BLOOD PRESSURE: 64 MMHG | RESPIRATION RATE: 18 BRPM | HEIGHT: 60 IN

## 2019-12-12 DIAGNOSIS — J18.9 COMMUNITY ACQUIRED PNEUMONIA, UNSPECIFIED LATERALITY: Primary | ICD-10-CM

## 2019-12-12 LAB
ALBUMIN SERPL-MCNC: 3.2 G/DL (ref 3.5–5)
ALBUMIN/GLOB SERPL: 0.7 {RATIO} (ref 1.1–2.2)
ALP SERPL-CCNC: 155 U/L (ref 45–117)
ALT SERPL-CCNC: 29 U/L (ref 12–78)
ANION GAP SERPL CALC-SCNC: 7 MMOL/L (ref 5–15)
AST SERPL-CCNC: 17 U/L (ref 15–37)
BASOPHILS # BLD: 0.1 K/UL (ref 0–0.1)
BASOPHILS NFR BLD: 1 % (ref 0–1)
BILIRUB SERPL-MCNC: 0.5 MG/DL (ref 0.2–1)
BNP SERPL-MCNC: 2814 PG/ML
BUN SERPL-MCNC: 14 MG/DL (ref 6–20)
BUN/CREAT SERPL: 15 (ref 12–20)
CALCIUM SERPL-MCNC: 9.1 MG/DL (ref 8.5–10.1)
CHLORIDE SERPL-SCNC: 99 MMOL/L (ref 97–108)
CO2 SERPL-SCNC: 30 MMOL/L (ref 21–32)
CREAT SERPL-MCNC: 0.96 MG/DL (ref 0.55–1.02)
D DIMER PPP FEU-MCNC: 0.31 MG/L FEU (ref 0–0.65)
DIFFERENTIAL METHOD BLD: ABNORMAL
EOSINOPHIL # BLD: 0.2 K/UL (ref 0–0.4)
EOSINOPHIL NFR BLD: 3 % (ref 0–7)
ERYTHROCYTE [DISTWIDTH] IN BLOOD BY AUTOMATED COUNT: 14.4 % (ref 11.5–14.5)
GLOBULIN SER CALC-MCNC: 4.3 G/DL (ref 2–4)
GLUCOSE SERPL-MCNC: 106 MG/DL (ref 65–100)
HCT VFR BLD AUTO: 41.1 % (ref 35–47)
HGB BLD-MCNC: 13.8 G/DL (ref 11.5–16)
IMM GRANULOCYTES # BLD AUTO: 0.1 K/UL (ref 0–0.04)
IMM GRANULOCYTES NFR BLD AUTO: 1 % (ref 0–0.5)
LACTATE SERPL-SCNC: 1.7 MMOL/L (ref 0.4–2)
LYMPHOCYTES # BLD: 1.5 K/UL (ref 0.8–3.5)
LYMPHOCYTES NFR BLD: 17 % (ref 12–49)
MCH RBC QN AUTO: 28.6 PG (ref 26–34)
MCHC RBC AUTO-ENTMCNC: 33.6 G/DL (ref 30–36.5)
MCV RBC AUTO: 85.3 FL (ref 80–99)
MONOCYTES # BLD: 1 K/UL (ref 0–1)
MONOCYTES NFR BLD: 11 % (ref 5–13)
NEUTS SEG # BLD: 6 K/UL (ref 1.8–8)
NEUTS SEG NFR BLD: 67 % (ref 32–75)
NRBC # BLD: 0 K/UL (ref 0–0.01)
NRBC BLD-RTO: 0 PER 100 WBC
PLATELET # BLD AUTO: 232 K/UL (ref 150–400)
PMV BLD AUTO: 10.3 FL (ref 8.9–12.9)
POTASSIUM SERPL-SCNC: 3.8 MMOL/L (ref 3.5–5.1)
PROT SERPL-MCNC: 7.5 G/DL (ref 6.4–8.2)
RBC # BLD AUTO: 4.82 M/UL (ref 3.8–5.2)
SODIUM SERPL-SCNC: 136 MMOL/L (ref 136–145)
TROPONIN I SERPL-MCNC: <0.05 NG/ML
WBC # BLD AUTO: 8.8 K/UL (ref 3.6–11)

## 2019-12-12 PROCEDURE — 84484 ASSAY OF TROPONIN QUANT: CPT

## 2019-12-12 PROCEDURE — 74011250637 HC RX REV CODE- 250/637: Performed by: EMERGENCY MEDICINE

## 2019-12-12 PROCEDURE — 87040 BLOOD CULTURE FOR BACTERIA: CPT

## 2019-12-12 PROCEDURE — 85379 FIBRIN DEGRADATION QUANT: CPT

## 2019-12-12 PROCEDURE — 85025 COMPLETE CBC W/AUTO DIFF WBC: CPT

## 2019-12-12 PROCEDURE — 94640 AIRWAY INHALATION TREATMENT: CPT

## 2019-12-12 PROCEDURE — 80053 COMPREHEN METABOLIC PANEL: CPT

## 2019-12-12 PROCEDURE — 74011000250 HC RX REV CODE- 250: Performed by: EMERGENCY MEDICINE

## 2019-12-12 PROCEDURE — 74011250636 HC RX REV CODE- 250/636: Performed by: EMERGENCY MEDICINE

## 2019-12-12 PROCEDURE — 71250 CT THORAX DX C-: CPT

## 2019-12-12 PROCEDURE — 83605 ASSAY OF LACTIC ACID: CPT

## 2019-12-12 PROCEDURE — 99284 EMERGENCY DEPT VISIT MOD MDM: CPT

## 2019-12-12 PROCEDURE — 83880 ASSAY OF NATRIURETIC PEPTIDE: CPT

## 2019-12-12 PROCEDURE — 36415 COLL VENOUS BLD VENIPUNCTURE: CPT

## 2019-12-12 RX ORDER — IPRATROPIUM BROMIDE AND ALBUTEROL SULFATE 2.5; .5 MG/3ML; MG/3ML
SOLUTION RESPIRATORY (INHALATION)
Status: DISCONTINUED
Start: 2019-12-12 | End: 2019-12-12 | Stop reason: HOSPADM

## 2019-12-12 RX ORDER — LEVOFLOXACIN 750 MG/1
750 TABLET ORAL
Status: COMPLETED | OUTPATIENT
Start: 2019-12-12 | End: 2019-12-12

## 2019-12-12 RX ORDER — IPRATROPIUM BROMIDE AND ALBUTEROL SULFATE 2.5; .5 MG/3ML; MG/3ML
3 SOLUTION RESPIRATORY (INHALATION)
Status: COMPLETED | OUTPATIENT
Start: 2019-12-12 | End: 2019-12-12

## 2019-12-12 RX ORDER — LEVOFLOXACIN 750 MG/1
750 TABLET ORAL DAILY
Qty: 7 TAB | Refills: 0 | Status: SHIPPED | OUTPATIENT
Start: 2019-12-12 | End: 2019-12-20 | Stop reason: ALTCHOICE

## 2019-12-12 RX ORDER — ALBUTEROL SULFATE 90 UG/1
2 AEROSOL, METERED RESPIRATORY (INHALATION)
Qty: 1 INHALER | Refills: 0 | Status: SHIPPED | OUTPATIENT
Start: 2019-12-12 | End: 2020-01-01

## 2019-12-12 RX ADMIN — IPRATROPIUM BROMIDE AND ALBUTEROL SULFATE 3 ML: .5; 3 SOLUTION RESPIRATORY (INHALATION) at 15:02

## 2019-12-12 RX ADMIN — LEVOFLOXACIN 750 MG: 750 TABLET, FILM COATED ORAL at 16:41

## 2019-12-12 RX ADMIN — SODIUM CHLORIDE 1000 ML: 900 INJECTION, SOLUTION INTRAVENOUS at 12:43

## 2019-12-12 NOTE — DISCHARGE INSTRUCTIONS
Patient Education        Pneumonia: Care Instructions  Your Care Instructions    Pneumonia is an infection of the lungs. Most cases are caused by infections from bacteria or viruses. Pneumonia may be mild or very severe. If it is caused by bacteria, you will be treated with antibiotics. It may take a few weeks to a few months to recover fully from pneumonia, depending on how sick you were and whether your overall health is good. Follow-up care is a key part of your treatment and safety. Be sure to make and go to all appointments, and call your doctor if you are having problems. It's also a good idea to know your test results and keep a list of the medicines you take. How can you care for yourself at home? · Take your antibiotics exactly as directed. Do not stop taking the medicine just because you are feeling better. You need to take the full course of antibiotics. · Take your medicines exactly as prescribed. Call your doctor if you think you are having a problem with your medicine. · Get plenty of rest and sleep. You may feel weak and tired for a while, but your energy level will improve with time. · To prevent dehydration, drink plenty of fluids, enough so that your urine is light yellow or clear like water. Choose water and other caffeine-free clear liquids until you feel better. If you have kidney, heart, or liver disease and have to limit fluids, talk with your doctor before you increase the amount of fluids you drink. · Take care of your cough so you can rest. A cough that brings up mucus from your lungs is common with pneumonia. It is one way your body gets rid of the infection. But if coughing keeps you from resting or causes severe fatigue and chest-wall pain, talk to your doctor. He or she may suggest that you take a medicine to reduce the cough. · Use a vaporizer or humidifier to add moisture to your bedroom. Follow the directions for cleaning the machine.   · Do not smoke or allow others to smoke around you. Smoke will make your cough last longer. If you need help quitting, talk to your doctor about stop-smoking programs and medicines. These can increase your chances of quitting for good. · Take an over-the-counter pain medicine, such as acetaminophen (Tylenol), ibuprofen (Advil, Motrin), or naproxen (Aleve). Read and follow all instructions on the label. · Do not take two or more pain medicines at the same time unless the doctor told you to. Many pain medicines have acetaminophen, which is Tylenol. Too much acetaminophen (Tylenol) can be harmful. · If you were given a spirometer to measure how well your lungs are working, use it as instructed. This can help your doctor tell how your recovery is going. · To prevent pneumonia in the future, talk to your doctor about getting a flu vaccine (once a year) and a pneumococcal vaccine (one time only for most people). When should you call for help? Call 911 anytime you think you may need emergency care. For example, call if:    · You have severe trouble breathing.    Call your doctor now or seek immediate medical care if:    · You cough up dark brown or bloody mucus (sputum).     · You have new or worse trouble breathing.     · You are dizzy or lightheaded, or you feel like you may faint.    Watch closely for changes in your health, and be sure to contact your doctor if:    · You have a new or higher fever.     · You are coughing more deeply or more often.     · You are not getting better after 2 days (48 hours).     · You do not get better as expected. Where can you learn more? Go to http://kristy-sammi.info/. Enter [id number] in the search box to learn more about \"Pneumonia: Care Instructions. \"  Current as of: June 9, 2019  Content Version: 12.2  © 9636-4736 [employer]. Care instructions adapted under license by [email or web address] (which disclaims liability or warranty for this information).  If you have questions about a medical condition or this instruction, always ask your healthcare professional. [place] [address] any warranty or liability for your use of this information.

## 2019-12-12 NOTE — ED PROVIDER NOTES
EMERGENCY DEPARTMENT HISTORY AND PHYSICAL EXAM 
     
 
Date: 12/12/2019 Patient Name: Kym Clifton History of Presenting Illness Chief Complaint Patient presents with  Abnormal Chest X Ray  
  sent by PCP for PNA on xray; worse than last xray. patient c/o dry cough for two weeks History Provided By: Patient HPI: Kym Clifton is a 80 y.o. female, pmhx HTN, breast CA, who presents ambulatory with friend to the ED c/o cough Two weeks ago patient was in the ER and diagnosed with pneumonia. She finished a course of abx 7 days and went to her PCP yesterday for a recheck and a repeat xray was taken. She was called at home and told to come to the ER as she has a new spot of pna noted on xray. She complains of dry cough and myalgias but denies fevers, chills, nausea, vomiting or diarrhea (since two weeks ago). He appetite is still decreased but she notes she has been trying to drink plenty of water (about 24 oz a day). Patient has had her flu vaccine in September. Pt notes that her symptoms started while [address] and drove back 12/2/19 PCP: Barby Crooks MD 
 
Allergies: Doxy, Augmentin, keflex, sulfa Social Hx: quit years agotobacco, -EtOH, -Illicit Drugs There are no other complaints, changes, or physical findings at this time. Current Facility-Administered Medications Medication Dose Route Frequency Provider Last Rate Last Dose  albuterol-ipratropium (DUO-NEB) 2.5 mg-0.5 mg/3 ml nebulizer solution  levoFLOXacin (LEVAQUIN) tablet 750 mg  750 mg Oral NOW Zamzam Lai MD      
 
Current Outpatient Medications Medication Sig Dispense Refill  levoFLOXacin (LEVAQUIN) 750 mg tablet Take 1 Tab by mouth daily. 7 Tab 0  
 albuterol (PROVENTIL HFA, VENTOLIN HFA, PROAIR HFA) 90 mcg/actuation inhaler Take 2 Puffs by inhalation every four (4) hours as needed for Wheezing.  1 Inhaler 0  
  benzonatate (TESSALON) 200 mg capsule Take 1 Cap by mouth three (3) times daily as needed for Cough for up to 7 days. 21 Cap 0  
 aspirin delayed-release 81 mg tablet Take  by mouth daily.  hydroCHLOROthiazide (MICROZIDE) 12.5 mg capsule TAKE 1 CAPSULE BY MOUTH EVERY DAY 90 Cap 3  
 BYSTOLIC 2.5 mg tablet TAKE 1 TABLET BY MOUTH EVERY DAY 90 Tab 3  
 ELIQUIS 5 mg tablet TAKE 1 TABLET EVERY 12     HOURS TO PREVENT STROKE IN ATRIAL FIBRILLATION 180 Tab 2  
 rosuvastatin (CRESTOR) 20 mg tablet TAKE 1 TABLET NIGHTLY 90 Tab 1  celecoxib (CELEBREX) 200 mg capsule Take 200 mg by mouth daily.  HYDROcodone-acetaminophen (NORCO) 5-325 mg per tablet Take 1 Tab by mouth every eight (8) hours as needed for Pain. Max Daily Amount: 3 Tabs. (Patient not taking: Reported on 12/11/2019) 21 Tab 0  
 sucralfate (CARAFATE) 100 mg/mL suspension Take  by mouth as needed.  timolol (TIMOPTIC-XE) 0.5 % ophthalmic gel-forming Administer 1 Drop to both eyes daily.  CYANOCOBALAMIN, VITAMIN B-12, (VITAMIN B-12 PO) Take 1,000 mcg by mouth daily.  latanoprost (XALATAN) 0.005 % ophthalmic solution Administer 1 Drop to both eyes nightly.  cycloSPORINE (RESTASIS) 0.05 % ophthalmic emulsion Administer 1 Drop to both eyes two (2) times a day.  VIT C/E/ZN/COPPR/LUTEIN/ZEAXAN (PRESERVISION AREDS 2 PO) Take 2 capsules by mouth two (2) times a day.  esomeprazole (NEXIUM) 40 mg capsule Take 1 Cap by mouth Daily (before breakfast). 90 Cap 1  co-enzyme Q-10 (CO Q-10) 100 mg capsule Take 100 mg by mouth daily.  Cholecalciferol, Vitamin D3, (VITAMIN D3) 1,000 unit cap Take 1 Cap by mouth two (2) times a day.  omega-3 fatty acids-vitamin e (FISH OIL) 1,000 mg Cap Take 1 Cap by mouth two (2) times a day.  CALCIUM CARBONATE/VITAMIN D3 (CALCIUM 600 + D,3, PO) Take 1 Cap by mouth two (2) times a day.  multivitamins-minerals-lutein (CENTRUM SILVER) Tab Take 1 Tab by mouth daily. Past History Past Medical History: 
Past Medical History:  
Diagnosis Date  Arthritis  Cancer ([facility] [address] breast    (leg,ear,arm-skin cancer)  GERD (gastroesophageal reflux disease)  Hypertension  Other ill-defined conditions(799.89)   
 glaucoma  Other ill-defined conditions(799.89)   
 cellulitis left arm  Other ill-defined conditions(799.89)   
 carpal tunnel syndrome left hand  Other ill-defined conditions(799.89)   
 vertigo Past Surgical History: 
Past Surgical History:  
Procedure Laterality Date  CARDIAC CATHETERIZATION  1/10/2013  HX APPENDECTOMY  HX CARPAL TUNNEL RELEASE    
 left hand  HX CATARACT REMOVAL    
 bilateral  
 HX COLONOSCOPY  2012  HX KNEE ARTHROSCOPY    
 left  HX MASTECTOMY    
 bilateral  
 HX OTHER SURGICAL Basal cell skin cancer removed from left leg, left arm and neck  HX OTHER SURGICAL    
 cyst removed from left thigh  HX OREN AND BSO  HX TONSILLECTOMY  VASCULAR SURGERY PROCEDURE UNLIST    
 vascular blockages removed. Family History: 
Family History Problem Relation Age of Onset  Heart Disease Mother  Heart Disease Father   
      age 55  
 Heart Disease Brother  Stroke Brother  Cancer Sister Colon Cancer  Diabetes Sister  Heart Disease Sister  Heart Disease Sister  Diabetes Sister  Other Brother MVA  Heart Disease Brother  Heart Disease Son   
      age 52 Social History: 
Social History Tobacco Use  Smoking status: Former Smoker Last attempt to quit: 1955 Years since quittin.9  Smokeless tobacco: Never Used Substance Use Topics  Alcohol use: Yes Comment: rare  Drug use: Not on file Allergies: Allergies Allergen Reactions  Augmentin [Amoxicillin-Pot Clavulanate] Hives and Itching  Doxycycline Hives and Itching  Keflex [Cephalexin] Hives and Itching  Monodox [Doxycycline Monohydrate] Other (comments)  Sulfa (Sulfonamide Antibiotics) Rash Review of Systems Review of Systems Constitutional: Positive for appetite change. Negative for activity change, chills, fever and unexpected weight change. HENT: Negative for congestion. Eyes: Negative for pain and visual disturbance. Respiratory: Positive for cough. Negative for shortness of breath and wheezing. Cardiovascular: Negative for chest pain and leg swelling. Gastrointestinal: Negative for abdominal pain, diarrhea, nausea and vomiting. Genitourinary: Negative for dysuria. Musculoskeletal: Negative for back pain. Skin: Negative for rash. Neurological: Negative for headaches. Physical Exam  
Physical Exam 
Vitals signs and nursing note reviewed. Constitutional:   
   Appearance: She is well-developed. She is not diaphoretic. Comments: Thin elderly female currently in minimal distress HENT:  
   Head: Normocephalic and atraumatic. Eyes:  
   General:     
   Right eye: No discharge. Left eye: No discharge. Conjunctiva/sclera: Conjunctivae normal.  
   Pupils: Pupils are equal, round, and reactive to light. Neck: Musculoskeletal: Normal range of motion and neck supple. Cardiovascular:  
   Rate and Rhythm: Normal rate and regular rhythm. Heart sounds: Normal heart sounds. No murmur. Pulmonary:  
   Effort: Pulmonary effort is normal. No respiratory distress. Breath sounds: Normal breath sounds. No wheezing or rales. Abdominal:  
   General: Bowel sounds are normal. There is no distension. Palpations: Abdomen is soft. Tenderness: There is no tenderness. Musculoskeletal: Normal range of motion. Skin: 
   General: Skin is warm and dry. Findings: No rash. Neurological:  
   Mental Status: She is alert and oriented to person, place, and time. Cranial Nerves: No cranial nerve deficit. Motor: No abnormal muscle tone. Diagnostic Study Results Labs - Recent Results (from the past 12 hour(s)) CBC WITH AUTOMATED DIFF Collection Time: 12/12/19 12:44 PM  
Result Value Ref Range WBC 8.8 3.6 - 11.0 K/uL  
 RBC 4.82 3.80 - 5.20 M/uL  
 HGB 13.8 11.5 - 16.0 g/dL HCT 41.1 35.0 - 47.0 % MCV 85.3 80.0 - 99.0 FL  
 MCH 28.6 26.0 - 34.0 PG  
 MCHC 33.6 30.0 - 36.5 g/dL  
 RDW 14.4 11.5 - 14.5 % PLATELET 347 653 - 742 K/uL MPV 10.3 8.9 - 12.9 FL  
 NRBC 0.0 0  WBC ABSOLUTE NRBC 0.00 0.00 - 0.01 K/uL NEUTROPHILS 67 32 - 75 % LYMPHOCYTES 17 12 - 49 % MONOCYTES 11 5 - 13 % EOSINOPHILS 3 0 - 7 % BASOPHILS 1 0 - 1 % IMMATURE GRANULOCYTES 1 (H) 0.0 - 0.5 % ABS. NEUTROPHILS 6.0 1.8 - 8.0 K/UL  
 ABS. LYMPHOCYTES 1.5 0.8 - 3.5 K/UL  
 ABS. MONOCYTES 1.0 0.0 - 1.0 K/UL  
 ABS. EOSINOPHILS 0.2 0.0 - 0.4 K/UL  
 ABS. BASOPHILS 0.1 0.0 - 0.1 K/UL  
 ABS. IMM. GRANS. 0.1 (H) 0.00 - 0.04 K/UL  
 DF AUTOMATED METABOLIC PANEL, COMPREHENSIVE Collection Time: 12/12/19 12:44 PM  
Result Value Ref Range Sodium 136 136 - 145 mmol/L Potassium 3.8 3.5 - 5.1 mmol/L Chloride 99 97 - 108 mmol/L  
 CO2 30 21 - 32 mmol/L Anion gap 7 5 - 15 mmol/L Glucose 106 (H) 65 - 100 mg/dL BUN 14 6 - 20 MG/DL Creatinine 0.96 0.55 - 1.02 MG/DL  
 BUN/Creatinine ratio 15 12 - 20 GFR est AA >60 >60 ml/min/1.73m2 GFR est non-AA 54 (L) >60 ml/min/1.73m2 Calcium 9.1 8.5 - 10.1 MG/DL Bilirubin, total 0.5 0.2 - 1.0 MG/DL  
 ALT (SGPT) 29 12 - 78 U/L  
 AST (SGOT) 17 15 - 37 U/L Alk. phosphatase 155 (H) 45 - 117 U/L Protein, total 7.5 6.4 - 8.2 g/dL Albumin 3.2 (L) 3.5 - 5.0 g/dL Globulin 4.3 (H) 2.0 - 4.0 g/dL A-G Ratio 0.7 (L) 1.1 - 2.2 D DIMER Collection Time: 12/12/19 12:44 PM  
Result Value Ref Range D-dimer 0.31 0.00 - 0.65 mg/L FEU  
LACTIC ACID Collection Time: 12/12/19 12:44 PM  
Result Value Ref Range Lactic acid 1.7 0.4 - 2.0 MMOL/L  
NT-PRO BNP Collection Time: 12/12/19 12:44 PM  
Result Value Ref Range NT pro-BNP 2,814 (H) <450 PG/ML  
TROPONIN I Collection Time: 12/12/19 12:44 PM  
Result Value Ref Range Troponin-I, Qt. <0.05 <0.05 ng/mL Radiologic Studies -  
CT CHEST WO CONT Final Result IMPRESSION:    
1. Patchy bilateral airspace disease. Question developing small cavity in the  
left upper lobe. Recommend follow-up to ensure resolution. CT Results  (Last 48 hours)  
          
 12/12/19 1600  CT CHEST WO CONT Final result Impression:  IMPRESSION:    
1. Patchy bilateral airspace disease. Question developing small cavity in the  
left upper lobe. Recommend follow-up to ensure resolution. Narrative:  EXAM:  CT CHEST WO CONT INDICATION:  bilateral pna  
COMPARISON: Chest x-ray prior day. .  
TECHNIQUE: Unenhanced multislice helical CT was performed from the thoracic  
inlet to the adrenal glands without intravenous contrast administration. Contiguous 5 mm axial images were reconstructed and lung and soft tissue windows  
were generated. Coronal and sagittal reformations were generated. CT dose  
reduction was achieved through use of a standardized protocol tailored for this  
examination and automatic exposure control for dose modulation. Amber Amaya FINDINGS:  
CHEST:  
Chest wall/thoracic inlet: There are surgical clips in the axilla bilaterally. Thyroid: Within normal limits. Mediastinum/ernst: There are no pathologically enlarged mediastinal, hilar or  
axillary nodes. Heart/vessels: Extensive atherosclerotic calcifications and calcifications of  
the aortic valve and coronary arteries. Heart is enlarged. Amber Amaya Lungs/Pleura: Patchy areas of consolidation the bilateral upper lobes slightly  
worse on the right but no pleural effusion. No pneumothorax area of  
consolidation of left upper lobe may be developing a small cavity. .  
. ABDOMEN:  
 Round hypodensity upper pole right kidney is nonspecific. Extensive vascular  
calcifications are again demonstrated Marinette Emily MSK: Bones are osteopenic. 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  
skeletal hyperostosis. Incidental hemangioma within T1 and T5. .  
. CXR Results  (Last 48 hours)  
          
 12/11/19 1222  XR CHEST PA LAT Final result Impression:  IMPRESSION:  
1. Improvement of MAKAYLA infiltrate/pneumonia. 2. New minimal RUL infiltrate/pneumonia. Narrative:  INDICATION: chest xray EXAM: CXR 2 Views. COMPARISON: 12/3/2019. FINDINGS: Frontal and lateral views of the chest show interval decrease of the  
previous small left upper lobe infiltrate. There is new small right upper lobe airspace disease. Heart size is normal. There is no pulmonary edema. There is no pneumothorax,  
midline shift or pleural effusion. There are bilateral axillary clips. Medical Decision Making I am the first provider for this patient. I reviewed the vital signs, available nursing notes, past medical history, past surgical history, family history and social history. Vital Signs-Reviewed the patient's vital signs. Patient Vitals for the past 12 hrs: 
 Temp Pulse Resp BP SpO2  
12/12/19 1503     97 % 12/12/19 1500    157/76 97 % 12/12/19 1430    164/58 94 % 12/12/19 1301     96 % 12/12/19 1301    159/59 95 % 12/12/19 1252    (!) 115/102   
12/12/19 1035 97.5 °F (36.4 °C) 76 18 171/70 100 % Pulse Oximetry Analysis - 100% on RA Cardiac Monitor:  
Rate: 76bpm 
Rhythm: Normal Sinus Rhythm Records Reviewed: Nursing Notes, Old Medical Records, Previous Radiology Studies and Previous Laboratory Studies Provider Notes (Medical Decision Making): MDM: Elderly female with bilateral pneumonia sent in from her primary care doctor's office. She is afebrile without any evidence of sepsis.   Will check cardiac markers as well as test for CHF and d-dimer given these could be infarcts. ED Course:  
Initial assessment performed. The patients presenting problems have been discussed, and they are in agreement with the care plan formulated and outlined with them. I have encouraged them to ask questions as they arise throughout their visit. PROGRESS NOTE: 
3 PM 
Pt sitting in bed with stable vital signs but states she feels unchanged. Albuterol ordered for attempted symptom relief and will send her for noncontrasted CT scan as her d-dimer is negative and her heart failure testing is minimally elevated. 4:15 p.m. CT with multifocal pneumonia without any parapneumonic effusion. Patient reevaluated and continues to saturate 97% normal heart rate and blood pressure and remains afebrile. Discussed with her results as well as recommendations for antibiotics for prolonged course. Patient to call her primary care doctor tomorrow to arrange a follow-up appointment next week to recheck her progress. Discharge note: 
Pt re-evaluated and noted to be feeling better, ready for discharge. Updated pt and family on all final lab and CT findings. Will follow up as instructed. All questions have been answered, pt voiced understanding and agreement with plan. Specific return precautions provided as well as instructions to return to the ED should sx worsen at any time. Vital signs stable for discharge. Critical Care Time:  
0 Diagnosis Clinical Impression: 1. Community acquired pneumonia, unspecified laterality PLAN: 
1. Current Discharge Medication List  
  
START taking these medications Details  
levoFLOXacin (LEVAQUIN) 750 mg tablet Take 1 Tab by mouth daily. Qty: 7 Tab, Refills: 0  
  
albuterol (PROVENTIL HFA, VENTOLIN HFA, PROAIR HFA) 90 mcg/actuation inhaler Take 2 Puffs by inhalation every four (4) hours as needed for Wheezing. Qty: 1 Inhaler, Refills: 0  
  
  
 
2. Follow-up Information Follow up With Specialties Details Why Contact Info Crystal Morrison MD Family Practice Schedule an appointment as soon as possible for a visit for recheck next week [address] [facility] 
[phone] [facility] EMERGENCY DEPT Emergency Medicine  If symptoms worsen [address] [facility] 
[phone] Return to ED if worse Disposition: 
home Please note, this dictation was completed with [employer]aft[employer], the computer voice recognition software. Quite often unanticipated grammatical, syntax, homophones, and other interpretive errors are inadvertently transcribed by the computer software. Please disregard these errors. Please excuse any errors that have escaped final proof reading.

## 2019-12-12 NOTE — ED NOTES
Pt feeling better now. Discharge instructions given. Pt verbalized understanding. No questions or concerns. Assisted to car via w/c with friend driving.

## 2019-12-13 ENCOUNTER — TELEPHONE (OUTPATIENT)
Dept: INTERNAL MEDICINE CLINIC | Age: 84
End: 2019-12-13

## 2019-12-13 NOTE — TELEPHONE ENCOUNTER
Identified patient 2 identifiers verified. Patient was seen in ED Dx with pneumonia. Patient accepted an appointment with Dr. Saul Andersen for follow up on 12/20/19.

## 2019-12-17 LAB
BACTERIA SPEC CULT: NORMAL
SERVICE CMNT-IMP: NORMAL

## 2019-12-20 ENCOUNTER — OFFICE VISIT (OUTPATIENT)
Dept: INTERNAL MEDICINE CLINIC | Age: 84
End: 2019-12-20

## 2019-12-20 ENCOUNTER — TELEPHONE (OUTPATIENT)
Dept: INTERNAL MEDICINE CLINIC | Age: 84
End: 2019-12-20

## 2019-12-20 VITALS
TEMPERATURE: 97.8 F | OXYGEN SATURATION: 94 % | RESPIRATION RATE: 18 BRPM | BODY MASS INDEX: 28.47 KG/M2 | HEIGHT: 60 IN | SYSTOLIC BLOOD PRESSURE: 115 MMHG | HEART RATE: 67 BPM | WEIGHT: 145 LBS | DIASTOLIC BLOOD PRESSURE: 68 MMHG

## 2019-12-20 DIAGNOSIS — Z87.01 PERSONAL HISTORY OF PNEUMONIA (RECURRENT): Primary | ICD-10-CM

## 2019-12-20 DIAGNOSIS — Z87.01 HISTORY OF PNEUMONIA: Primary | ICD-10-CM

## 2019-12-20 RX ORDER — LEVOFLOXACIN 750 MG/1
750 TABLET ORAL DAILY
Qty: 3 TAB | Refills: 0 | Status: SHIPPED | OUTPATIENT
Start: 2019-12-20 | End: 2020-02-24 | Stop reason: ALTCHOICE

## 2019-12-20 NOTE — TELEPHONE ENCOUNTER
Reviewed xray results. There has been some improvement. There is still some consolidation on the right upper lobe. I will give her a few more days of the abx and have her repeat the xray in 2 weeks.

## 2019-12-20 NOTE — PROGRESS NOTES
Identified pt with two pt identifiers(name and ). Reviewed record in preparation for visit and have obtained necessary documentation. Chief Complaint Patient presents with  ED Follow-up Pt went to [id number] [place] [address] ER on 19 Dx: Pneumonia Visit Vitals /68 (BP 1 Location: Right arm, BP Patient Position: Sitting) Pulse 67 Temp 97.8 °F (36.6 °C) (Oral) Resp 18 Ht 5' (1.524 m) Wt 145 lb (65.8 kg) SpO2 94% BMI 28.32 kg/m² Health Maintenance Due Topic  Shingrix Vaccine Age 50> (1 of 2)  MEDICARE YEARLY EXAM   
 Influenza Age 5 to Adult  GLAUCOMA SCREENING Q2Y Med Reconciliation: Completed Coordination of Care Questionnaire: 
:  
1) Have you been to an emergency room, urgent care, or hospitalized since your last visit? If yes, where when, and reason for visit? Yes, See Chief Complaint. 2. Have seen or consulted any other health care provider since your last visit? If yes, where when, and reason for visit? No 
  
 
3) Do you have an Advanced Directive/ Living Will in place? No 
If yes, do we have a copy on file If no, would you like information Patient is accompanied by self I have received verbal consent from Cammy Leo to discuss any/all medical information while they are present in the room.

## 2019-12-20 NOTE — PROGRESS NOTES
SUBJECTIVE:  
Ms. Amy Land is a 80 y.o. female who is here for an ED follow up. ED Summary: Patient presented to the ED on 12/12/2019 c/o pneumonia. She was in the ER 2 weeks prior and diagnosed with pneumonia. She finished a course of abx 7 days and went to her PCP for a repeat XR. She was called at home and told to come to the ER after a new spot of PNA was noted on XR. She complained of dry cough and myalgias but denied fevers, chills, nausea, vomiting or diarrhea (since two weeks ago). Patient had her flu vaccine in September. A Chest CT found patchy areas of consolidation of the bilateral upper lobes which was slightly worse on the right but no pleural effusion. There was no pneumothorax area of consolidation of left upper lobe, which may be developing a small cavity. A CXR found improvement of MAKAYLA infiltrate/pneumonia with new minimal RUL infiltrate/pneumonia. Pt was prescribed Levaquin 750 mg tablet every day for 7 days and albuterol 90 mcg/actuation inhaler for treatment and sx management. Pt was discharged in stable condition. Pt reports that she finished the Levaquin yesterday. She notes that she feels a little better and as though her breathing has improved. Pt notes that her throat is very dry. She states that she is experiencing generalized weakness. She states that she has been drinking Ensure for extra nutrients because she has not been very hungry. She has not been taking her vitamins throughout this ordeal, but intends to start when she feels better. Pt denies n/v/d, fever, chills, or upset stomach. She reports that she has been able to sleep in her bed again because she is able to breathe laying down again. She is not traveling for the holidays. At this time, she is otherwise doing well and has brought no other complaints to my attention today. For a list of the medical issues addressed today, see the assessment and plan below. PMH:  
Past Medical History:  
Diagnosis Date  Arthritis  Cancer ([facility] [address] breast    (leg,ear,arm-skin cancer)  GERD (gastroesophageal reflux disease)  Hypertension  Other ill-defined conditions(799.89)   
 glaucoma  Other ill-defined conditions(799.89)   
 cellulitis left arm  Other ill-defined conditions(799.89)   
 carpal tunnel syndrome left hand  Other ill-defined conditions(799.89)   
 vertigo PSH:  has a past surgical history that includes hx mastectomy; hx efren and bso; hx cataract removal; hx tonsillectomy; hx knee arthroscopy; hx appendectomy; hx other surgical; hx colonoscopy (4/2012); hx other surgical; hx carpal tunnel release; cardiac catheterization (1/10/2013); and vascular surgery procedure unlist. 
 
All: is allergic to augmentin [amoxicillin-pot clavulanate]; doxycycline; keflex [cephalexin]; monodox [doxycycline monohydrate]; and sulfa (sulfonamide antibiotics). MEDS:  
Current Outpatient Medications Medication Sig  
 albuterol (PROVENTIL HFA, VENTOLIN HFA, PROAIR HFA) 90 mcg/actuation inhaler Take 2 Puffs by inhalation every four (4) hours as needed for Wheezing.  hydroCHLOROthiazide (MICROZIDE) 12.5 mg capsule TAKE 1 CAPSULE BY MOUTH EVERY DAY  
 BYSTOLIC 2.5 mg tablet TAKE 1 TABLET BY MOUTH EVERY DAY  ELIQUIS 5 mg tablet TAKE 1 TABLET EVERY 12     HOURS TO PREVENT STROKE IN ATRIAL FIBRILLATION  
 rosuvastatin (CRESTOR) 20 mg tablet TAKE 1 TABLET NIGHTLY  timolol (TIMOPTIC-XE) 0.5 % ophthalmic gel-forming Administer 1 Drop to both eyes daily.  latanoprost (XALATAN) 0.005 % ophthalmic solution Administer 1 Drop to both eyes nightly.  cycloSPORINE (RESTASIS) 0.05 % ophthalmic emulsion Administer 1 Drop to both eyes two (2) times a day.  esomeprazole (NEXIUM) 40 mg capsule Take 1 Cap by mouth Daily (before breakfast).  aspirin delayed-release 81 mg tablet Take  by mouth daily.  celecoxib (CELEBREX) 200 mg capsule Take 200 mg by mouth daily.  HYDROcodone-acetaminophen (NORCO) 5-325 mg per tablet Take 1 Tab by mouth every eight (8) hours as needed for Pain. Max Daily Amount: 3 Tabs. (Patient not taking: Reported on 12/11/2019)  sucralfate (CARAFATE) 100 mg/mL suspension Take  by mouth as needed.  CYANOCOBALAMIN, VITAMIN B-12, (VITAMIN B-12 PO) Take 1,000 mcg by mouth daily.  VIT C/E/ZN/COPPR/LUTEIN/ZEAXAN (PRESERVISION AREDS 2 PO) Take 2 capsules by mouth two (2) times a day.  co-enzyme Q-10 (CO Q-10) 100 mg capsule Take 100 mg by mouth daily.  Cholecalciferol, Vitamin D3, (VITAMIN D3) 1,000 unit cap Take 1 Cap by mouth two (2) times a day.  omega-3 fatty acids-vitamin e (FISH OIL) 1,000 mg Cap Take 1 Cap by mouth two (2) times a day.  CALCIUM CARBONATE/VITAMIN D3 (CALCIUM 600 + D,3, PO) Take 1 Cap by mouth two (2) times a day.  multivitamins-minerals-lutein (CENTRUM SILVER) Tab Take 1 Tab by mouth daily. No current facility-administered medications for this visit. FH: family history includes Cancer in her sister; Diabetes in her sister and sister; Heart Disease in her brother, brother, father, mother, sister, sister, and son; Other in her brother; Stroke in her brother. SH:  reports that she quit smoking about 65 years ago. She has never used smokeless tobacco. She reports current alcohol use. She reports that she does not use drugs. Review of Systems - History obtained from the patient General ROS: +generalized weakness. no fever, chills, fatigue, body aches Psychological ROS: no change in anxiety, depression, SI/HI Ophthalmic ROS: no blurred vision, myopia, double vision ENT ROS: no dysphagia, otalgia, otorrhea, rhinorrhea, post nasal drip Respiratory ROS: no cough, shortness of breath, or wheezing Cardiovascular ROS: no chest pain or dyspnea on exertion Gastrointestinal ROS: no abdominal pain, change in bowel habits, or black or bloody stools Genito-Urinary ROS: no frequency, urgency, incontinence, dysuria, hematuria Musculoskeletal ROS: no arthralgia, myalgia Neurological ROS: no headaches, dizziness, lightheadedness, tremors, seizures Dermatological ROS: no rash or lesions OBJECTIVE:  
Vitals:  
Visit Vitals /68 (BP 1 Location: Right arm, BP Patient Position: Sitting) Pulse 67 Temp 97.8 °F (36.6 °C) (Oral) Resp 18 Ht 5' (1.524 m) Wt 145 lb (65.8 kg) SpO2 94% BMI 28.32 kg/m² Gen: Pleasant 80 y.o.  female in NAD. HEENT: NC/AT 
HEART: RRR, No M/G/R.     
LUNGS: CTAB No W/R.   
 
ASSESSMENT/ PLAN: Diagnoses and all orders for this visit: 
 
1. History of pneumonia -     XR CHEST PA LAT; Future ICD-10-CM ICD-9-CM 1. History of pneumonia Z87.01 V12.61 XR CHEST PA LAT 1. History of Pneumonia Ordered a CXR PA/LAT for follow-up imaging to ensure pneumonia resolution. Follow-up and Dispositions · Return in about 6 weeks (around 1/31/2020) for follow up. I have reviewed the patient's medications and risks/side effects/benefits were discussed. Diagnosis(-es) explained to patient and questions answered. Literature provided where appropriate.   
 
Written by Natanael Jay, as dictated by Mandie Grullon MD.

## 2020-01-01 ENCOUNTER — OFFICE VISIT (OUTPATIENT)
Dept: CARDIOLOGY CLINIC | Age: 85
End: 2020-01-01
Payer: MEDICARE

## 2020-01-01 ENCOUNTER — ANCILLARY PROCEDURE (OUTPATIENT)
Dept: CARDIOLOGY CLINIC | Age: 85
End: 2020-01-01
Payer: MEDICARE

## 2020-01-01 ENCOUNTER — VIRTUAL VISIT (OUTPATIENT)
Dept: INTERNAL MEDICINE CLINIC | Age: 85
End: 2020-01-01
Payer: MEDICARE

## 2020-01-01 ENCOUNTER — HOSPITAL ENCOUNTER (OUTPATIENT)
Dept: PET IMAGING | Age: 85
Discharge: HOME | End: 2020-11-23
Attending: INTERNAL MEDICINE
Payer: MEDICARE

## 2020-01-01 ENCOUNTER — TRANSCRIBE ORDER (OUTPATIENT)
Dept: SCHEDULING | Age: 85
End: 2020-01-01

## 2020-01-01 ENCOUNTER — HOSPITAL ENCOUNTER (OUTPATIENT)
Dept: LAB | Age: 85
Discharge: HOME | End: 2020-10-07
Payer: MEDICARE

## 2020-01-01 ENCOUNTER — TELEPHONE (OUTPATIENT)
Dept: INTERNAL MEDICINE CLINIC | Age: 85
End: 2020-01-01

## 2020-01-01 ENCOUNTER — TELEPHONE (OUTPATIENT)
Dept: CARDIOLOGY CLINIC | Age: 85
End: 2020-01-01

## 2020-01-01 VITALS
SYSTOLIC BLOOD PRESSURE: 136 MMHG | DIASTOLIC BLOOD PRESSURE: 72 MMHG | BODY MASS INDEX: 30.53 KG/M2 | OXYGEN SATURATION: 94 % | HEIGHT: 60 IN | HEART RATE: 80 BPM | RESPIRATION RATE: 26 BRPM | WEIGHT: 155.5 LBS

## 2020-01-01 VITALS
DIASTOLIC BLOOD PRESSURE: 72 MMHG | BODY MASS INDEX: 30.43 KG/M2 | WEIGHT: 155 LBS | SYSTOLIC BLOOD PRESSURE: 136 MMHG | HEIGHT: 60 IN

## 2020-01-01 VITALS
OXYGEN SATURATION: 94 % | WEIGHT: 153 LBS | RESPIRATION RATE: 16 BRPM | BODY MASS INDEX: 26.12 KG/M2 | DIASTOLIC BLOOD PRESSURE: 80 MMHG | HEART RATE: 86 BPM | SYSTOLIC BLOOD PRESSURE: 150 MMHG | HEIGHT: 64 IN

## 2020-01-01 VITALS
BODY MASS INDEX: 26.63 KG/M2 | HEIGHT: 64 IN | SYSTOLIC BLOOD PRESSURE: 122 MMHG | DIASTOLIC BLOOD PRESSURE: 80 MMHG | WEIGHT: 156 LBS

## 2020-01-01 DIAGNOSIS — Z85.3 HISTORY OF BREAST CANCER: Primary | ICD-10-CM

## 2020-01-01 DIAGNOSIS — E55.9 VITAMIN D DEFICIENCY: ICD-10-CM

## 2020-01-01 DIAGNOSIS — E78.00 HYPERCHOLESTEREMIA: ICD-10-CM

## 2020-01-01 DIAGNOSIS — C50.111 MALIGNANT NEOPLASM OF CENTRAL PORTION OF RIGHT FEMALE BREAST (HCC): ICD-10-CM

## 2020-01-01 DIAGNOSIS — I51.7 RIGHT VENTRICULAR DILATION: ICD-10-CM

## 2020-01-01 DIAGNOSIS — I10 ESSENTIAL HYPERTENSION: ICD-10-CM

## 2020-01-01 DIAGNOSIS — I27.21 MODERATE PULMONARY ARTERIAL SYSTOLIC HYPERTENSION (HCC): ICD-10-CM

## 2020-01-01 DIAGNOSIS — I35.0 NONRHEUMATIC AORTIC VALVE STENOSIS: ICD-10-CM

## 2020-01-01 DIAGNOSIS — R06.02 SOBOE (SHORTNESS OF BREATH ON EXERTION): Primary | ICD-10-CM

## 2020-01-01 DIAGNOSIS — I48.21 PERMANENT ATRIAL FIBRILLATION (HCC): ICD-10-CM

## 2020-01-01 DIAGNOSIS — R73.09 ELEVATED HEMOGLOBIN A1C: Primary | ICD-10-CM

## 2020-01-01 DIAGNOSIS — E78.2 MIXED HYPERLIPIDEMIA: ICD-10-CM

## 2020-01-01 DIAGNOSIS — Z85.3 PERSONAL HISTORY OF MALIGNANT NEOPLASM OF BREAST: Primary | ICD-10-CM

## 2020-01-01 DIAGNOSIS — R97.8 OTHER ABNORMAL TUMOR MARKERS: ICD-10-CM

## 2020-01-01 DIAGNOSIS — R06.02 SOBOE (SHORTNESS OF BREATH ON EXERTION): ICD-10-CM

## 2020-01-01 DIAGNOSIS — Z85.3 PERSONAL HISTORY OF MALIGNANT NEOPLASM OF BREAST: ICD-10-CM

## 2020-01-01 DIAGNOSIS — Q23.1 BICUSPID AORTIC VALVE: ICD-10-CM

## 2020-01-01 DIAGNOSIS — G47.00 INSOMNIA, UNSPECIFIED TYPE: ICD-10-CM

## 2020-01-01 DIAGNOSIS — G47.00 INSOMNIA, UNSPECIFIED TYPE: Primary | ICD-10-CM

## 2020-01-01 LAB
25(OH)D3+25(OH)D2 SERPL-MCNC: 46.3 NG/ML (ref 30–100)
ALBUMIN SERPL-MCNC: 4 G/DL (ref 3.5–4.6)
ALBUMIN/GLOB SERPL: 1.3 {RATIO} (ref 1.2–2.2)
ALP SERPL-CCNC: 132 IU/L (ref 39–117)
ALT SERPL-CCNC: 17 IU/L (ref 0–32)
AST SERPL-CCNC: 23 IU/L (ref 0–40)
BASOPHILS # BLD AUTO: 0.1 X10E3/UL (ref 0–0.2)
BASOPHILS NFR BLD AUTO: 1 %
BILIRUB SERPL-MCNC: 0.6 MG/DL (ref 0–1.2)
BUN SERPL-MCNC: 17 MG/DL (ref 10–36)
BUN/CREAT SERPL: 15 (ref 12–28)
CALCIUM SERPL-MCNC: 9.7 MG/DL (ref 8.7–10.3)
CANCER AG27-29 SERPL-ACNC: 46.4 U/ML (ref 0–38.6)
CHLORIDE SERPL-SCNC: 99 MMOL/L (ref 96–106)
CHOLEST SERPL-MCNC: 127 MG/DL (ref 100–199)
CO2 SERPL-SCNC: 24 MMOL/L (ref 20–29)
CREAT SERPL-MCNC: 1.17 MG/DL (ref 0.57–1)
ECHO AO ASC DIAM: 3.05 CM
ECHO AO ROOT DIAM: 3.15 CM
ECHO AV AREA PEAK VELOCITY: 0.68 CM2
ECHO AV AREA VTI: 0.67 CM2
ECHO AV AREA/BSA PEAK VELOCITY: 0.4 CM2/M2
ECHO AV AREA/BSA VTI: 0.4 CM2/M2
ECHO AV MEAN GRADIENT: 15.68 MMHG
ECHO AV PEAK GRADIENT: 28.73 MMHG
ECHO AV PEAK VELOCITY: 268.01 CM/S
ECHO AV VTI: 66.18 CM
ECHO EST RA PRESSURE: 3 MMHG
ECHO LA VOL 2C: 99.6 ML (ref 22–52)
ECHO LA VOL 4C: 80.25 ML (ref 22–52)
ECHO LA VOLUME INDEX A2C: 59.46 ML/M2 (ref 16–28)
ECHO LA VOLUME INDEX A4C: 47.91 ML/M2 (ref 16–28)
ECHO LV INTERNAL DIMENSION DIASTOLIC: 3.89 CM (ref 3.9–5.3)
ECHO LV INTERNAL DIMENSION SYSTOLIC: 2.06 CM
ECHO LV ISOVOLUMETRIC RELAXATION TIME (IVRT): 36.97 MS
ECHO LV IVSD: 1.23 CM (ref 0.6–0.9)
ECHO LV MASS 2D: 173.8 G (ref 67–162)
ECHO LV MASS INDEX 2D: 103.8 G/M2 (ref 43–95)
ECHO LV POSTERIOR WALL DIASTOLIC: 1.32 CM (ref 0.6–0.9)
ECHO LVOT DIAM: 2.18 CM
ECHO LVOT PEAK GRADIENT: 0.94 MMHG
ECHO LVOT PEAK VELOCITY: 48.64 CM/S
ECHO LVOT SV: 44.1 ML
ECHO LVOT VTI: 11.79 CM
ECHO MV "A" WAVE DURATION: 188.54 MS
ECHO MV A VELOCITY: 42.55 CENTIMETER/SECOND
ECHO MV E DECELERATION TIME (DT): 168.81 MS
ECHO MV E VELOCITY: 148.56 CENTIMETER/SECOND
ECHO RIGHT VENTRICULAR SYSTOLIC PRESSURE (RVSP): 56.39 MMHG
ECHO TV REGURGITANT MAX VELOCITY: 365.34 CM/S
ECHO TV REGURGITANT PEAK GRADIENT: 53.39 MMHG
EOSINOPHIL # BLD AUTO: 0.4 X10E3/UL (ref 0–0.4)
EOSINOPHIL NFR BLD AUTO: 7 %
ERYTHROCYTE [DISTWIDTH] IN BLOOD BY AUTOMATED COUNT: 14.5 % (ref 11.7–15.4)
GLOBULIN SER CALC-MCNC: 3.2 G/DL (ref 1.5–4.5)
GLUCOSE BLD STRIP.AUTO-MCNC: 102 MG/DL (ref 65–100)
GLUCOSE SERPL-MCNC: 134 MG/DL (ref 65–99)
HCT VFR BLD AUTO: 42.4 % (ref 34–46.6)
HDLC SERPL-MCNC: 44 MG/DL
HGB BLD-MCNC: 13.8 G/DL (ref 11.1–15.9)
IMM GRANULOCYTES # BLD AUTO: 0 X10E3/UL (ref 0–0.1)
IMM GRANULOCYTES NFR BLD AUTO: 0 %
LDLC SERPL CALC-MCNC: 58 MG/DL (ref 0–99)
LVOT MG: 0.56 MMHG
LYMPHOCYTES # BLD AUTO: 1.6 X10E3/UL (ref 0.7–3.1)
LYMPHOCYTES NFR BLD AUTO: 27 %
MCH RBC QN AUTO: 28.5 PG (ref 26.6–33)
MCHC RBC AUTO-ENTMCNC: 32.5 G/DL (ref 31.5–35.7)
MCV RBC AUTO: 88 FL (ref 79–97)
MONOCYTES # BLD AUTO: 0.8 X10E3/UL (ref 0.1–0.9)
MONOCYTES NFR BLD AUTO: 13 %
NEUTROPHILS # BLD AUTO: 3.1 X10E3/UL (ref 1.4–7)
NEUTROPHILS NFR BLD AUTO: 52 %
PLATELET # BLD AUTO: 151 X10E3/UL (ref 150–450)
POTASSIUM SERPL-SCNC: 4.3 MMOL/L (ref 3.5–5.2)
PROT SERPL-MCNC: 7.2 G/DL (ref 6–8.5)
RBC # BLD AUTO: 4.84 X10E6/UL (ref 3.77–5.28)
SERVICE CMNT-IMP: ABNORMAL
SODIUM SERPL-SCNC: 138 MMOL/L (ref 134–144)
STRESS BASELINE DIAS BP: 80 MMHG
STRESS BASELINE HR: 71 BPM
STRESS BASELINE SYS BP: 122 MMHG
STRESS PEAK DIAS BP: 62 MMHG
STRESS PEAK SYS BP: 142 MMHG
STRESS PERCENT HR ACHIEVED: 73 %
STRESS POST PEAK HR: 93 BPM
STRESS RATE PRESSURE PRODUCT: NORMAL BPM*MMHG
STRESS ST DEPRESSION: 0 MM
STRESS ST ELEVATION: 0 MM
STRESS TARGET HR: 127 BPM
TRIGL SERPL-MCNC: 144 MG/DL (ref 0–149)
VLDLC SERPL CALC-MCNC: 25 MG/DL (ref 5–40)
WBC # BLD AUTO: 6 X10E3/UL (ref 3.4–10.8)

## 2020-01-01 PROCEDURE — A9502 TC99M TETROFOSMIN: HCPCS | Performed by: INTERNAL MEDICINE

## 2020-01-01 PROCEDURE — 86300 IMMUNOASSAY TUMOR CA 15-3: CPT

## 2020-01-01 PROCEDURE — 99214 OFFICE O/P EST MOD 30 MIN: CPT | Performed by: FAMILY MEDICINE

## 2020-01-01 PROCEDURE — G8432 DEP SCR NOT DOC, RNG: HCPCS | Performed by: INTERNAL MEDICINE

## 2020-01-01 PROCEDURE — 1090F PRES/ABSN URINE INCON ASSESS: CPT | Performed by: INTERNAL MEDICINE

## 2020-01-01 PROCEDURE — 78452 HT MUSCLE IMAGE SPECT MULT: CPT | Performed by: INTERNAL MEDICINE

## 2020-01-01 PROCEDURE — 80053 COMPREHEN METABOLIC PANEL: CPT

## 2020-01-01 PROCEDURE — 36415 COLL VENOUS BLD VENIPUNCTURE: CPT

## 2020-01-01 PROCEDURE — G0463 HOSPITAL OUTPT CLINIC VISIT: HCPCS | Performed by: INTERNAL MEDICINE

## 2020-01-01 PROCEDURE — G8417 CALC BMI ABV UP PARAM F/U: HCPCS | Performed by: INTERNAL MEDICINE

## 2020-01-01 PROCEDURE — 99214 OFFICE O/P EST MOD 30 MIN: CPT | Performed by: INTERNAL MEDICINE

## 2020-01-01 PROCEDURE — 93010 ELECTROCARDIOGRAM REPORT: CPT | Performed by: INTERNAL MEDICINE

## 2020-01-01 PROCEDURE — 93016 CV STRESS TEST SUPVJ ONLY: CPT | Performed by: INTERNAL MEDICINE

## 2020-01-01 PROCEDURE — 1101F PT FALLS ASSESS-DOCD LE1/YR: CPT | Performed by: INTERNAL MEDICINE

## 2020-01-01 PROCEDURE — G8427 DOCREV CUR MEDS BY ELIG CLIN: HCPCS | Performed by: INTERNAL MEDICINE

## 2020-01-01 PROCEDURE — 85025 COMPLETE CBC W/AUTO DIFF WBC: CPT

## 2020-01-01 PROCEDURE — 82306 VITAMIN D 25 HYDROXY: CPT

## 2020-01-01 PROCEDURE — 80061 LIPID PANEL: CPT

## 2020-01-01 PROCEDURE — A9552 F18 FDG: HCPCS

## 2020-01-01 PROCEDURE — 93017 CV STRESS TEST TRACING ONLY: CPT | Performed by: INTERNAL MEDICINE

## 2020-01-01 PROCEDURE — 93306 TTE W/DOPPLER COMPLETE: CPT | Performed by: INTERNAL MEDICINE

## 2020-01-01 PROCEDURE — 93018 CV STRESS TEST I&R ONLY: CPT | Performed by: INTERNAL MEDICINE

## 2020-01-01 PROCEDURE — G8536 NO DOC ELDER MAL SCRN: HCPCS | Performed by: INTERNAL MEDICINE

## 2020-01-01 PROCEDURE — 93005 ELECTROCARDIOGRAM TRACING: CPT | Performed by: INTERNAL MEDICINE

## 2020-01-01 RX ORDER — ESZOPICLONE 1 MG/1
1 TABLET, FILM COATED ORAL
Qty: 30 TAB | Refills: 0 | Status: SHIPPED | OUTPATIENT
Start: 2020-01-01 | End: 2021-01-01 | Stop reason: SDUPTHER

## 2020-01-01 RX ORDER — DICLOFENAC SODIUM 10 MG/G
GEL TOPICAL
COMMUNITY
Start: 2020-01-01

## 2020-01-01 RX ORDER — CARTEOLOL HYDROCHLORIDE 10 MG/ML
SOLUTION OPHTHALMIC
COMMUNITY
Start: 2020-01-01 | End: 2020-01-01

## 2020-01-01 RX ORDER — ESZOPICLONE 1 MG/1
1 TABLET, FILM COATED ORAL
Qty: 30 TAB | Refills: 0 | Status: SHIPPED | OUTPATIENT
Start: 2020-01-01 | End: 2020-01-01

## 2020-01-01 RX ORDER — ROSUVASTATIN CALCIUM 20 MG/1
TABLET, COATED ORAL
Qty: 90 TAB | Refills: 1 | Status: SHIPPED | OUTPATIENT
Start: 2020-01-01 | End: 2021-01-01

## 2020-01-01 RX ORDER — SODIUM CHLORIDE 0.9 % (FLUSH) 0.9 %
10 SYRINGE (ML) INJECTION
Status: COMPLETED | OUTPATIENT
Start: 2020-01-01 | End: 2020-01-01

## 2020-01-01 RX ORDER — CHOLECALCIFEROL (VITAMIN D3) 125 MCG
5 CAPSULE ORAL AS NEEDED
Status: ON HOLD | COMMUNITY
End: 2021-01-01

## 2020-01-01 RX ORDER — ROSUVASTATIN CALCIUM 20 MG/1
TABLET, COATED ORAL
Qty: 90 TAB | Refills: 1 | Status: SHIPPED | OUTPATIENT
Start: 2020-01-01 | End: 2020-01-01

## 2020-01-01 RX ADMIN — TETROFOSMIN 34.7 MILLICURIE: 1.38 INJECTION, POWDER, LYOPHILIZED, FOR SOLUTION INTRAVENOUS at 10:50

## 2020-01-01 RX ADMIN — Medication 10 ML: at 13:15

## 2020-01-01 RX ADMIN — REGADENOSON 0.4 MG: 0.08 INJECTION, SOLUTION INTRAVENOUS at 10:50

## 2020-01-06 DIAGNOSIS — J69.0 ASPIRATION PNEUMONIA, UNSPECIFIED ASPIRATION PNEUMONIA TYPE, UNSPECIFIED LATERALITY, UNSPECIFIED PART OF LUNG (HCC): Primary | ICD-10-CM

## 2020-01-06 DIAGNOSIS — J69.0 PNEUMONITIS DUE TO INHALATION OF FOOD OR VOMITUS (HCC): Primary | ICD-10-CM

## 2020-01-06 DIAGNOSIS — E04.1 THYROID NODULE: Primary | ICD-10-CM

## 2020-01-08 ENCOUNTER — TELEPHONE (OUTPATIENT)
Dept: INTERNAL MEDICINE CLINIC | Age: 85
End: 2020-01-08

## 2020-01-08 NOTE — TELEPHONE ENCOUNTER
----- Message from Maximus Wolfe sent at 1/8/2020  4:33 PM EST -----  Regarding: Dr. [facility] telephone  General Message/Vendor Calls    Caller's first and last name:      Reason for call: Pt requesting further explanation of her xray results       Callback required yes/no and why: yes       Best contact number(s): [phone]      Details to clarify the request:      Maximus Wolfe

## 2020-01-09 NOTE — TELEPHONE ENCOUNTER
Pt states she had x ray on 1-6-20 and has not gotten the results that she was asking for. She was called stating they had them, but gave no update on the outcome. Pt has been suffering with pneumonia since Thanksgiving. Please call as soon as possible she ask. Pt states she is not happy and wants a call back today.

## 2020-01-14 ENCOUNTER — TELEPHONE (OUTPATIENT)
Dept: INTERNAL MEDICINE CLINIC | Age: 85
End: 2020-01-14

## 2020-01-14 DIAGNOSIS — I10 ESSENTIAL HYPERTENSION: Primary | ICD-10-CM

## 2020-01-14 DIAGNOSIS — R73.09 ELEVATED HEMOGLOBIN A1C: ICD-10-CM

## 2020-01-14 NOTE — TELEPHONE ENCOUNTER
Was able to get patient an appt w/ Dr. Marco Antonio Bradley on 01/27/2020 at 10:15am.   Called, spoke with Pt. Two pt identifiers confirmed. Pt stated she's not sure she could make that appt. Pt given phone number for the office to call and see about a better appt that will work for her. Pt verbalized understanding of information discussed w/ no further questions at this time.

## 2020-01-14 NOTE — TELEPHONE ENCOUNTER
Please get this patient an appointment with the pulmonology asap. She is a 79 y/o female. recovering from a double pneumonia since early December.

## 2020-01-14 NOTE — TELEPHONE ENCOUNTER
Called, spoke with Pt. Two pt identifiers confirmed. Pt informed she would like her 6 month labs put in before her 02/24/20 appt. Pt informed will forward message to Dr. Gregory Low so labs are ready for her to take next month. Pt verbalized understanding of information discussed w/ no further questions at this time.

## 2020-01-21 NOTE — TELEPHONE ENCOUNTER
Called, spoke with Pt. Two pt identifiers confirmed. Pt informed labs are put in the system for her to take before her next appt. Pt verbalized understanding of information discussed w/ no further questions at this time.

## 2020-02-07 RX ORDER — ROSUVASTATIN CALCIUM 20 MG/1
TABLET, COATED ORAL
Qty: 90 TAB | Refills: 1 | Status: SHIPPED | OUTPATIENT
Start: 2020-02-07 | End: 2020-01-01

## 2020-02-12 ENCOUNTER — HOSPITAL ENCOUNTER (OUTPATIENT)
Dept: LAB | Age: 85
Discharge: HOME | End: 2020-02-12
Payer: MEDICARE

## 2020-02-12 PROCEDURE — 83036 HEMOGLOBIN GLYCOSYLATED A1C: CPT

## 2020-02-12 PROCEDURE — 85025 COMPLETE CBC W/AUTO DIFF WBC: CPT

## 2020-02-12 PROCEDURE — 36415 COLL VENOUS BLD VENIPUNCTURE: CPT

## 2020-02-12 PROCEDURE — 80053 COMPREHEN METABOLIC PANEL: CPT

## 2020-02-13 LAB
ALBUMIN SERPL-MCNC: 4.3 G/DL (ref 3.5–4.6)
ALBUMIN/GLOB SERPL: 1.7 {RATIO} (ref 1.2–2.2)
ALP SERPL-CCNC: 80 IU/L (ref 39–117)
ALT SERPL-CCNC: 20 IU/L (ref 0–32)
AST SERPL-CCNC: 25 IU/L (ref 0–40)
BASOPHILS # BLD AUTO: 0.1 X10E3/UL (ref 0–0.2)
BASOPHILS NFR BLD AUTO: 1 %
BILIRUB SERPL-MCNC: 0.6 MG/DL (ref 0–1.2)
BUN SERPL-MCNC: 14 MG/DL (ref 10–36)
BUN/CREAT SERPL: 14 (ref 12–28)
CALCIUM SERPL-MCNC: 9.5 MG/DL (ref 8.7–10.3)
CHLORIDE SERPL-SCNC: 103 MMOL/L (ref 96–106)
CO2 SERPL-SCNC: 23 MMOL/L (ref 20–29)
CREAT SERPL-MCNC: 1 MG/DL (ref 0.57–1)
EOSINOPHIL # BLD AUTO: 0.2 X10E3/UL (ref 0–0.4)
EOSINOPHIL NFR BLD AUTO: 5 %
ERYTHROCYTE [DISTWIDTH] IN BLOOD BY AUTOMATED COUNT: 14.5 % (ref 11.7–15.4)
EST. AVERAGE GLUCOSE BLD GHB EST-MCNC: 126 MG/DL
GLOBULIN SER CALC-MCNC: 2.5 G/DL (ref 1.5–4.5)
GLUCOSE SERPL-MCNC: 114 MG/DL (ref 65–99)
HBA1C MFR BLD: 6 % (ref 4.8–5.6)
HCT VFR BLD AUTO: 42.6 % (ref 34–46.6)
HGB BLD-MCNC: 14.4 G/DL (ref 11.1–15.9)
IMM GRANULOCYTES # BLD AUTO: 0 X10E3/UL (ref 0–0.1)
IMM GRANULOCYTES NFR BLD AUTO: 0 %
LYMPHOCYTES # BLD AUTO: 1.8 X10E3/UL (ref 0.7–3.1)
LYMPHOCYTES NFR BLD AUTO: 38 %
MCH RBC QN AUTO: 28.8 PG (ref 26.6–33)
MCHC RBC AUTO-ENTMCNC: 33.8 G/DL (ref 31.5–35.7)
MCV RBC AUTO: 85 FL (ref 79–97)
MONOCYTES # BLD AUTO: 0.6 X10E3/UL (ref 0.1–0.9)
MONOCYTES NFR BLD AUTO: 12 %
NEUTROPHILS # BLD AUTO: 2.1 X10E3/UL (ref 1.4–7)
NEUTROPHILS NFR BLD AUTO: 44 %
PLATELET # BLD AUTO: 141 X10E3/UL (ref 150–450)
POTASSIUM SERPL-SCNC: 4.4 MMOL/L (ref 3.5–5.2)
PROT SERPL-MCNC: 6.8 G/DL (ref 6–8.5)
RBC # BLD AUTO: 5 X10E6/UL (ref 3.77–5.28)
SODIUM SERPL-SCNC: 144 MMOL/L (ref 134–144)
WBC # BLD AUTO: 4.8 X10E3/UL (ref 3.4–10.8)

## 2020-02-17 ENCOUNTER — HOSPITAL ENCOUNTER (OUTPATIENT)
Dept: CT IMAGING | Age: 85
Discharge: HOME | End: 2020-02-17
Attending: INTERNAL MEDICINE
Payer: MEDICARE

## 2020-02-17 DIAGNOSIS — J18.9 PNEUMONIA: ICD-10-CM

## 2020-02-17 PROCEDURE — 71250 CT THORAX DX C-: CPT

## 2020-02-18 RX ORDER — NEBIVOLOL 2.5 MG/1
TABLET ORAL
Qty: 90 TAB | Refills: 3 | Status: SHIPPED | OUTPATIENT
Start: 2020-02-18 | End: 2021-01-01

## 2020-02-24 ENCOUNTER — OFFICE VISIT (OUTPATIENT)
Dept: INTERNAL MEDICINE CLINIC | Age: 85
End: 2020-02-24

## 2020-02-24 VITALS
SYSTOLIC BLOOD PRESSURE: 160 MMHG | HEIGHT: 60 IN | RESPIRATION RATE: 16 BRPM | OXYGEN SATURATION: 98 % | HEART RATE: 53 BPM | DIASTOLIC BLOOD PRESSURE: 72 MMHG | BODY MASS INDEX: 28.47 KG/M2 | WEIGHT: 145 LBS | TEMPERATURE: 97.5 F

## 2020-02-24 DIAGNOSIS — I10 ESSENTIAL HYPERTENSION: Primary | ICD-10-CM

## 2020-02-24 DIAGNOSIS — Z71.2 ENCOUNTER TO DISCUSS TEST RESULTS: ICD-10-CM

## 2020-02-24 NOTE — PROGRESS NOTES
Identified pt with two pt identifiers(name and ). Reviewed record in preparation for visit and have obtained necessary documentation. Chief Complaint Patient presents with  Hypertension f/u Visit Vitals /72 (BP 1 Location: Left arm, BP Patient Position: Sitting) Pulse (!) 53 Temp 97.5 °F (36.4 °C) (Oral) Resp 16 Ht 5' (1.524 m) Wt 145 lb (65.8 kg) SpO2 98% BMI 28.32 kg/m² Health Maintenance Due Topic  Shingrix Vaccine Age 50> (1 of 2)  Medicare Yearly Exam   
 Influenza Age 5 to Adult  GLAUCOMA SCREENING Q2Y Med Reconciliation: Completed Coordination of Care Questionnaire: 
:  
1) Have you been to an emergency room, urgent care, or hospitalized since your last visit? If yes, where when, and reason for visit? No 
  
 
2. Have seen or consulted any other health care provider since your last visit? If yes, where when, and reason for visit? No 
  
 
3) Do you have an Advanced Directive/ Living Will in place? No 
If yes, do we have a copy on file If no, would you like information Patient is accompanied by self I have received verbal consent from [place] to discuss any/all medical information while they are present in the room.

## 2020-02-24 NOTE — PROGRESS NOTES
SUBJECTIVE:  
Ms. Merary Bagley is a 80 y.o. female who is here for follow up of routine medical issues. Pt is ambulatory with a cane. HTN: Pt's BP in the office today was elevated at 160/72. Pt is compliant in taking nebivolol 2.5 mg tablet every day, HCTZ 12.5 mg capsule every day, Eliquis 5 mg tablet BID, and ASA 81 every day. Patient denies chest pain, ALLAN/SOB, edema, headache, visual changes, dizziness, palpitations or syncope. She does not exercise regularly. Pt reports that her macular degeneration is very bothersome. She has some techniques for managing it, but has noticed continual worsening. We reviewed her labs from 2/12/2020 and her Chest CT from 2/17/2020. At this time, she is otherwise doing well and has brought no other complaints to my attention today. For a list of the medical issues addressed today, see the assessment and plan below. PMH:  
Past Medical History:  
Diagnosis Date  Arthritis  Cancer ([facility] [address] breast    (leg,ear,arm-skin cancer)  GERD (gastroesophageal reflux disease)  Hypertension  Other ill-defined conditions(799.89)   
 glaucoma  Other ill-defined conditions(799.89)   
 cellulitis left arm  Other ill-defined conditions(799.89)   
 carpal tunnel syndrome left hand  Other ill-defined conditions(799.89)   
 vertigo PSH:  has a past surgical history that includes hx mastectomy; hx efren and bso; hx cataract removal; hx tonsillectomy; hx knee arthroscopy; hx appendectomy; hx other surgical; hx colonoscopy (4/2012); hx other surgical; hx carpal tunnel release; cardiac catheterization (1/10/2013); and vascular surgery procedure unlist. 
 
All: is allergic to augmentin [amoxicillin-pot clavulanate]; doxycycline; keflex [cephalexin]; monodox [doxycycline monohydrate]; and sulfa (sulfonamide antibiotics). MEDS:  
Current Outpatient Medications Medication Sig  
 nebivolol (BYSTOLIC) 2.5 mg tablet TAKE 1 TABLET BY MOUTH EVERY DAY  
  rosuvastatin (CRESTOR) 20 mg tablet TAKE 1 TABLET NIGHTLY  hydroCHLOROthiazide (MICROZIDE) 12.5 mg capsule TAKE 1 CAPSULE BY MOUTH EVERY DAY  ELIQUIS 5 mg tablet TAKE 1 TABLET EVERY 12     HOURS TO PREVENT STROKE IN ATRIAL FIBRILLATION  
 sucralfate (CARAFATE) 100 mg/mL suspension Take  by mouth as needed.  timolol (TIMOPTIC-XE) 0.5 % ophthalmic gel-forming Administer 1 Drop to both eyes daily.  CYANOCOBALAMIN, VITAMIN B-12, (VITAMIN B-12 PO) Take 1,000 mcg by mouth daily.  latanoprost (XALATAN) 0.005 % ophthalmic solution Administer 1 Drop to both eyes nightly.  cycloSPORINE (RESTASIS) 0.05 % ophthalmic emulsion Administer 1 Drop to both eyes two (2) times a day.  VIT C/E/ZN/COPPR/LUTEIN/ZEAXAN (PRESERVISION AREDS 2 PO) Take 2 capsules by mouth two (2) times a day.  esomeprazole (NEXIUM) 40 mg capsule Take 1 Cap by mouth Daily (before breakfast).  co-enzyme Q-10 (CO Q-10) 100 mg capsule Take 100 mg by mouth daily.  Cholecalciferol, Vitamin D3, (VITAMIN D3) 1,000 unit cap Take 1 Cap by mouth two (2) times a day.  omega-3 fatty acids-vitamin e (FISH OIL) 1,000 mg Cap Take 1 Cap by mouth two (2) times a day.  CALCIUM CARBONATE/VITAMIN D3 (CALCIUM 600 + D,3, PO) Take 1 Cap by mouth two (2) times a day.  multivitamins-minerals-lutein (CENTRUM SILVER) Tab Take 1 Tab by mouth daily.  albuterol (PROVENTIL HFA, VENTOLIN HFA, PROAIR HFA) 90 mcg/actuation inhaler Take 2 Puffs by inhalation every four (4) hours as needed for Wheezing. (Patient not taking: Reported on 2/24/2020)  aspirin delayed-release 81 mg tablet Take  by mouth daily.  celecoxib (CELEBREX) 200 mg capsule Take 200 mg by mouth daily.  HYDROcodone-acetaminophen (NORCO) 5-325 mg per tablet Take 1 Tab by mouth every eight (8) hours as needed for Pain. Max Daily Amount: 3 Tabs. (Patient not taking: Reported on 12/11/2019) No current facility-administered medications for this visit. FH: family history includes Cancer in her sister; Diabetes in her sister and sister; Heart Disease in her brother, brother, father, mother, sister, sister, and son; Other in her brother; Stroke in her brother. SH:  reports that she quit smoking about 65 years ago. She has never used smokeless tobacco. She reports current alcohol use. She reports that she does not use drugs. Review of Systems - History obtained from the patient General ROS: no fever, chills, fatigue, body aches Psychological ROS: no change in anxiety, depression, SI/HI Ophthalmic ROS: no blurred vision, myopia, double vision ENT ROS: no dysphagia, otalgia, otorrhea, rhinorrhea, post nasal drip Respiratory ROS: no cough, shortness of breath, or wheezing Cardiovascular ROS: no chest pain or dyspnea on exertion Gastrointestinal ROS: no abdominal pain, change in bowel habits, or black or bloody stools Genito-Urinary ROS: no frequency, urgency, incontinence, dysuria, hematuria Musculoskeletal ROS: no arthralgia, myalgia Neurological ROS: no headaches, dizziness, lightheadedness, tremors, seizures Dermatological ROS: no rash or lesions OBJECTIVE:  
Vitals:  
Visit Vitals /72 (BP 1 Location: Left arm, BP Patient Position: Sitting) Pulse (!) 53 Temp 97.5 °F (36.4 °C) (Oral) Resp 16 Ht 5' (1.524 m) Wt 145 lb (65.8 kg) SpO2 98% BMI 28.32 kg/m² Gen: Pleasant 80 y.o.  female in NAD. HEENT: NC/AT 
HEART: RRR, No M/G/R.     
LUNGS: CTAB No W/R.   
 
ASSESSMENT/ PLAN: Diagnoses and all orders for this visit: 1. Essential hypertension 2. Encounter to discuss test results ICD-10-CM ICD-9-CM 1. Essential hypertension I10 401.9 2. Encounter to discuss test results Z71.2 V65.49 1. Essential Hypertension BP seems to be well controlled.  I recommended continuing current dose of nebivolol 2.5 mg tablet every day, HCTZ 12.5 mg capsule every day, Eliquis 5 mg tablet BID, and ASA 81 every day, eating a low sodium diet, and increasing exercise. Recheck CMP. 2. Encounter to discuss test results Reviewed and discussed recent lab results and chest CT. I have reviewed the patient's medications and risks/side effects/benefits were discussed. Diagnosis(-es) explained to patient and questions answered. Literature provided where appropriate.   
 
Written by Kali Kidd, as dictated by Lora Jimenez MD.

## 2020-02-26 ENCOUNTER — TELEPHONE (OUTPATIENT)
Dept: ENDOCRINOLOGY | Age: 85
End: 2020-02-26

## 2020-02-26 NOTE — TELEPHONE ENCOUNTER
Called pt and I explained the US is a better medium for visualizing the thyroid and seeing nodules that CT can miss. I also explained that with US there is no radiation used.     Pt voices understanding

## 2020-02-26 NOTE — TELEPHONE ENCOUNTER
Patient states that she had a CT scan of her chest on 2/17/2020. She was given a copy of the CT scan by her pulmonologist. She states that there is a notation of her thyroid having no nodules. She wonders why she is to have an ultrasound of her thyroid scheduled in March. \"Why should I subject myself to more radiation\".

## 2020-02-26 NOTE — TELEPHONE ENCOUNTER
Results reviewed at appointment. Repeat CBC in 3 months to check platelets and next follow up visit in 6 months. Platelet levels are slightly low. This is not a significant decrease in the level.  Repeat will most likely be normal.

## 2020-03-12 ENCOUNTER — TELEPHONE (OUTPATIENT)
Dept: INTERNAL MEDICINE CLINIC | Age: 85
End: 2020-03-12

## 2020-03-12 NOTE — TELEPHONE ENCOUNTER
Patient states she needs a call back to discuss her recent test/lab results & also to get a copy mailed to her. Please call.  Thank you

## 2020-04-13 RX ORDER — APIXABAN 5 MG/1
TABLET, FILM COATED ORAL
Qty: 180 TAB | Refills: 2 | Status: SHIPPED | OUTPATIENT
Start: 2020-04-13 | End: 2021-01-01

## 2020-05-08 ENCOUNTER — TELEPHONE (OUTPATIENT)
Dept: INTERNAL MEDICINE CLINIC | Age: 85
End: 2020-05-08

## 2020-05-08 DIAGNOSIS — Z97.4 WEARS HEARING AID: Primary | ICD-10-CM

## 2020-05-08 NOTE — TELEPHONE ENCOUNTER
Patient states she needs a call back in reference to getting a Hearing Aid Test to be done thru [place]. Please call to discuss or advise when done.  Thank you

## 2020-05-11 NOTE — TELEPHONE ENCOUNTER
Address: [address], [place], [place] [address]   Hours:   Open ? Closes 4:30PM [place] [facility] and order faxed to [phone].  For hearing test.                                    Phone: [phone]

## 2020-08-12 NOTE — PROGRESS NOTES
Akil Ramos is a 80 y.o. female who was seen by synchronous (real-time) audio-video technology on 8/12/2020 for Hypertension (f/u ) and Sleep Problem Ms. Stephy Fontenot reports that she is doing well except for problems sleeping. She has been using Tylenol reports this is no longer working to improve her sleep. She did check her blood pressure this morning and it was in a good range. She denies any chest pains or shortness of breath. She is followed by her cardiologist Dr. Yaima Larry. She reports that she is seeing her ophthalmologist retinal specialist specialist every 2 months. She gets injections in her right eye. She has lost vision in the left eye. She has been social distancing and her only contact is a niece who was staying with her but is currently out of town. She will return in mid September. She has friends that can bring her medications where she is using a delivery service for essentials. Assessment & Plan:  
Diagnoses and all orders for this visit: 
 
1. History of breast cancer -     CA 27.29 
 
2. Essential hypertension -     METABOLIC PANEL, COMPREHENSIVE 
-     CBC WITH AUTOMATED DIFF 
 
3. Insomnia, unspecified type 
-     eszopiclone (LUNESTA) 1 mg tablet; Take 1 Tab by mouth nightly. Max Daily Amount: 1 mg. 
 
4. Vitamin D deficiency 
-     VITAMIN D, 25 HYDROXY; Future 5. Hypercholesteremia -     LIPID PANEL This is [facility] has a history of breast cancer and was followed by Dr. Poornima Emanuel in the past.  Labs were ordered to check a CA 27.29. She is also overdue for labs related to hypertensive medications. She has a history of hyperlipidemia and vitamin D deficiency so a level for the vitamin D and lipids were ordered today. Prescription for Lunesta was sent over due to patient's complaint of insomnia. She was asked to try melatonin first prior to starting the Lunesta. She will start using the Lunesta if she gets no relief from the melatonin. Ms. Paz yeung will return in mid September. She will then have a ride to the lab and to the office for her flu shot. Subjective:  
 
 
Prior to Admission medications Medication Sig Start Date End Date Taking? Authorizing Provider  
eszopiclone (LUNESTA) 1 mg tablet Take 1 Tab by mouth nightly. Max Daily Amount: 1 mg. 8/12/20  Yes Florance Aschoff, MD  
rosuvastatin (CRESTOR) 20 mg tablet TAKE 1 TABLET NIGHTLY 6/29/20  Yes Florance Aschoff, MD  
Eliquis 5 mg tablet TAKE 1 TABLET EVERY 12     HOURS TO PREVENT STROKE IN ATRIAL FIBRILLATION 4/13/20  Yes Rafael Grey NP  
nebivolol (BYSTOLIC) 2.5 mg tablet TAKE 1 TABLET BY MOUTH EVERY DAY 2/18/20  Yes Poonam Kelley MD  
hydroCHLOROthiazide (MICROZIDE) 12.5 mg capsule TAKE 1 CAPSULE BY MOUTH EVERY DAY 8/22/19  Yes Margie Crockett NP  
sucralfate (CARAFATE) 100 mg/mL suspension Take  by mouth as needed. Yes Provider, Historical  
timolol (TIMOPTIC-XE) 0.5 % ophthalmic gel-forming Administer 1 Drop to both eyes daily. Yes Provider, Historical  
CYANOCOBALAMIN, VITAMIN B-12, (VITAMIN B-12 PO) Take 1,000 mcg by mouth daily. Yes Provider, Historical  
latanoprost (XALATAN) 0.005 % ophthalmic solution Administer 1 Drop to both eyes nightly. 6/8/15  Yes Provider, Historical  
cycloSPORINE (RESTASIS) 0.05 % ophthalmic emulsion Administer 1 Drop to both eyes two (2) times a day. Yes Provider, Historical  
VIT C/E/ZN/COPPR/LUTEIN/ZEAXAN (PRESERVISION AREDS 2 PO) Take 2 capsules by mouth two (2) times a day. Yes Other, MD Mercedes  
esomeprazole (NEXIUM) 40 mg capsule Take 1 Cap by mouth Daily (before breakfast). 8/6/14  Yes Catina Mckinney MD  
co-enzyme Q-10 (CO Q-10) 100 mg capsule Take 100 mg by mouth daily. Yes Provider, Historical  
Cholecalciferol, Vitamin D3, (VITAMIN D3) 1,000 unit cap Take 1 Cap by mouth two (2) times a day.    Yes Provider, Historical  
omega-3 fatty acids-vitamin e (FISH OIL) 1,000 mg Cap Take 1 Cap by mouth two (2) times a day. Yes Provider, Historical  
CALCIUM CARBONATE/VITAMIN D3 (CALCIUM 600 + D,3, PO) Take 1 Cap by mouth two (2) times a day. Yes Provider, Historical  
multivitamins-minerals-lutein (CENTRUM SILVER) Tab Take 1 Tab by mouth daily. Yes Provider, Historical  
albuterol (PROVENTIL HFA, VENTOLIN HFA, PROAIR HFA) 90 mcg/actuation inhaler Take 2 Puffs by inhalation every four (4) hours as needed for Wheezing. Patient not taking: Reported on 2/24/2020 12/12/19   Leyda Lai MD  
aspirin delayed-release 81 mg tablet Take  by mouth daily. Provider, Historical  
celecoxib (CELEBREX) 200 mg capsule Take 200 mg by mouth daily. Provider, Historical  
HYDROcodone-acetaminophen (NORCO) 5-325 mg per tablet Take 1 Tab by mouth every eight (8) hours as needed for Pain. Max Daily Amount: 3 Tabs. Patient not taking: Reported on 12/11/2019 11/5/18   Luana Ponce MD  
 
 
 
Review of Systems Constitutional: Negative. HENT: Negative. Eyes: Positive for blurred vision. Respiratory: Negative. Cardiovascular: Negative. Gastrointestinal: Negative. Genitourinary: Negative. Musculoskeletal: Negative. Skin: Negative. Neurological: Negative. Psychiatric/Behavioral: The patient has insomnia. Objective:  
 
Patient-Reported Vitals 8/12/2020 Patient-Reported Weight 151lbs Patient-Reported Height 5' Patient-Reported Pulse 61 Patient-Reported Temperature 97.7 Patient-Reported Systolic  616 Patient-Reported Diastolic 66  
  
 
[INSTRUCTIONS:  \"[x]\" Indicates a positive item  \"[]\" Indicates a negative item  -- DELETE ALL ITEMS NOT EXAMINED] Constitutional: [x] Appears well-developed and well-nourished [x] No apparent distress   
  [] Abnormal - Mental status: [x] Alert and awake  [x] Oriented to person/place/time [x] Able to follow commands   
[] Abnormal - Eyes:   EOM    [x]  Normal    [] Abnormal -  
Sclera  [x]  Normal    [] Abnormal - 
 Discharge [x]  None visible   [] Abnormal - HENT: [x] Normocephalic, atraumatic  [] Abnormal -  
[x] Mouth/Throat: Mucous membranes are moist 
 
External Ears [x] Normal  [] Abnormal - Neck: [x] No visualized mass [] Abnormal - Pulmonary/Chest: [x] Respiratory effort normal   [x] No visualized signs of difficulty breathing or respiratory distress 
      [] Abnormal - Musculoskeletal:   [x] Normal gait with no signs of ataxia [x] Normal range of motion of neck [] Abnormal -  
 
Neurological:        [x] No Facial Asymmetry (Cranial nerve 7 motor function) (limited exam due to video visit) [x] No gaze palsy  
     [] Abnormal -   
     
Skin:        [x] No significant exanthematous lesions or discoloration noted on facial skin   
     [] Abnormal - Psychiatric:       [x] Normal Affect [] Abnormal -  
     [x] No Hallucinations Other pertinent observable physical exam findings:- 
 
 
 
We discussed the expected course, resolution and complications of the diagnosis(es) in detail. Medication risks, benefits, costs, interactions, and alternatives were discussed as indicated. I advised her to contact the office if her condition worsens, changes or fails to improve as anticipated. She expressed understanding with the diagnosis(es) and plan. Saima Bright, who was evaluated through a patient-initiated, synchronous (real-time) audio-video encounter, and/or her healthcare decision maker, is aware that it is a billable service, with coverage as determined by her insurance carrier. She provided verbal consent to proceed: Yes, and patient identification was verified. It was conducted pursuant to the emergency declaration under the [address] [place] [address] and the [employer] General Act. A caregiver was present when appropriate. Ability to conduct physical exam was limited. I was at home. The patient was at home.  
 
 
Yvrose Ruff MD

## 2020-08-12 NOTE — PROGRESS NOTES
Identified pt with two pt identifiers(name and ). Reviewed record in preparation for visit and have obtained necessary documentation. Chief Complaint Patient presents with  Hypertension f/u  Sleep Problem Patient-Reported Vitals 2020 Patient-Reported Weight 151lbs Patient-Reported Height 5' Patient-Reported Pulse 61 Patient-Reported Temperature 97.7 Patient-Reported Systolic  639 Patient-Reported Diastolic 66 Health Maintenance Due Topic  Shingrix Vaccine Age 50> (1 of 2)  Medicare Yearly Exam   
 GLAUCOMA SCREENING Q2Y   
 Lipid Screen  Influenza Age 5 to Adult Med Reconciliation: Completed Coordination of Care Questionnaire: 
:  
1) Have you been to an emergency room, urgent care, or hospitalized since your last visit? If yes, where when, and reason for visit? No 
  
 
2. Have seen or consulted any other health care provider since your last visit? If yes, where when, and reason for visit? No 
  
 
3) Do you have an Advanced Directive/ Living Will in place? No 
If yes, do we have a copy on file If no, would you like information This is an established visit conducted via telemedicine. The patient has been instructed that this meets HIPAA criteria and acknowledges and agrees to this method of visitation. [address] 
11:01 AM

## 2020-10-14 NOTE — TELEPHONE ENCOUNTER
Called Dr Elizabeth Cabral office. Faxed labs, notes and dem to office. Dr French Rodriguez will call patient . Dr Elizabeth Cabral office will call the patient to make appt.

## 2020-10-14 NOTE — TELEPHONE ENCOUNTER
Lab results were reviewed with the patient. Dr. Marylou Cee office was contacted and records sent today. The will contact this patient about an appointment tomorrow. She is requesting the lab results also be forwarded to her dermatologist. The office number is [phone]. She has that appointment in 10/27/20. Please see printed lab order for her A1c. Please mail this to the patient along with a copy of her lab results.

## 2020-10-15 PROBLEM — I35.0 NONRHEUMATIC AORTIC VALVE STENOSIS: Status: ACTIVE | Noted: 2020-01-01

## 2020-10-15 PROBLEM — E78.2 MIXED HYPERLIPIDEMIA: Status: ACTIVE | Noted: 2020-01-01

## 2020-10-15 NOTE — PROGRESS NOTES
Jen Cerna, [facility] Subjective/HPI:  
 
Ms. Wandy Castanon is a 80 y.o. female is here for routine follow-up. She has a PMHx of PAF, aortic stenosis, HTN, HLD, and hx of breast cancer s/p bilateral mastectomy. She complains of shortness of breath symptoms with exertion. Symptoms started after being diagnosed with PNA in 12/2019. She did not require hospitalization for this, was treated with antibiotics. She notes symptom occur with exertion such as walking short distances or doing household chores. They are relieved with rest.  She has no associated chest pain symptoms, dizziness, palpitation symptoms. She has no lower extremity edema. She has no orthopnea or PND. She saw pulmonary earlier in the year, shortly after PNA diagnosis, and has since been cleared from them. She recently had labwork done by PCP, was noted to have elevated CA 27.29, and has an appointment with her oncologist for further workup. She does admit to some weight loss since her PNA diagnosis, down about 10 lbs, but has picked up about 3-4 lbs in the past month due to increased appetite. PCP Provider Scarlet Moreira MD 
 
Patient Active Problem List  
Diagnosis Code  Glaucoma H40.9  Vitamin D deficiency E55.9  Hypercholesteremia E78.00  Arthritis M19.90  
 History  of basal cell carcinoma  History of breast cancer Z85.3  Vertigo R42  Wears hearing aid Z97.4  
 HTN (hypertension) I10  
 PVC's (premature ventricular contractions) I49.3  Colitis K52.9  First degree AV block I44.0  Paroxysmal ventricular tachycardia (HCC) I47.2  Shortness of breath R06.02  
 PAD (peripheral artery disease) (HCC) I73.9  Atherosclerosis of native arteries of the extremities with intermittent claudication I70.219  
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  
 melatonin 5 mg tablet Take 5 mg by mouth as needed.  hydroCHLOROthiazide (MICROZIDE) 12.5 mg capsule TAKE 1 CAPSULE BY MOUTH EVERY DAY  rosuvastatin (CRESTOR) 20 mg tablet TAKE 1 TABLET NIGHTLY  Eliquis 5 mg tablet TAKE 1 TABLET EVERY 12     HOURS TO PREVENT STROKE IN ATRIAL FIBRILLATION  
 nebivolol (BYSTOLIC) 2.5 mg tablet TAKE 1 TABLET BY MOUTH EVERY DAY  sucralfate (CARAFATE) 100 mg/mL suspension Take  by mouth as needed.  timolol (TIMOPTIC-XE) 0.5 % ophthalmic gel-forming Administer 1 Drop to both eyes daily.  CYANOCOBALAMIN, VITAMIN B-12, (VITAMIN B-12 PO) Take 1,000 mcg by mouth daily.  latanoprost (XALATAN) 0.005 % ophthalmic solution Administer 1 Drop to both eyes nightly.  cycloSPORINE (RESTASIS) 0.05 % ophthalmic emulsion Administer 1 Drop to both eyes two (2) times a day.  VIT C/E/ZN/COPPR/LUTEIN/ZEAXAN (PRESERVISION AREDS 2 PO) Take 2 capsules by mouth two (2) times a day.  esomeprazole (NEXIUM) 40 mg capsule Take 1 Cap by mouth Daily (before breakfast).  co-enzyme Q-10 (CO Q-10) 100 mg capsule Take 100 mg by mouth daily.  Cholecalciferol, Vitamin D3, (VITAMIN D3) 1,000 unit cap Take 1 Cap by mouth two (2) times a day.  omega-3 fatty acids-vitamin e (FISH OIL) 1,000 mg Cap Take 1 Cap by mouth two (2) times a day.  CALCIUM CARBONATE/VITAMIN D3 (CALCIUM 600 + D,3, PO) Take 1 Cap by mouth two (2) times a day.  multivitamins-minerals-lutein (CENTRUM SILVER) Tab Take 1 Tab by mouth daily. No current facility-administered medications for this visit. Allergies Allergen Reactions  Augmentin [Amoxicillin-Pot Clavulanate] Hives and Itching  Doxycycline Hives and Itching  Keflex [Cephalexin] Hives and Itching  Monodox [Doxycycline Monohydrate] Other (comments)  Sulfa (Sulfonamide Antibiotics) Rash I have reviewed the problem list, allergy list, medical history, family, social history and medications. Review of Symptoms: 
 
Review of Systems Constitutional: Negative for chills, fever and weight loss. HENT: Negative for nosebleeds. Eyes: Negative for blurred vision and double vision. Respiratory: Positive for shortness of breath. Negative for cough and wheezing. Cardiovascular: Negative for chest pain, palpitations, orthopnea, leg swelling and PND. Gastrointestinal: Negative for abdominal pain, blood in stool, diarrhea, nausea and vomiting. Musculoskeletal: Negative for joint pain. Skin: Negative for rash. Neurological: Negative for dizziness, tingling and loss of consciousness. Endo/Heme/Allergies: Does not bruise/bleed easily. Physical Exam:   
 
General: Well developed, in no acute distress, cooperative and alert HEENT: No carotid bruits, no JVD, trach is midline. Neck Supple, PEERL, EOM intact. Heart:  reg rate and rhythm; normal S1/S2; no murmurs, no gallops or rubs. Respiratory: Clear bilaterally x 4, no wheezing or rales Abdomen:   Soft, non-tender, no distention, no masses. + BS. Extremities:  Normal cap refill, no cyanosis, atraumatic. No edema. Neuro: A&Ox3, speech clear, gait stable. Skin: Skin color is normal. No rashes or lesions. Non diaphoretic Vascular: 2+ pulses symmetric in all extremities Vitals:  
 10/15/20 1110 10/15/20 1127 BP: 124/72 136/72 Pulse: 80 Resp: 26 SpO2: 94% Weight: 155 lb 8 oz (70.5 kg) Height: 5' (1.524 m) ECG: sinus rhythm Cardiology Labs: 
 
Lab Results Component Value Date/Time Cholesterol, total 127 10/07/2020 09:43 AM  
 HDL Cholesterol 44 10/07/2020 09:43 AM  
 LDL, calculated 55 07/08/2019 09:33 AM  
 LDL, calculated 50 12/19/2018 01:01 PM  
 LDL, calculated 60 05/09/2018 09:19 AM  
 LDL Chol Calc ([facility]) 58 10/07/2020 09:43 AM  
 VLDL, calculated 31 07/08/2019 09:33 AM  
 VLDL Cholesterol Ricky 25 10/07/2020 09:43 AM  
 
 
Lab Results Component Value Date/Time Hemoglobin A1c 6.0 (H) 02/12/2020 09:27 AM  
 Hemoglobin A1c (POC) 5.5 04/16/2013 03:58 PM  
 
 
Lab Results Component Value Date/Time Sodium 138 10/07/2020 09:43 AM  
 Potassium 4.3 10/07/2020 09:43 AM  
 Chloride 99 10/07/2020 09:43 AM  
 CO2 24 10/07/2020 09:43 AM  
 Glucose 134 (H) 10/07/2020 09:43 AM  
 BUN 17 10/07/2020 09:43 AM  
 Creatinine 1.17 (H) 10/07/2020 09:43 AM  
 BUN/Creatinine ratio 15 10/07/2020 09:43 AM  
 GFR est AA 46 (L) 10/07/2020 09:43 AM  
 GFR est non-AA 40 (L) 10/07/2020 09:43 AM  
 Calcium 9.7 10/07/2020 09:43 AM  
 Anion gap 7 12/12/2019 12:44 PM  
 Bilirubin, total 0.6 10/07/2020 09:43 AM  
 ALT (SGPT) 17 10/07/2020 09:43 AM  
 Alk. phosphatase 132 (H) 10/07/2020 09:43 AM  
 Protein, total 7.2 10/07/2020 09:43 AM  
 Albumin 4.0 10/07/2020 09:43 AM  
 Globulin 4.3 (H) 12/12/2019 12:44 PM  
 A-G Ratio 1.3 10/07/2020 09:43 AM  
 
 
Orders Placed This Encounter  AMB POC EKG ROUTINE W/ 12 LEADS, INTER & REP Order Specific Question:   Reason for Exam: Answer:   routine  DISCONTD: carteoloL (OCUPRESS) 1 % ophthalmic solution Sig: INSTILL 1 DROP IN BOTH EYES FIRST THING EVERY MORNING  melatonin 5 mg tablet Sig: Take 5 mg by mouth as needed. Assessment: 
 
 Assessment: ICD-10-CM ICD-9-CM 1. SOBOE (shortness of breath on exertion)  R06.02 786.05 NUCLEAR CARDIAC STRESS TEST  
   ECHO ADULT COMPLETE 2. Permanent atrial fibrillation (HCC)  I48.21 427.31   
3.  Nonrheumatic aortic valve stenosis  I35.0 424.1 ECHO ADULT COMPLETE  
 4. Mixed hyperlipidemia  E78.2 272.2 AMB POC EKG ROUTINE W/ 12 LEADS, INTER & REP 5. Essential hypertension  I10 401.9 Plan: SOBOE (shortness of breath on exertion) Persistent symptoms since 12/2019 after diagnosis of PNA Will evaluate with echocardiogram and lexiscan stress test 
CA 27.29 was elevated -- 46.4; has an appointment with her oncologist soon (has prev hx of breast cancer) Non-contrast Chest CT in 2/2020 unremarkable Permanent atrial fibrillation ([address] Rates controlled Continue BB therapy Continue Eliquis for stroke prevention Nonrheumatic aortic valve stenosis Echo done 5/2018 with mild AS, mild MR with mod TR and mod PHTN with preserved LVEF 55-60% Repeat echo given new symptoms Mixed hyperlipidemia Continue statin therapy and low fat, low cholesterol diet Has muscle aches with rosuvastatin, but elects to continue dose for now Okay to hold CoQ 10 if myalgia no worse Lipids managed by PCP Essential hypertension BP controlled. Continue anti-hypertensive therapy and low sodium diet Follow-up to be determined based on test results.     
 
Laz Arcos MD

## 2020-10-15 NOTE — LETTER
10/15/20 Patient: Ede Aceves YOB: 1927 Date of Visit: 10/15/2020 Suzie Saini MD 
[address] [address] VIA In Basket Dear Suzie Saini MD, Thank you for referring Ms. Harris Due to [facility] for evaluation. My notes for this consultation are attached. If you have questions, please do not hesitate to call me. I look forward to following your patient along with you.  
 
 
Sincerely, 
 
Alyx Lee MD

## 2020-10-15 NOTE — PROGRESS NOTES
1. Have you been to the ER, urgent care clinic since your last visit? Hospitalized since your last visit? No 
 
2. Have you seen or consulted any other health care providers outside of the [address] [place] [address] since your last visit? Include any pap smears or colon screening. No 
 
Chief Complaint Patient presents with  Shortness of Breath SOB: 
Pt onset of SOB since Dec 2019 when had pneumonia Occurs on exertion with short distance walking. Denies chest tightness, pain, discomfort, pressure Sees Dr. Montgomery Burkitt - pulmonary.

## 2020-10-26 NOTE — TELEPHONE ENCOUNTER
#[phone]   Pt states she has been waiting on her lab results to come in the mail. They have not and it has been a couple of weeks pt states. Please mail these out as soon as possible as pt needs to take to another physicians.       Address confirmed

## 2020-11-20 NOTE — PROGRESS NOTES
Carlene,    Please call the patient and inform that stress test is normal.  Low concern for significant blockages in the heart arteries at this time.   Should keep f/u with Dr. Sowmya Hunter to discuss results of echocardiogram.    Thanks,  Jigar Black

## 2020-11-20 NOTE — PROGRESS NOTES
Carlene,    Please call the patient and inform that echocardiogram is normal, ejection fraction 55-60%. No concern for heart failure at this time. Her tricuspid valve is leaking significantly and she has moderate pulmonary hypertension. This means the pressure in her lungs is very high. Most likely, this is causing her shortness of breath. Her stress test results are still pending. She should come back to see Dr. Tahira Flores in about a month or so, to discuss further. Most likely, this will need to be addressed by her pulmonologist.  She also has a follow up with her oncologist, so she should see them as well, as all of this could be related.     Thanks,  [employer]

## 2020-11-23 NOTE — TELEPHONE ENCOUNTER
Ac Downey, GINA Wilson, ARIEL Concepcion,     Please call the patient and inform that stress test is normal.  Low concern for significant blockages in the heart arteries at this time.    Should keep f/u with Dr. Koreen Jeans to discuss results of echocardiogram.     Thanks,   Elier Ott

## 2020-11-23 NOTE — TELEPHONE ENCOUNTER
Chris De La Torre NP  Manon Call, ARIEL Concepcion,     Please call the patient and inform that echocardiogram is normal, ejection fraction 55-60%. No concern for heart failure at this time.  Her tricuspid valve is leaking significantly and she has moderate pulmonary hypertension.  This means the pressure in her lungs is very high.  Most likely, this is causing her shortness of breath.  Her stress test results are still pending.  She should come back to see Dr. Sukhi Neves in about a month or so, to discuss further.  Most likely, this will need to be addressed by her pulmonologist.   She also has a follow up with her oncologist, so she should see them as well, as all of this could be related.      Thanks,   [employer]

## 2020-11-24 NOTE — TELEPHONE ENCOUNTER
Patient informed has been under testing with Dr Can Lew .  Mustapha please call for an appt with Dr Brigida Snell within the month thanks

## 2020-12-22 NOTE — PROGRESS NOTES
Chief Complaint Patient presents with  Follow-up  Valvular Heart Disease  Hypertension  Cholesterol Problem  Irregular Heart Beat 1. Have you been to the ER, urgent care clinic since your last visit? No  Hospitalized since your last visit? No 
2. Have you seen or consulted any other health care providers outside of the [address] [place] [address] since your last visit? Yes Dr Mason Singh scan 11/23/20  Include any pap smears or colon screening. No 
 
Patient here to review results question if discussed with oncology /OBGYN her niece in in the car.

## 2020-12-22 NOTE — PROGRESS NOTES
[address], [address] [facility]  [phone] Subjective:  
  
Jacky Suarez is a 80 y.o. female is here for test follow up re echo and NST. She has a PMHx of PAF, aortic stenosis, HTN, HLD, and hx of breast cancer s/p bilateral mastectomy. Negative NST in 11/2020. Normal EF moderate AS mild-mod MR severe TR and mod PHTN  per echo 11/2020 Recently diagnosed with Ovarian ca. Followed by Dr Ruben Johnson Last seen by us in 10/2020. Today, Reports unchanged oliver. She appears dyspneic with exertion, but her pulse ox remains at 94% or greater with ambulation in our hallways. The patient denies chest pain, orthopnea, PND, LE edema, palpitations, syncope, or presyncope. Patient Active Problem List  
 Diagnosis Date Noted  Mixed hyperlipidemia 10/15/2020  Nonrheumatic aortic valve stenosis 10/15/2020  Bicuspid aortic valve 02/01/2018  PAF (paroxysmal atrial fibrillation) ([address] 02/01/2018  Basilar artery aneurysm ([address] 09/15/2015  Orthostatic hypotension 06/05/2015  Leg edema 01/30/2014  Hemorrhoids 10/29/2013  BRBPR (bright red blood per rectum) 10/17/2013  PAD (peripheral artery disease) ([address] 06/04/2013  Atherosclerosis of native arteries of the extremities with intermittent claudication 06/04/2013  Paroxysmal ventricular tachycardia ([address] 01/10/2013  Shortness of breath 01/10/2013  PVC's (premature ventricular contractions) 12/18/2012  Colitis 12/18/2012  First degree AV block 12/18/2012  
 HTN (hypertension) 12/05/2012  Glaucoma 11/07/2012  Vitamin D deficiency 11/07/2012  Hypercholesteremia 11/07/2012  Arthritis 11/07/2012  History  of basal cell carcinoma 11/07/2012  History of breast cancer 11/07/2012  Vertigo 11/07/2012  Wears hearing aid 11/07/2012 Keyur Kapoor MD 
Past Medical History:  
Diagnosis Date  Arthritis  Cancer ([address] breast    (leg,ear,arm-skin cancer)  GERD (gastroesophageal reflux disease)  Hypertension  Other ill-defined conditions(799.89)   
 glaucoma  Other ill-defined conditions(799.89)   
 cellulitis left arm  Other ill-defined conditions(799.89)   
 carpal tunnel syndrome left hand  Other ill-defined conditions(799.89)   
 vertigo Past Surgical History:  
Procedure Laterality Date  CARDIAC CATHETERIZATION  1/10/2013  HX APPENDECTOMY  HX CARPAL TUNNEL RELEASE    
 left hand  HX CATARACT REMOVAL    
 bilateral  
 HX COLONOSCOPY  2012  HX KNEE ARTHROSCOPY    
 left  HX MASTECTOMY    
 bilateral  
 HX OTHER SURGICAL Basal cell skin cancer removed from left leg, left arm and neck  HX OTHER SURGICAL    
 cyst removed from left thigh  HX OREN AND BSO  HX TONSILLECTOMY  VASCULAR SURGERY PROCEDURE UNLIST    
 vascular blockages removed. Allergies Allergen Reactions  Augmentin [Amoxicillin-Pot Clavulanate] Hives and Itching  Doxycycline Hives and Itching  Keflex [Cephalexin] Hives and Itching  Monodox [Doxycycline Monohydrate] Other (comments)  Sulfa (Sulfonamide Antibiotics) Rash Family History Problem Relation Age of Onset  Heart Disease Mother  Heart Disease Father   
      age 55  
 Heart Disease Brother  Stroke Brother  Cancer Sister Colon Cancer  Diabetes Sister  Heart Disease Sister  Heart Disease Sister  Diabetes Sister  Other Brother MVA  Heart Disease Brother  Heart Disease Son   
      age 52 Social History Socioeconomic History  Marital status:  Spouse name: Not on file  Number of children: Not on file  Years of education: Not on file  Highest education level: Not on file Occupational History  Not on file Social Needs  Financial resource strain: Not on file  Food insecurity Worry: Not on file Inability: Not on file  Transportation needs Medical: Not on file Non-medical: Not on file Tobacco Use  Smoking status: Former Smoker Quit date: 1955 Years since quittin.0  Smokeless tobacco: Never Used Substance and Sexual Activity  Alcohol use: Yes Comment: rare  Drug use: Never  Sexual activity: Not Currently Lifestyle  Physical activity Days per week: Not on file Minutes per session: Not on file  Stress: Not on file Relationships  Social connections Talks on phone: Not on file Gets together: Not on file Attends Oriental orthodox service: Not on file Active member of club or organization: Not on file Attends meetings of clubs or organizations: Not on file Relationship status: Not on file  Intimate partner violence Fear of current or ex partner: Not on file Emotionally abused: Not on file Physically abused: Not on file Forced sexual activity: Not on file Other Topics Concern  Not on file Social History Narrative  Not on file Current Outpatient Medications Medication Sig  
 rosuvastatin (CRESTOR) 20 mg tablet TAKE 1 TABLET NIGHTLY  hydroCHLOROthiazide (MICROZIDE) 12.5 mg capsule TAKE 1 CAPSULE BY MOUTH EVERY DAY  melatonin 5 mg tablet Take 5 mg by mouth as needed.  Eliquis 5 mg tablet TAKE 1 TABLET EVERY 12     HOURS TO PREVENT STROKE IN ATRIAL FIBRILLATION  
 nebivolol (BYSTOLIC) 2.5 mg tablet TAKE 1 TABLET BY MOUTH EVERY DAY  sucralfate (CARAFATE) 100 mg/mL suspension Take  by mouth as needed.  timolol (TIMOPTIC-XE) 0.5 % ophthalmic gel-forming Administer 1 Drop to both eyes daily.  CYANOCOBALAMIN, VITAMIN B-12, (VITAMIN B-12 PO) Take 1,000 mcg by mouth daily.  latanoprost (XALATAN) 0.005 % ophthalmic solution Administer 1 Drop to both eyes nightly.  cycloSPORINE (RESTASIS) 0.05 % ophthalmic emulsion Administer 1 Drop to both eyes two (2) times a day.   
 VIT C/E/ZN/COPPR/LUTEIN/ZEAXAN (PRESERVISION AREDS 2 PO) Take 2 capsules by mouth two (2) times a day.  esomeprazole (NEXIUM) 40 mg capsule Take 1 Cap by mouth Daily (before breakfast).  Cholecalciferol, Vitamin D3, (VITAMIN D3) 1,000 unit cap Take 1 Cap by mouth two (2) times a day.  omega-3 fatty acids-vitamin e (FISH OIL) 1,000 mg Cap Take 1 Cap by mouth two (2) times a day.  CALCIUM CARBONATE/VITAMIN D3 (CALCIUM 600 + D,3, PO) Take 1 Cap by mouth two (2) times a day.  multivitamins-minerals-lutein (CENTRUM SILVER) Tab Take 1 Tab by mouth daily.  diclofenac (VOLTAREN) 1 % gel APPLY 1 APPLICATION TOPICALLY 4 (FOUR) TIMES A DAY AS NEEDED (PAIN) No current facility-administered medications for this visit. Review of Symptoms: 
11 systems reviewed, negative other than as stated in the HPI Physical ExamPhysical Exam:   
Vitals:  
 12/22/20 7370 12/22/20 4681 12/22/20 0957 BP: (!) 150/80 Pulse: 86 Resp: 16 SpO2: 95% 99% 94% Weight: 153 lb (69.4 kg) Height: 5' 4\" (1.626 m) Body mass index is 26.26 kg/m². General PE Gen:  NAD Mental Status - Alert. General Appearance - Not in acute distress. HEENT: 
PERRL, no carotid bruits or JVD Chest and Lung Exam  
Inspection: Accessory muscles - No use of accessory muscles in breathing. Auscultation:  
Breath sounds: - Normal.  
Cardiovascular Inspection: Jugular vein - Bilateral - Inspection Normal.  
Palpation/Percussion:  
Apical Impulse: - Normal.  
Auscultation: Rhythm - Regular. Heart Sounds - S1 WNL and S2 WNL. No S3 or S4. Murmurs & Other Heart Sounds: Auscultation of the heart reveals -2/6 KERRIE Peripheral Vascular Upper Extremity: Inspection - Bilateral - No Cyanotic nailbeds or Digital clubbing. Lower Extremity:  
Palpation: Edema - Bilateral - No edema. Abdomen:   Soft, non-tender, bowel sounds are active. Neuro: A&O times 3, CN and motor grossly WNL Labs:  
Lab Results Component Value Date/Time  Cholesterol, total 127 10/07/2020 09:43 AM  
 Cholesterol, total 130 07/08/2019 09:33 AM  
 Cholesterol, total 126 12/19/2018 01:01 PM  
 Cholesterol, total 137 05/09/2018 09:19 AM  
 Cholesterol, total 156 06/08/2017 09:47 AM  
 HDL Cholesterol 44 10/07/2020 09:43 AM  
 HDL Cholesterol 44 07/08/2019 09:33 AM  
 HDL Cholesterol 53 12/19/2018 01:01 PM  
 HDL Cholesterol 43 05/09/2018 09:19 AM  
 HDL Cholesterol 53 06/08/2017 09:47 AM  
 LDL,Direct 85 10/30/2014 09:36 AM  
 LDL,Direct 80 02/06/2014 09:11 AM  
 LDL, calculated 55 07/08/2019 09:33 AM  
 LDL, calculated 50 12/19/2018 01:01 PM  
 LDL, calculated 60 05/09/2018 09:19 AM  
 LDL, calculated 79 06/08/2017 09:47 AM  
 LDL, calculated 59 09/08/2016 08:51 AM  
 LDL Chol Calc (NIH) 58 10/07/2020 09:43 AM  
 Triglyceride 144 10/07/2020 09:43 AM  
 Triglyceride 157 (H) 07/08/2019 09:33 AM  
 Triglyceride 115 12/19/2018 01:01 PM  
 Triglyceride 171 (H) 05/09/2018 09:19 AM  
 Triglyceride 120 06/08/2017 09:47 AM  
 
Lab Results Component Value Date/Time CK 53 11/25/2014 12:00 PM  
 
Lab Results Component Value Date/Time Sodium 138 10/07/2020 09:43 AM  
 Potassium 4.3 10/07/2020 09:43 AM  
 Chloride 99 10/07/2020 09:43 AM  
 CO2 24 10/07/2020 09:43 AM  
 Anion gap 7 12/12/2019 12:44 PM  
 Glucose 134 (H) 10/07/2020 09:43 AM  
 BUN 17 10/07/2020 09:43 AM  
 Creatinine 1.17 (H) 10/07/2020 09:43 AM  
 BUN/Creatinine ratio 15 10/07/2020 09:43 AM  
 GFR est AA 46 (L) 10/07/2020 09:43 AM  
 GFR est non-AA 40 (L) 10/07/2020 09:43 AM  
 Calcium 9.7 10/07/2020 09:43 AM  
 Bilirubin, total 0.6 10/07/2020 09:43 AM  
 Alk. phosphatase 132 (H) 10/07/2020 09:43 AM  
 Protein, total 7.2 10/07/2020 09:43 AM  
 Albumin 4.0 10/07/2020 09:43 AM  
 Globulin 4.3 (H) 12/12/2019 12:44 PM  
 A-G Ratio 1.3 10/07/2020 09:43 AM  
 ALT (SGPT) 17 10/07/2020 09:43 AM  
 
 
EKG: 
 
 
 Assessment: 
 
 Assessment:  
  
1. SOBOE (shortness of breath on exertion) 2. Bicuspid aortic valve 3. Mixed hyperlipidemia 4.  Nonrheumatic aortic valve stenosis 5. Essential hypertension 6. Permanent atrial fibrillation ([address] 7. Moderate pulmonary arterial systolic hypertension (HCC) 8. Right ventricular dilation Orders Placed This Encounter  diclofenac (VOLTAREN) 1 % gel Sig: APPLY 1 APPLICATION TOPICALLY 4 (FOUR) TIMES A DAY AS NEEDED (PAIN) Plan: ALLAN Persistent symptoms since 12/2019 after diagnosis of PNA Now diagnosed with ovarian cancer Non-contrast Chest CT in 2/2020 unremarkable Negative NST in 11/2020 Echocardiogram reveals moderate RV dilatation and moderate pulmonary hypertension, only mild aortic stenosis with a mean gradient of 15 mmHg. Patient is visibly dyspneic with walking in the office, and with pulse ox only dropping to 94% on room air with ambulation approximately 150 feet in the office. Moderate pulmonary hypertension was previously seen in 2018. She follows with Dr. Manav Ngo of pulmonary. Reviewed last pulmonary note from February 2020. At that time, they had performed a chest CT which revealed complete resolution of bilateral apical pneumonia. In light of RV dilatation and persistent moderate pulmonary hypertension, I have recommended she call to schedule follow-up. Discussed with Dr. Milla Cuenca at the patient's request.  Discussed findings on nuclear stress test, and echo. Dr. Nelson Delgado wants additional lung imaging performed. Of course suspicion for pulmonary embolism is extremely low as the patient is on Eliquis. Copy of this to Dr. Violette Garcia as well. 
  
Permanent atrial fibrillation Continue BB therapyrate is controlled Continue Eliquis for stroke prevention Cr 1.17 in 10/2020 Hgb stable at 13.8 in 10/2020 Aortic stenosis / Mitral Insufficiency Normal EF moderate AS mild-mod MR severe TR and mod PHTN per echo 11/2020 Echo done 5/2018 with mild AS, mild MR with mod TR and mod PHTN with preserved LVEF 55-60% 
 
  Mixed hyperlipidemia 10/2020 LDL 58  On statin labs and lipids per PCP Essential hypertension Bp up some---Has not taken BB Continue anti-hypertensive therapy and low sodium diet Continue current care and f/u in 3 months with me after pulmonary, oncology, and gynecologic oncology follow-up.  
 
Miriam Garza MD

## 2020-12-22 NOTE — LETTER
12/22/2020 Patient: Bryanna Cee YOB: 1927 Date of Visit: 12/22/2020 Amita Santoyo MD 
[address] [address] [address] [employer] Dear Amita Santoyo MD, Thank you for referring Ms. Rachel Abbasi to [address] [place] [address] for evaluation. My notes for this consultation are attached. If you have questions, please do not hesitate to call me. I look forward to following your patient along with you.  
 
 
Sincerely, 
 
Radha Spears MD

## 2020-12-29 NOTE — TELEPHONE ENCOUNTER
----- Message from Jen Reeves sent at 12/29/2020 11:02 AM EST -----  Regarding: Dr. Danita Nichols (if not patient): self  Relationship of caller (if not patient): self  Best contact number(s): [phone]  Name of medication and dosage if known: \"eszopiclone tab 1 mg 30 tabs\"  Is patient out of this medication (yes/no): yes  Pharmacy name: [address]   Pharmacy listed in chart? (yes/no): yes  Pharmacy phone number: [phone]  Date of last visit: 8/12/20  Details to clarify the request: Fax: [phone]    Message from [facility]

## 2021-01-01 ENCOUNTER — HOME CARE VISIT (OUTPATIENT)
Dept: HOSPICE | Facility: HOSPICE | Age: 86
End: 2021-01-01
Payer: MEDICARE

## 2021-01-01 ENCOUNTER — TELEPHONE (OUTPATIENT)
Dept: INTERNAL MEDICINE CLINIC | Age: 86
End: 2021-01-01

## 2021-01-01 ENCOUNTER — HOSPITAL ENCOUNTER (OUTPATIENT)
Dept: PREADMISSION TESTING | Age: 86
Discharge: HOME | End: 2021-01-31
Payer: MEDICARE

## 2021-01-01 ENCOUNTER — HOSPICE ADMISSION (OUTPATIENT)
Dept: HOSPICE | Facility: HOSPICE | Age: 86
End: 2021-01-01
Payer: MEDICARE

## 2021-01-01 ENCOUNTER — ANESTHESIA EVENT (OUTPATIENT)
Dept: SURGERY | Age: 86
DRG: 252 | End: 2021-01-01
Payer: MEDICARE

## 2021-01-01 ENCOUNTER — HOME CARE VISIT (OUTPATIENT)
Dept: SCHEDULING | Facility: HOME HEALTH | Age: 86
End: 2021-01-01
Payer: MEDICARE

## 2021-01-01 ENCOUNTER — APPOINTMENT (OUTPATIENT)
Dept: GENERAL RADIOLOGY | Age: 86
DRG: 252 | End: 2021-01-01
Attending: NURSE PRACTITIONER
Payer: MEDICARE

## 2021-01-01 ENCOUNTER — HOSPITAL ENCOUNTER (INPATIENT)
Age: 86
LOS: 6 days | Discharge: NURSING, REHAB OR LONG-TERM CARE | DRG: 644 | End: 2021-04-12
Attending: EMERGENCY MEDICINE | Admitting: INTERNAL MEDICINE
Payer: MEDICARE

## 2021-01-01 ENCOUNTER — APPOINTMENT (OUTPATIENT)
Dept: GENERAL RADIOLOGY | Age: 86
DRG: 644 | End: 2021-01-01
Attending: EMERGENCY MEDICINE
Payer: MEDICARE

## 2021-01-01 ENCOUNTER — TELEPHONE (OUTPATIENT)
Dept: CARDIOLOGY CLINIC | Age: 86
End: 2021-01-01

## 2021-01-01 ENCOUNTER — APPOINTMENT (OUTPATIENT)
Dept: GENERAL RADIOLOGY | Age: 86
DRG: 252 | End: 2021-01-01
Attending: EMERGENCY MEDICINE
Payer: MEDICARE

## 2021-01-01 ENCOUNTER — HOSPITAL ENCOUNTER (OUTPATIENT)
Dept: CT IMAGING | Age: 86
Discharge: HOME | End: 2021-04-30
Attending: NURSE PRACTITIONER
Payer: MEDICARE

## 2021-01-01 ENCOUNTER — PATIENT OUTREACH (OUTPATIENT)
Dept: CASE MANAGEMENT | Age: 86
End: 2021-01-01

## 2021-01-01 ENCOUNTER — HOSPITAL ENCOUNTER (INPATIENT)
Age: 86
LOS: 14 days | Discharge: HOSPICE | DRG: 252 | End: 2021-05-20
Attending: EMERGENCY MEDICINE | Admitting: FAMILY MEDICINE
Payer: MEDICARE

## 2021-01-01 ENCOUNTER — APPOINTMENT (OUTPATIENT)
Dept: VASCULAR SURGERY | Age: 86
DRG: 252 | End: 2021-01-01
Payer: MEDICARE

## 2021-01-01 ENCOUNTER — APPOINTMENT (OUTPATIENT)
Dept: GENERAL RADIOLOGY | Age: 86
DRG: 252 | End: 2021-01-01
Payer: MEDICARE

## 2021-01-01 ENCOUNTER — APPOINTMENT (OUTPATIENT)
Dept: CT IMAGING | Age: 86
DRG: 252 | End: 2021-01-01
Attending: INTERNAL MEDICINE
Payer: MEDICARE

## 2021-01-01 ENCOUNTER — APPOINTMENT (OUTPATIENT)
Dept: GENERAL RADIOLOGY | Age: 86
End: 2021-01-01
Attending: EMERGENCY MEDICINE
Payer: MEDICARE

## 2021-01-01 ENCOUNTER — APPOINTMENT (OUTPATIENT)
Dept: NON INVASIVE DIAGNOSTICS | Age: 86
DRG: 252 | End: 2021-01-01
Attending: NURSE PRACTITIONER
Payer: MEDICARE

## 2021-01-01 ENCOUNTER — HOSPITAL ENCOUNTER (OUTPATIENT)
Dept: NUCLEAR MEDICINE | Age: 86
Discharge: HOME | End: 2021-02-04
Attending: INTERNAL MEDICINE
Payer: MEDICARE

## 2021-01-01 ENCOUNTER — APPOINTMENT (OUTPATIENT)
Dept: CT IMAGING | Age: 86
DRG: 644 | End: 2021-01-01
Attending: INTERNAL MEDICINE
Payer: MEDICARE

## 2021-01-01 ENCOUNTER — TRANSCRIBE ORDER (OUTPATIENT)
Dept: SCHEDULING | Age: 86
End: 2021-01-01

## 2021-01-01 ENCOUNTER — APPOINTMENT (OUTPATIENT)
Dept: CT IMAGING | Age: 86
End: 2021-01-01
Attending: EMERGENCY MEDICINE
Payer: MEDICARE

## 2021-01-01 ENCOUNTER — HOSPITAL ENCOUNTER (EMERGENCY)
Age: 86
Discharge: HOME | End: 2021-03-30
Attending: EMERGENCY MEDICINE
Payer: MEDICARE

## 2021-01-01 ENCOUNTER — VIRTUAL VISIT (OUTPATIENT)
Dept: INTERNAL MEDICINE CLINIC | Age: 86
End: 2021-01-01
Payer: MEDICARE

## 2021-01-01 ENCOUNTER — OFFICE VISIT (OUTPATIENT)
Dept: CARDIOLOGY CLINIC | Age: 86
End: 2021-01-01
Payer: MEDICARE

## 2021-01-01 ENCOUNTER — ANESTHESIA (OUTPATIENT)
Dept: SURGERY | Age: 86
DRG: 252 | End: 2021-01-01
Payer: MEDICARE

## 2021-01-01 ENCOUNTER — OFFICE VISIT (OUTPATIENT)
Dept: INTERNAL MEDICINE CLINIC | Age: 86
End: 2021-01-01
Payer: MEDICARE

## 2021-01-01 ENCOUNTER — ANCILLARY PROCEDURE (OUTPATIENT)
Dept: CARDIOLOGY CLINIC | Age: 86
End: 2021-01-01
Payer: MEDICARE

## 2021-01-01 ENCOUNTER — APPOINTMENT (OUTPATIENT)
Dept: MRI IMAGING | Age: 86
DRG: 252 | End: 2021-01-01
Attending: NURSE PRACTITIONER
Payer: MEDICARE

## 2021-01-01 VITALS
DIASTOLIC BLOOD PRESSURE: 52 MMHG | HEART RATE: 64 BPM | HEIGHT: 64 IN | RESPIRATION RATE: 18 BRPM | OXYGEN SATURATION: 91 % | TEMPERATURE: 97.4 F | WEIGHT: 149 LBS | BODY MASS INDEX: 25.44 KG/M2 | SYSTOLIC BLOOD PRESSURE: 114 MMHG

## 2021-01-01 VITALS
RESPIRATION RATE: 16 BRPM | DIASTOLIC BLOOD PRESSURE: 73 MMHG | SYSTOLIC BLOOD PRESSURE: 105 MMHG | OXYGEN SATURATION: 99 % | HEART RATE: 69 BPM

## 2021-01-01 VITALS
SYSTOLIC BLOOD PRESSURE: 140 MMHG | RESPIRATION RATE: 18 BRPM | OXYGEN SATURATION: 98 % | WEIGHT: 148.4 LBS | HEIGHT: 64 IN | BODY MASS INDEX: 25.33 KG/M2 | HEART RATE: 67 BPM | DIASTOLIC BLOOD PRESSURE: 56 MMHG

## 2021-01-01 VITALS
RESPIRATION RATE: 16 BRPM | WEIGHT: 164.2 LBS | DIASTOLIC BLOOD PRESSURE: 77 MMHG | OXYGEN SATURATION: 97 % | BODY MASS INDEX: 28.03 KG/M2 | TEMPERATURE: 96.9 F | HEIGHT: 64 IN | HEART RATE: 62 BPM | SYSTOLIC BLOOD PRESSURE: 134 MMHG

## 2021-01-01 VITALS
HEART RATE: 116 BPM | TEMPERATURE: 99 F | RESPIRATION RATE: 14 BRPM | WEIGHT: 167.3 LBS | DIASTOLIC BLOOD PRESSURE: 63 MMHG | BODY MASS INDEX: 28.56 KG/M2 | OXYGEN SATURATION: 98 % | HEIGHT: 64 IN | SYSTOLIC BLOOD PRESSURE: 107 MMHG

## 2021-01-01 VITALS
RESPIRATION RATE: 16 BRPM | DIASTOLIC BLOOD PRESSURE: 85 MMHG | SYSTOLIC BLOOD PRESSURE: 174 MMHG | BODY MASS INDEX: 25.27 KG/M2 | HEIGHT: 64 IN | OXYGEN SATURATION: 98 % | TEMPERATURE: 97.9 F | WEIGHT: 148 LBS | HEART RATE: 81 BPM

## 2021-01-01 VITALS — SYSTOLIC BLOOD PRESSURE: 100 MMHG | RESPIRATION RATE: 6 BRPM | DIASTOLIC BLOOD PRESSURE: 70 MMHG | HEART RATE: 96 BPM

## 2021-01-01 VITALS — RESPIRATION RATE: 22 BRPM | DIASTOLIC BLOOD PRESSURE: 72 MMHG | SYSTOLIC BLOOD PRESSURE: 112 MMHG | HEART RATE: 100 BPM

## 2021-01-01 VITALS — RESPIRATION RATE: 14 BRPM | SYSTOLIC BLOOD PRESSURE: 102 MMHG | DIASTOLIC BLOOD PRESSURE: 60 MMHG | HEART RATE: 100 BPM

## 2021-01-01 VITALS — DIASTOLIC BLOOD PRESSURE: 58 MMHG | RESPIRATION RATE: 18 BRPM | SYSTOLIC BLOOD PRESSURE: 100 MMHG

## 2021-01-01 DIAGNOSIS — E78.2 MIXED HYPERLIPIDEMIA: ICD-10-CM

## 2021-01-01 DIAGNOSIS — R60.0 BILATERAL EDEMA OF LOWER EXTREMITY: ICD-10-CM

## 2021-01-01 DIAGNOSIS — R97.8 ABNORMAL TUMOR MARKERS: ICD-10-CM

## 2021-01-01 DIAGNOSIS — I48.21 PERMANENT ATRIAL FIBRILLATION (HCC): Primary | ICD-10-CM

## 2021-01-01 DIAGNOSIS — I73.9 PERIPHERAL VASCULAR DISEASE, UNSPECIFIED (HCC): Primary | ICD-10-CM

## 2021-01-01 DIAGNOSIS — S81.812A LACERATION OF LEFT LEG EXCLUDING THIGH, INITIAL ENCOUNTER: ICD-10-CM

## 2021-01-01 DIAGNOSIS — I10 ESSENTIAL HYPERTENSION: ICD-10-CM

## 2021-01-01 DIAGNOSIS — L97.929 ULCER OF LEFT LOWER EXTREMITY, UNSPECIFIED ULCER STAGE (HCC): ICD-10-CM

## 2021-01-01 DIAGNOSIS — I95.9 HYPOTENSION, UNSPECIFIED HYPOTENSION TYPE: ICD-10-CM

## 2021-01-01 DIAGNOSIS — R06.09 DOE (DYSPNEA ON EXERTION): ICD-10-CM

## 2021-01-01 DIAGNOSIS — E87.1 HYPONATREMIA: Primary | ICD-10-CM

## 2021-01-01 DIAGNOSIS — G47.00 INSOMNIA, UNSPECIFIED TYPE: ICD-10-CM

## 2021-01-01 DIAGNOSIS — I73.9 CLAUDICATION IN PERIPHERAL VASCULAR DISEASE (HCC): ICD-10-CM

## 2021-01-01 DIAGNOSIS — C50.111 MALIGNANT NEOPLASM OF CENTRAL PORTION OF RIGHT FEMALE BREAST (HCC): ICD-10-CM

## 2021-01-01 DIAGNOSIS — I48.21 PERMANENT ATRIAL FIBRILLATION (HCC): ICD-10-CM

## 2021-01-01 DIAGNOSIS — S01.01XA LACERATION OF SCALP, INITIAL ENCOUNTER: Primary | ICD-10-CM

## 2021-01-01 DIAGNOSIS — Z85.3 PERSONAL HISTORY OF MALIGNANT NEOPLASM OF BREAST: Primary | ICD-10-CM

## 2021-01-01 DIAGNOSIS — L97.902 ULCER OF LOWER EXTREMITY WITH FAT LAYER EXPOSED, UNSPECIFIED LATERALITY (HCC): Primary | ICD-10-CM

## 2021-01-01 DIAGNOSIS — D64.9 ANEMIA, UNSPECIFIED TYPE: ICD-10-CM

## 2021-01-01 DIAGNOSIS — R60.0 BILATERAL LEG EDEMA: ICD-10-CM

## 2021-01-01 DIAGNOSIS — L03.116 CELLULITIS OF LEFT LOWER EXTREMITY: ICD-10-CM

## 2021-01-01 DIAGNOSIS — I63.9 ACUTE CVA (CEREBROVASCULAR ACCIDENT) (HCC): ICD-10-CM

## 2021-01-01 DIAGNOSIS — I48.0 PAF (PAROXYSMAL ATRIAL FIBRILLATION) (HCC): ICD-10-CM

## 2021-01-01 DIAGNOSIS — M79.89 RIGHT LEG SWELLING: Primary | ICD-10-CM

## 2021-01-01 DIAGNOSIS — R73.09 ELEVATED HEMOGLOBIN A1C: Primary | ICD-10-CM

## 2021-01-01 DIAGNOSIS — I35.0 NONRHEUMATIC AORTIC VALVE STENOSIS: ICD-10-CM

## 2021-01-01 DIAGNOSIS — I27.20 PULMONARY HTN (HCC): ICD-10-CM
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
## 2021-01-01 DIAGNOSIS — R79.89 ELEVATED LACTIC ACID LEVEL: ICD-10-CM

## 2021-01-01 DIAGNOSIS — C56.9 MALIGNANT NEOPLASM OF OVARY (HCC): ICD-10-CM

## 2021-01-01 DIAGNOSIS — I27.20 PULMONARY HTN (HCC): Primary | ICD-10-CM

## 2021-01-01 DIAGNOSIS — C56.9 MALIGNANT NEOPLASM OF OVARY, UNSPECIFIED LATERALITY (HCC): ICD-10-CM

## 2021-01-01 DIAGNOSIS — M79.605 PAIN OF LEFT LOWER EXTREMITY: ICD-10-CM

## 2021-01-01 DIAGNOSIS — I73.9 PERIPHERAL VASCULAR DISEASE, UNSPECIFIED (HCC): ICD-10-CM

## 2021-01-01 LAB
ABO + RH BLD: NORMAL
ABO + RH BLD: NORMAL
ALBUMIN SERPL-MCNC: 2.6 G/DL (ref 3.5–5)
ALBUMIN SERPL-MCNC: 2.6 G/DL (ref 3.5–5)
ALBUMIN SERPL-MCNC: 2.7 G/DL (ref 3.5–5)
ALBUMIN SERPL-MCNC: 2.8 G/DL (ref 3.5–5)
ALBUMIN SERPL-MCNC: 2.8 G/DL (ref 3.5–5)
ALBUMIN SERPL-MCNC: 2.9 G/DL (ref 3.5–5)
ALBUMIN SERPL-MCNC: 2.9 G/DL (ref 3.5–5)
ALBUMIN SERPL-MCNC: 3 G/DL (ref 3.5–5)
ALBUMIN SERPL-MCNC: 3.1 G/DL (ref 3.5–5)
ALBUMIN/GLOB SERPL: 0.8 {RATIO} (ref 1.1–2.2)
ALBUMIN/GLOB SERPL: 1.2 {RATIO} (ref 1.1–2.2)
ALBUMIN/GLOB SERPL: 1.2 {RATIO} (ref 1.1–2.2)
ALBUMIN/GLOB SERPL: 1.3 {RATIO} (ref 1.1–2.2)
ALP SERPL-CCNC: 106 U/L (ref 45–117)
ALP SERPL-CCNC: 110 U/L (ref 45–117)
ALP SERPL-CCNC: 111 U/L (ref 45–117)
ALP SERPL-CCNC: 113 U/L (ref 45–117)
ALP SERPL-CCNC: 114 U/L (ref 45–117)
ALP SERPL-CCNC: 115 U/L (ref 45–117)
ALP SERPL-CCNC: 118 U/L (ref 45–117)
ALP SERPL-CCNC: 135 U/L (ref 45–117)
ALP SERPL-CCNC: 140 U/L (ref 45–117)
ALT SERPL-CCNC: 119 U/L (ref 12–78)
ALT SERPL-CCNC: 13 U/L (ref 12–78)
ALT SERPL-CCNC: 30 U/L (ref 12–78)
ALT SERPL-CCNC: 30 U/L (ref 12–78)
ALT SERPL-CCNC: 37 U/L (ref 12–78)
ALT SERPL-CCNC: 38 U/L (ref 12–78)
ALT SERPL-CCNC: 38 U/L (ref 12–78)
ALT SERPL-CCNC: 76 U/L (ref 12–78)
ALT SERPL-CCNC: 76 U/L (ref 12–78)
ANION GAP SERPL CALC-SCNC: 10 MMOL/L (ref 5–15)
ANION GAP SERPL CALC-SCNC: 11 MMOL/L (ref 5–15)
ANION GAP SERPL CALC-SCNC: 11 MMOL/L (ref 5–15)
ANION GAP SERPL CALC-SCNC: 14 MMOL/L (ref 5–15)
ANION GAP SERPL CALC-SCNC: 14 MMOL/L (ref 5–15)
ANION GAP SERPL CALC-SCNC: 16 MMOL/L (ref 5–15)
ANION GAP SERPL CALC-SCNC: 19 MMOL/L (ref 5–15)
ANION GAP SERPL CALC-SCNC: 20 MMOL/L (ref 5–15)
ANION GAP SERPL CALC-SCNC: 5 MMOL/L (ref 5–15)
ANION GAP SERPL CALC-SCNC: 6 MMOL/L (ref 5–15)
ANION GAP SERPL CALC-SCNC: 6 MMOL/L (ref 5–15)
ANION GAP SERPL CALC-SCNC: 7 MMOL/L (ref 5–15)
ANION GAP SERPL CALC-SCNC: 7 MMOL/L (ref 5–15)
ANION GAP SERPL CALC-SCNC: 8 MMOL/L (ref 5–15)
ANION GAP SERPL CALC-SCNC: 9 MMOL/L (ref 5–15)
APPEARANCE UR: ABNORMAL
APPEARANCE UR: CLEAR
APTT PPP: 37.6 SEC (ref 22.1–31)
ARTERIAL PATENCY WRIST A: ABNORMAL
ARTERIAL PATENCY WRIST A: YES
AST SERPL-CCNC: 124 U/L (ref 15–37)
AST SERPL-CCNC: 16 U/L (ref 15–37)
AST SERPL-CCNC: 24 U/L (ref 15–37)
AST SERPL-CCNC: 28 U/L (ref 15–37)
AST SERPL-CCNC: 29 U/L (ref 15–37)
AST SERPL-CCNC: 29 U/L (ref 15–37)
AST SERPL-CCNC: 31 U/L (ref 15–37)
AST SERPL-CCNC: 57 U/L (ref 15–37)
AST SERPL-CCNC: 77 U/L (ref 15–37)
ATRIAL RATE: 357 BPM
ATRIAL RATE: 59 BPM
ATRIAL RATE: 82 BPM
BACTERIA SPEC CULT: ABNORMAL
BACTERIA SPEC CULT: NORMAL
BACTERIA URNS QL MICRO: ABNORMAL /HPF
BACTERIA URNS QL MICRO: NEGATIVE /HPF
BASE DEFICIT BLDA-SCNC: 6 MMOL/L
BASE DEFICIT BLDA-SCNC: 6.5 MMOL/L
BASOPHILS # BLD: 0 K/UL (ref 0–0.1)
BASOPHILS # BLD: 0.1 K/UL (ref 0–0.1)
BASOPHILS NFR BLD: 0 % (ref 0–1)
BASOPHILS NFR BLD: 1 % (ref 0–1)
BDY SITE: ABNORMAL
BDY SITE: ABNORMAL
BILIRUB DIRECT SERPL-MCNC: 0.7 MG/DL (ref 0–0.2)
BILIRUB SERPL-MCNC: 0.5 MG/DL (ref 0.2–1)
BILIRUB SERPL-MCNC: 0.8 MG/DL (ref 0.2–1)
BILIRUB SERPL-MCNC: 0.9 MG/DL (ref 0.2–1)
BILIRUB SERPL-MCNC: 0.9 MG/DL (ref 0.2–1)
BILIRUB SERPL-MCNC: 1 MG/DL (ref 0.2–1)
BILIRUB SERPL-MCNC: 1 MG/DL (ref 0.2–1)
BILIRUB SERPL-MCNC: 1.1 MG/DL (ref 0.2–1)
BILIRUB SERPL-MCNC: 1.3 MG/DL (ref 0.2–1)
BILIRUB SERPL-MCNC: 1.4 MG/DL (ref 0.2–1)
BILIRUB UR QL: NEGATIVE
BILIRUB UR QL: NEGATIVE
BLD PROD TYP BPU: NORMAL
BLD PROD TYP BPU: NORMAL
BLOOD GROUP ANTIBODIES SERPL: NORMAL
BLOOD GROUP ANTIBODIES SERPL: NORMAL
BNP SERPL-MCNC: 5843 PG/ML
BNP SERPL-MCNC: ABNORMAL PG/ML
BPU ID: NORMAL
BPU ID: NORMAL
BUN SERPL-MCNC: 15 MG/DL (ref 6–20)
BUN SERPL-MCNC: 15 MG/DL (ref 6–20)
BUN SERPL-MCNC: 16 MG/DL (ref 6–20)
BUN SERPL-MCNC: 17 MG/DL (ref 6–20)
BUN SERPL-MCNC: 18 MG/DL (ref 6–20)
BUN SERPL-MCNC: 20 MG/DL (ref 6–20)
BUN SERPL-MCNC: 21 MG/DL (ref 6–20)
BUN SERPL-MCNC: 21 MG/DL (ref 6–20)
BUN SERPL-MCNC: 22 MG/DL (ref 6–20)
BUN SERPL-MCNC: 22 MG/DL (ref 6–20)
BUN SERPL-MCNC: 23 MG/DL (ref 6–20)
BUN SERPL-MCNC: 23 MG/DL (ref 6–20)
BUN SERPL-MCNC: 25 MG/DL (ref 6–20)
BUN SERPL-MCNC: 27 MG/DL (ref 6–20)
BUN SERPL-MCNC: 27 MG/DL (ref 6–20)
BUN SERPL-MCNC: 29 MG/DL (ref 6–20)
BUN SERPL-MCNC: 29 MG/DL (ref 6–20)
BUN SERPL-MCNC: 31 MG/DL (ref 6–20)
BUN SERPL-MCNC: 31 MG/DL (ref 6–20)
BUN/CREAT SERPL: 13 (ref 12–20)
BUN/CREAT SERPL: 16 (ref 12–20)
BUN/CREAT SERPL: 18 (ref 12–20)
BUN/CREAT SERPL: 18 (ref 12–20)
BUN/CREAT SERPL: 21 (ref 12–20)
BUN/CREAT SERPL: 22 (ref 12–20)
BUN/CREAT SERPL: 23 (ref 12–20)
BUN/CREAT SERPL: 24 (ref 12–20)
BUN/CREAT SERPL: 25 (ref 12–20)
BUN/CREAT SERPL: 25 (ref 12–20)
BUN/CREAT SERPL: 26 (ref 12–20)
BUN/CREAT SERPL: 30 (ref 12–20)
BUN/CREAT SERPL: 31 (ref 12–20)
BUN/CREAT SERPL: 40 (ref 12–20)
BUN/CREAT SERPL: 48 (ref 12–20)
CALCIUM SERPL-MCNC: 7.3 MG/DL (ref 8.5–10.1)
CALCIUM SERPL-MCNC: 7.5 MG/DL (ref 8.5–10.1)
CALCIUM SERPL-MCNC: 7.6 MG/DL (ref 8.5–10.1)
CALCIUM SERPL-MCNC: 7.7 MG/DL (ref 8.5–10.1)
CALCIUM SERPL-MCNC: 7.8 MG/DL (ref 8.5–10.1)
CALCIUM SERPL-MCNC: 7.8 MG/DL (ref 8.5–10.1)
CALCIUM SERPL-MCNC: 7.9 MG/DL (ref 8.5–10.1)
CALCIUM SERPL-MCNC: 7.9 MG/DL (ref 8.5–10.1)
CALCIUM SERPL-MCNC: 8 MG/DL (ref 8.5–10.1)
CALCIUM SERPL-MCNC: 8.1 MG/DL (ref 8.5–10.1)
CALCIUM SERPL-MCNC: 8.1 MG/DL (ref 8.5–10.1)
CALCIUM SERPL-MCNC: 8.2 MG/DL (ref 8.5–10.1)
CALCIUM SERPL-MCNC: 8.2 MG/DL (ref 8.5–10.1)
CALCIUM SERPL-MCNC: 8.3 MG/DL (ref 8.5–10.1)
CALCIUM SERPL-MCNC: 8.4 MG/DL (ref 8.5–10.1)
CALCIUM SERPL-MCNC: 8.4 MG/DL (ref 8.5–10.1)
CALCIUM SERPL-MCNC: 8.5 MG/DL (ref 8.5–10.1)
CALCIUM SERPL-MCNC: 8.8 MG/DL (ref 8.5–10.1)
CALCIUM SERPL-MCNC: 8.9 MG/DL (ref 8.5–10.1)
CALCULATED R AXIS, ECG10: 1 DEGREES
CALCULATED R AXIS, ECG10: 12 DEGREES
CALCULATED R AXIS, ECG10: 19 DEGREES
CALCULATED T AXIS, ECG11: -168 DEGREES
CALCULATED T AXIS, ECG11: -170 DEGREES
CALCULATED T AXIS, ECG11: 110 DEGREES
CC UR VC: ABNORMAL
CHLORIDE SERPL-SCNC: 100 MMOL/L (ref 97–108)
CHLORIDE SERPL-SCNC: 101 MMOL/L (ref 97–108)
CHLORIDE SERPL-SCNC: 102 MMOL/L (ref 97–108)
CHLORIDE SERPL-SCNC: 103 MMOL/L (ref 97–108)
CHLORIDE SERPL-SCNC: 88 MMOL/L (ref 97–108)
CHLORIDE SERPL-SCNC: 88 MMOL/L (ref 97–108)
CHLORIDE SERPL-SCNC: 89 MMOL/L (ref 97–108)
CHLORIDE SERPL-SCNC: 89 MMOL/L (ref 97–108)
CHLORIDE SERPL-SCNC: 90 MMOL/L (ref 97–108)
CHLORIDE SERPL-SCNC: 91 MMOL/L (ref 97–108)
CHLORIDE SERPL-SCNC: 93 MMOL/L (ref 97–108)
CHLORIDE SERPL-SCNC: 94 MMOL/L (ref 97–108)
CHLORIDE SERPL-SCNC: 96 MMOL/L (ref 97–108)
CHLORIDE SERPL-SCNC: 97 MMOL/L (ref 97–108)
CHLORIDE SERPL-SCNC: 98 MMOL/L (ref 97–108)
CHLORIDE SERPL-SCNC: 98 MMOL/L (ref 97–108)
CHLORIDE SERPL-SCNC: 99 MMOL/L (ref 97–108)
CHLORIDE UR-SCNC: 96 MMOL/L
CHOLEST SERPL-MCNC: 54 MG/DL
CO2 SERPL-SCNC: 14 MMOL/L (ref 21–32)
CO2 SERPL-SCNC: 14 MMOL/L (ref 21–32)
CO2 SERPL-SCNC: 15 MMOL/L (ref 21–32)
CO2 SERPL-SCNC: 15 MMOL/L (ref 21–32)
CO2 SERPL-SCNC: 18 MMOL/L (ref 21–32)
CO2 SERPL-SCNC: 20 MMOL/L (ref 21–32)
CO2 SERPL-SCNC: 21 MMOL/L (ref 21–32)
CO2 SERPL-SCNC: 22 MMOL/L (ref 21–32)
CO2 SERPL-SCNC: 23 MMOL/L (ref 21–32)
CO2 SERPL-SCNC: 24 MMOL/L (ref 21–32)
CO2 SERPL-SCNC: 25 MMOL/L (ref 21–32)
CO2 SERPL-SCNC: 26 MMOL/L (ref 21–32)
CO2 SERPL-SCNC: 27 MMOL/L (ref 21–32)
CO2 SERPL-SCNC: 27 MMOL/L (ref 21–32)
COLOR UR: ABNORMAL
COLOR UR: NORMAL
COMMENT, HOLDF: NORMAL
CORTIS SERPL-MCNC: 37.8 UG/DL
COVID-19 RAPID TEST, COVR: NOT DETECTED
CREAT SERPL-MCNC: 0.56 MG/DL (ref 0.55–1.02)
CREAT SERPL-MCNC: 0.62 MG/DL (ref 0.55–1.02)
CREAT SERPL-MCNC: 0.64 MG/DL (ref 0.55–1.02)
CREAT SERPL-MCNC: 0.66 MG/DL (ref 0.55–1.02)
CREAT SERPL-MCNC: 0.74 MG/DL (ref 0.55–1.02)
CREAT SERPL-MCNC: 0.76 MG/DL (ref 0.55–1.02)
CREAT SERPL-MCNC: 0.78 MG/DL (ref 0.55–1.02)
CREAT SERPL-MCNC: 0.83 MG/DL (ref 0.55–1.02)
CREAT SERPL-MCNC: 0.85 MG/DL (ref 0.55–1.02)
CREAT SERPL-MCNC: 0.87 MG/DL (ref 0.55–1.02)
CREAT SERPL-MCNC: 0.89 MG/DL (ref 0.55–1.02)
CREAT SERPL-MCNC: 0.91 MG/DL (ref 0.55–1.02)
CREAT SERPL-MCNC: 0.95 MG/DL (ref 0.55–1.02)
CREAT SERPL-MCNC: 0.95 MG/DL (ref 0.55–1.02)
CREAT SERPL-MCNC: 0.97 MG/DL (ref 0.55–1.02)
CREAT SERPL-MCNC: 0.99 MG/DL (ref 0.55–1.02)
CREAT SERPL-MCNC: 1.01 MG/DL (ref 0.55–1.02)
CREAT SERPL-MCNC: 1.04 MG/DL (ref 0.55–1.02)
CREAT SERPL-MCNC: 1.04 MG/DL (ref 0.55–1.02)
CREAT SERPL-MCNC: 1.07 MG/DL (ref 0.55–1.02)
CREAT SERPL-MCNC: 1.1 MG/DL (ref 0.55–1.02)
CREAT SERPL-MCNC: 1.1 MG/DL (ref 0.55–1.02)
CREAT SERPL-MCNC: 1.18 MG/DL (ref 0.55–1.02)
CREAT SERPL-MCNC: 1.32 MG/DL (ref 0.55–1.02)
CREAT SERPL-MCNC: 1.39 MG/DL (ref 0.55–1.02)
CREAT SERPL-MCNC: 1.42 MG/DL (ref 0.55–1.02)
CREAT SERPL-MCNC: 1.82 MG/DL (ref 0.55–1.02)
CREAT SERPL-MCNC: 1.97 MG/DL (ref 0.55–1.02)
CROSSMATCH RESULT,%XM: NORMAL
CROSSMATCH RESULT,%XM: NORMAL
CRP SERPL-MCNC: 2.85 MG/DL (ref 0–0.6)
CRP SERPL-MCNC: 6.32 MG/DL (ref 0–0.6)
D DIMER PPP FEU-MCNC: 1.35 MG/L FEU (ref 0–0.65)
DATE LAST DOSE: ABNORMAL
DIAGNOSIS, 93000: NORMAL
DIFFERENTIAL METHOD BLD: ABNORMAL
ECHO AV AREA PEAK VELOCITY: 1.56 CM2
ECHO AV AREA VTI: 1.85 CM2
ECHO AV AREA/BSA PEAK VELOCITY: 0.9 CM2/M2
ECHO AV AREA/BSA VTI: 1.1 CM2/M2
ECHO AV MEAN GRADIENT: 17.01 MMHG
ECHO AV PEAK GRADIENT: 31.66 MMHG
ECHO AV PEAK VELOCITY: 281.32 CM/S
ECHO AV VTI: 46.51 CM
ECHO EST RA PRESSURE: 8 MMHG
ECHO LA AREA 4C: 31.64 CM2
ECHO LA MAJOR AXIS: 4.18 CM
ECHO LA MINOR AXIS: 2.45 CM
ECHO LA TO AORTIC ROOT RATIO: 1.61
ECHO LA TO AORTIC ROOT RATIO: 1.61
ECHO LA VOL 4C: 129.45 ML (ref 22–52)
ECHO LA VOLUME INDEX A4C: 75.76 ML/M2 (ref 16–28)
ECHO LV INTERNAL DIMENSION DIASTOLIC: 3.53 CM (ref 3.9–5.3)
ECHO LV INTERNAL DIMENSION SYSTOLIC: 2.72 CM
ECHO LV IVSD: 1.02 CM (ref 0.6–0.9)
ECHO LV MASS 2D: 140.1 G (ref 67–162)
ECHO LV MASS INDEX 2D: 82 G/M2 (ref 43–95)
ECHO LV POSTERIOR WALL DIASTOLIC: 1.41 CM (ref 0.6–0.9)
ECHO LVOT DIAM: 2.13 CM
ECHO LVOT PEAK GRADIENT: 6.05 MMHG
ECHO LVOT PEAK VELOCITY: 122.96 CM/S
ECHO LVOT SV: 86.1 ML
ECHO LVOT VTI: 24.13 CM
ECHO MV AREA PHT: 5.05 CM2
ECHO MV AREA VTI: 2.79 CM2
ECHO MV EROA PISA: 0.29 CM2
ECHO MV MAX VELOCITY: 201.2 CM/S
ECHO MV MEAN GRADIENT: 5.11 MMHG
ECHO MV PEAK GRADIENT: 16.19 MMHG
ECHO MV PRESSURE HALF TIME (PHT): 43.54 MS
ECHO MV REGURGITANT RADIUS PISA: 0.89 CM
ECHO MV REGURGITANT VOLUME: 48.25 ML
ECHO MV REGURGITANT VTIA: 165.94 CM
ECHO MV REGURGITANT VTIA: 167.8 CM
ECHO MV VTI: 30.87 CM
ECHO RIGHT VENTRICULAR SYSTOLIC PRESSURE (RVSP): 111.11 MMHG
ECHO RV INTERNAL DIMENSION: 5.03 CM
ECHO TV REGURGITANT MAX VELOCITY: 507.71 CM/S
ECHO TV REGURGITANT PEAK GRADIENT: 103.11 MMHG
EOSINOPHIL # BLD: 0 K/UL (ref 0–0.4)
EOSINOPHIL # BLD: 0.1 K/UL (ref 0–0.4)
EOSINOPHIL # BLD: 0.2 K/UL (ref 0–0.4)
EOSINOPHIL NFR BLD: 0 % (ref 0–7)
EOSINOPHIL NFR BLD: 1 % (ref 0–7)
EOSINOPHIL NFR BLD: 2 % (ref 0–7)
EOSINOPHIL NFR BLD: 3 % (ref 0–7)
EOSINOPHIL NFR BLD: 3 % (ref 0–7)
EPITH CASTS URNS QL MICRO: ABNORMAL /LPF
EPITH CASTS URNS QL MICRO: NORMAL /LPF
ERYTHROCYTE [DISTWIDTH] IN BLOOD BY AUTOMATED COUNT: 17.9 % (ref 11.5–14.5)
ERYTHROCYTE [DISTWIDTH] IN BLOOD BY AUTOMATED COUNT: 18 % (ref 11.5–14.5)
ERYTHROCYTE [DISTWIDTH] IN BLOOD BY AUTOMATED COUNT: 18 % (ref 11.5–14.5)
ERYTHROCYTE [DISTWIDTH] IN BLOOD BY AUTOMATED COUNT: 18.3 % (ref 11.5–14.5)
ERYTHROCYTE [DISTWIDTH] IN BLOOD BY AUTOMATED COUNT: 18.5 % (ref 11.5–14.5)
ERYTHROCYTE [DISTWIDTH] IN BLOOD BY AUTOMATED COUNT: 18.6 % (ref 11.5–14.5)
ERYTHROCYTE [DISTWIDTH] IN BLOOD BY AUTOMATED COUNT: 18.9 % (ref 11.5–14.5)
ERYTHROCYTE [DISTWIDTH] IN BLOOD BY AUTOMATED COUNT: 19 % (ref 11.5–14.5)
ERYTHROCYTE [DISTWIDTH] IN BLOOD BY AUTOMATED COUNT: 19 % (ref 11.5–14.5)
ERYTHROCYTE [DISTWIDTH] IN BLOOD BY AUTOMATED COUNT: 19.1 % (ref 11.5–14.5)
ERYTHROCYTE [DISTWIDTH] IN BLOOD BY AUTOMATED COUNT: 19.1 % (ref 11.5–14.5)
ERYTHROCYTE [DISTWIDTH] IN BLOOD BY AUTOMATED COUNT: 19.2 % (ref 11.5–14.5)
ERYTHROCYTE [DISTWIDTH] IN BLOOD BY AUTOMATED COUNT: 19.3 % (ref 11.5–14.5)
ERYTHROCYTE [DISTWIDTH] IN BLOOD BY AUTOMATED COUNT: 19.4 % (ref 11.5–14.5)
ERYTHROCYTE [DISTWIDTH] IN BLOOD BY AUTOMATED COUNT: 19.4 % (ref 11.5–14.5)
ERYTHROCYTE [DISTWIDTH] IN BLOOD BY AUTOMATED COUNT: 19.5 % (ref 11.5–14.5)
ERYTHROCYTE [DISTWIDTH] IN BLOOD BY AUTOMATED COUNT: 19.6 % (ref 11.5–14.5)
ERYTHROCYTE [DISTWIDTH] IN BLOOD BY AUTOMATED COUNT: 19.7 % (ref 11.5–14.5)
ERYTHROCYTE [DISTWIDTH] IN BLOOD BY AUTOMATED COUNT: 19.7 % (ref 11.5–14.5)
ERYTHROCYTE [DISTWIDTH] IN BLOOD BY AUTOMATED COUNT: 19.8 % (ref 11.5–14.5)
ERYTHROCYTE [DISTWIDTH] IN BLOOD BY AUTOMATED COUNT: 19.9 % (ref 11.5–14.5)
ERYTHROCYTE [DISTWIDTH] IN BLOOD BY AUTOMATED COUNT: 20 % (ref 11.5–14.5)
ERYTHROCYTE [DISTWIDTH] IN BLOOD BY AUTOMATED COUNT: 21.6 % (ref 11.5–14.5)
ERYTHROCYTE [SEDIMENTATION RATE] IN BLOOD: 34 MM/HR (ref 0–30)
ERYTHROCYTE [SEDIMENTATION RATE] IN BLOOD: 63 MM/HR (ref 0–30)
EST. AVERAGE GLUCOSE BLD GHB EST-MCNC: 103 MG/DL
FERRITIN SERPL-MCNC: 65 NG/ML (ref 8–252)
FIO2 ON VENT: 1 %
FIO2 ON VENT: 90 %
FOLATE SERPL-MCNC: 37.2 NG/ML (ref 5–21)
GAS FLOW.O2 O2 DELIVERY SYS: 30 L/MIN
GLOBULIN SER CALC-MCNC: 2.3 G/DL (ref 2–4)
GLOBULIN SER CALC-MCNC: 2.5 G/DL (ref 2–4)
GLOBULIN SER CALC-MCNC: 2.6 G/DL (ref 2–4)
GLOBULIN SER CALC-MCNC: 3.4 G/DL (ref 2–4)
GLOBULIN SER CALC-MCNC: 3.4 G/DL (ref 2–4)
GLOBULIN SER CALC-MCNC: 3.6 G/DL (ref 2–4)
GLOBULIN SER CALC-MCNC: 3.7 G/DL (ref 2–4)
GLUCOSE BLD STRIP.AUTO-MCNC: 104 MG/DL (ref 65–117)
GLUCOSE BLD STRIP.AUTO-MCNC: 104 MG/DL (ref 65–117)
GLUCOSE BLD STRIP.AUTO-MCNC: 113 MG/DL (ref 65–117)
GLUCOSE BLD STRIP.AUTO-MCNC: 119 MG/DL (ref 65–117)
GLUCOSE BLD STRIP.AUTO-MCNC: 124 MG/DL (ref 65–117)
GLUCOSE BLD STRIP.AUTO-MCNC: 125 MG/DL (ref 65–117)
GLUCOSE BLD STRIP.AUTO-MCNC: 127 MG/DL (ref 65–117)
GLUCOSE BLD STRIP.AUTO-MCNC: 127 MG/DL (ref 65–117)
GLUCOSE BLD STRIP.AUTO-MCNC: 130 MG/DL (ref 65–117)
GLUCOSE BLD STRIP.AUTO-MCNC: 132 MG/DL (ref 65–117)
GLUCOSE BLD STRIP.AUTO-MCNC: 133 MG/DL (ref 65–117)
GLUCOSE BLD STRIP.AUTO-MCNC: 134 MG/DL (ref 65–117)
GLUCOSE BLD STRIP.AUTO-MCNC: 136 MG/DL (ref 65–117)
GLUCOSE BLD STRIP.AUTO-MCNC: 141 MG/DL (ref 65–117)
GLUCOSE BLD STRIP.AUTO-MCNC: 141 MG/DL (ref 65–117)
GLUCOSE BLD STRIP.AUTO-MCNC: 142 MG/DL (ref 65–117)
GLUCOSE BLD STRIP.AUTO-MCNC: 143 MG/DL (ref 65–117)
GLUCOSE BLD STRIP.AUTO-MCNC: 146 MG/DL (ref 65–117)
GLUCOSE BLD STRIP.AUTO-MCNC: 150 MG/DL (ref 65–117)
GLUCOSE BLD STRIP.AUTO-MCNC: 151 MG/DL (ref 65–117)
GLUCOSE BLD STRIP.AUTO-MCNC: 155 MG/DL (ref 65–117)
GLUCOSE BLD STRIP.AUTO-MCNC: 157 MG/DL (ref 65–117)
GLUCOSE BLD STRIP.AUTO-MCNC: 71 MG/DL (ref 65–117)
GLUCOSE BLD STRIP.AUTO-MCNC: 74 MG/DL (ref 65–117)
GLUCOSE BLD STRIP.AUTO-MCNC: 83 MG/DL (ref 65–117)
GLUCOSE BLD STRIP.AUTO-MCNC: 87 MG/DL (ref 65–117)
GLUCOSE BLD STRIP.AUTO-MCNC: 88 MG/DL (ref 65–117)
GLUCOSE BLD STRIP.AUTO-MCNC: 98 MG/DL (ref 65–100)
GLUCOSE SERPL-MCNC: 100 MG/DL (ref 65–100)
GLUCOSE SERPL-MCNC: 100 MG/DL (ref 65–100)
GLUCOSE SERPL-MCNC: 101 MG/DL (ref 65–100)
GLUCOSE SERPL-MCNC: 107 MG/DL (ref 65–100)
GLUCOSE SERPL-MCNC: 109 MG/DL (ref 65–100)
GLUCOSE SERPL-MCNC: 110 MG/DL (ref 65–100)
GLUCOSE SERPL-MCNC: 112 MG/DL (ref 65–100)
GLUCOSE SERPL-MCNC: 115 MG/DL (ref 65–100)
GLUCOSE SERPL-MCNC: 124 MG/DL (ref 65–100)
GLUCOSE SERPL-MCNC: 125 MG/DL (ref 65–100)
GLUCOSE SERPL-MCNC: 126 MG/DL (ref 65–100)
GLUCOSE SERPL-MCNC: 128 MG/DL (ref 65–100)
GLUCOSE SERPL-MCNC: 130 MG/DL (ref 65–100)
GLUCOSE SERPL-MCNC: 130 MG/DL (ref 65–100)
GLUCOSE SERPL-MCNC: 134 MG/DL (ref 65–100)
GLUCOSE SERPL-MCNC: 136 MG/DL (ref 65–100)
GLUCOSE SERPL-MCNC: 137 MG/DL (ref 65–100)
GLUCOSE SERPL-MCNC: 141 MG/DL (ref 65–100)
GLUCOSE SERPL-MCNC: 150 MG/DL (ref 65–100)
GLUCOSE SERPL-MCNC: 159 MG/DL (ref 65–100)
GLUCOSE SERPL-MCNC: 165 MG/DL (ref 65–100)
GLUCOSE SERPL-MCNC: 176 MG/DL (ref 65–100)
GLUCOSE SERPL-MCNC: 235 MG/DL (ref 65–100)
GLUCOSE SERPL-MCNC: 71 MG/DL (ref 65–100)
GLUCOSE SERPL-MCNC: 84 MG/DL (ref 65–100)
GLUCOSE SERPL-MCNC: 94 MG/DL (ref 65–100)
GLUCOSE SERPL-MCNC: 95 MG/DL (ref 65–100)
GLUCOSE SERPL-MCNC: 97 MG/DL (ref 65–100)
GLUCOSE UR STRIP.AUTO-MCNC: NEGATIVE MG/DL
GLUCOSE UR STRIP.AUTO-MCNC: NEGATIVE MG/DL
HBA1C MFR BLD: 5.2 % (ref 4–5.6)
HCO3 BLDA-SCNC: 15 MMOL/L (ref 22–26)
HCO3 BLDA-SCNC: 16 MMOL/L (ref 22–26)
HCT VFR BLD AUTO: 20.6 % (ref 35–47)
HCT VFR BLD AUTO: 22.3 % (ref 35–47)
HCT VFR BLD AUTO: 22.8 % (ref 35–47)
HCT VFR BLD AUTO: 23 % (ref 35–47)
HCT VFR BLD AUTO: 23 % (ref 35–47)
HCT VFR BLD AUTO: 23.3 % (ref 35–47)
HCT VFR BLD AUTO: 23.5 % (ref 35–47)
HCT VFR BLD AUTO: 23.7 % (ref 35–47)
HCT VFR BLD AUTO: 23.7 % (ref 35–47)
HCT VFR BLD AUTO: 24.4 % (ref 35–47)
HCT VFR BLD AUTO: 24.5 % (ref 35–47)
HCT VFR BLD AUTO: 24.6 % (ref 35–47)
HCT VFR BLD AUTO: 24.8 % (ref 35–47)
HCT VFR BLD AUTO: 24.9 % (ref 35–47)
HCT VFR BLD AUTO: 25.2 % (ref 35–47)
HCT VFR BLD AUTO: 25.5 % (ref 35–47)
HCT VFR BLD AUTO: 26.1 % (ref 35–47)
HCT VFR BLD AUTO: 26.8 % (ref 35–47)
HCT VFR BLD AUTO: 27.2 % (ref 35–47)
HCT VFR BLD AUTO: 27.9 % (ref 35–47)
HCT VFR BLD AUTO: 28.3 % (ref 35–47)
HCT VFR BLD AUTO: 28.4 % (ref 35–47)
HCT VFR BLD AUTO: 28.9 % (ref 35–47)
HCT VFR BLD AUTO: 33.6 % (ref 35–47)
HCT VFR BLD AUTO: 35.2 % (ref 35–47)
HDLC SERPL-MCNC: 28 MG/DL
HDLC SERPL: 1.9 {RATIO} (ref 0–5)
HEMOCCULT STL QL: POSITIVE
HGB BLD-MCNC: 10 G/DL (ref 11.5–16)
HGB BLD-MCNC: 10.4 G/DL (ref 11.5–16)
HGB BLD-MCNC: 6.2 G/DL (ref 11.5–16)
HGB BLD-MCNC: 6.5 G/DL (ref 11.5–16)
HGB BLD-MCNC: 6.8 G/DL (ref 11.5–16)
HGB BLD-MCNC: 7 G/DL (ref 11.5–16)
HGB BLD-MCNC: 7.2 G/DL (ref 11.5–16)
HGB BLD-MCNC: 7.3 G/DL (ref 11.5–16)
HGB BLD-MCNC: 7.4 G/DL (ref 11.5–16)
HGB BLD-MCNC: 7.5 G/DL (ref 11.5–16)
HGB BLD-MCNC: 7.6 G/DL (ref 11.5–16)
HGB BLD-MCNC: 7.6 G/DL (ref 11.5–16)
HGB BLD-MCNC: 7.7 G/DL (ref 11.5–16)
HGB BLD-MCNC: 7.9 G/DL (ref 11.5–16)
HGB BLD-MCNC: 8.2 G/DL (ref 11.5–16)
HGB BLD-MCNC: 8.3 G/DL (ref 11.5–16)
HGB BLD-MCNC: 8.5 G/DL (ref 11.5–16)
HGB BLD-MCNC: 8.5 G/DL (ref 11.5–16)
HGB BLD-MCNC: 8.6 G/DL (ref 11.5–16)
HGB BLD-MCNC: 8.6 G/DL (ref 11.5–16)
HGB UR QL STRIP: ABNORMAL
HGB UR QL STRIP: NEGATIVE
HISTORY CHECKED?,CKHIST: NORMAL
HISTORY CHECKED?,CKHIST: NORMAL
HYALINE CASTS URNS QL MICRO: ABNORMAL /LPF (ref 0–5)
HYALINE CASTS URNS QL MICRO: NORMAL /LPF (ref 0–5)
IMM GRANULOCYTES # BLD AUTO: 0 K/UL
IMM GRANULOCYTES # BLD AUTO: 0 K/UL (ref 0–0.04)
IMM GRANULOCYTES # BLD AUTO: 0.1 K/UL (ref 0–0.04)
IMM GRANULOCYTES # BLD AUTO: 0.2 K/UL (ref 0–0.04)
IMM GRANULOCYTES # BLD AUTO: 0.3 K/UL (ref 0–0.04)
IMM GRANULOCYTES # BLD AUTO: 0.4 K/UL (ref 0–0.04)
IMM GRANULOCYTES NFR BLD AUTO: 0 %
IMM GRANULOCYTES NFR BLD AUTO: 0 % (ref 0–0.5)
IMM GRANULOCYTES NFR BLD AUTO: 1 % (ref 0–0.5)
IMM GRANULOCYTES NFR BLD AUTO: 2 % (ref 0–0.5)
IMM GRANULOCYTES NFR BLD AUTO: 6 % (ref 0–0.5)
INR PPP: 1.5 (ref 0.9–1.1)
IRON SATN MFR SERPL: 10 % (ref 20–50)
IRON SATN MFR SERPL: 5 % (ref 20–50)
IRON SERPL-MCNC: 18 UG/DL (ref 35–150)
IRON SERPL-MCNC: 25 UG/DL (ref 35–150)
KETONES UR QL STRIP.AUTO: NEGATIVE MG/DL
KETONES UR QL STRIP.AUTO: NEGATIVE MG/DL
LACTATE BLD-SCNC: 1.4 MMOL/L (ref 0.4–2)
LACTATE SERPL-SCNC: 1.6 MMOL/L (ref 0.4–2)
LACTATE SERPL-SCNC: 11.2 MMOL/L (ref 0.4–2)
LACTATE SERPL-SCNC: 2.6 MMOL/L (ref 0.4–2)
LACTATE SERPL-SCNC: 2.8 MMOL/L (ref 0.4–2)
LACTATE SERPL-SCNC: 3.3 MMOL/L (ref 0.4–2)
LACTATE SERPL-SCNC: 4 MMOL/L (ref 0.4–2)
LACTATE SERPL-SCNC: 4.2 MMOL/L (ref 0.4–2)
LACTATE SERPL-SCNC: 4.3 MMOL/L (ref 0.4–2)
LDLC SERPL CALC-MCNC: 11 MG/DL (ref 0–100)
LEFT ABI: 0.39
LEFT ARM BP: 137 MMHG
LEFT CFA PROX SYS PSV: 152.7 CM/S
LEFT GSV AT KNEE DIAM: 0.43 CM
LEFT GSV AT KNEE RFX: 0 S
LEFT GSV BK MID DIAM: 0.25 CM
LEFT GSV BK MID RFX: 0 S
LEFT GSV JUNC DIAM: 0.81 CM
LEFT GSV JUNC RFX: 2259 S
LEFT GSV THIGH PROX DIAM: 0.67 CM
LEFT GSV THIGH PROX RFX: 0 S
LEFT POP A PROX VEL RATIO: 0.72
LEFT POPLITEAL PROX SYS PSV: 64 CM/S
LEFT POSTERIOR TIBIAL: 54 MMHG
LEFT PROX ATA VELOCITY: 38 CM/S
LEFT PROX PFA A PSV: 116.9 CM/S
LEFT PROX PTA PSV: 36 CM/S
LEFT SFA PROX VEL RATIO: 2.29
LEFT SSV PROX DIAM: 0.1 CM
LEFT SUPER FEMORAL DIST SYS PSV: 89 CM/S
LEFT SUPER FEMORAL PROX SYS PSV: 350 CM/S
LEUKOCYTE ESTERASE UR QL STRIP.AUTO: ABNORMAL
LEUKOCYTE ESTERASE UR QL STRIP.AUTO: NEGATIVE
LIPASE SERPL-CCNC: 122 U/L (ref 73–393)
LIPID PROFILE,FLP: NORMAL
LYMPHOCYTES # BLD: 0.6 K/UL (ref 0.8–3.5)
LYMPHOCYTES # BLD: 0.9 K/UL (ref 0.8–3.5)
LYMPHOCYTES # BLD: 1.1 K/UL (ref 0.8–3.5)
LYMPHOCYTES # BLD: 1.2 K/UL (ref 0.8–3.5)
LYMPHOCYTES # BLD: 1.4 K/UL (ref 0.8–3.5)
LYMPHOCYTES # BLD: 1.6 K/UL (ref 0.8–3.5)
LYMPHOCYTES # BLD: 1.8 K/UL (ref 0.8–3.5)
LYMPHOCYTES # BLD: 1.9 K/UL (ref 0.8–3.5)
LYMPHOCYTES # BLD: 2 K/UL (ref 0.8–3.5)
LYMPHOCYTES # BLD: 2 K/UL (ref 0.8–3.5)
LYMPHOCYTES # BLD: 2.2 K/UL (ref 0.8–3.5)
LYMPHOCYTES # BLD: 2.4 K/UL (ref 0.8–3.5)
LYMPHOCYTES # BLD: 3.6 K/UL (ref 0.8–3.5)
LYMPHOCYTES NFR BLD: 10 % (ref 12–49)
LYMPHOCYTES NFR BLD: 12 % (ref 12–49)
LYMPHOCYTES NFR BLD: 13 % (ref 12–49)
LYMPHOCYTES NFR BLD: 17 % (ref 12–49)
LYMPHOCYTES NFR BLD: 18 % (ref 12–49)
LYMPHOCYTES NFR BLD: 20 % (ref 12–49)
LYMPHOCYTES NFR BLD: 22 % (ref 12–49)
LYMPHOCYTES NFR BLD: 22 % (ref 12–49)
LYMPHOCYTES NFR BLD: 23 % (ref 12–49)
LYMPHOCYTES NFR BLD: 24 % (ref 12–49)
LYMPHOCYTES NFR BLD: 24 % (ref 12–49)
LYMPHOCYTES NFR BLD: 26 % (ref 12–49)
LYMPHOCYTES NFR BLD: 27 % (ref 12–49)
LYMPHOCYTES NFR BLD: 29 % (ref 12–49)
LYMPHOCYTES NFR BLD: 4 % (ref 12–49)
LYMPHOCYTES NFR BLD: 5 % (ref 12–49)
LYMPHOCYTES NFR BLD: 9 % (ref 12–49)
LYMPHOCYTES NFR BLD: 9 % (ref 12–49)
MAGNESIUM SERPL-MCNC: 1.8 MG/DL (ref 1.6–2.4)
MAGNESIUM SERPL-MCNC: 1.8 MG/DL (ref 1.6–2.4)
MAGNESIUM SERPL-MCNC: 1.9 MG/DL (ref 1.6–2.4)
MAGNESIUM SERPL-MCNC: 2 MG/DL (ref 1.6–2.4)
MAGNESIUM SERPL-MCNC: 2.1 MG/DL (ref 1.6–2.4)
MCH RBC QN AUTO: 25.7 PG (ref 26–34)
MCH RBC QN AUTO: 25.9 PG (ref 26–34)
MCH RBC QN AUTO: 26 PG (ref 26–34)
MCH RBC QN AUTO: 26.1 PG (ref 26–34)
MCH RBC QN AUTO: 26.2 PG (ref 26–34)
MCH RBC QN AUTO: 26.2 PG (ref 26–34)
MCH RBC QN AUTO: 26.3 PG (ref 26–34)
MCH RBC QN AUTO: 26.3 PG (ref 26–34)
MCH RBC QN AUTO: 26.4 PG (ref 26–34)
MCH RBC QN AUTO: 26.4 PG (ref 26–34)
MCH RBC QN AUTO: 26.6 PG (ref 26–34)
MCH RBC QN AUTO: 26.7 PG (ref 26–34)
MCH RBC QN AUTO: 27 PG (ref 26–34)
MCH RBC QN AUTO: 27 PG (ref 26–34)
MCH RBC QN AUTO: 27.1 PG (ref 26–34)
MCH RBC QN AUTO: 27.1 PG (ref 26–34)
MCH RBC QN AUTO: 27.3 PG (ref 26–34)
MCH RBC QN AUTO: 27.3 PG (ref 26–34)
MCH RBC QN AUTO: 27.4 PG (ref 26–34)
MCH RBC QN AUTO: 27.5 PG (ref 26–34)
MCH RBC QN AUTO: 27.6 PG (ref 26–34)
MCH RBC QN AUTO: 27.6 PG (ref 26–34)
MCH RBC QN AUTO: 27.7 PG (ref 26–34)
MCHC RBC AUTO-ENTMCNC: 28.4 G/DL (ref 30–36.5)
MCHC RBC AUTO-ENTMCNC: 29.1 G/DL (ref 30–36.5)
MCHC RBC AUTO-ENTMCNC: 29.5 G/DL (ref 30–36.5)
MCHC RBC AUTO-ENTMCNC: 29.5 G/DL (ref 30–36.5)
MCHC RBC AUTO-ENTMCNC: 29.6 G/DL (ref 30–36.5)
MCHC RBC AUTO-ENTMCNC: 29.7 G/DL (ref 30–36.5)
MCHC RBC AUTO-ENTMCNC: 29.7 G/DL (ref 30–36.5)
MCHC RBC AUTO-ENTMCNC: 29.8 G/DL (ref 30–36.5)
MCHC RBC AUTO-ENTMCNC: 29.9 G/DL (ref 30–36.5)
MCHC RBC AUTO-ENTMCNC: 29.9 G/DL (ref 30–36.5)
MCHC RBC AUTO-ENTMCNC: 30.1 G/DL (ref 30–36.5)
MCHC RBC AUTO-ENTMCNC: 30.2 G/DL (ref 30–36.5)
MCHC RBC AUTO-ENTMCNC: 30.4 G/DL (ref 30–36.5)
MCHC RBC AUTO-ENTMCNC: 30.6 G/DL (ref 30–36.5)
MCHC RBC AUTO-ENTMCNC: 30.7 G/DL (ref 30–36.5)
MCHC RBC AUTO-ENTMCNC: 30.8 G/DL (ref 30–36.5)
MCHC RBC AUTO-ENTMCNC: 31 G/DL (ref 30–36.5)
MCHC RBC AUTO-ENTMCNC: 31 G/DL (ref 30–36.5)
MCHC RBC AUTO-ENTMCNC: 31.3 G/DL (ref 30–36.5)
MCV RBC AUTO: 83.3 FL (ref 80–99)
MCV RBC AUTO: 84.1 FL (ref 80–99)
MCV RBC AUTO: 84.8 FL (ref 80–99)
MCV RBC AUTO: 85.8 FL (ref 80–99)
MCV RBC AUTO: 86.8 FL (ref 80–99)
MCV RBC AUTO: 87.5 FL (ref 80–99)
MCV RBC AUTO: 87.9 FL (ref 80–99)
MCV RBC AUTO: 88 FL (ref 80–99)
MCV RBC AUTO: 88 FL (ref 80–99)
MCV RBC AUTO: 88.2 FL (ref 80–99)
MCV RBC AUTO: 88.3 FL (ref 80–99)
MCV RBC AUTO: 88.4 FL (ref 80–99)
MCV RBC AUTO: 88.7 FL (ref 80–99)
MCV RBC AUTO: 89 FL (ref 80–99)
MCV RBC AUTO: 89.1 FL (ref 80–99)
MCV RBC AUTO: 89.1 FL (ref 80–99)
MCV RBC AUTO: 89.2 FL (ref 80–99)
MCV RBC AUTO: 89.4 FL (ref 80–99)
MCV RBC AUTO: 89.8 FL (ref 80–99)
MCV RBC AUTO: 89.9 FL (ref 80–99)
MCV RBC AUTO: 90.6 FL (ref 80–99)
MCV RBC AUTO: 90.7 FL (ref 80–99)
MCV RBC AUTO: 90.7 FL (ref 80–99)
MCV RBC AUTO: 92.5 FL (ref 80–99)
MCV RBC AUTO: 92.6 FL (ref 80–99)
METAMYELOCYTES NFR BLD MANUAL: 1 %
MONOCYTES # BLD: 0.6 K/UL (ref 0–1)
MONOCYTES # BLD: 0.8 K/UL (ref 0–1)
MONOCYTES # BLD: 0.9 K/UL (ref 0–1)
MONOCYTES # BLD: 0.9 K/UL (ref 0–1)
MONOCYTES # BLD: 1 K/UL (ref 0–1)
MONOCYTES # BLD: 1.1 K/UL (ref 0–1)
MONOCYTES # BLD: 1.2 K/UL (ref 0–1)
MONOCYTES # BLD: 1.3 K/UL (ref 0–1)
MONOCYTES # BLD: 1.4 K/UL (ref 0–1)
MONOCYTES # BLD: 1.6 K/UL (ref 0–1)
MONOCYTES # BLD: 1.6 K/UL (ref 0–1)
MONOCYTES # BLD: 1.9 K/UL (ref 0–1)
MONOCYTES # BLD: 1.9 K/UL (ref 0–1)
MONOCYTES # BLD: 2.3 K/UL (ref 0–1)
MONOCYTES NFR BLD: 10 % (ref 5–13)
MONOCYTES NFR BLD: 10 % (ref 5–13)
MONOCYTES NFR BLD: 11 % (ref 5–13)
MONOCYTES NFR BLD: 11 % (ref 5–13)
MONOCYTES NFR BLD: 12 % (ref 5–13)
MONOCYTES NFR BLD: 13 % (ref 5–13)
MONOCYTES NFR BLD: 15 % (ref 5–13)
MONOCYTES NFR BLD: 16 % (ref 5–13)
MONOCYTES NFR BLD: 17 % (ref 5–13)
MONOCYTES NFR BLD: 17 % (ref 5–13)
MONOCYTES NFR BLD: 5 % (ref 5–13)
MONOCYTES NFR BLD: 7 % (ref 5–13)
MONOCYTES NFR BLD: 9 % (ref 5–13)
MR PISA PV: 581.08 CM/S
MYELOCYTES NFR BLD MANUAL: 1 %
MYELOCYTES NFR BLD MANUAL: 1 %
NEUTS BAND NFR BLD MANUAL: 1 % (ref 0–6)
NEUTS BAND NFR BLD MANUAL: 2 %
NEUTS BAND NFR BLD MANUAL: 3 %
NEUTS BAND NFR BLD MANUAL: 5 %
NEUTS SEG # BLD: 10.7 K/UL (ref 1.8–8)
NEUTS SEG # BLD: 11.6 K/UL (ref 1.8–8)
NEUTS SEG # BLD: 14.5 K/UL (ref 1.8–8)
NEUTS SEG # BLD: 19.9 K/UL (ref 1.8–8)
NEUTS SEG # BLD: 3.7 K/UL (ref 1.8–8)
NEUTS SEG # BLD: 4 K/UL (ref 1.8–8)
NEUTS SEG # BLD: 4.6 K/UL (ref 1.8–8)
NEUTS SEG # BLD: 4.7 K/UL (ref 1.8–8)
NEUTS SEG # BLD: 5 K/UL (ref 1.8–8)
NEUTS SEG # BLD: 5.2 K/UL (ref 1.8–8)
NEUTS SEG # BLD: 5.3 K/UL (ref 1.8–8)
NEUTS SEG # BLD: 6.1 K/UL (ref 1.8–8)
NEUTS SEG # BLD: 6.3 K/UL (ref 1.8–8)
NEUTS SEG # BLD: 6.8 K/UL (ref 1.8–8)
NEUTS SEG # BLD: 7.4 K/UL (ref 1.8–8)
NEUTS SEG # BLD: 7.7 K/UL (ref 1.8–8)
NEUTS SEG # BLD: 7.9 K/UL (ref 1.8–8)
NEUTS SEG # BLD: 8 K/UL (ref 1.8–8)
NEUTS SEG NFR BLD: 54 % (ref 32–75)
NEUTS SEG NFR BLD: 54 % (ref 32–75)
NEUTS SEG NFR BLD: 56 % (ref 32–75)
NEUTS SEG NFR BLD: 58 % (ref 32–75)
NEUTS SEG NFR BLD: 59 % (ref 32–75)
NEUTS SEG NFR BLD: 62 % (ref 32–75)
NEUTS SEG NFR BLD: 63 % (ref 32–75)
NEUTS SEG NFR BLD: 65 % (ref 32–75)
NEUTS SEG NFR BLD: 65 % (ref 32–75)
NEUTS SEG NFR BLD: 69 % (ref 32–75)
NEUTS SEG NFR BLD: 71 % (ref 32–75)
NEUTS SEG NFR BLD: 76 % (ref 32–75)
NEUTS SEG NFR BLD: 78 % (ref 32–75)
NEUTS SEG NFR BLD: 81 % (ref 32–75)
NEUTS SEG NFR BLD: 81 % (ref 32–75)
NEUTS SEG NFR BLD: 89 % (ref 32–75)
NITRITE UR QL STRIP.AUTO: NEGATIVE
NITRITE UR QL STRIP.AUTO: POSITIVE
NRBC # BLD: 0 K/UL (ref 0–0.01)
NRBC # BLD: 0.02 K/UL (ref 0–0.01)
NRBC # BLD: 0.02 K/UL (ref 0–0.01)
NRBC # BLD: 0.03 K/UL (ref 0–0.01)
NRBC # BLD: 0.03 K/UL (ref 0–0.01)
NRBC # BLD: 0.04 K/UL (ref 0–0.01)
NRBC # BLD: 0.04 K/UL (ref 0–0.01)
NRBC # BLD: 0.06 K/UL (ref 0–0.01)
NRBC # BLD: 0.07 K/UL (ref 0–0.01)
NRBC # BLD: 0.07 K/UL (ref 0–0.01)
NRBC # BLD: 0.08 K/UL (ref 0–0.01)
NRBC # BLD: 0.1 K/UL (ref 0–0.01)
NRBC # BLD: 0.14 K/UL (ref 0–0.01)
NRBC # BLD: 0.15 K/UL (ref 0–0.01)
NRBC # BLD: 0.15 K/UL (ref 0–0.01)
NRBC # BLD: 0.18 K/UL (ref 0–0.01)
NRBC # BLD: 0.23 K/UL (ref 0–0.01)
NRBC # BLD: 0.33 K/UL (ref 0–0.01)
NRBC BLD-RTO: 0 PER 100 WBC
NRBC BLD-RTO: 0.2 PER 100 WBC
NRBC BLD-RTO: 0.2 PER 100 WBC
NRBC BLD-RTO: 0.3 PER 100 WBC
NRBC BLD-RTO: 0.3 PER 100 WBC
NRBC BLD-RTO: 0.5 PER 100 WBC
NRBC BLD-RTO: 0.6 PER 100 WBC
NRBC BLD-RTO: 0.7 PER 100 WBC
NRBC BLD-RTO: 0.8 PER 100 WBC
NRBC BLD-RTO: 1 PER 100 WBC
NRBC BLD-RTO: 1.2 PER 100 WBC
NRBC BLD-RTO: 1.3 PER 100 WBC
NRBC BLD-RTO: 1.5 PER 100 WBC
NRBC BLD-RTO: 1.5 PER 100 WBC
NRBC BLD-RTO: 1.6 PER 100 WBC
NRBC BLD-RTO: 1.9 PER 100 WBC
OSMOLALITY SERPL: 262 MOSM/KG H2O
OSMOLALITY UR: 330 MOSM/KG H2O
PCO2 BLDA: 22 MMHG (ref 35–45)
PCO2 BLDA: 23 MMHG (ref 35–45)
PH BLDA: 7.45 [PH] (ref 7.35–7.45)
PH BLDA: 7.46 [PH] (ref 7.35–7.45)
PH UR STRIP: 5 [PH] (ref 5–8)
PH UR STRIP: 5.5 [PH] (ref 5–8)
PHOSPHATE SERPL-MCNC: 1.9 MG/DL (ref 2.6–4.7)
PHOSPHATE SERPL-MCNC: 2.3 MG/DL (ref 2.6–4.7)
PHOSPHATE SERPL-MCNC: 2.6 MG/DL (ref 2.6–4.7)
PHOSPHATE SERPL-MCNC: 3.2 MG/DL (ref 2.6–4.7)
PHOSPHATE SERPL-MCNC: 3.7 MG/DL (ref 2.6–4.7)
PHOSPHATE SERPL-MCNC: 4 MG/DL (ref 2.6–4.7)
PHOSPHATE SERPL-MCNC: 4.2 MG/DL (ref 2.6–4.7)
PHOSPHATE SERPL-MCNC: 4.5 MG/DL (ref 2.6–4.7)
PLATELET # BLD AUTO: 131 K/UL (ref 150–400)
PLATELET # BLD AUTO: 132 K/UL (ref 150–400)
PLATELET # BLD AUTO: 140 K/UL (ref 150–400)
PLATELET # BLD AUTO: 140 K/UL (ref 150–400)
PLATELET # BLD AUTO: 144 K/UL (ref 150–400)
PLATELET # BLD AUTO: 145 K/UL (ref 150–400)
PLATELET # BLD AUTO: 149 K/UL (ref 150–400)
PLATELET # BLD AUTO: 154 K/UL (ref 150–400)
PLATELET # BLD AUTO: 155 K/UL (ref 150–400)
PLATELET # BLD AUTO: 157 K/UL (ref 150–400)
PLATELET # BLD AUTO: 158 K/UL (ref 150–400)
PLATELET # BLD AUTO: 159 K/UL (ref 150–400)
PLATELET # BLD AUTO: 170 K/UL (ref 150–400)
PLATELET # BLD AUTO: 171 K/UL (ref 150–400)
PLATELET # BLD AUTO: 171 K/UL (ref 150–400)
PLATELET # BLD AUTO: 176 K/UL (ref 150–400)
PLATELET # BLD AUTO: 199 K/UL (ref 150–400)
PLATELET # BLD AUTO: 218 K/UL (ref 150–400)
PLATELET # BLD AUTO: 225 K/UL (ref 150–400)
PLATELET # BLD AUTO: 229 K/UL (ref 150–400)
PLATELET # BLD AUTO: 230 K/UL (ref 150–400)
PLATELET # BLD AUTO: 230 K/UL (ref 150–400)
PLATELET # BLD AUTO: 246 K/UL (ref 150–400)
PLATELET # BLD AUTO: 252 K/UL (ref 150–400)
PLATELET # BLD AUTO: 263 K/UL (ref 150–400)
PMV BLD AUTO: 10 FL (ref 8.9–12.9)
PMV BLD AUTO: 10.1 FL (ref 8.9–12.9)
PMV BLD AUTO: 10.1 FL (ref 8.9–12.9)
PMV BLD AUTO: 10.2 FL (ref 8.9–12.9)
PMV BLD AUTO: 10.3 FL (ref 8.9–12.9)
PMV BLD AUTO: 10.4 FL (ref 8.9–12.9)
PMV BLD AUTO: 10.5 FL (ref 8.9–12.9)
PMV BLD AUTO: 10.5 FL (ref 8.9–12.9)
PMV BLD AUTO: 10.6 FL (ref 8.9–12.9)
PMV BLD AUTO: 10.7 FL (ref 8.9–12.9)
PMV BLD AUTO: 10.7 FL (ref 8.9–12.9)
PMV BLD AUTO: 10.9 FL (ref 8.9–12.9)
PMV BLD AUTO: 11 FL (ref 8.9–12.9)
PMV BLD AUTO: 11 FL (ref 8.9–12.9)
PMV BLD AUTO: 11.1 FL (ref 8.9–12.9)
PMV BLD AUTO: 11.5 FL (ref 8.9–12.9)
PMV BLD AUTO: 9.8 FL (ref 8.9–12.9)
PMV BLD AUTO: 9.8 FL (ref 8.9–12.9)
PO2 BLDA: 297 MMHG (ref 80–100)
PO2 BLDA: 363 MMHG (ref 80–100)
POTASSIUM SERPL-SCNC: 3.3 MMOL/L (ref 3.5–5.1)
POTASSIUM SERPL-SCNC: 3.4 MMOL/L (ref 3.5–5.1)
POTASSIUM SERPL-SCNC: 3.6 MMOL/L (ref 3.5–5.1)
POTASSIUM SERPL-SCNC: 3.6 MMOL/L (ref 3.5–5.1)
POTASSIUM SERPL-SCNC: 3.7 MMOL/L (ref 3.5–5.1)
POTASSIUM SERPL-SCNC: 3.9 MMOL/L (ref 3.5–5.1)
POTASSIUM SERPL-SCNC: 3.9 MMOL/L (ref 3.5–5.1)
POTASSIUM SERPL-SCNC: 4 MMOL/L (ref 3.5–5.1)
POTASSIUM SERPL-SCNC: 4.1 MMOL/L (ref 3.5–5.1)
POTASSIUM SERPL-SCNC: 4.1 MMOL/L (ref 3.5–5.1)
POTASSIUM SERPL-SCNC: 4.2 MMOL/L (ref 3.5–5.1)
POTASSIUM SERPL-SCNC: 4.3 MMOL/L (ref 3.5–5.1)
POTASSIUM SERPL-SCNC: 4.6 MMOL/L (ref 3.5–5.1)
POTASSIUM SERPL-SCNC: 4.7 MMOL/L (ref 3.5–5.1)
POTASSIUM SERPL-SCNC: 4.8 MMOL/L (ref 3.5–5.1)
POTASSIUM SERPL-SCNC: 4.8 MMOL/L (ref 3.5–5.1)
POTASSIUM SERPL-SCNC: 4.9 MMOL/L (ref 3.5–5.1)
POTASSIUM SERPL-SCNC: 5 MMOL/L (ref 3.5–5.1)
POTASSIUM SERPL-SCNC: 5.1 MMOL/L (ref 3.5–5.1)
POTASSIUM SERPL-SCNC: 5.3 MMOL/L (ref 3.5–5.1)
POTASSIUM SERPL-SCNC: 5.3 MMOL/L (ref 3.5–5.1)
POTASSIUM SERPL-SCNC: 5.7 MMOL/L (ref 3.5–5.1)
POTASSIUM UR-SCNC: 28 MMOL/L
PROCALCITONIN SERPL-MCNC: 0.32 NG/ML
PROCALCITONIN SERPL-MCNC: 2.93 NG/ML
PROT SERPL-MCNC: 5.2 G/DL (ref 6.4–8.2)
PROT SERPL-MCNC: 5.5 G/DL (ref 6.4–8.2)
PROT SERPL-MCNC: 5.7 G/DL (ref 6.4–8.2)
PROT SERPL-MCNC: 6 G/DL (ref 6.4–8.2)
PROT SERPL-MCNC: 6 G/DL (ref 6.4–8.2)
PROT SERPL-MCNC: 6.3 G/DL (ref 6.4–8.2)
PROT SERPL-MCNC: 6.4 G/DL (ref 6.4–8.2)
PROT SERPL-MCNC: 6.5 G/DL (ref 6.4–8.2)
PROT SERPL-MCNC: 6.5 G/DL (ref 6.4–8.2)
PROT UR STRIP-MCNC: NEGATIVE MG/DL
PROT UR STRIP-MCNC: NEGATIVE MG/DL
PROTHROMBIN TIME: 15.8 SEC (ref 9–11.1)
Q-T INTERVAL, ECG07: 334 MS
Q-T INTERVAL, ECG07: 410 MS
Q-T INTERVAL, ECG07: 410 MS
QRS DURATION, ECG06: 88 MS
QRS DURATION, ECG06: 90 MS
QRS DURATION, ECG06: 98 MS
QTC CALCULATION (BEZET), ECG08: 426 MS
QTC CALCULATION (BEZET), ECG08: 439 MS
QTC CALCULATION (BEZET), ECG08: 472 MS
RBC # BLD AUTO: 2.33 M/UL (ref 3.8–5.2)
RBC # BLD AUTO: 2.46 M/UL (ref 3.8–5.2)
RBC # BLD AUTO: 2.65 M/UL (ref 3.8–5.2)
RBC # BLD AUTO: 2.66 M/UL (ref 3.8–5.2)
RBC # BLD AUTO: 2.67 M/UL (ref 3.8–5.2)
RBC # BLD AUTO: 2.69 M/UL (ref 3.8–5.2)
RBC # BLD AUTO: 2.69 M/UL (ref 3.8–5.2)
RBC # BLD AUTO: 2.71 M/UL (ref 3.8–5.2)
RBC # BLD AUTO: 2.74 M/UL (ref 3.8–5.2)
RBC # BLD AUTO: 2.74 M/UL (ref 3.8–5.2)
RBC # BLD AUTO: 2.76 M/UL (ref 3.8–5.2)
RBC # BLD AUTO: 2.77 M/UL (ref 3.8–5.2)
RBC # BLD AUTO: 2.77 M/UL (ref 3.8–5.2)
RBC # BLD AUTO: 2.78 M/UL (ref 3.8–5.2)
RBC # BLD AUTO: 2.86 M/UL (ref 3.8–5.2)
RBC # BLD AUTO: 2.89 M/UL (ref 3.8–5.2)
RBC # BLD AUTO: 2.97 M/UL (ref 3.8–5.2)
RBC # BLD AUTO: 3.04 M/UL (ref 3.8–5.2)
RBC # BLD AUTO: 3.07 M/UL (ref 3.8–5.2)
RBC # BLD AUTO: 3.08 M/UL (ref 3.8–5.2)
RBC # BLD AUTO: 3.12 M/UL (ref 3.8–5.2)
RBC # BLD AUTO: 3.13 M/UL (ref 3.8–5.2)
RBC # BLD AUTO: 3.18 M/UL (ref 3.8–5.2)
RBC # BLD AUTO: 3.82 M/UL (ref 3.8–5.2)
RBC # BLD AUTO: 3.97 M/UL (ref 3.8–5.2)
RBC #/AREA URNS HPF: ABNORMAL /HPF (ref 0–5)
RBC #/AREA URNS HPF: NORMAL /HPF (ref 0–5)
RBC MORPH BLD: ABNORMAL
REPORTED DOSE,DOSE: ABNORMAL UNITS
REPORTED DOSE/TIME,TMG: ABNORMAL
RETICS # AUTO: 0.12 M/UL (ref 0.02–0.08)
RETICS/RBC NFR AUTO: 4.4 % (ref 0.7–2.1)
RIGHT ABI: 0.41
RIGHT ARM BP: 140 MMHG
RIGHT GSV AK RFX: 1004 S
RIGHT GSV AT KNEE DIAM: 0.2 CM
RIGHT GSV BK MID DIAM: 0.2 CM
RIGHT GSV BK MID RFX: 2075 S
RIGHT GSV JUNC DIAM: 0.66 CM
RIGHT GSV JUNC RFX: 726 S
RIGHT GSV THIGH PROX DIAM: 0.54 CM
RIGHT GSV THIGH PROX RFX: 504 S
RIGHT POSTERIOR TIBIAL: 57 MMHG
RIGHT SSV PROX DIAM: 0.14 CM
RIGHT SSV PROX RFX: 0 S
SAMPLES BEING HELD,HOLD: NORMAL
SAO2 % BLD: 100 % (ref 92–97)
SAO2 % BLD: 100 % (ref 92–97)
SAO2% DEVICE SAO2% SENSOR NAME: ABNORMAL
SAO2% DEVICE SAO2% SENSOR NAME: ABNORMAL
SARS-COV-2, COV2: NORMAL
SARS-COV-2, COV2NT: NOT DETECTED
SERVICE CMNT-IMP: ABNORMAL
SERVICE CMNT-IMP: NORMAL
SODIUM SERPL-SCNC: 119 MMOL/L (ref 136–145)
SODIUM SERPL-SCNC: 120 MMOL/L (ref 136–145)
SODIUM SERPL-SCNC: 120 MMOL/L (ref 136–145)
SODIUM SERPL-SCNC: 122 MMOL/L (ref 136–145)
SODIUM SERPL-SCNC: 124 MMOL/L (ref 136–145)
SODIUM SERPL-SCNC: 125 MMOL/L (ref 136–145)
SODIUM SERPL-SCNC: 127 MMOL/L (ref 136–145)
SODIUM SERPL-SCNC: 128 MMOL/L (ref 136–145)
SODIUM SERPL-SCNC: 129 MMOL/L (ref 136–145)
SODIUM SERPL-SCNC: 130 MMOL/L (ref 136–145)
SODIUM SERPL-SCNC: 131 MMOL/L (ref 136–145)
SODIUM SERPL-SCNC: 132 MMOL/L (ref 136–145)
SODIUM SERPL-SCNC: 133 MMOL/L (ref 136–145)
SODIUM SERPL-SCNC: 134 MMOL/L (ref 136–145)
SODIUM SERPL-SCNC: 135 MMOL/L (ref 136–145)
SODIUM UR-SCNC: 77 MMOL/L
SOURCE, COVRS: NORMAL
SP GR UR REFRACTOMETRY: 1.01 (ref 1–1.03)
SP GR UR REFRACTOMETRY: 1.01 (ref 1–1.03)
SPECIMEN EXP DATE BLD: NORMAL
SPECIMEN EXP DATE BLD: NORMAL
SPECIMEN SITE: ABNORMAL
SPECIMEN SITE: ABNORMAL
STATUS OF UNIT,%ST: NORMAL
STATUS OF UNIT,%ST: NORMAL
THERAPEUTIC RANGE,PTTT: ABNORMAL SECS (ref 58–77)
TIBC SERPL-MCNC: 251 UG/DL (ref 250–450)
TIBC SERPL-MCNC: 365 UG/DL (ref 250–450)
TRIGL SERPL-MCNC: 75 MG/DL (ref ?–150)
TROPONIN I SERPL-MCNC: 0.05 NG/ML
TROPONIN I SERPL-MCNC: 0.19 NG/ML
TROPONIN I SERPL-MCNC: 0.2 NG/ML
TROPONIN I SERPL-MCNC: 0.26 NG/ML
TSH SERPL DL<=0.05 MIU/L-ACNC: 2.19 UIU/ML (ref 0.36–3.74)
UA: UC IF INDICATED,UAUC: ABNORMAL
UNIT DIVISION, %UDIV: 0
UNIT DIVISION, %UDIV: 0
UR CULT HOLD, URHOLD: NORMAL
URATE SERPL-MCNC: 5.2 MG/DL (ref 2.6–6)
URATE SERPL-MCNC: 6.6 MG/DL (ref 2.6–6)
UROBILINOGEN UR QL STRIP.AUTO: 0.2 EU/DL (ref 0.2–1)
UROBILINOGEN UR QL STRIP.AUTO: 0.2 EU/DL (ref 0.2–1)
VANCOMYCIN TROUGH SERPL-MCNC: 11.4 UG/ML (ref 5–10)
VENTRICULAR RATE, ECG03: 69 BPM
VENTRICULAR RATE, ECG03: 80 BPM
VENTRICULAR RATE, ECG03: 98 BPM
VIT B12 SERPL-MCNC: >2000 PG/ML (ref 193–986)
VLDLC SERPL CALC-MCNC: 15 MG/DL
WBC # BLD AUTO: 10.8 K/UL (ref 3.6–11)
WBC # BLD AUTO: 10.9 K/UL (ref 3.6–11)
WBC # BLD AUTO: 11.2 K/UL (ref 3.6–11)
WBC # BLD AUTO: 11.6 K/UL (ref 3.6–11)
WBC # BLD AUTO: 11.7 K/UL (ref 3.6–11)
WBC # BLD AUTO: 13.5 K/UL (ref 3.6–11)
WBC # BLD AUTO: 14 K/UL (ref 3.6–11)
WBC # BLD AUTO: 14.2 K/UL (ref 3.6–11)
WBC # BLD AUTO: 17.9 K/UL (ref 3.6–11)
WBC # BLD AUTO: 22.1 K/UL (ref 3.6–11)
WBC # BLD AUTO: 6.8 K/UL (ref 3.6–11)
WBC # BLD AUTO: 6.8 K/UL (ref 3.6–11)
WBC # BLD AUTO: 7.1 K/UL (ref 3.6–11)
WBC # BLD AUTO: 7.8 K/UL (ref 3.6–11)
WBC # BLD AUTO: 7.9 K/UL (ref 3.6–11)
WBC # BLD AUTO: 8.2 K/UL (ref 3.6–11)
WBC # BLD AUTO: 8.3 K/UL (ref 3.6–11)
WBC # BLD AUTO: 8.9 K/UL (ref 3.6–11)
WBC # BLD AUTO: 9.2 K/UL (ref 3.6–11)
WBC # BLD AUTO: 9.3 K/UL (ref 3.6–11)
WBC # BLD AUTO: 9.4 K/UL (ref 3.6–11)
WBC # BLD AUTO: 9.7 K/UL (ref 3.6–11)
WBC # BLD AUTO: 9.9 K/UL (ref 3.6–11)
WBC MORPH BLD: ABNORMAL
WBC URNS QL MICRO: ABNORMAL /HPF (ref 0–4)
WBC URNS QL MICRO: NORMAL /HPF (ref 0–4)

## 2021-01-01 PROCEDURE — 74011250636 HC RX REV CODE- 250/636: Performed by: FAMILY MEDICINE

## 2021-01-01 PROCEDURE — 74011636637 HC RX REV CODE- 636/637: Performed by: INTERNAL MEDICINE

## 2021-01-01 PROCEDURE — 74011250637 HC RX REV CODE- 250/637: Performed by: FAMILY MEDICINE

## 2021-01-01 PROCEDURE — 74011000258 HC RX REV CODE- 258: Performed by: NURSE PRACTITIONER

## 2021-01-01 PROCEDURE — 74011000250 HC RX REV CODE- 250: Performed by: NURSE PRACTITIONER

## 2021-01-01 PROCEDURE — A9503 TC99M MEDRONATE: HCPCS

## 2021-01-01 PROCEDURE — 74018 RADEX ABDOMEN 1 VIEW: CPT

## 2021-01-01 PROCEDURE — 87077 CULTURE AEROBIC IDENTIFY: CPT

## 2021-01-01 PROCEDURE — HOSPICE MEDICATION HC HH HOSPICE MEDICATION

## 2021-01-01 PROCEDURE — 2709999900 HC NON-CHARGEABLE SUPPLY

## 2021-01-01 PROCEDURE — G0463 HOSPITAL OUTPT CLINIC VISIT: HCPCS | Performed by: INTERNAL MEDICINE

## 2021-01-01 PROCEDURE — 74011250636 HC RX REV CODE- 250/636: Performed by: EMERGENCY MEDICINE

## 2021-01-01 PROCEDURE — 0651 HSPC ROUTINE HOME CARE

## 2021-01-01 PROCEDURE — 80053 COMPREHEN METABOLIC PANEL: CPT

## 2021-01-01 PROCEDURE — 74011250637 HC RX REV CODE- 250/637

## 2021-01-01 PROCEDURE — 80048 BASIC METABOLIC PNL TOTAL CA: CPT

## 2021-01-01 PROCEDURE — 65610000006 HC RM INTENSIVE CARE

## 2021-01-01 PROCEDURE — 82272 OCCULT BLD FECES 1-3 TESTS: CPT

## 2021-01-01 PROCEDURE — 73701 CT LOWER EXTREMITY W/DYE: CPT

## 2021-01-01 PROCEDURE — 047N3ZZ DILATION OF LEFT POPLITEAL ARTERY, PERCUTANEOUS APPROACH: ICD-10-PCS

## 2021-01-01 PROCEDURE — 85025 COMPLETE CBC W/AUTO DIFF WBC: CPT

## 2021-01-01 PROCEDURE — 85027 COMPLETE CBC AUTOMATED: CPT

## 2021-01-01 PROCEDURE — 77030038269 HC DRN EXT URIN PURWCK BARD -A

## 2021-01-01 PROCEDURE — 74011250637 HC RX REV CODE- 250/637: Performed by: INTERNAL MEDICINE

## 2021-01-01 PROCEDURE — 94760 N-INVAS EAR/PLS OXIMETRY 1: CPT

## 2021-01-01 PROCEDURE — 73590 X-RAY EXAM OF LOWER LEG: CPT

## 2021-01-01 PROCEDURE — 74011250637 HC RX REV CODE- 250/637: Performed by: NURSE PRACTITIONER

## 2021-01-01 PROCEDURE — P9040 RBC LEUKOREDUCED IRRADIATED: HCPCS

## 2021-01-01 PROCEDURE — 36415 COLL VENOUS BLD VENIPUNCTURE: CPT

## 2021-01-01 PROCEDURE — APPSS45 APP SPLIT SHARED TIME 31-45 MINUTES: Performed by: NURSE PRACTITIONER

## 2021-01-01 PROCEDURE — 84145 PROCALCITONIN (PCT): CPT

## 2021-01-01 PROCEDURE — 74011000258 HC RX REV CODE- 258: Performed by: FAMILY MEDICINE

## 2021-01-01 PROCEDURE — 77030018717 HC DRSG GRNUFM KCON -B

## 2021-01-01 PROCEDURE — 74011000250 HC RX REV CODE- 250: Performed by: NURSE ANESTHETIST, CERTIFIED REGISTERED

## 2021-01-01 PROCEDURE — 65660000001 HC RM ICU INTERMED STEPDOWN

## 2021-01-01 PROCEDURE — 99214 OFFICE O/P EST MOD 30 MIN: CPT | Performed by: INTERNAL MEDICINE

## 2021-01-01 PROCEDURE — 76210000016 HC OR PH I REC 1 TO 1.5 HR

## 2021-01-01 PROCEDURE — 65270000029 HC RM PRIVATE

## 2021-01-01 PROCEDURE — 93005 ELECTROCARDIOGRAM TRACING: CPT

## 2021-01-01 PROCEDURE — 83735 ASSAY OF MAGNESIUM: CPT

## 2021-01-01 PROCEDURE — 77030013060 HC DEV INFL PRSS MRTM -B

## 2021-01-01 PROCEDURE — 77030040718 HC DRSG HYDRGEL SKINTEGRITY MDII -A

## 2021-01-01 PROCEDURE — 84100 ASSAY OF PHOSPHORUS: CPT

## 2021-01-01 PROCEDURE — 74011000250 HC RX REV CODE- 250: Performed by: INTERNAL MEDICINE

## 2021-01-01 PROCEDURE — 84484 ASSAY OF TROPONIN QUANT: CPT

## 2021-01-01 PROCEDURE — 97162 PT EVAL MOD COMPLEX 30 MIN: CPT

## 2021-01-01 PROCEDURE — G8427 DOCREV CUR MEDS BY ELIG CLIN: HCPCS | Performed by: INTERNAL MEDICINE

## 2021-01-01 PROCEDURE — 74011000636 HC RX REV CODE- 636: Performed by: INTERNAL MEDICINE

## 2021-01-01 PROCEDURE — 74011250636 HC RX REV CODE- 250/636: Performed by: INTERNAL MEDICINE

## 2021-01-01 PROCEDURE — 75810000293 HC SIMP/SUPERF WND  RPR

## 2021-01-01 PROCEDURE — 82803 BLOOD GASES ANY COMBINATION: CPT

## 2021-01-01 PROCEDURE — 97164 PT RE-EVAL EST PLAN CARE: CPT

## 2021-01-01 PROCEDURE — 82962 GLUCOSE BLOOD TEST: CPT

## 2021-01-01 PROCEDURE — 74011000250 HC RX REV CODE- 250

## 2021-01-01 PROCEDURE — 83540 ASSAY OF IRON: CPT

## 2021-01-01 PROCEDURE — 74011000636 HC RX REV CODE- 636

## 2021-01-01 PROCEDURE — 77010033678 HC OXYGEN DAILY

## 2021-01-01 PROCEDURE — 99222 1ST HOSP IP/OBS MODERATE 55: CPT | Performed by: INTERNAL MEDICINE

## 2021-01-01 PROCEDURE — P9016 RBC LEUKOCYTES REDUCED: HCPCS

## 2021-01-01 PROCEDURE — 97165 OT EVAL LOW COMPLEX 30 MIN: CPT

## 2021-01-01 PROCEDURE — 97168 OT RE-EVAL EST PLAN CARE: CPT

## 2021-01-01 PROCEDURE — 82607 VITAMIN B-12: CPT

## 2021-01-01 PROCEDURE — 83605 ASSAY OF LACTIC ACID: CPT

## 2021-01-01 PROCEDURE — 77030004561 HC CATH ANGI DX COBRA ANGI -B

## 2021-01-01 PROCEDURE — 94664 DEMO&/EVAL PT USE INHALER: CPT

## 2021-01-01 PROCEDURE — 93922 UPR/L XTREMITY ART 2 LEVELS: CPT

## 2021-01-01 PROCEDURE — 86920 COMPATIBILITY TEST SPIN: CPT

## 2021-01-01 PROCEDURE — 36591 DRAW BLOOD OFF VENOUS DEVICE: CPT

## 2021-01-01 PROCEDURE — 76060000034 HC ANESTHESIA 1.5 TO 2 HR

## 2021-01-01 PROCEDURE — 84443 ASSAY THYROID STIM HORMONE: CPT

## 2021-01-01 PROCEDURE — 0LBP0ZZ EXCISION OF LEFT LOWER LEG TENDON, OPEN APPROACH: ICD-10-PCS

## 2021-01-01 PROCEDURE — 74011250636 HC RX REV CODE- 250/636: Performed by: NURSE PRACTITIONER

## 2021-01-01 PROCEDURE — G8510 SCR DEP NEG, NO PLAN REQD: HCPCS | Performed by: FAMILY MEDICINE

## 2021-01-01 PROCEDURE — 77030042556 HC PNCL CAUT -B

## 2021-01-01 PROCEDURE — 77030010509 HC AIRWY LMA MSK TELE -A: Performed by: ANESTHESIOLOGY

## 2021-01-01 PROCEDURE — 71045 X-RAY EXAM CHEST 1 VIEW: CPT

## 2021-01-01 PROCEDURE — 92526 ORAL FUNCTION THERAPY: CPT

## 2021-01-01 PROCEDURE — 94640 AIRWAY INHALATION TREATMENT: CPT

## 2021-01-01 PROCEDURE — 99285 EMERGENCY DEPT VISIT HI MDM: CPT

## 2021-01-01 PROCEDURE — G8417 CALC BMI ABV UP PARAM F/U: HCPCS | Performed by: INTERNAL MEDICINE

## 2021-01-01 PROCEDURE — G0155 HHCP-SVS OF CSW,EA 15 MIN: HCPCS

## 2021-01-01 PROCEDURE — G0299 HHS/HOSPICE OF RN EA 15 MIN: HCPCS

## 2021-01-01 PROCEDURE — 97530 THERAPEUTIC ACTIVITIES: CPT

## 2021-01-01 PROCEDURE — 74011250636 HC RX REV CODE- 250/636

## 2021-01-01 PROCEDURE — 77030041076 HC DRSG AG OPTICELL MDII -A

## 2021-01-01 PROCEDURE — 0042T CT CODE NEURO PERF W CBF: CPT

## 2021-01-01 PROCEDURE — 36600 WITHDRAWAL OF ARTERIAL BLOOD: CPT

## 2021-01-01 PROCEDURE — 51798 US URINE CAPACITY MEASURE: CPT

## 2021-01-01 PROCEDURE — C1769 GUIDE WIRE: HCPCS

## 2021-01-01 PROCEDURE — 99222 1ST HOSP IP/OBS MODERATE 55: CPT | Performed by: NURSE PRACTITIONER

## 2021-01-01 PROCEDURE — 84295 ASSAY OF SERUM SODIUM: CPT

## 2021-01-01 PROCEDURE — 83690 ASSAY OF LIPASE: CPT

## 2021-01-01 PROCEDURE — 82436 ASSAY OF URINE CHLORIDE: CPT

## 2021-01-01 PROCEDURE — 80061 LIPID PANEL: CPT

## 2021-01-01 PROCEDURE — 36430 TRANSFUSION BLD/BLD COMPNT: CPT

## 2021-01-01 PROCEDURE — G8432 DEP SCR NOT DOC, RNG: HCPCS | Performed by: INTERNAL MEDICINE

## 2021-01-01 PROCEDURE — 80202 ASSAY OF VANCOMYCIN: CPT

## 2021-01-01 PROCEDURE — 87086 URINE CULTURE/COLONY COUNT: CPT

## 2021-01-01 PROCEDURE — 85379 FIBRIN DEGRADATION QUANT: CPT

## 2021-01-01 PROCEDURE — 85730 THROMBOPLASTIN TIME PARTIAL: CPT

## 2021-01-01 PROCEDURE — 93306 TTE W/DOPPLER COMPLETE: CPT

## 2021-01-01 PROCEDURE — 99443 PR PHYS/QHP TELEPHONE EVALUATION 21-30 MIN: CPT | Performed by: FAMILY MEDICINE

## 2021-01-01 PROCEDURE — 77030033175 HC DRSG CLNS VAC VERALFO KCON -C

## 2021-01-01 PROCEDURE — 70496 CT ANGIOGRAPHY HEAD: CPT

## 2021-01-01 PROCEDURE — 86140 C-REACTIVE PROTEIN: CPT

## 2021-01-01 PROCEDURE — 74011000258 HC RX REV CODE- 258: Performed by: INTERNAL MEDICINE

## 2021-01-01 PROCEDURE — 74011250636 HC RX REV CODE- 250/636: Performed by: SURGERY

## 2021-01-01 PROCEDURE — 87040 BLOOD CULTURE FOR BACTERIA: CPT

## 2021-01-01 PROCEDURE — 99223 1ST HOSP IP/OBS HIGH 75: CPT | Performed by: INTERNAL MEDICINE

## 2021-01-01 PROCEDURE — 85018 HEMOGLOBIN: CPT

## 2021-01-01 PROCEDURE — U0003 INFECTIOUS AGENT DETECTION BY NUCLEIC ACID (DNA OR RNA); SEVERE ACUTE RESPIRATORY SYNDROME CORONAVIRUS 2 (SARS-COV-2) (CORONAVIRUS DISEASE [COVID-19]), AMPLIFIED PROBE TECHNIQUE, MAKING USE OF HIGH THROUGHPUT TECHNOLOGIES AS DESCRIBED BY CMS-2020-01-R: HCPCS

## 2021-01-01 PROCEDURE — 97110 THERAPEUTIC EXERCISES: CPT | Performed by: OCCUPATIONAL THERAPIST

## 2021-01-01 PROCEDURE — P9045 ALBUMIN (HUMAN), 5%, 250 ML: HCPCS | Performed by: NURSE PRACTITIONER

## 2021-01-01 PROCEDURE — 77030002996 HC SUT SLK J&J -A

## 2021-01-01 PROCEDURE — APPSS60 APP SPLIT SHARED TIME 46-60 MINUTES: Performed by: NURSE PRACTITIONER

## 2021-01-01 PROCEDURE — 83935 ASSAY OF URINE OSMOLALITY: CPT

## 2021-01-01 PROCEDURE — 96361 HYDRATE IV INFUSION ADD-ON: CPT

## 2021-01-01 PROCEDURE — 80076 HEPATIC FUNCTION PANEL: CPT

## 2021-01-01 PROCEDURE — 99232 SBSQ HOSP IP/OBS MODERATE 35: CPT | Performed by: NURSE PRACTITIONER

## 2021-01-01 PROCEDURE — 74011250637 HC RX REV CODE- 250/637: Performed by: EMERGENCY MEDICINE

## 2021-01-01 PROCEDURE — 74011000250 HC RX REV CODE- 250: Performed by: FAMILY MEDICINE

## 2021-01-01 PROCEDURE — 74011250636 HC RX REV CODE- 250/636: Performed by: NURSE ANESTHETIST, CERTIFIED REGISTERED

## 2021-01-01 PROCEDURE — 047L3ZZ DILATION OF LEFT FEMORAL ARTERY, PERCUTANEOUS APPROACH: ICD-10-PCS

## 2021-01-01 PROCEDURE — 3331090004 HSPC SERVICE INTENSITY ADD-ON

## 2021-01-01 PROCEDURE — 74011000250 HC RX REV CODE- 250: Performed by: PHYSICIAN ASSISTANT

## 2021-01-01 PROCEDURE — 96374 THER/PROPH/DIAG INJ IV PUSH: CPT

## 2021-01-01 PROCEDURE — 99233 SBSQ HOSP IP/OBS HIGH 50: CPT | Performed by: INTERNAL MEDICINE

## 2021-01-01 PROCEDURE — 76010000153 HC OR TIME 1.5 TO 2 HR

## 2021-01-01 PROCEDURE — 70551 MRI BRAIN STEM W/O DYE: CPT

## 2021-01-01 PROCEDURE — 83880 ASSAY OF NATRIURETIC PEPTIDE: CPT

## 2021-01-01 PROCEDURE — 96375 TX/PRO/DX INJ NEW DRUG ADDON: CPT

## 2021-01-01 PROCEDURE — 81001 URINALYSIS AUTO W/SCOPE: CPT

## 2021-01-01 PROCEDURE — G0156 HHCP-SVS OF AIDE,EA 15 MIN: HCPCS

## 2021-01-01 PROCEDURE — 77030005513 HC CATH URETH FOL11 MDII -B

## 2021-01-01 PROCEDURE — C9113 INJ PANTOPRAZOLE SODIUM, VIA: HCPCS | Performed by: NURSE PRACTITIONER

## 2021-01-01 PROCEDURE — 77030018842 HC SOL IRR SOD CL 9% BAXT -A

## 2021-01-01 PROCEDURE — 99233 SBSQ HOSP IP/OBS HIGH 50: CPT | Performed by: PSYCHIATRY & NEUROLOGY

## 2021-01-01 PROCEDURE — 83930 ASSAY OF BLOOD OSMOLALITY: CPT

## 2021-01-01 PROCEDURE — 85045 AUTOMATED RETICULOCYTE COUNT: CPT

## 2021-01-01 PROCEDURE — 77030003704 HC NDL VASC ACC ARMD -A

## 2021-01-01 PROCEDURE — G8536 NO DOC ELDER MAL SCRN: HCPCS | Performed by: FAMILY MEDICINE

## 2021-01-01 PROCEDURE — 93005 ELECTROCARDIOGRAM TRACING: CPT | Performed by: INTERNAL MEDICINE

## 2021-01-01 PROCEDURE — 90715 TDAP VACCINE 7 YRS/> IM: CPT | Performed by: EMERGENCY MEDICINE

## 2021-01-01 PROCEDURE — APPSS30 APP SPLIT SHARED TIME 16-30 MINUTES: Performed by: NURSE PRACTITIONER

## 2021-01-01 PROCEDURE — C1894 INTRO/SHEATH, NON-LASER: HCPCS

## 2021-01-01 PROCEDURE — 93010 ELECTROCARDIOGRAM REPORT: CPT | Performed by: INTERNAL MEDICINE

## 2021-01-01 PROCEDURE — 1090F PRES/ABSN URINE INCON ASSESS: CPT | Performed by: FAMILY MEDICINE

## 2021-01-01 PROCEDURE — 4A03X5D MEASUREMENT OF ARTERIAL FLOW, INTRACRANIAL, EXTERNAL APPROACH: ICD-10-PCS | Performed by: RADIOLOGY

## 2021-01-01 PROCEDURE — 1090F PRES/ABSN URINE INCON ASSESS: CPT | Performed by: INTERNAL MEDICINE

## 2021-01-01 PROCEDURE — 97535 SELF CARE MNGMENT TRAINING: CPT | Performed by: OCCUPATIONAL THERAPIST

## 2021-01-01 PROCEDURE — G8417 CALC BMI ABV UP PARAM F/U: HCPCS | Performed by: FAMILY MEDICINE

## 2021-01-01 PROCEDURE — 87635 SARS-COV-2 COVID-19 AMP PRB: CPT

## 2021-01-01 PROCEDURE — 93926 LOWER EXTREMITY STUDY: CPT

## 2021-01-01 PROCEDURE — 1101F PT FALLS ASSESS-DOCD LE1/YR: CPT | Performed by: FAMILY MEDICINE

## 2021-01-01 PROCEDURE — 82746 ASSAY OF FOLIC ACID SERUM: CPT

## 2021-01-01 PROCEDURE — 92610 EVALUATE SWALLOWING FUNCTION: CPT

## 2021-01-01 PROCEDURE — 99233 SBSQ HOSP IP/OBS HIGH 50: CPT | Performed by: NURSE PRACTITIONER

## 2021-01-01 PROCEDURE — G8427 DOCREV CUR MEDS BY ELIG CLIN: HCPCS | Performed by: FAMILY MEDICINE

## 2021-01-01 PROCEDURE — G0463 HOSPITAL OUTPT CLINIC VISIT: HCPCS | Performed by: FAMILY MEDICINE

## 2021-01-01 PROCEDURE — 99283 EMERGENCY DEPT VISIT LOW MDM: CPT

## 2021-01-01 PROCEDURE — 99214 OFFICE O/P EST MOD 30 MIN: CPT | Performed by: FAMILY MEDICINE

## 2021-01-01 PROCEDURE — P9047 ALBUMIN (HUMAN), 25%, 50ML: HCPCS | Performed by: NURSE PRACTITIONER

## 2021-01-01 PROCEDURE — 84133 ASSAY OF URINE POTASSIUM: CPT

## 2021-01-01 PROCEDURE — 30233N1 TRANSFUSION OF NONAUTOLOGOUS RED BLOOD CELLS INTO PERIPHERAL VEIN, PERCUTANEOUS APPROACH: ICD-10-PCS | Performed by: INTERNAL MEDICINE

## 2021-01-01 PROCEDURE — 94761 N-INVAS EAR/PLS OXIMETRY MLT: CPT

## 2021-01-01 PROCEDURE — 77030029684 HC NEB SM VOL KT MONA -A

## 2021-01-01 PROCEDURE — G8536 NO DOC ELDER MAL SCRN: HCPCS | Performed by: INTERNAL MEDICINE

## 2021-01-01 PROCEDURE — 70450 CT HEAD/BRAIN W/O DYE: CPT

## 2021-01-01 PROCEDURE — 97535 SELF CARE MNGMENT TRAINING: CPT

## 2021-01-01 PROCEDURE — 82533 TOTAL CORTISOL: CPT

## 2021-01-01 PROCEDURE — 1101F PT FALLS ASSESS-DOCD LE1/YR: CPT | Performed by: INTERNAL MEDICINE

## 2021-01-01 PROCEDURE — 74011000250 HC RX REV CODE- 250: Performed by: SURGERY

## 2021-01-01 PROCEDURE — 86901 BLOOD TYPING SEROLOGIC RH(D): CPT

## 2021-01-01 PROCEDURE — 85652 RBC SED RATE AUTOMATED: CPT

## 2021-01-01 PROCEDURE — 3336500001 HSPC ELECTION

## 2021-01-01 PROCEDURE — C1760 CLOSURE DEV, VASC: HCPCS

## 2021-01-01 PROCEDURE — 86850 RBC ANTIBODY SCREEN: CPT

## 2021-01-01 PROCEDURE — C1725 CATH, TRANSLUMIN NON-LASER: HCPCS

## 2021-01-01 PROCEDURE — A9540 TC99M MAA: HCPCS

## 2021-01-01 PROCEDURE — 74176 CT ABD & PELVIS W/O CONTRAST: CPT

## 2021-01-01 PROCEDURE — 77030019905 HC CATH URETH INTMIT MDII -A

## 2021-01-01 PROCEDURE — 97606 NEG PRS WND THER DME>50 SQCM: CPT

## 2021-01-01 PROCEDURE — 74011000636 HC RX REV CODE- 636: Performed by: RADIOLOGY

## 2021-01-01 PROCEDURE — 85610 PROTHROMBIN TIME: CPT

## 2021-01-01 PROCEDURE — 99223 1ST HOSP IP/OBS HIGH 75: CPT | Performed by: PSYCHIATRY & NEUROLOGY

## 2021-01-01 PROCEDURE — 90471 IMMUNIZATION ADMIN: CPT

## 2021-01-01 PROCEDURE — 99231 SBSQ HOSP IP/OBS SF/LOW 25: CPT | Performed by: NURSE PRACTITIONER

## 2021-01-01 PROCEDURE — 84550 ASSAY OF BLOOD/URIC ACID: CPT

## 2021-01-01 PROCEDURE — 83036 HEMOGLOBIN GLYCOSYLATED A1C: CPT

## 2021-01-01 PROCEDURE — 82728 ASSAY OF FERRITIN: CPT

## 2021-01-01 PROCEDURE — 84300 ASSAY OF URINE SODIUM: CPT

## 2021-01-01 PROCEDURE — 97161 PT EVAL LOW COMPLEX 20 MIN: CPT

## 2021-01-01 PROCEDURE — 99231 SBSQ HOSP IP/OBS SF/LOW 25: CPT | Performed by: PSYCHIATRY & NEUROLOGY

## 2021-01-01 PROCEDURE — 87186 SC STD MICRODIL/AGAR DIL: CPT

## 2021-01-01 PROCEDURE — 93970 EXTREMITY STUDY: CPT | Performed by: INTERNAL MEDICINE

## 2021-01-01 RX ORDER — SODIUM CHLORIDE 9 MG/ML
250 INJECTION, SOLUTION INTRAVENOUS AS NEEDED
Status: DISCONTINUED | OUTPATIENT
Start: 2021-01-01 | End: 2021-01-01 | Stop reason: HOSPADM

## 2021-01-01 RX ORDER — POLYETHYLENE GLYCOL 3350 17 G/17G
17 POWDER, FOR SOLUTION ORAL DAILY PRN
Status: DISCONTINUED | OUTPATIENT
Start: 2021-01-01 | End: 2021-01-01

## 2021-01-01 RX ORDER — ROPIVACAINE HYDROCHLORIDE 5 MG/ML
30 INJECTION, SOLUTION EPIDURAL; INFILTRATION; PERINEURAL AS NEEDED
Status: DISCONTINUED | OUTPATIENT
Start: 2021-01-01 | End: 2021-01-01 | Stop reason: HOSPADM

## 2021-01-01 RX ORDER — BUMETANIDE 0.25 MG/ML
2 INJECTION INTRAMUSCULAR; INTRAVENOUS ONCE
Status: COMPLETED | OUTPATIENT
Start: 2021-01-01 | End: 2021-01-01

## 2021-01-01 RX ORDER — MIDODRINE HYDROCHLORIDE 5 MG/1
20 TABLET ORAL 3 TIMES DAILY
Status: DISCONTINUED | OUTPATIENT
Start: 2021-01-01 | End: 2021-01-01

## 2021-01-01 RX ORDER — IPRATROPIUM BROMIDE AND ALBUTEROL SULFATE 2.5; .5 MG/3ML; MG/3ML
3 SOLUTION RESPIRATORY (INHALATION)
Status: DISCONTINUED | OUTPATIENT
Start: 2021-01-01 | End: 2021-01-01 | Stop reason: HOSPADM

## 2021-01-01 RX ORDER — INSULIN LISPRO 100 [IU]/ML
6 INJECTION, SOLUTION INTRAVENOUS; SUBCUTANEOUS ONCE
Status: COMPLETED | OUTPATIENT
Start: 2021-01-01 | End: 2021-01-01

## 2021-01-01 RX ORDER — SODIUM CHLORIDE 0.9 % (FLUSH) 0.9 %
5-40 SYRINGE (ML) INJECTION AS NEEDED
Status: DISCONTINUED | OUTPATIENT
Start: 2021-01-01 | End: 2021-01-01 | Stop reason: HOSPADM

## 2021-01-01 RX ORDER — FUROSEMIDE 10 MG/ML
20 INJECTION INTRAMUSCULAR; INTRAVENOUS ONCE
Status: COMPLETED | OUTPATIENT
Start: 2021-01-01 | End: 2021-01-01

## 2021-01-01 RX ORDER — FLUTICASONE FUROATE AND VILANTEROL TRIFENATATE 200; 25 UG/1; UG/1
POWDER RESPIRATORY (INHALATION)
COMMUNITY
Start: 2021-01-01

## 2021-01-01 RX ORDER — DOCUSATE SODIUM 50 MG/5ML
100 LIQUID ORAL DAILY
Status: DISCONTINUED | OUTPATIENT
Start: 2021-01-01 | End: 2021-01-01

## 2021-01-01 RX ORDER — POLYETHYLENE GLYCOL 3350 17 G/17G
17 POWDER, FOR SOLUTION ORAL DAILY PRN
Status: DISCONTINUED | OUTPATIENT
Start: 2021-01-01 | End: 2021-01-01 | Stop reason: HOSPADM

## 2021-01-01 RX ORDER — IPRATROPIUM BROMIDE AND ALBUTEROL SULFATE 2.5; .5 MG/3ML; MG/3ML
3 SOLUTION RESPIRATORY (INHALATION)
Qty: 30 NEBULE | Refills: 3 | Status: SHIPPED
Start: 2021-01-01

## 2021-01-01 RX ORDER — PHENYLEPHRINE HCL IN 0.9% NACL 0.4MG/10ML
SYRINGE (ML) INTRAVENOUS AS NEEDED
Status: DISCONTINUED | OUTPATIENT
Start: 2021-01-01 | End: 2021-01-01 | Stop reason: HOSPADM

## 2021-01-01 RX ORDER — SODIUM CHLORIDE 0.9 % (FLUSH) 0.9 %
5-40 SYRINGE (ML) INJECTION EVERY 8 HOURS
Status: DISCONTINUED | OUTPATIENT
Start: 2021-01-01 | End: 2021-01-01

## 2021-01-01 RX ORDER — SODIUM CHLORIDE, SODIUM LACTATE, POTASSIUM CHLORIDE, CALCIUM CHLORIDE 600; 310; 30; 20 MG/100ML; MG/100ML; MG/100ML; MG/100ML
INJECTION, SOLUTION INTRAVENOUS
Status: DISCONTINUED | OUTPATIENT
Start: 2021-01-01 | End: 2021-01-01 | Stop reason: HOSPADM

## 2021-01-01 RX ORDER — TOLVAPTAN 15 MG/1
15 TABLET ORAL ONCE
Status: DISCONTINUED | OUTPATIENT
Start: 2021-01-01 | End: 2021-01-01 | Stop reason: DRUGHIGH

## 2021-01-01 RX ORDER — NOREPINEPHRINE BITARTRATE/D5W 8 MG/250ML
.5-16 PLASTIC BAG, INJECTION (ML) INTRAVENOUS
Status: DISCONTINUED | OUTPATIENT
Start: 2021-01-01 | End: 2021-01-01

## 2021-01-01 RX ORDER — TIMOLOL MALEATE 5 MG/ML
1 SOLUTION/ DROPS OPHTHALMIC DAILY
Status: DISCONTINUED | OUTPATIENT
Start: 2021-01-01 | End: 2021-01-01 | Stop reason: HOSPADM

## 2021-01-01 RX ORDER — MORPHINE SULFATE 2 MG/ML
2 INJECTION, SOLUTION INTRAMUSCULAR; INTRAVENOUS
Status: DISCONTINUED | OUTPATIENT
Start: 2021-01-01 | End: 2021-01-01

## 2021-01-01 RX ORDER — NEBIVOLOL 2.5 MG/1
2.5 TABLET ORAL DAILY
Status: DISCONTINUED | OUTPATIENT
Start: 2021-01-01 | End: 2021-01-01 | Stop reason: CLARIF

## 2021-01-01 RX ORDER — OXYCODONE HYDROCHLORIDE 5 MG/1
5 TABLET ORAL
Status: DISCONTINUED | OUTPATIENT
Start: 2021-01-01 | End: 2021-01-01

## 2021-01-01 RX ORDER — SODIUM CHLORIDE 9 MG/ML
25 INJECTION, SOLUTION INTRAVENOUS CONTINUOUS
Status: DISCONTINUED | OUTPATIENT
Start: 2021-01-01 | End: 2021-01-01 | Stop reason: HOSPADM

## 2021-01-01 RX ORDER — ONDANSETRON 2 MG/ML
INJECTION INTRAMUSCULAR; INTRAVENOUS AS NEEDED
Status: DISCONTINUED | OUTPATIENT
Start: 2021-01-01 | End: 2021-01-01 | Stop reason: HOSPADM

## 2021-01-01 RX ORDER — HYDROCHLOROTHIAZIDE 12.5 MG/1
12.5 TABLET ORAL DAILY
COMMUNITY
End: 2021-01-01

## 2021-01-01 RX ORDER — LANOLIN ALCOHOL/MO/W.PET/CERES
5 CREAM (GRAM) TOPICAL
Status: DISCONTINUED | OUTPATIENT
Start: 2021-01-01 | End: 2021-01-01 | Stop reason: HOSPADM

## 2021-01-01 RX ORDER — VANCOMYCIN HYDROCHLORIDE
1250
Status: COMPLETED | OUTPATIENT
Start: 2021-01-01 | End: 2021-01-01

## 2021-01-01 RX ORDER — FENTANYL CITRATE 50 UG/ML
50 INJECTION, SOLUTION INTRAMUSCULAR; INTRAVENOUS ONCE
Status: COMPLETED | OUTPATIENT
Start: 2021-01-01 | End: 2021-01-01

## 2021-01-01 RX ORDER — MAGNESIUM SULFATE 100 %
4 CRYSTALS MISCELLANEOUS AS NEEDED
Status: DISCONTINUED | OUTPATIENT
Start: 2021-01-01 | End: 2021-01-01 | Stop reason: HOSPADM

## 2021-01-01 RX ORDER — MIDAZOLAM HYDROCHLORIDE 1 MG/ML
1 INJECTION, SOLUTION INTRAMUSCULAR; INTRAVENOUS AS NEEDED
Status: DISCONTINUED | OUTPATIENT
Start: 2021-01-01 | End: 2021-01-01 | Stop reason: HOSPADM

## 2021-01-01 RX ORDER — NOREPINEPHRINE BITARTRATE/D5W 8 MG/250ML
.5-2 PLASTIC BAG, INJECTION (ML) INTRAVENOUS
Status: DISCONTINUED | OUTPATIENT
Start: 2021-01-01 | End: 2021-01-01

## 2021-01-01 RX ORDER — BUDESONIDE 0.5 MG/2ML
500 INHALANT ORAL
Status: DISCONTINUED | OUTPATIENT
Start: 2021-01-01 | End: 2021-01-01

## 2021-01-01 RX ORDER — SODIUM CHLORIDE, SODIUM LACTATE, POTASSIUM CHLORIDE, CALCIUM CHLORIDE 600; 310; 30; 20 MG/100ML; MG/100ML; MG/100ML; MG/100ML
25 INJECTION, SOLUTION INTRAVENOUS CONTINUOUS
Status: DISCONTINUED | OUTPATIENT
Start: 2021-01-01 | End: 2021-01-01 | Stop reason: HOSPADM

## 2021-01-01 RX ORDER — TIMOLOL MALEATE 5 MG/ML
1 SOLUTION/ DROPS OPHTHALMIC 2 TIMES DAILY
Status: DISCONTINUED | OUTPATIENT
Start: 2021-01-01 | End: 2021-01-01

## 2021-01-01 RX ORDER — ALBUMIN HUMAN 50 G/1000ML
25 SOLUTION INTRAVENOUS ONCE
Status: COMPLETED | OUTPATIENT
Start: 2021-01-01 | End: 2021-01-01

## 2021-01-01 RX ORDER — TOLVAPTAN 15 MG/1
15 TABLET ORAL ONCE
Status: COMPLETED | OUTPATIENT
Start: 2021-01-01 | End: 2021-01-01

## 2021-01-01 RX ORDER — OXYCODONE HYDROCHLORIDE 5 MG/1
5 TABLET ORAL
Status: DISCONTINUED | OUTPATIENT
Start: 2021-01-01 | End: 2021-01-01 | Stop reason: HOSPADM

## 2021-01-01 RX ORDER — ACETAMINOPHEN 325 MG/1
650 TABLET ORAL
Status: DISCONTINUED | OUTPATIENT
Start: 2021-01-01 | End: 2021-01-01 | Stop reason: HOSPADM

## 2021-01-01 RX ORDER — MIDODRINE HYDROCHLORIDE 5 MG/1
15 TABLET ORAL 3 TIMES DAILY
Status: DISCONTINUED | OUTPATIENT
Start: 2021-01-01 | End: 2021-01-01

## 2021-01-01 RX ORDER — SODIUM CHLORIDE 1 G/1
1 TABLET ORAL 3 TIMES DAILY
Status: DISCONTINUED | OUTPATIENT
Start: 2021-01-01 | End: 2021-01-01 | Stop reason: SDUPTHER

## 2021-01-01 RX ORDER — SODIUM CHLORIDE 0.9 % (FLUSH) 0.9 %
5-10 SYRINGE (ML) INJECTION AS NEEDED
Status: DISCONTINUED | OUTPATIENT
Start: 2021-01-01 | End: 2021-01-01 | Stop reason: HOSPADM

## 2021-01-01 RX ORDER — METRONIDAZOLE 500 MG/100ML
500 INJECTION, SOLUTION INTRAVENOUS EVERY 12 HOURS
Status: DISCONTINUED | OUTPATIENT
Start: 2021-01-01 | End: 2021-01-01

## 2021-01-01 RX ORDER — CHOLECALCIFEROL (VITAMIN D3) 125 MCG
5 CAPSULE ORAL
Qty: 30 TAB | Refills: 2 | Status: CANCELLED
Start: 2021-01-01

## 2021-01-01 RX ORDER — METOPROLOL TARTRATE 25 MG/1
12.5 TABLET, FILM COATED ORAL 2 TIMES DAILY
Status: DISCONTINUED | OUTPATIENT
Start: 2021-01-01 | End: 2021-01-01 | Stop reason: HOSPADM

## 2021-01-01 RX ORDER — METOPROLOL SUCCINATE 25 MG/1
12.5 TABLET, EXTENDED RELEASE ORAL DAILY
Qty: 30 TAB | Refills: 5 | Status: SHIPPED
Start: 2021-01-01 | End: 2021-01-01

## 2021-01-01 RX ORDER — SODIUM CHLORIDE 0.9 % (FLUSH) 0.9 %
5-40 SYRINGE (ML) INJECTION EVERY 8 HOURS
Status: DISCONTINUED | OUTPATIENT
Start: 2021-01-01 | End: 2021-01-01 | Stop reason: HOSPADM

## 2021-01-01 RX ORDER — FENTANYL CITRATE 50 UG/ML
50 INJECTION, SOLUTION INTRAMUSCULAR; INTRAVENOUS AS NEEDED
Status: DISCONTINUED | OUTPATIENT
Start: 2021-01-01 | End: 2021-01-01 | Stop reason: HOSPADM

## 2021-01-01 RX ORDER — SODIUM CHLORIDE, SODIUM LACTATE, POTASSIUM CHLORIDE, CALCIUM CHLORIDE 600; 310; 30; 20 MG/100ML; MG/100ML; MG/100ML; MG/100ML
100 INJECTION, SOLUTION INTRAVENOUS CONTINUOUS
Status: DISCONTINUED | OUTPATIENT
Start: 2021-01-01 | End: 2021-01-01 | Stop reason: HOSPADM

## 2021-01-01 RX ORDER — FUROSEMIDE 10 MG/ML
40 INJECTION INTRAMUSCULAR; INTRAVENOUS ONCE
Status: COMPLETED | OUTPATIENT
Start: 2021-01-01 | End: 2021-01-01

## 2021-01-01 RX ORDER — METOPROLOL SUCCINATE 25 MG/1
12.5 TABLET, EXTENDED RELEASE ORAL DAILY
Qty: 30 TAB | Refills: 5 | Status: CANCELLED
Start: 2021-01-01

## 2021-01-01 RX ORDER — ACETAMINOPHEN 325 MG/1
650 TABLET ORAL ONCE
Status: DISCONTINUED | OUTPATIENT
Start: 2021-01-01 | End: 2021-01-01 | Stop reason: HOSPADM

## 2021-01-01 RX ORDER — DEXAMETHASONE 4 MG/1
TABLET ORAL
COMMUNITY
Start: 2021-01-01 | End: 2021-01-01

## 2021-01-01 RX ORDER — MORPHINE SULFATE 2 MG/ML
1 INJECTION, SOLUTION INTRAMUSCULAR; INTRAVENOUS ONCE
Status: COMPLETED | OUTPATIENT
Start: 2021-01-01 | End: 2021-01-01

## 2021-01-01 RX ORDER — FOSNETUPITANT AND PALONOSETRON 260; .28 MG/1; MG/1
INJECTION INTRAVENOUS
COMMUNITY
End: 2021-01-01

## 2021-01-01 RX ORDER — METOPROLOL SUCCINATE 25 MG/1
25 TABLET, EXTENDED RELEASE ORAL DAILY
Status: DISCONTINUED | OUTPATIENT
Start: 2021-01-01 | End: 2021-01-01

## 2021-01-01 RX ORDER — ONDANSETRON 2 MG/ML
4 INJECTION INTRAMUSCULAR; INTRAVENOUS AS NEEDED
Status: DISCONTINUED | OUTPATIENT
Start: 2021-01-01 | End: 2021-01-01 | Stop reason: HOSPADM

## 2021-01-01 RX ORDER — FACIAL-BODY WIPES
10 EACH TOPICAL ONCE
Status: COMPLETED | OUTPATIENT
Start: 2021-01-01 | End: 2021-01-01

## 2021-01-01 RX ORDER — LIDOCAINE HYDROCHLORIDE 20 MG/ML
INJECTION, SOLUTION EPIDURAL; INFILTRATION; INTRACAUDAL; PERINEURAL AS NEEDED
Status: DISCONTINUED | OUTPATIENT
Start: 2021-01-01 | End: 2021-01-01 | Stop reason: HOSPADM

## 2021-01-01 RX ORDER — MORPHINE SULFATE 2 MG/ML
2 INJECTION, SOLUTION INTRAMUSCULAR; INTRAVENOUS
Status: DISCONTINUED | OUTPATIENT
Start: 2021-01-01 | End: 2021-01-01 | Stop reason: HOSPADM

## 2021-01-01 RX ORDER — IPRATROPIUM BROMIDE AND ALBUTEROL SULFATE 2.5; .5 MG/3ML; MG/3ML
3 SOLUTION RESPIRATORY (INHALATION)
Status: ON HOLD | COMMUNITY
End: 2021-01-01 | Stop reason: SDUPTHER

## 2021-01-01 RX ORDER — POLYETHYLENE GLYCOL 3350 17 G/17G
17 POWDER, FOR SOLUTION ORAL 2 TIMES DAILY
Status: DISCONTINUED | OUTPATIENT
Start: 2021-01-01 | End: 2021-01-01

## 2021-01-01 RX ORDER — HYDROCODONE BITARTRATE AND ACETAMINOPHEN 5; 325 MG/1; MG/1
1 TABLET ORAL
Status: DISCONTINUED | OUTPATIENT
Start: 2021-01-01 | End: 2021-01-01

## 2021-01-01 RX ORDER — ERYTHROPOIETIN 20000 [IU]/ML
20000 INJECTION, SOLUTION INTRAVENOUS; SUBCUTANEOUS
COMMUNITY
End: 2021-01-01

## 2021-01-01 RX ORDER — APIXABAN 5 MG/1
TABLET, FILM COATED ORAL
Qty: 180 TAB | Refills: 2 | Status: SHIPPED | OUTPATIENT
Start: 2021-01-01 | End: 2021-01-01

## 2021-01-01 RX ORDER — OXYCODONE HYDROCHLORIDE 5 MG/1
5 TABLET ORAL
Qty: 20 TAB | Refills: 0 | Status: SHIPPED | OUTPATIENT
Start: 2021-01-01 | End: 2021-01-01

## 2021-01-01 RX ORDER — BUMETANIDE 0.25 MG/ML
1 INJECTION INTRAMUSCULAR; INTRAVENOUS ONCE
Status: COMPLETED | OUTPATIENT
Start: 2021-01-01 | End: 2021-01-01

## 2021-01-01 RX ORDER — ALBUMIN HUMAN 50 G/1000ML
SOLUTION INTRAVENOUS
Status: DISPENSED
Start: 2021-01-01 | End: 2021-01-01

## 2021-01-01 RX ORDER — ACETAMINOPHEN 650 MG/1
650 SUPPOSITORY RECTAL
Status: DISCONTINUED | OUTPATIENT
Start: 2021-01-01 | End: 2021-01-01 | Stop reason: HOSPADM

## 2021-01-01 RX ORDER — MIDAZOLAM HYDROCHLORIDE 1 MG/ML
0.5 INJECTION, SOLUTION INTRAMUSCULAR; INTRAVENOUS
Status: DISCONTINUED | OUTPATIENT
Start: 2021-01-01 | End: 2021-01-01 | Stop reason: HOSPADM

## 2021-01-01 RX ORDER — SODIUM CHLORIDE TAB 1 GM 1 G
1 TAB MISCELLANEOUS
Status: DISCONTINUED | OUTPATIENT
Start: 2021-01-01 | End: 2021-01-01 | Stop reason: HOSPADM

## 2021-01-01 RX ORDER — LATANOPROST 50 UG/ML
1 SOLUTION/ DROPS OPHTHALMIC
Status: DISCONTINUED | OUTPATIENT
Start: 2021-01-01 | End: 2021-01-01 | Stop reason: HOSPADM

## 2021-01-01 RX ORDER — MORPHINE SULFATE 2 MG/ML
1 INJECTION, SOLUTION INTRAMUSCULAR; INTRAVENOUS
Status: DISCONTINUED | OUTPATIENT
Start: 2021-01-01 | End: 2021-01-01

## 2021-01-01 RX ORDER — POTASSIUM CHLORIDE 29.8 MG/ML
20 INJECTION INTRAVENOUS
Status: COMPLETED | OUTPATIENT
Start: 2021-01-01 | End: 2021-01-01

## 2021-01-01 RX ORDER — SODIUM CHLORIDE TAB 1 GM 1 G
1 TAB MISCELLANEOUS
Qty: 30 TAB | Refills: 0 | Status: SHIPPED
Start: 2021-01-01 | End: 2021-01-01

## 2021-01-01 RX ORDER — ALBUMIN HUMAN 250 G/1000ML
12.5 SOLUTION INTRAVENOUS ONCE
Status: COMPLETED | OUTPATIENT
Start: 2021-01-01 | End: 2021-01-01

## 2021-01-01 RX ORDER — SODIUM BICARBONATE 1 MEQ/ML
100 SYRINGE (ML) INTRAVENOUS
Status: COMPLETED | OUTPATIENT
Start: 2021-01-01 | End: 2021-01-01

## 2021-01-01 RX ORDER — HYDROMORPHONE HYDROCHLORIDE 1 MG/ML
0.2 INJECTION, SOLUTION INTRAMUSCULAR; INTRAVENOUS; SUBCUTANEOUS
Status: DISCONTINUED | OUTPATIENT
Start: 2021-01-01 | End: 2021-01-01 | Stop reason: HOSPADM

## 2021-01-01 RX ORDER — CLOBETASOL PROPIONATE 0.05 G/ML
SPRAY TOPICAL
COMMUNITY
End: 2021-01-01

## 2021-01-01 RX ORDER — GUAIFENESIN 100 MG/5ML
81 LIQUID (ML) ORAL DAILY
Status: DISCONTINUED | OUTPATIENT
Start: 2021-01-01 | End: 2021-01-01

## 2021-01-01 RX ORDER — FENTANYL CITRATE 50 UG/ML
12.5 INJECTION, SOLUTION INTRAMUSCULAR; INTRAVENOUS ONCE
Status: COMPLETED | OUTPATIENT
Start: 2021-01-01 | End: 2021-01-01

## 2021-01-01 RX ORDER — ALBUMIN HUMAN 50 G/1000ML
12.5 SOLUTION INTRAVENOUS ONCE
Status: COMPLETED | OUTPATIENT
Start: 2021-01-01 | End: 2021-01-01

## 2021-01-01 RX ORDER — METOPROLOL TARTRATE 5 MG/5ML
2.5 INJECTION INTRAVENOUS
Status: DISCONTINUED | OUTPATIENT
Start: 2021-01-01 | End: 2021-01-01

## 2021-01-01 RX ORDER — LANOLIN ALCOHOL/MO/W.PET/CERES
3 CREAM (GRAM) TOPICAL
Status: DISCONTINUED | OUTPATIENT
Start: 2021-01-01 | End: 2021-01-01

## 2021-01-01 RX ORDER — MAGNESIUM SULFATE 1 G/100ML
1 INJECTION INTRAVENOUS ONCE
Status: COMPLETED | OUTPATIENT
Start: 2021-01-01 | End: 2021-01-01

## 2021-01-01 RX ORDER — INSULIN LISPRO 100 [IU]/ML
INJECTION, SOLUTION INTRAVENOUS; SUBCUTANEOUS
Status: DISCONTINUED | OUTPATIENT
Start: 2021-01-01 | End: 2021-01-01

## 2021-01-01 RX ORDER — ACETAMINOPHEN 500 MG
1000 TABLET ORAL
Status: COMPLETED | OUTPATIENT
Start: 2021-01-01 | End: 2021-01-01

## 2021-01-01 RX ORDER — ROSUVASTATIN CALCIUM 10 MG/1
20 TABLET, COATED ORAL
Status: DISCONTINUED | OUTPATIENT
Start: 2021-01-01 | End: 2021-01-01 | Stop reason: HOSPADM

## 2021-01-01 RX ORDER — PROPOFOL 10 MG/ML
INJECTION, EMULSION INTRAVENOUS AS NEEDED
Status: DISCONTINUED | OUTPATIENT
Start: 2021-01-01 | End: 2021-01-01 | Stop reason: HOSPADM

## 2021-01-01 RX ORDER — LIDOCAINE HYDROCHLORIDE 10 MG/ML
0.1 INJECTION, SOLUTION EPIDURAL; INFILTRATION; INTRACAUDAL; PERINEURAL AS NEEDED
Status: DISCONTINUED | OUTPATIENT
Start: 2021-01-01 | End: 2021-01-01 | Stop reason: HOSPADM

## 2021-01-01 RX ORDER — ONDANSETRON 8 MG/1
TABLET, ORALLY DISINTEGRATING ORAL
COMMUNITY
Start: 2021-01-01 | End: 2021-01-01

## 2021-01-01 RX ORDER — DIPHENHYDRAMINE HYDROCHLORIDE 50 MG/ML
12.5 INJECTION, SOLUTION INTRAMUSCULAR; INTRAVENOUS AS NEEDED
Status: DISCONTINUED | OUTPATIENT
Start: 2021-01-01 | End: 2021-01-01 | Stop reason: HOSPADM

## 2021-01-01 RX ORDER — LATANOPROST 50 UG/ML
1 SOLUTION/ DROPS OPHTHALMIC EVERY EVENING
Status: DISCONTINUED | OUTPATIENT
Start: 2021-01-01 | End: 2021-01-01

## 2021-01-01 RX ORDER — LATANOPROST 50 UG/ML
1 SOLUTION/ DROPS OPHTHALMIC DAILY
Status: DISCONTINUED | OUTPATIENT
Start: 2021-01-01 | End: 2021-01-01 | Stop reason: SDUPTHER

## 2021-01-01 RX ORDER — POTASSIUM CHLORIDE 750 MG/1
40 TABLET, FILM COATED, EXTENDED RELEASE ORAL
Status: COMPLETED | OUTPATIENT
Start: 2021-01-01 | End: 2021-01-01

## 2021-01-01 RX ORDER — DEXTROSE 50 % IN WATER (D50W) INTRAVENOUS SYRINGE
12.5-25 AS NEEDED
Status: DISCONTINUED | OUTPATIENT
Start: 2021-01-01 | End: 2021-01-01 | Stop reason: HOSPADM

## 2021-01-01 RX ORDER — FENTANYL CITRATE 50 UG/ML
100 INJECTION, SOLUTION INTRAMUSCULAR; INTRAVENOUS
Status: COMPLETED | OUTPATIENT
Start: 2021-01-01 | End: 2021-01-01

## 2021-01-01 RX ORDER — VANCOMYCIN 1.75 GRAM/500 ML IN 0.9 % SODIUM CHLORIDE INTRAVENOUS
1750 ONCE
Status: COMPLETED | OUTPATIENT
Start: 2021-01-01 | End: 2021-01-01

## 2021-01-01 RX ORDER — FENTANYL CITRATE 50 UG/ML
INJECTION, SOLUTION INTRAMUSCULAR; INTRAVENOUS AS NEEDED
Status: DISCONTINUED | OUTPATIENT
Start: 2021-01-01 | End: 2021-01-01 | Stop reason: HOSPADM

## 2021-01-01 RX ORDER — TIOTROPIUM BROMIDE AND OLODATEROL 3.124; 2.736 UG/1; UG/1
SPRAY, METERED RESPIRATORY (INHALATION)
Status: ON HOLD | COMMUNITY
End: 2021-01-01

## 2021-01-01 RX ORDER — POLYETHYLENE GLYCOL 3350 17 G/17G
17 POWDER, FOR SOLUTION ORAL DAILY
Status: DISCONTINUED | OUTPATIENT
Start: 2021-01-01 | End: 2021-01-01

## 2021-01-01 RX ORDER — PANTOPRAZOLE SODIUM 40 MG/1
40 TABLET, DELAYED RELEASE ORAL
Status: DISCONTINUED | OUTPATIENT
Start: 2021-01-01 | End: 2021-01-01

## 2021-01-01 RX ORDER — LIDOCAINE HYDROCHLORIDE AND EPINEPHRINE 20; 10 MG/ML; UG/ML
10 INJECTION, SOLUTION INFILTRATION; PERINEURAL
Status: COMPLETED | OUTPATIENT
Start: 2021-01-01 | End: 2021-01-01

## 2021-01-01 RX ORDER — FENTANYL CITRATE 50 UG/ML
12.5 INJECTION, SOLUTION INTRAMUSCULAR; INTRAVENOUS
Status: DISCONTINUED | OUTPATIENT
Start: 2021-01-01 | End: 2021-01-01

## 2021-01-01 RX ORDER — ALPRAZOLAM 0.25 MG/1
0.25 TABLET ORAL
Status: DISCONTINUED | OUTPATIENT
Start: 2021-01-01 | End: 2021-01-01

## 2021-01-01 RX ORDER — MORPHINE SULFATE 2 MG/ML
2 INJECTION, SOLUTION INTRAMUSCULAR; INTRAVENOUS ONCE
Status: DISCONTINUED | OUTPATIENT
Start: 2021-01-01 | End: 2021-01-01

## 2021-01-01 RX ORDER — POLYETHYLENE GLYCOL 3350 17 G/17G
17 POWDER, FOR SOLUTION ORAL DAILY
Qty: 30 PACKET | Refills: 2 | Status: CANCELLED
Start: 2021-01-01

## 2021-01-01 RX ORDER — METOPROLOL SUCCINATE 25 MG/1
12.5 TABLET, EXTENDED RELEASE ORAL DAILY
Status: DISCONTINUED | OUTPATIENT
Start: 2021-01-01 | End: 2021-01-01 | Stop reason: HOSPADM

## 2021-01-01 RX ORDER — FUROSEMIDE 10 MG/ML
INJECTION INTRAMUSCULAR; INTRAVENOUS
Status: DISPENSED
Start: 2021-01-01 | End: 2021-01-01

## 2021-01-01 RX ORDER — MIDODRINE HYDROCHLORIDE 5 MG/1
10 TABLET ORAL 3 TIMES DAILY
Status: DISCONTINUED | OUTPATIENT
Start: 2021-01-01 | End: 2021-01-01

## 2021-01-01 RX ORDER — ROSUVASTATIN CALCIUM 20 MG/1
TABLET, COATED ORAL
Qty: 90 TAB | Refills: 1 | Status: SHIPPED | OUTPATIENT
Start: 2021-01-01

## 2021-01-01 RX ORDER — ESZOPICLONE 1 MG/1
1 TABLET, FILM COATED ORAL
Qty: 30 TAB | Refills: 4 | Status: SHIPPED | OUTPATIENT
Start: 2021-01-01 | End: 2021-01-01

## 2021-01-01 RX ORDER — FENTANYL CITRATE 50 UG/ML
25 INJECTION, SOLUTION INTRAMUSCULAR; INTRAVENOUS
Status: DISCONTINUED | OUTPATIENT
Start: 2021-01-01 | End: 2021-01-01 | Stop reason: HOSPADM

## 2021-01-01 RX ORDER — ASPIRIN 81 MG/1
81 TABLET ORAL DAILY
Status: DISCONTINUED | OUTPATIENT
Start: 2021-01-01 | End: 2021-01-01 | Stop reason: HOSPADM

## 2021-01-01 RX ORDER — ESZOPICLONE 1 MG/1
1 TABLET, FILM COATED ORAL
Qty: 30 TAB | Refills: 0 | Status: SHIPPED | OUTPATIENT
Start: 2021-01-01 | End: 2021-01-01 | Stop reason: SDUPTHER

## 2021-01-01 RX ORDER — ROSUVASTATIN CALCIUM 10 MG/1
20 TABLET, COATED ORAL
Status: DISCONTINUED | OUTPATIENT
Start: 2021-01-01 | End: 2021-01-01

## 2021-01-01 RX ORDER — MIDODRINE HYDROCHLORIDE 5 MG/1
5 TABLET ORAL
Status: DISCONTINUED | OUTPATIENT
Start: 2021-01-01 | End: 2021-01-01

## 2021-01-01 RX ORDER — ASPIRIN 81 MG/1
81 TABLET ORAL DAILY
Qty: 30 TAB | Refills: 5 | Status: SHIPPED
Start: 2021-01-01

## 2021-01-01 RX ORDER — ARFORMOTEROL TARTRATE 15 UG/2ML
15 SOLUTION RESPIRATORY (INHALATION)
Status: DISCONTINUED | OUTPATIENT
Start: 2021-01-01 | End: 2021-01-01

## 2021-01-01 RX ORDER — DOCUSATE SODIUM 50 MG/5ML
100 LIQUID ORAL 2 TIMES DAILY
Status: DISCONTINUED | OUTPATIENT
Start: 2021-01-01 | End: 2021-01-01

## 2021-01-01 RX ORDER — LORAZEPAM 2 MG/ML
1 INJECTION INTRAMUSCULAR
Status: DISCONTINUED | OUTPATIENT
Start: 2021-01-01 | End: 2021-01-01 | Stop reason: HOSPADM

## 2021-01-01 RX ORDER — TOLVAPTAN 15 MG/1
15 TABLET ORAL DAILY
Status: DISCONTINUED | OUTPATIENT
Start: 2021-01-01 | End: 2021-01-01 | Stop reason: DRUGHIGH

## 2021-01-01 RX ORDER — ONDANSETRON 2 MG/ML
4 INJECTION INTRAMUSCULAR; INTRAVENOUS
Status: DISCONTINUED | OUTPATIENT
Start: 2021-01-01 | End: 2021-01-01 | Stop reason: HOSPADM

## 2021-01-01 RX ORDER — OXYCODONE HYDROCHLORIDE 5 MG/1
5 TABLET ORAL AS NEEDED
Status: DISCONTINUED | OUTPATIENT
Start: 2021-01-01 | End: 2021-01-01 | Stop reason: HOSPADM

## 2021-01-01 RX ORDER — SODIUM,POTASSIUM PHOSPHATES 280-250MG
2 POWDER IN PACKET (EA) ORAL 4 TIMES DAILY
Status: DISCONTINUED | OUTPATIENT
Start: 2021-01-01 | End: 2021-01-01

## 2021-01-01 RX ORDER — HEPARIN SODIUM 1000 [USP'U]/ML
INJECTION, SOLUTION INTRAVENOUS; SUBCUTANEOUS AS NEEDED
Status: DISCONTINUED | OUTPATIENT
Start: 2021-01-01 | End: 2021-01-01 | Stop reason: HOSPADM

## 2021-01-01 RX ORDER — NITROFURANTOIN 25; 75 MG/1; MG/1
100 CAPSULE ORAL 2 TIMES DAILY
Status: COMPLETED | OUTPATIENT
Start: 2021-01-01 | End: 2021-01-01

## 2021-01-01 RX ORDER — SODIUM,POTASSIUM PHOSPHATES 280-250MG
1 POWDER IN PACKET (EA) ORAL 2 TIMES DAILY
Status: COMPLETED | OUTPATIENT
Start: 2021-01-01 | End: 2021-01-01

## 2021-01-01 RX ORDER — ONDANSETRON 2 MG/ML
4 INJECTION INTRAMUSCULAR; INTRAVENOUS
Status: COMPLETED | OUTPATIENT
Start: 2021-01-01 | End: 2021-01-01

## 2021-01-01 RX ORDER — HEPARIN SODIUM 5000 [USP'U]/ML
5000 INJECTION, SOLUTION INTRAVENOUS; SUBCUTANEOUS EVERY 8 HOURS
Status: DISCONTINUED | OUTPATIENT
Start: 2021-01-01 | End: 2021-01-01 | Stop reason: HOSPADM

## 2021-01-01 RX ADMIN — TIMOLOL MALEATE 1 DROP: 5 SOLUTION OPHTHALMIC at 09:49

## 2021-01-01 RX ADMIN — POTASSIUM BICARBONATE 40 MEQ: 782 TABLET, EFFERVESCENT ORAL at 01:26

## 2021-01-01 RX ADMIN — TIMOLOL MALEATE 1 DROP: 5 SOLUTION OPHTHALMIC at 08:17

## 2021-01-01 RX ADMIN — DOCUSATE SODIUM 100 MG: 50 LIQUID ORAL at 08:14

## 2021-01-01 RX ADMIN — MORPHINE SULFATE 2 MG: 2 INJECTION, SOLUTION INTRAMUSCULAR; INTRAVENOUS at 08:55

## 2021-01-01 RX ADMIN — HEPARIN SODIUM 5000 UNITS: 5000 INJECTION INTRAVENOUS; SUBCUTANEOUS at 12:35

## 2021-01-01 RX ADMIN — BUDESONIDE 500 MCG: 0.5 INHALANT RESPIRATORY (INHALATION) at 10:14

## 2021-01-01 RX ADMIN — TIMOLOL MALEATE 1 DROP: 5 SOLUTION OPHTHALMIC at 08:53

## 2021-01-01 RX ADMIN — Medication 10 ML: at 21:17

## 2021-01-01 RX ADMIN — INSULIN LISPRO 2 UNITS: 100 INJECTION, SOLUTION INTRAVENOUS; SUBCUTANEOUS at 16:05

## 2021-01-01 RX ADMIN — IOPAMIDOL 40 ML: 755 INJECTION, SOLUTION INTRAVENOUS at 09:00

## 2021-01-01 RX ADMIN — POLYETHYLENE GLYCOL 3350 17 G: 17 POWDER, FOR SOLUTION ORAL at 08:52

## 2021-01-01 RX ADMIN — Medication 10 ML: at 17:12

## 2021-01-01 RX ADMIN — POTASSIUM BICARBONATE 20 MEQ: 782 TABLET, EFFERVESCENT ORAL at 08:12

## 2021-01-01 RX ADMIN — LATANOPROST 1 DROP: 50 SOLUTION OPHTHALMIC at 19:36

## 2021-01-01 RX ADMIN — DOCUSATE SODIUM 100 MG: 50 LIQUID ORAL at 08:52

## 2021-01-01 RX ADMIN — Medication 10 ML: at 13:06

## 2021-01-01 RX ADMIN — MORPHINE SULFATE 2 MG: 2 INJECTION, SOLUTION INTRAMUSCULAR; INTRAVENOUS at 13:06

## 2021-01-01 RX ADMIN — OXYCODONE 5 MG: 5 TABLET ORAL at 08:27

## 2021-01-01 RX ADMIN — ARFORMOTEROL TARTRATE 15 MCG: 15 SOLUTION RESPIRATORY (INHALATION) at 10:14

## 2021-01-01 RX ADMIN — Medication 10 ML: at 15:40

## 2021-01-01 RX ADMIN — VANCOMYCIN HYDROCHLORIDE 1000 MG: 1 INJECTION, POWDER, LYOPHILIZED, FOR SOLUTION INTRAVENOUS at 19:21

## 2021-01-01 RX ADMIN — ROSUVASTATIN CALCIUM 20 MG: 10 TABLET, FILM COATED ORAL at 21:16

## 2021-01-01 RX ADMIN — POTASSIUM & SODIUM PHOSPHATES POWDER PACK 280-160-250 MG 1 PACKET: 280-160-250 PACK at 17:04

## 2021-01-01 RX ADMIN — Medication 10 ML: at 06:33

## 2021-01-01 RX ADMIN — Medication 1 TABLET: at 08:11

## 2021-01-01 RX ADMIN — LIDOCAINE HYDROCHLORIDE 40 MG: 20 INJECTION, SOLUTION EPIDURAL; INFILTRATION; INTRACAUDAL; PERINEURAL at 10:04

## 2021-01-01 RX ADMIN — LATANOPROST 1 DROP: 50 SOLUTION OPHTHALMIC at 21:39

## 2021-01-01 RX ADMIN — LATANOPROST 1 DROP: 50 SOLUTION OPHTHALMIC at 20:15

## 2021-01-01 RX ADMIN — ROSUVASTATIN CALCIUM 20 MG: 10 TABLET, COATED ORAL at 22:19

## 2021-01-01 RX ADMIN — SODIUM CHLORIDE 1000 ML: 9 INJECTION, SOLUTION INTRAVENOUS at 03:56

## 2021-01-01 RX ADMIN — Medication 3 MG: at 21:02

## 2021-01-01 RX ADMIN — CEFTRIAXONE SODIUM 1 G: 1 INJECTION, POWDER, FOR SOLUTION INTRAMUSCULAR; INTRAVENOUS at 20:22

## 2021-01-01 RX ADMIN — NITROFURANTOIN (MONOHYDRATE/MACROCRYSTALS) 100 MG: 75; 25 CAPSULE ORAL at 13:12

## 2021-01-01 RX ADMIN — Medication 10 ML: at 22:00

## 2021-01-01 RX ADMIN — Medication 4.5 MG: at 21:13

## 2021-01-01 RX ADMIN — OXYCODONE 5 MG: 5 TABLET ORAL at 18:14

## 2021-01-01 RX ADMIN — Medication 10 ML: at 21:09

## 2021-01-01 RX ADMIN — HEPARIN SODIUM 5000 UNITS: 5000 INJECTION INTRAVENOUS; SUBCUTANEOUS at 13:32

## 2021-01-01 RX ADMIN — BUDESONIDE 500 MCG: 0.5 INHALANT RESPIRATORY (INHALATION) at 19:37

## 2021-01-01 RX ADMIN — ARFORMOTEROL TARTRATE 15 MCG: 15 SOLUTION RESPIRATORY (INHALATION) at 07:49

## 2021-01-01 RX ADMIN — LATANOPROST 1 DROP: 50 SOLUTION OPHTHALMIC at 17:52

## 2021-01-01 RX ADMIN — OXYCODONE 5 MG: 5 TABLET ORAL at 15:03

## 2021-01-01 RX ADMIN — OXYCODONE 5 MG: 5 TABLET ORAL at 18:42

## 2021-01-01 RX ADMIN — Medication 10 ML: at 21:04

## 2021-01-01 RX ADMIN — LATANOPROST 1 DROP: 50 SOLUTION OPHTHALMIC at 23:09

## 2021-01-01 RX ADMIN — MORPHINE SULFATE 2 MG: 2 INJECTION, SOLUTION INTRAMUSCULAR; INTRAVENOUS at 11:00

## 2021-01-01 RX ADMIN — Medication 10 ML: at 06:51

## 2021-01-01 RX ADMIN — POLYETHYLENE GLYCOL 3350 17 G: 17 POWDER, FOR SOLUTION ORAL at 16:49

## 2021-01-01 RX ADMIN — FENTANYL CITRATE 12.5 MCG: 50 INJECTION, SOLUTION INTRAMUSCULAR; INTRAVENOUS at 07:08

## 2021-01-01 RX ADMIN — MORPHINE SULFATE 2 MG: 2 INJECTION, SOLUTION INTRAMUSCULAR; INTRAVENOUS at 18:08

## 2021-01-01 RX ADMIN — METOPROLOL SUCCINATE 12.5 MG: 25 TABLET, EXTENDED RELEASE ORAL at 09:12

## 2021-01-01 RX ADMIN — Medication 10 ML: at 20:30

## 2021-01-01 RX ADMIN — ACETAMINOPHEN 650 MG: 325 TABLET ORAL at 23:21

## 2021-01-01 RX ADMIN — MORPHINE SULFATE 1 MG: 2 INJECTION, SOLUTION INTRAMUSCULAR; INTRAVENOUS at 23:00

## 2021-01-01 RX ADMIN — ARFORMOTEROL TARTRATE 15 MCG: 15 SOLUTION RESPIRATORY (INHALATION) at 19:52

## 2021-01-01 RX ADMIN — CEFTRIAXONE SODIUM 1 G: 1 INJECTION, POWDER, FOR SOLUTION INTRAMUSCULAR; INTRAVENOUS at 19:59

## 2021-01-01 RX ADMIN — BUDESONIDE 500 MCG: 0.5 INHALANT RESPIRATORY (INHALATION) at 09:03

## 2021-01-01 RX ADMIN — TIMOLOL MALEATE 1 DROP: 5 SOLUTION OPHTHALMIC at 09:02

## 2021-01-01 RX ADMIN — INSULIN LISPRO 2 UNITS: 100 INJECTION, SOLUTION INTRAVENOUS; SUBCUTANEOUS at 16:58

## 2021-01-01 RX ADMIN — MORPHINE SULFATE 2 MG: 2 INJECTION, SOLUTION INTRAMUSCULAR; INTRAVENOUS at 03:59

## 2021-01-01 RX ADMIN — Medication 10 ML: at 13:32

## 2021-01-01 RX ADMIN — TIMOLOL MALEATE 1 DROP: 5 SOLUTION OPHTHALMIC at 17:01

## 2021-01-01 RX ADMIN — ARFORMOTEROL TARTRATE: 15 SOLUTION RESPIRATORY (INHALATION) at 22:07

## 2021-01-01 RX ADMIN — TIMOLOL MALEATE 1 DROP: 5 SOLUTION OPHTHALMIC at 19:39

## 2021-01-01 RX ADMIN — METOPROLOL SUCCINATE 12.5 MG: 25 TABLET, EXTENDED RELEASE ORAL at 08:52

## 2021-01-01 RX ADMIN — HEPARIN SODIUM 5000 UNITS: 5000 INJECTION INTRAVENOUS; SUBCUTANEOUS at 06:01

## 2021-01-01 RX ADMIN — WATER: 1000 INJECTION, SOLUTION INTRAVENOUS at 06:32

## 2021-01-01 RX ADMIN — Medication 10 ML: at 05:33

## 2021-01-01 RX ADMIN — NOREPINEPHRINE BITARTRATE 20 MCG/MIN: 1 INJECTION, SOLUTION, CONCENTRATE INTRAVENOUS at 04:24

## 2021-01-01 RX ADMIN — INSULIN LISPRO 2 UNITS: 100 INJECTION, SOLUTION INTRAVENOUS; SUBCUTANEOUS at 21:32

## 2021-01-01 RX ADMIN — INSULIN LISPRO 2 UNITS: 100 INJECTION, SOLUTION INTRAVENOUS; SUBCUTANEOUS at 12:35

## 2021-01-01 RX ADMIN — LATANOPROST 1 DROP: 50 SOLUTION OPHTHALMIC at 19:08

## 2021-01-01 RX ADMIN — TIMOLOL MALEATE 1 DROP: 5 SOLUTION OPHTHALMIC at 20:15

## 2021-01-01 RX ADMIN — Medication 10 ML: at 21:05

## 2021-01-01 RX ADMIN — POLYETHYLENE GLYCOL 3350 17 G: 17 POWDER, FOR SOLUTION ORAL at 20:38

## 2021-01-01 RX ADMIN — TOLVAPTAN 15 MG: 30 TABLET ORAL at 11:50

## 2021-01-01 RX ADMIN — Medication 10 ML: at 14:24

## 2021-01-01 RX ADMIN — ASPIRIN 81 MG: 81 TABLET, CHEWABLE ORAL at 08:52

## 2021-01-01 RX ADMIN — LATANOPROST 1 DROP: 50 SOLUTION OPHTHALMIC at 17:48

## 2021-01-01 RX ADMIN — Medication 10 ML: at 21:18

## 2021-01-01 RX ADMIN — MIDODRINE HYDROCHLORIDE 10 MG: 5 TABLET ORAL at 16:49

## 2021-01-01 RX ADMIN — INSULIN LISPRO 2 UNITS: 100 INJECTION, SOLUTION INTRAVENOUS; SUBCUTANEOUS at 11:04

## 2021-01-01 RX ADMIN — PANTOPRAZOLE SODIUM 40 MG: 40 TABLET, DELAYED RELEASE ORAL at 11:08

## 2021-01-01 RX ADMIN — POTASSIUM & SODIUM PHOSPHATES POWDER PACK 280-160-250 MG 2 PACKET: 280-160-250 PACK at 21:02

## 2021-01-01 RX ADMIN — MIDODRINE HYDROCHLORIDE 20 MG: 5 TABLET ORAL at 08:52

## 2021-01-01 RX ADMIN — IPRATROPIUM BROMIDE AND ALBUTEROL SULFATE 3 ML: .5; 3 SOLUTION RESPIRATORY (INHALATION) at 12:06

## 2021-01-01 RX ADMIN — COLLAGENASE SANTYL: 250 OINTMENT TOPICAL at 15:26

## 2021-01-01 RX ADMIN — VANCOMYCIN HYDROCHLORIDE 1000 MG: 1 INJECTION, POWDER, LYOPHILIZED, FOR SOLUTION INTRAVENOUS at 20:25

## 2021-01-01 RX ADMIN — Medication 10 ML: at 21:16

## 2021-01-01 RX ADMIN — Medication 10 ML: at 06:00

## 2021-01-01 RX ADMIN — MORPHINE SULFATE 2 MG: 2 INJECTION, SOLUTION INTRAMUSCULAR; INTRAVENOUS at 18:24

## 2021-01-01 RX ADMIN — MIDODRINE HYDROCHLORIDE 20 MG: 5 TABLET ORAL at 17:20

## 2021-01-01 RX ADMIN — OXYCODONE 5 MG: 5 TABLET ORAL at 20:40

## 2021-01-01 RX ADMIN — NITROFURANTOIN (MONOHYDRATE/MACROCRYSTALS) 100 MG: 75; 25 CAPSULE ORAL at 09:02

## 2021-01-01 RX ADMIN — TIMOLOL MALEATE 1 DROP: 5 SOLUTION OPHTHALMIC at 08:15

## 2021-01-01 RX ADMIN — BISACODYL 10 MG: 10 SUPPOSITORY RECTAL at 12:26

## 2021-01-01 RX ADMIN — METOROPROLOL TARTRATE 2.5 MG: 5 INJECTION, SOLUTION INTRAVENOUS at 21:51

## 2021-01-01 RX ADMIN — METOPROLOL SUCCINATE 25 MG: 25 TABLET, EXTENDED RELEASE ORAL at 08:44

## 2021-01-01 RX ADMIN — IRON SUCROSE 300 MG: 20 INJECTION, SOLUTION INTRAVENOUS at 11:53

## 2021-01-01 RX ADMIN — Medication 10 ML: at 08:28

## 2021-01-01 RX ADMIN — POLYETHYLENE GLYCOL 3350 17 G: 17 POWDER, FOR SOLUTION ORAL at 18:52

## 2021-01-01 RX ADMIN — ACETAMINOPHEN 650 MG: 325 TABLET ORAL at 20:41

## 2021-01-01 RX ADMIN — MIDODRINE HYDROCHLORIDE 10 MG: 5 TABLET ORAL at 09:48

## 2021-01-01 RX ADMIN — ROSUVASTATIN CALCIUM 20 MG: 10 TABLET, FILM COATED ORAL at 03:02

## 2021-01-01 RX ADMIN — FUROSEMIDE 40 MG: 10 INJECTION, SOLUTION INTRAMUSCULAR; INTRAVENOUS at 23:35

## 2021-01-01 RX ADMIN — OXYCODONE 5 MG: 5 TABLET ORAL at 22:21

## 2021-01-01 RX ADMIN — VANCOMYCIN HYDROCHLORIDE 1000 MG: 1 INJECTION, POWDER, LYOPHILIZED, FOR SOLUTION INTRAVENOUS at 20:20

## 2021-01-01 RX ADMIN — CEFTRIAXONE SODIUM 1 G: 1 INJECTION, POWDER, FOR SOLUTION INTRAMUSCULAR; INTRAVENOUS at 20:30

## 2021-01-01 RX ADMIN — HEPARIN SODIUM 3000 UNITS: 1000 INJECTION INTRAVENOUS; SUBCUTANEOUS at 10:32

## 2021-01-01 RX ADMIN — MIDODRINE HYDROCHLORIDE 10 MG: 5 TABLET ORAL at 08:14

## 2021-01-01 RX ADMIN — Medication 4.5 MG: at 21:38

## 2021-01-01 RX ADMIN — POLYETHYLENE GLYCOL 3350 17 G: 17 POWDER, FOR SOLUTION ORAL at 08:14

## 2021-01-01 RX ADMIN — ACETAMINOPHEN 650 MG: 325 TABLET ORAL at 16:58

## 2021-01-01 RX ADMIN — HEPARIN SODIUM 5000 UNITS: 5000 INJECTION INTRAVENOUS; SUBCUTANEOUS at 05:39

## 2021-01-01 RX ADMIN — MORPHINE SULFATE 2 MG: 2 INJECTION, SOLUTION INTRAMUSCULAR; INTRAVENOUS at 10:08

## 2021-01-01 RX ADMIN — TIMOLOL MALEATE 1 DROP: 5 SOLUTION OPHTHALMIC at 17:21

## 2021-01-01 RX ADMIN — BUDESONIDE 500 MCG: 0.5 INHALANT RESPIRATORY (INHALATION) at 08:05

## 2021-01-01 RX ADMIN — LATANOPROST 1 DROP: 50 SOLUTION OPHTHALMIC at 20:44

## 2021-01-01 RX ADMIN — PHENYLEPHRINE HYDROCHLORIDE 40 MCG/MIN: 10 INJECTION INTRAVENOUS at 10:03

## 2021-01-01 RX ADMIN — NITROFURANTOIN (MONOHYDRATE/MACROCRYSTALS) 100 MG: 75; 25 CAPSULE ORAL at 17:07

## 2021-01-01 RX ADMIN — SODIUM CHLORIDE, POTASSIUM CHLORIDE, SODIUM LACTATE AND CALCIUM CHLORIDE 500 ML: 600; 310; 30; 20 INJECTION, SOLUTION INTRAVENOUS at 08:17

## 2021-01-01 RX ADMIN — Medication 10 ML: at 05:19

## 2021-01-01 RX ADMIN — PROPOFOL 30 MG: 10 INJECTION, EMULSION INTRAVENOUS at 09:52

## 2021-01-01 RX ADMIN — Medication 10 ML: at 13:49

## 2021-01-01 RX ADMIN — POTASSIUM & SODIUM PHOSPHATES POWDER PACK 280-160-250 MG 2 PACKET: 280-160-250 PACK at 14:51

## 2021-01-01 RX ADMIN — MIDODRINE HYDROCHLORIDE 20 MG: 5 TABLET ORAL at 21:15

## 2021-01-01 RX ADMIN — ROSUVASTATIN CALCIUM 20 MG: 10 TABLET, FILM COATED ORAL at 21:14

## 2021-01-01 RX ADMIN — Medication 1 TABLET: at 08:14

## 2021-01-01 RX ADMIN — TIMOLOL MALEATE 1 DROP: 5 SOLUTION OPHTHALMIC at 08:12

## 2021-01-01 RX ADMIN — ALBUMIN (HUMAN) 25 G: 12.5 INJECTION, SOLUTION INTRAVENOUS at 11:11

## 2021-01-01 RX ADMIN — MIDODRINE HYDROCHLORIDE 10 MG: 5 TABLET ORAL at 16:58

## 2021-01-01 RX ADMIN — CEFEPIME 2 G: 2 INJECTION, POWDER, FOR SOLUTION INTRAVENOUS at 18:52

## 2021-01-01 RX ADMIN — PANTOPRAZOLE SODIUM 40 MG: 40 TABLET, DELAYED RELEASE ORAL at 06:30

## 2021-01-01 RX ADMIN — ARFORMOTEROL TARTRATE 15 MCG: 15 SOLUTION RESPIRATORY (INHALATION) at 19:37

## 2021-01-01 RX ADMIN — LATANOPROST 1 DROP: 50 SOLUTION OPHTHALMIC at 17:01

## 2021-01-01 RX ADMIN — ARFORMOTEROL TARTRATE: 15 SOLUTION RESPIRATORY (INHALATION) at 07:53

## 2021-01-01 RX ADMIN — FUROSEMIDE 20 MG: 10 INJECTION, SOLUTION INTRAMUSCULAR; INTRAVENOUS at 12:06

## 2021-01-01 RX ADMIN — Medication 10 ML: at 06:46

## 2021-01-01 RX ADMIN — ROSUVASTATIN CALCIUM 20 MG: 10 TABLET, FILM COATED ORAL at 20:37

## 2021-01-01 RX ADMIN — MIDODRINE HYDROCHLORIDE 20 MG: 5 TABLET ORAL at 21:46

## 2021-01-01 RX ADMIN — Medication 10 ML: at 14:08

## 2021-01-01 RX ADMIN — Medication 10 ML: at 06:08

## 2021-01-01 RX ADMIN — FUROSEMIDE 20 MG: 10 INJECTION, SOLUTION INTRAMUSCULAR; INTRAVENOUS at 21:24

## 2021-01-01 RX ADMIN — TIMOLOL MALEATE 1 DROP: 5 SOLUTION OPHTHALMIC at 17:48

## 2021-01-01 RX ADMIN — OXYCODONE HYDROCHLORIDE 5 MG: 5 TABLET ORAL at 08:10

## 2021-01-01 RX ADMIN — MORPHINE SULFATE 1 MG: 2 INJECTION, SOLUTION INTRAMUSCULAR; INTRAVENOUS at 07:17

## 2021-01-01 RX ADMIN — Medication 10 ML: at 14:23

## 2021-01-01 RX ADMIN — CEFTRIAXONE SODIUM 1 G: 1 INJECTION, POWDER, FOR SOLUTION INTRAMUSCULAR; INTRAVENOUS at 20:27

## 2021-01-01 RX ADMIN — CEFEPIME 2 G: 2 INJECTION, POWDER, FOR SOLUTION INTRAVENOUS at 17:21

## 2021-01-01 RX ADMIN — OXYCODONE 5 MG: 5 TABLET ORAL at 14:51

## 2021-01-01 RX ADMIN — BUDESONIDE 500 MCG: 0.5 INHALANT RESPIRATORY (INHALATION) at 19:52

## 2021-01-01 RX ADMIN — ALBUMIN (HUMAN) 12.5 G: 12.5 INJECTION, SOLUTION INTRAVENOUS at 04:59

## 2021-01-01 RX ADMIN — FUROSEMIDE 20 MG: 10 INJECTION, SOLUTION INTRAMUSCULAR; INTRAVENOUS at 16:58

## 2021-01-01 RX ADMIN — OXYCODONE 5 MG: 5 TABLET ORAL at 18:02

## 2021-01-01 RX ADMIN — TIMOLOL MALEATE 1 DROP: 5 SOLUTION OPHTHALMIC at 09:13

## 2021-01-01 RX ADMIN — ARFORMOTEROL TARTRATE 15 MCG: 15 SOLUTION RESPIRATORY (INHALATION) at 09:03

## 2021-01-01 RX ADMIN — FUROSEMIDE 20 MG: 10 INJECTION, SOLUTION INTRAMUSCULAR; INTRAVENOUS at 07:09

## 2021-01-01 RX ADMIN — METOPROLOL SUCCINATE 25 MG: 25 TABLET, EXTENDED RELEASE ORAL at 09:02

## 2021-01-01 RX ADMIN — DOCUSATE SODIUM 100 MG: 50 LIQUID ORAL at 17:21

## 2021-01-01 RX ADMIN — PHENYLEPHRINE HYDROCHLORIDE 30 MCG/MIN: 10 INJECTION INTRAVENOUS at 10:10

## 2021-01-01 RX ADMIN — ROSUVASTATIN CALCIUM 20 MG: 10 TABLET, COATED ORAL at 21:01

## 2021-01-01 RX ADMIN — TIMOLOL MALEATE 1 DROP: 5 SOLUTION OPHTHALMIC at 08:23

## 2021-01-01 RX ADMIN — ROSUVASTATIN CALCIUM 20 MG: 10 TABLET, COATED ORAL at 21:03

## 2021-01-01 RX ADMIN — HEPARIN SODIUM 5000 UNITS: 5000 INJECTION INTRAVENOUS; SUBCUTANEOUS at 20:40

## 2021-01-01 RX ADMIN — ASPIRIN 81 MG: 81 TABLET, CHEWABLE ORAL at 08:11

## 2021-01-01 RX ADMIN — OXYCODONE 5 MG: 5 TABLET ORAL at 21:06

## 2021-01-01 RX ADMIN — OXYCODONE 5 MG: 5 TABLET ORAL at 15:43

## 2021-01-01 RX ADMIN — DOCUSATE SODIUM 100 MG: 50 LIQUID ORAL at 08:11

## 2021-01-01 RX ADMIN — SODIUM CHLORIDE, POTASSIUM CHLORIDE, SODIUM LACTATE AND CALCIUM CHLORIDE 1000 ML: 600; 310; 30; 20 INJECTION, SOLUTION INTRAVENOUS at 15:12

## 2021-01-01 RX ADMIN — TIMOLOL MALEATE 1 DROP: 5 SOLUTION OPHTHALMIC at 09:03

## 2021-01-01 RX ADMIN — FENTANYL CITRATE 25 MCG: 50 INJECTION, SOLUTION INTRAMUSCULAR; INTRAVENOUS at 11:12

## 2021-01-01 RX ADMIN — Medication 10 ML: at 21:12

## 2021-01-01 RX ADMIN — FUROSEMIDE 40 MG: 10 INJECTION, SOLUTION INTRAMUSCULAR; INTRAVENOUS at 05:00

## 2021-01-01 RX ADMIN — Medication 3 MG: at 20:37

## 2021-01-01 RX ADMIN — HEPARIN SODIUM 5000 UNITS: 5000 INJECTION INTRAVENOUS; SUBCUTANEOUS at 04:43

## 2021-01-01 RX ADMIN — Medication 1 G: at 17:12

## 2021-01-01 RX ADMIN — CEFTRIAXONE SODIUM 1 G: 1 INJECTION, POWDER, FOR SOLUTION INTRAMUSCULAR; INTRAVENOUS at 20:20

## 2021-01-01 RX ADMIN — HEPARIN SODIUM 5000 UNITS: 5000 INJECTION INTRAVENOUS; SUBCUTANEOUS at 05:33

## 2021-01-01 RX ADMIN — HYDROCODONE BITARTRATE AND ACETAMINOPHEN 1 TABLET: 5; 325 TABLET ORAL at 20:26

## 2021-01-01 RX ADMIN — LATANOPROST 1 DROP: 50 SOLUTION OPHTHALMIC at 17:21

## 2021-01-01 RX ADMIN — OXYCODONE 5 MG: 5 TABLET ORAL at 22:14

## 2021-01-01 RX ADMIN — NITROFURANTOIN (MONOHYDRATE/MACROCRYSTALS) 100 MG: 75; 25 CAPSULE ORAL at 08:26

## 2021-01-01 RX ADMIN — TETANUS TOXOID, REDUCED DIPHTHERIA TOXOID AND ACELLULAR PERTUSSIS VACCINE, ADSORBED 0.5 ML: 5; 2.5; 8; 8; 2.5 SUSPENSION INTRAMUSCULAR at 06:54

## 2021-01-01 RX ADMIN — Medication 10 ML: at 06:14

## 2021-01-01 RX ADMIN — APIXABAN 5 MG: 5 TABLET, FILM COATED ORAL at 09:47

## 2021-01-01 RX ADMIN — TIMOLOL MALEATE 1 DROP: 5 SOLUTION OPHTHALMIC at 09:31

## 2021-01-01 RX ADMIN — Medication 10 ML: at 14:04

## 2021-01-01 RX ADMIN — Medication 1 G: at 09:02

## 2021-01-01 RX ADMIN — Medication 4.5 MG: at 21:01

## 2021-01-01 RX ADMIN — BUDESONIDE 500 MCG: 0.5 INHALANT RESPIRATORY (INHALATION) at 20:06

## 2021-01-01 RX ADMIN — Medication 19 MCG/MIN: at 10:45

## 2021-01-01 RX ADMIN — CEFTRIAXONE SODIUM 1 G: 1 INJECTION, POWDER, FOR SOLUTION INTRAMUSCULAR; INTRAVENOUS at 19:55

## 2021-01-01 RX ADMIN — ASPIRIN 81 MG: 81 TABLET, COATED ORAL at 09:01

## 2021-01-01 RX ADMIN — Medication 10 ML: at 22:21

## 2021-01-01 RX ADMIN — ROSUVASTATIN CALCIUM 20 MG: 10 TABLET, FILM COATED ORAL at 21:09

## 2021-01-01 RX ADMIN — ROSUVASTATIN CALCIUM 20 MG: 10 TABLET, FILM COATED ORAL at 21:06

## 2021-01-01 RX ADMIN — Medication 5 MCG/MIN: at 03:33

## 2021-01-01 RX ADMIN — HEPARIN SODIUM 5000 UNITS: 5000 INJECTION INTRAVENOUS; SUBCUTANEOUS at 04:18

## 2021-01-01 RX ADMIN — FENTANYL CITRATE 25 MCG: 50 INJECTION, SOLUTION INTRAMUSCULAR; INTRAVENOUS at 11:11

## 2021-01-01 RX ADMIN — HEPARIN SODIUM 5000 UNITS: 5000 INJECTION INTRAVENOUS; SUBCUTANEOUS at 06:07

## 2021-01-01 RX ADMIN — LATANOPROST 1 DROP: 50 SOLUTION OPHTHALMIC at 21:04

## 2021-01-01 RX ADMIN — HEPARIN SODIUM 5000 UNITS: 5000 INJECTION INTRAVENOUS; SUBCUTANEOUS at 14:08

## 2021-01-01 RX ADMIN — POTASSIUM BICARBONATE 40 MEQ: 782 TABLET, EFFERVESCENT ORAL at 18:00

## 2021-01-01 RX ADMIN — HEPARIN SODIUM 5000 UNITS: 5000 INJECTION INTRAVENOUS; SUBCUTANEOUS at 05:18

## 2021-01-01 RX ADMIN — ONDANSETRON HYDROCHLORIDE 4 MG: 2 INJECTION, SOLUTION INTRAMUSCULAR; INTRAVENOUS at 03:27

## 2021-01-01 RX ADMIN — FENTANYL CITRATE 50 MCG: 50 INJECTION, SOLUTION INTRAMUSCULAR; INTRAVENOUS at 10:35

## 2021-01-01 RX ADMIN — Medication 17 MCG/MIN: at 12:29

## 2021-01-01 RX ADMIN — FENTANYL CITRATE 12.5 MCG: 50 INJECTION, SOLUTION INTRAMUSCULAR; INTRAVENOUS at 11:16

## 2021-01-01 RX ADMIN — MIDODRINE HYDROCHLORIDE 5 MG: 5 TABLET ORAL at 07:37

## 2021-01-01 RX ADMIN — TIMOLOL MALEATE 1 DROP: 5 SOLUTION OPHTHALMIC at 09:48

## 2021-01-01 RX ADMIN — ARFORMOTEROL TARTRATE 15 MCG: 15 SOLUTION RESPIRATORY (INHALATION) at 19:38

## 2021-01-01 RX ADMIN — Medication 10 ML: at 08:53

## 2021-01-01 RX ADMIN — Medication 10 ML: at 06:05

## 2021-01-01 RX ADMIN — ROSUVASTATIN CALCIUM 20 MG: 10 TABLET, COATED ORAL at 21:38

## 2021-01-01 RX ADMIN — BUMETANIDE 2 MG: 0.25 INJECTION, SOLUTION INTRAMUSCULAR; INTRAVENOUS at 17:29

## 2021-01-01 RX ADMIN — CHLOROTHIAZIDE SODIUM 500 MG: 500 INJECTION, POWDER, LYOPHILIZED, FOR SOLUTION INTRAVENOUS at 17:42

## 2021-01-01 RX ADMIN — ROSUVASTATIN CALCIUM 20 MG: 10 TABLET, FILM COATED ORAL at 21:02

## 2021-01-01 RX ADMIN — Medication 10 ML: at 13:17

## 2021-01-01 RX ADMIN — ASPIRIN 81 MG: 81 TABLET, COATED ORAL at 08:26

## 2021-01-01 RX ADMIN — IRON SUCROSE 300 MG: 20 INJECTION, SOLUTION INTRAVENOUS at 12:35

## 2021-01-01 RX ADMIN — Medication 4.5 MG: at 22:14

## 2021-01-01 RX ADMIN — ROSUVASTATIN CALCIUM 20 MG: 10 TABLET, FILM COATED ORAL at 20:40

## 2021-01-01 RX ADMIN — TIMOLOL MALEATE 1 DROP: 5 SOLUTION OPHTHALMIC at 17:04

## 2021-01-01 RX ADMIN — HEPARIN SODIUM 5000 UNITS: 5000 INJECTION INTRAVENOUS; SUBCUTANEOUS at 21:46

## 2021-01-01 RX ADMIN — ALPRAZOLAM 0.25 MG: 0.25 TABLET ORAL at 15:26

## 2021-01-01 RX ADMIN — ACETAMINOPHEN 650 MG: 325 TABLET ORAL at 15:39

## 2021-01-01 RX ADMIN — Medication 3 MG: at 21:46

## 2021-01-01 RX ADMIN — SODIUM CHLORIDE 40 MG: 9 INJECTION INTRAMUSCULAR; INTRAVENOUS; SUBCUTANEOUS at 21:18

## 2021-01-01 RX ADMIN — Medication 10 ML: at 15:32

## 2021-01-01 RX ADMIN — Medication 3 MG: at 20:39

## 2021-01-01 RX ADMIN — Medication 10 ML: at 05:43

## 2021-01-01 RX ADMIN — POTASSIUM BICARBONATE 40 MEQ: 782 TABLET, EFFERVESCENT ORAL at 09:48

## 2021-01-01 RX ADMIN — ARFORMOTEROL TARTRATE: 15 SOLUTION RESPIRATORY (INHALATION) at 08:10

## 2021-01-01 RX ADMIN — VANCOMYCIN HYDROCHLORIDE 1750 MG: 10 INJECTION, POWDER, LYOPHILIZED, FOR SOLUTION INTRAVENOUS at 05:20

## 2021-01-01 RX ADMIN — OXYCODONE 5 MG: 5 TABLET ORAL at 09:25

## 2021-01-01 RX ADMIN — METOPROLOL SUCCINATE 25 MG: 25 TABLET, EXTENDED RELEASE ORAL at 08:23

## 2021-01-01 RX ADMIN — ALBUMIN (HUMAN) 25 G: 12.5 INJECTION, SOLUTION INTRAVENOUS at 21:16

## 2021-01-01 RX ADMIN — PANTOPRAZOLE SODIUM 40 MG: 40 TABLET, DELAYED RELEASE ORAL at 07:19

## 2021-01-01 RX ADMIN — EPOETIN ALFA-EPBX 8000 UNITS: 4000 INJECTION, SOLUTION INTRAVENOUS; SUBCUTANEOUS at 23:24

## 2021-01-01 RX ADMIN — IOPAMIDOL 80 ML: 755 INJECTION, SOLUTION INTRAVENOUS at 09:00

## 2021-01-01 RX ADMIN — ASPIRIN 81 MG: 81 TABLET, COATED ORAL at 08:52

## 2021-01-01 RX ADMIN — POTASSIUM CHLORIDE 20 MEQ: 29.8 INJECTION, SOLUTION INTRAVENOUS at 08:52

## 2021-01-01 RX ADMIN — IOPAMIDOL 100 ML: 612 INJECTION, SOLUTION INTRAVENOUS at 11:23

## 2021-01-01 RX ADMIN — POLYETHYLENE GLYCOL 3350 17 G: 17 POWDER, FOR SOLUTION ORAL at 08:11

## 2021-01-01 RX ADMIN — INSULIN LISPRO 2 UNITS: 100 INJECTION, SOLUTION INTRAVENOUS; SUBCUTANEOUS at 12:34

## 2021-01-01 RX ADMIN — METOPROLOL SUCCINATE 12.5 MG: 25 TABLET, EXTENDED RELEASE ORAL at 09:02

## 2021-01-01 RX ADMIN — ARFORMOTEROL TARTRATE: 15 SOLUTION RESPIRATORY (INHALATION) at 19:43

## 2021-01-01 RX ADMIN — MIDODRINE HYDROCHLORIDE 10 MG: 5 TABLET ORAL at 21:02

## 2021-01-01 RX ADMIN — OXYCODONE 5 MG: 5 TABLET ORAL at 18:03

## 2021-01-01 RX ADMIN — ROSUVASTATIN CALCIUM 20 MG: 10 TABLET, COATED ORAL at 21:12

## 2021-01-01 RX ADMIN — Medication 3 MG: at 21:01

## 2021-01-01 RX ADMIN — Medication 1 G: at 17:07

## 2021-01-01 RX ADMIN — Medication 3 MG: at 21:17

## 2021-01-01 RX ADMIN — FENTANYL CITRATE 50 MCG: 50 INJECTION, SOLUTION INTRAMUSCULAR; INTRAVENOUS at 09:32

## 2021-01-01 RX ADMIN — Medication 19 MCG/MIN: at 21:00

## 2021-01-01 RX ADMIN — HEPARIN SODIUM 5000 UNITS: 5000 INJECTION INTRAVENOUS; SUBCUTANEOUS at 12:31

## 2021-01-01 RX ADMIN — MIDODRINE HYDROCHLORIDE 15 MG: 5 TABLET ORAL at 08:11

## 2021-01-01 RX ADMIN — HEPARIN SODIUM 5000 UNITS: 5000 INJECTION INTRAVENOUS; SUBCUTANEOUS at 21:03

## 2021-01-01 RX ADMIN — HEPARIN SODIUM 5000 UNITS: 5000 INJECTION INTRAVENOUS; SUBCUTANEOUS at 21:10

## 2021-01-01 RX ADMIN — CEFTRIAXONE SODIUM 1 G: 1 INJECTION, POWDER, FOR SOLUTION INTRAMUSCULAR; INTRAVENOUS at 20:28

## 2021-01-01 RX ADMIN — Medication 4.5 MG: at 22:19

## 2021-01-01 RX ADMIN — OXYCODONE 5 MG: 5 TABLET ORAL at 18:46

## 2021-01-01 RX ADMIN — BUDESONIDE 500 MCG: 0.5 INHALANT RESPIRATORY (INHALATION) at 07:50

## 2021-01-01 RX ADMIN — ACETAMINOPHEN 650 MG: 325 TABLET ORAL at 10:22

## 2021-01-01 RX ADMIN — MAGNESIUM SULFATE HEPTAHYDRATE 1 G: 1 INJECTION, SOLUTION INTRAVENOUS at 11:16

## 2021-01-01 RX ADMIN — CEFTRIAXONE SODIUM 1 G: 1 INJECTION, POWDER, FOR SOLUTION INTRAMUSCULAR; INTRAVENOUS at 20:25

## 2021-01-01 RX ADMIN — MORPHINE SULFATE 5 MG: 20 SOLUTION ORAL at 14:00

## 2021-01-01 RX ADMIN — Medication 1 G: at 14:22

## 2021-01-01 RX ADMIN — OXYCODONE 5 MG: 5 TABLET ORAL at 22:39

## 2021-01-01 RX ADMIN — INSULIN LISPRO 2 UNITS: 100 INJECTION, SOLUTION INTRAVENOUS; SUBCUTANEOUS at 08:21

## 2021-01-01 RX ADMIN — POTASSIUM CHLORIDE 20 MEQ: 29.8 INJECTION, SOLUTION INTRAVENOUS at 10:13

## 2021-01-01 RX ADMIN — Medication 10 ML: at 20:39

## 2021-01-01 RX ADMIN — TIMOLOL MALEATE 1 DROP: 5 SOLUTION OPHTHALMIC at 20:44

## 2021-01-01 RX ADMIN — ARFORMOTEROL TARTRATE: 15 SOLUTION RESPIRATORY (INHALATION) at 08:36

## 2021-01-01 RX ADMIN — Medication 1 TABLET: at 08:52

## 2021-01-01 RX ADMIN — TIMOLOL MALEATE 1 DROP: 5 SOLUTION OPHTHALMIC at 17:52

## 2021-01-01 RX ADMIN — ACETAMINOPHEN 1000 MG: 500 TABLET ORAL at 06:52

## 2021-01-01 RX ADMIN — FENTANYL CITRATE 100 MCG: 50 INJECTION, SOLUTION INTRAMUSCULAR; INTRAVENOUS at 03:27

## 2021-01-01 RX ADMIN — Medication 3 MG: at 21:09

## 2021-01-01 RX ADMIN — VANCOMYCIN HYDROCHLORIDE 1000 MG: 1 INJECTION, POWDER, LYOPHILIZED, FOR SOLUTION INTRAVENOUS at 20:09

## 2021-01-01 RX ADMIN — TIMOLOL MALEATE 1 DROP: 5 SOLUTION OPHTHALMIC at 19:36

## 2021-01-01 RX ADMIN — Medication 3 MG: at 03:02

## 2021-01-01 RX ADMIN — METOPROLOL SUCCINATE 12.5 MG: 25 TABLET, EXTENDED RELEASE ORAL at 09:00

## 2021-01-01 RX ADMIN — Medication 40 MCG: at 10:10

## 2021-01-01 RX ADMIN — Medication 4.5 MG: at 21:03

## 2021-01-01 RX ADMIN — OXYCODONE 5 MG: 5 TABLET ORAL at 22:29

## 2021-01-01 RX ADMIN — LATANOPROST 1 DROP: 50 SOLUTION OPHTHALMIC at 17:04

## 2021-01-01 RX ADMIN — Medication 10 ML: at 13:58

## 2021-01-01 RX ADMIN — HEPARIN SODIUM 5000 UNITS: 5000 INJECTION INTRAVENOUS; SUBCUTANEOUS at 20:31

## 2021-01-01 RX ADMIN — TIMOLOL MALEATE 1 DROP: 5 SOLUTION OPHTHALMIC at 08:32

## 2021-01-01 RX ADMIN — ALBUMIN (HUMAN) 25 G: 12.5 INJECTION, SOLUTION INTRAVENOUS at 23:35

## 2021-01-01 RX ADMIN — ARFORMOTEROL TARTRATE 15 MCG: 15 SOLUTION RESPIRATORY (INHALATION) at 20:06

## 2021-01-01 RX ADMIN — ASPIRIN 81 MG: 81 TABLET, COATED ORAL at 08:22

## 2021-01-01 RX ADMIN — PANTOPRAZOLE SODIUM 40 MG: 40 TABLET, DELAYED RELEASE ORAL at 08:52

## 2021-01-01 RX ADMIN — Medication 10 ML: at 14:17

## 2021-01-01 RX ADMIN — NITROFURANTOIN (MONOHYDRATE/MACROCRYSTALS) 100 MG: 75; 25 CAPSULE ORAL at 17:12

## 2021-01-01 RX ADMIN — CEFTRIAXONE SODIUM 1 G: 1 INJECTION, POWDER, FOR SOLUTION INTRAMUSCULAR; INTRAVENOUS at 20:40

## 2021-01-01 RX ADMIN — OXYCODONE 5 MG: 5 TABLET ORAL at 14:16

## 2021-01-01 RX ADMIN — LATANOPROST 1 DROP: 50 SOLUTION OPHTHALMIC at 19:39

## 2021-01-01 RX ADMIN — POLYETHYLENE GLYCOL 3350 17 G: 17 POWDER, FOR SOLUTION ORAL at 17:21

## 2021-01-01 RX ADMIN — Medication 4 MCG/MIN: at 22:01

## 2021-01-01 RX ADMIN — PANTOPRAZOLE SODIUM 40 MG: 40 TABLET, DELAYED RELEASE ORAL at 06:46

## 2021-01-01 RX ADMIN — POTASSIUM & SODIUM PHOSPHATES POWDER PACK 280-160-250 MG 2 PACKET: 280-160-250 PACK at 01:26

## 2021-01-01 RX ADMIN — PROPOFOL 70 MG: 10 INJECTION, EMULSION INTRAVENOUS at 10:04

## 2021-01-01 RX ADMIN — Medication 3 MG: at 21:15

## 2021-01-01 RX ADMIN — Medication 10 ML: at 05:09

## 2021-01-01 RX ADMIN — METOPROLOL SUCCINATE 25 MG: 25 TABLET, EXTENDED RELEASE ORAL at 15:39

## 2021-01-01 RX ADMIN — ACETAMINOPHEN 650 MG: 325 TABLET ORAL at 03:02

## 2021-01-01 RX ADMIN — INSULIN LISPRO 2 UNITS: 100 INJECTION, SOLUTION INTRAVENOUS; SUBCUTANEOUS at 12:26

## 2021-01-01 RX ADMIN — Medication 10 ML: at 13:46

## 2021-01-01 RX ADMIN — OXYCODONE 5 MG: 5 TABLET ORAL at 22:28

## 2021-01-01 RX ADMIN — OXYCODONE 5 MG: 5 TABLET ORAL at 09:47

## 2021-01-01 RX ADMIN — OXYCODONE 5 MG: 5 TABLET ORAL at 01:32

## 2021-01-01 RX ADMIN — ASPIRIN 81 MG: 81 TABLET, COATED ORAL at 09:02

## 2021-01-01 RX ADMIN — VANCOMYCIN HYDROCHLORIDE 1000 MG: 1 INJECTION, POWDER, LYOPHILIZED, FOR SOLUTION INTRAVENOUS at 21:13

## 2021-01-01 RX ADMIN — ASPIRIN 81 MG: 81 TABLET, COATED ORAL at 09:12

## 2021-01-01 RX ADMIN — OXYCODONE 5 MG: 5 TABLET ORAL at 04:45

## 2021-01-01 RX ADMIN — VANCOMYCIN HYDROCHLORIDE 1000 MG: 1 INJECTION, POWDER, LYOPHILIZED, FOR SOLUTION INTRAVENOUS at 19:46

## 2021-01-01 RX ADMIN — ACETAMINOPHEN 650 MG: 325 TABLET ORAL at 09:17

## 2021-01-01 RX ADMIN — NITROFURANTOIN (MONOHYDRATE/MACROCRYSTALS) 100 MG: 75; 25 CAPSULE ORAL at 17:15

## 2021-01-01 RX ADMIN — Medication 40 MCG: at 10:04

## 2021-01-01 RX ADMIN — ARFORMOTEROL TARTRATE 15 MCG: 15 SOLUTION RESPIRATORY (INHALATION) at 20:11

## 2021-01-01 RX ADMIN — Medication 3 MG: at 21:06

## 2021-01-01 RX ADMIN — MIDODRINE HYDROCHLORIDE 10 MG: 5 TABLET ORAL at 15:57

## 2021-01-01 RX ADMIN — ARFORMOTEROL TARTRATE 15 MCG: 15 SOLUTION RESPIRATORY (INHALATION) at 08:05

## 2021-01-01 RX ADMIN — ALBUMIN (HUMAN) 25 G: 12.5 INJECTION, SOLUTION INTRAVENOUS at 00:13

## 2021-01-01 RX ADMIN — BUDESONIDE 500 MCG: 0.5 INHALANT RESPIRATORY (INHALATION) at 19:39

## 2021-01-01 RX ADMIN — ROSUVASTATIN CALCIUM 20 MG: 10 TABLET, FILM COATED ORAL at 20:26

## 2021-01-01 RX ADMIN — CHLOROTHIAZIDE SODIUM 500 MG: 500 INJECTION, POWDER, LYOPHILIZED, FOR SOLUTION INTRAVENOUS at 00:04

## 2021-01-01 RX ADMIN — ASPIRIN 81 MG: 81 TABLET, CHEWABLE ORAL at 08:14

## 2021-01-01 RX ADMIN — HEPARIN SODIUM 5000 UNITS: 5000 INJECTION INTRAVENOUS; SUBCUTANEOUS at 12:29

## 2021-01-01 RX ADMIN — TIMOLOL MALEATE 1 DROP: 5 SOLUTION OPHTHALMIC at 19:08

## 2021-01-01 RX ADMIN — Medication 10 ML: at 21:39

## 2021-01-01 RX ADMIN — ARFORMOTEROL TARTRATE: 15 SOLUTION RESPIRATORY (INHALATION) at 08:01

## 2021-01-01 RX ADMIN — ROSUVASTATIN CALCIUM 20 MG: 10 TABLET, FILM COATED ORAL at 21:01

## 2021-01-01 RX ADMIN — MORPHINE SULFATE 10 MG: 20 SOLUTION ORAL at 11:00

## 2021-01-01 RX ADMIN — ARFORMOTEROL TARTRATE: 15 SOLUTION RESPIRATORY (INHALATION) at 20:11

## 2021-01-01 RX ADMIN — TIMOLOL MALEATE 1 DROP: 5 SOLUTION OPHTHALMIC at 08:55

## 2021-01-01 RX ADMIN — OXYCODONE 5 MG: 5 TABLET ORAL at 20:39

## 2021-01-01 RX ADMIN — OXYCODONE 5 MG: 5 TABLET ORAL at 06:13

## 2021-01-01 RX ADMIN — MIDODRINE HYDROCHLORIDE 20 MG: 5 TABLET ORAL at 21:17

## 2021-01-01 RX ADMIN — Medication 4 MCG/MIN: at 11:18

## 2021-01-01 RX ADMIN — METOPROLOL SUCCINATE 25 MG: 25 TABLET, EXTENDED RELEASE ORAL at 08:30

## 2021-01-01 RX ADMIN — ACETAMINOPHEN 650 MG: 325 TABLET ORAL at 11:57

## 2021-01-01 RX ADMIN — FUROSEMIDE 40 MG: 10 INJECTION, SOLUTION INTRAMUSCULAR; INTRAVENOUS at 07:18

## 2021-01-01 RX ADMIN — POTASSIUM & SODIUM PHOSPHATES POWDER PACK 280-160-250 MG 1 PACKET: 280-160-250 PACK at 11:19

## 2021-01-01 RX ADMIN — METOPROLOL TARTRATE 12.5 MG: 25 TABLET, FILM COATED ORAL at 18:37

## 2021-01-01 RX ADMIN — ALBUMIN (HUMAN) 12.5 G: 0.25 INJECTION, SOLUTION INTRAVENOUS at 21:24

## 2021-01-01 RX ADMIN — ONDANSETRON HYDROCHLORIDE 4 MG: 2 INJECTION, SOLUTION INTRAMUSCULAR; INTRAVENOUS at 11:21

## 2021-01-01 RX ADMIN — Medication 1 G: at 11:38

## 2021-01-01 RX ADMIN — MIDODRINE HYDROCHLORIDE 10 MG: 5 TABLET ORAL at 21:07

## 2021-01-01 RX ADMIN — HEPARIN SODIUM 5000 UNITS: 5000 INJECTION INTRAVENOUS; SUBCUTANEOUS at 21:15

## 2021-01-01 RX ADMIN — LATANOPROST 1 DROP: 50 SOLUTION OPHTHALMIC at 19:21

## 2021-01-01 RX ADMIN — BUMETANIDE 2 MG: 0.25 INJECTION, SOLUTION INTRAMUSCULAR; INTRAVENOUS at 22:41

## 2021-01-01 RX ADMIN — Medication 10 ML: at 20:41

## 2021-01-01 RX ADMIN — ROSUVASTATIN CALCIUM 20 MG: 10 TABLET, FILM COATED ORAL at 21:17

## 2021-01-01 RX ADMIN — SODIUM CHLORIDE, POTASSIUM CHLORIDE, SODIUM LACTATE AND CALCIUM CHLORIDE: 600; 310; 30; 20 INJECTION, SOLUTION INTRAVENOUS at 09:48

## 2021-01-01 RX ADMIN — FENTANYL CITRATE 12.5 MCG: 50 INJECTION, SOLUTION INTRAMUSCULAR; INTRAVENOUS at 07:09

## 2021-01-01 RX ADMIN — POTASSIUM CHLORIDE 40 MEQ: 750 TABLET, FILM COATED, EXTENDED RELEASE ORAL at 06:30

## 2021-01-01 RX ADMIN — LATANOPROST 1 DROP: 50 SOLUTION OPHTHALMIC at 22:28

## 2021-01-01 RX ADMIN — VANCOMYCIN HYDROCHLORIDE 1250 MG: 10 INJECTION, POWDER, LYOPHILIZED, FOR SOLUTION INTRAVENOUS at 18:00

## 2021-01-01 RX ADMIN — Medication 10 ML: at 05:18

## 2021-01-01 RX ADMIN — Medication 10 ML: at 21:25

## 2021-01-01 RX ADMIN — DOCUSATE SODIUM 100 MG: 50 LIQUID ORAL at 18:52

## 2021-01-01 RX ADMIN — INSULIN LISPRO 6 UNITS: 100 INJECTION, SOLUTION INTRAVENOUS; SUBCUTANEOUS at 00:52

## 2021-01-01 RX ADMIN — Medication 3 MG: at 20:26

## 2021-01-01 RX ADMIN — FUROSEMIDE 20 MG: 10 INJECTION, SOLUTION INTRAMUSCULAR; INTRAVENOUS at 16:49

## 2021-01-01 RX ADMIN — MIDODRINE HYDROCHLORIDE 20 MG: 5 TABLET ORAL at 08:14

## 2021-01-01 RX ADMIN — LIDOCAINE HYDROCHLORIDE,EPINEPHRINE BITARTRATE 200 MG: 20; .01 INJECTION, SOLUTION INFILTRATION; PERINEURAL at 07:43

## 2021-01-01 RX ADMIN — TIMOLOL MALEATE 1 DROP: 5 SOLUTION OPHTHALMIC at 19:21

## 2021-01-01 RX ADMIN — VANCOMYCIN HYDROCHLORIDE 1000 MG: 1 INJECTION, POWDER, LYOPHILIZED, FOR SOLUTION INTRAVENOUS at 20:28

## 2021-01-01 RX ADMIN — LATANOPROST 1 DROP: 50 SOLUTION OPHTHALMIC at 22:14

## 2021-01-01 RX ADMIN — METRONIDAZOLE 500 MG: 500 INJECTION, SOLUTION INTRAVENOUS at 05:21

## 2021-01-01 RX ADMIN — MORPHINE SULFATE 2 MG: 2 INJECTION, SOLUTION INTRAMUSCULAR; INTRAVENOUS at 20:33

## 2021-01-01 RX ADMIN — BUMETANIDE 1 MG: 0.25 INJECTION INTRAMUSCULAR; INTRAVENOUS at 22:07

## 2021-01-01 RX ADMIN — MIDODRINE HYDROCHLORIDE 15 MG: 5 TABLET ORAL at 15:25

## 2021-01-01 RX ADMIN — HEPARIN SODIUM 5000 UNITS: 5000 INJECTION INTRAVENOUS; SUBCUTANEOUS at 21:25

## 2021-01-01 RX ADMIN — HEPARIN SODIUM 5000 UNITS: 5000 INJECTION INTRAVENOUS; SUBCUTANEOUS at 20:41

## 2021-01-01 RX ADMIN — FENTANYL CITRATE 12.5 MCG: 50 INJECTION, SOLUTION INTRAMUSCULAR; INTRAVENOUS at 08:43

## 2021-01-01 RX ADMIN — TOLVAPTAN 15 MG: 30 TABLET ORAL at 11:11

## 2021-01-01 RX ADMIN — BUDESONIDE 500 MCG: 0.5 INHALANT RESPIRATORY (INHALATION) at 20:11

## 2021-01-01 RX ADMIN — TIMOLOL MALEATE 1 DROP: 5 SOLUTION OPHTHALMIC at 08:28

## 2021-01-01 RX ADMIN — SODIUM BICARBONATE 100 MEQ: 84 INJECTION, SOLUTION INTRAVENOUS at 00:34

## 2021-01-01 RX ADMIN — Medication 10 ML: at 22:15

## 2021-01-01 RX ADMIN — ROSUVASTATIN CALCIUM 20 MG: 10 TABLET, COATED ORAL at 22:14

## 2021-01-01 RX ADMIN — Medication 10 ML: at 15:43

## 2021-01-01 RX ADMIN — METOPROLOL SUCCINATE 12.5 MG: 25 TABLET, EXTENDED RELEASE ORAL at 08:26

## 2021-01-01 RX ADMIN — EPOETIN ALFA-EPBX 8000 UNITS: 4000 INJECTION, SOLUTION INTRAVENOUS; SUBCUTANEOUS at 21:51

## 2021-01-01 RX ADMIN — HEPARIN SODIUM 5000 UNITS: 5000 INJECTION INTRAVENOUS; SUBCUTANEOUS at 04:10

## 2021-01-01 RX ADMIN — CEFTRIAXONE SODIUM 1 G: 1 INJECTION, POWDER, FOR SOLUTION INTRAMUSCULAR; INTRAVENOUS at 21:14

## 2021-02-08 NOTE — PROGRESS NOTES
SUBJECTIVE:  
Lizette Avila is a 80 y.o. female who is here for complete physical exam. 
 
 
Pt specifically denies changes in vision or hearing, trouble with swallowing or taste, CP, SOB, heartburn or upset stomach, change in bowel habits, problems urinating, unusual joint or muscle pains, numbness or tingling in extremities, skin lesions of concern, or pain. Pt notes feeling good today. Pt has a hx of ovarian cancer and breast cancer and has had a hystorectomy. She reports itching and has been to Dermatology for skin cancer where she received an injection to help reduce it. She states she has 6 sessions of chemotherapy planned for her ovarian cancer and will start on 02/10/2021. She notes she will have a port put in on 02/11/2021. At this time, she is otherwise doing well and has brought no other complaints to my attention today. For a list of the medical issues addressed today, see the assessment and plan below. PMH:  
Past Medical History:  
Diagnosis Date  Arthritis  Cancer ([facility] breast    (leg,ear,arm-skin cancer)  GERD (gastroesophageal reflux disease)  Hypertension  Other ill-defined conditions(799.89)   
 glaucoma  Other ill-defined conditions(799.89)   
 cellulitis left arm  Other ill-defined conditions(799.89)   
 carpal tunnel syndrome left hand  Other ill-defined conditions(799.89)   
 vertigo PSH:  has a past surgical history that includes hx mastectomy; hx efren and bso; hx cataract removal; hx tonsillectomy; hx knee arthroscopy; hx appendectomy; hx other surgical; hx colonoscopy (4/2012); hx other surgical; hx carpal tunnel release; cardiac catheterization (1/10/2013); and vascular surgery procedure unlist. 
 
Allergies: is allergic to augmentin [amoxicillin-pot clavulanate]; doxycycline; keflex [cephalexin]; monodox [doxycycline monohydrate]; and sulfa (sulfonamide antibiotics). Meds:  
Current Outpatient Medications Medication Sig  
  tiotropium-olodateroL (Stiolto Respimat) 2.5-2.5 mcg/actuation inhaler Take  by inhalation. 2 puffs each night  eszopiclone (LUNESTA) 1 mg tablet Take 1 Tab by mouth nightly. Max Daily Amount: 1 mg.  Eliquis 5 mg tablet TAKE 1 TABLET EVERY 12     HOURS TO PREVENT STROKE IN ATRIAL FIBRILLATION  
 nebivoloL (Bystolic) 2.5 mg tablet TAKE 1 TABLET DAILY  diclofenac (VOLTAREN) 1 % gel APPLY 1 APPLICATION TOPICALLY 4 (FOUR) TIMES A DAY AS NEEDED (PAIN)  rosuvastatin (CRESTOR) 20 mg tablet TAKE 1 TABLET NIGHTLY  hydroCHLOROthiazide (MICROZIDE) 12.5 mg capsule TAKE 1 CAPSULE BY MOUTH EVERY DAY  melatonin 5 mg tablet Take 5 mg by mouth as needed.  sucralfate (CARAFATE) 100 mg/mL suspension Take  by mouth as needed.  timolol (TIMOPTIC-XE) 0.5 % ophthalmic gel-forming Administer 1 Drop to both eyes daily.  CYANOCOBALAMIN, VITAMIN B-12, (VITAMIN B-12 PO) Take 1,000 mcg by mouth daily.  latanoprost (XALATAN) 0.005 % ophthalmic solution Administer 1 Drop to both eyes nightly.  cycloSPORINE (RESTASIS) 0.05 % ophthalmic emulsion Administer 1 Drop to both eyes two (2) times a day.  VIT C/E/ZN/COPPR/LUTEIN/ZEAXAN (PRESERVISION AREDS 2 PO) Take 2 capsules by mouth two (2) times a day.  esomeprazole (NEXIUM) 40 mg capsule Take 1 Cap by mouth Daily (before breakfast).  Cholecalciferol, Vitamin D3, (VITAMIN D3) 1,000 unit cap Take 1 Cap by mouth two (2) times a day.  omega-3 fatty acids-vitamin e (FISH OIL) 1,000 mg Cap Take 1 Cap by mouth two (2) times a day.  CALCIUM CARBONATE/VITAMIN D3 (CALCIUM 600 + D,3, PO) Take 1 Cap by mouth two (2) times a day.  multivitamins-minerals-lutein (CENTRUM SILVER) Tab Take 1 Tab by mouth daily. No current facility-administered medications for this visit. Fam hx: family history includes Cancer in her sister; Diabetes in her sister and sister; Heart Disease in her brother, brother, father, mother, sister, sister, and son; Other in her brother; Stroke in her brother. Soc hx:  reports that she quit smoking about 66 years ago. She has never used smokeless tobacco. She reports current alcohol use. She reports that she does not use drugs. Review of Systems - History obtained from the patient General ROS: negative Psychological ROS: negative Ophthalmic ROS: negative ENT ROS: negative Respiratory ROS: no cough, shortness of breath, or wheezing Cardiovascular ROS: no chest pain or dyspnea on exertion Gastrointestinal ROS: no abdominal pain, change in bowel habits, or black or bloody stools Genito-Urinary ROS: negative Musculoskeletal ROS: negative Neurological ROS: negative Dermatological ROS: negative OBJECTIVE:  
Vitals:  
Visit Vitals /77 (BP 1 Location: Left upper arm, BP Patient Position: Sitting, BP Cuff Size: Adult) Pulse 62 Temp 96.9 °F (36.1 °C) (Temporal) Resp 16 Ht 5' 4\" (1.626 m) Wt 164 lb 3.2 oz (74.5 kg) SpO2 97% BMI 28.18 kg/m² Gen: Pleasant 80 y.o. female in NAD. HEENT: PERRLA. EOMI. OP moist and pink. EARS: TMs normal and canals equal bilaterally. NECK: Supple. No LAD. No thyromegaly. HEART: RRR, No M/G/R.    
LUNGS: CTAB No W/R. ABDOMEN: S, NT, ND, BS+. EXTREMITIES: Warm. No C/C/E. NEURO: Alert and oriented x 3. Cranial nerves grossly intact. No focal sensory or motor deficits noted. SKIN: Warm. Dry. No rashes or other lesions noted. Female : normal external genitalia, no discharge, no lesions, no masses ASSESSMENT/ PLAN:  
 
Diagnoses and all orders for this visit: 1. Elevated hemoglobin A1c 
-     AMB POC HEMOGLOBIN A1C 
 
2. Insomnia, unspecified type 
-     eszopiclone (LUNESTA) 1 mg tablet; Take 1 Tab by mouth nightly. Max Daily Amount: 1 mg. 3. Pain of left lower extremity -     AMB SUPPLY ORDER 
 
1. Elevated Hemoglobin A1c I ordered a AMB POC Hemoglobin A1c. 
 
2. Insomnia I prescribed Lunesta 1 mg tablet daily. 3. Pain of Left Lower Extremity I put in a AMB Supply Order for a wheelchair She needs to f/u with cardiology because she is on a blood thinner and will get a port on 02/11/2021. Follow-up appointment notes, labs, and imaging studies were reviewed during this encounter. Follow-up and Dispositions · Return in about 4 months (around 6/8/2021) for follow up. I have reviewed the patient's medications and risks/side effects/benefits were discussed. Diagnosis(-es) explained to patient and questions answered. Literature provided where appropriate.   
 
Written by Kristi Mello, as dictated by Kye Palumbo MD.

## 2021-02-16 NOTE — TELEPHONE ENCOUNTER
#[phone]  Eitan Salcedo Gathers states they have waited for a week to get a call back scheduling pt's wheelchair assessment. Please call to give status of this appt. Do we make that appt or [facility] scheduling? Please call to let Eitan Gathers the status.

## 2021-02-19 NOTE — TELEPHONE ENCOUNTER
HIPAA verified with Daksha Hi, patient 's daughter. Will call DME next week to  To contact patient regarding Wheelchair. Will try [facility]. Daksha Hi would like to be called at [phone].

## 2021-02-24 NOTE — TELEPHONE ENCOUNTER
Called and spoke to Roberth. Faxed the order form to [id number] [facility] for the patients wheelchair.

## 2021-02-24 NOTE — TELEPHONE ENCOUNTER
----- Message from Eliezer Mcdonough sent at 2/24/2021 11:39 AM EST -----  Regarding: Wheelchair [address] that [place] gave her the number too, they don't do regular wheel chairs, only power, do not need power wheel chair, just a regular wheel chair. Please Call José Miguel Espinoza back about getting this fixed for PT. Or getting a script sent to [employer] for one.       Ricky Back Info:  Arely Santos [phone]    Message from [facility]

## 2021-03-08 NOTE — TELEPHONE ENCOUNTER
----- Message from Laura Walker sent at 3/8/2021  2:31 PM EST -----  Regarding: Dr. Heidi Lebron (if not patient): N/A      Relationship of caller (if not patient): N/A      Best contact number(s): [phone]      Name of medication and dosage if known: \"Eszopiclone\" 1mg tablets      Is patient out of this medication (yes/no): no      Pharmacy name: [facility] [place] listed in chart? (yes/no): yes  Pharmacy phone number: unknown      Message from [facility]

## 2021-03-09 NOTE — TELEPHONE ENCOUNTER
Future Appointments:  Future Appointments   Date Time Provider Lisette Nunes   3/12/2021  9:15 AM Olya Collier MD [facility] BS AMB   3/22/2021  2:45 PM Alex Finn MD [facility] BS AMB   4/22/2021 11:00 AM [facility] RCR PET DOSE 1 SMHRCHPET PINKY JULITO   4/22/2021 12:00 PM [facility] RCR PET 1 SMAYANNA VANCE JULITO        Last Appointment With Me:  2/8/2021     Requested Prescriptions     Pending Prescriptions Disp Refills    eszopiclone (LUNESTA) 1 mg tablet 30 Tab 0     Sig: Take 1 Tab by mouth nightly. Max Daily Amount: 1 mg.

## 2021-03-12 NOTE — PROGRESS NOTES
This is an established visit conducted via telemedicine. The patient has been instructed that this meets HIPAA criteria and acknowledges and agrees to this method of visitation. Identified pt with two pt identifiers(name and ). Chief Complaint Patient presents with  Peripheral Edema  
  right leg is seeping fulid  started . Health Maintenance Due Topic Date Due  
 COVID-19 Vaccine (1) Never done  Shingles Vaccine (1 of 2) Never done [facility] Annual Well Visit  2019  Glaucoma Screening   2019  Yearly Flu Vaccine (1) 2020 Ms. Laura Avila has a reminder for a \"due or due soon\" health maintenance. I have asked that she discuss this further with her primary care provider for follow-up on this health maintenance.

## 2021-03-12 NOTE — Clinical Note
Please reach out to this patient to set up a follow-up for 3 weeks. She may want to do an in office visit or virtual.  Also I need to get the notes from her last dermatology appointment with Dr. Juan Pablo Waldrop. The office number is [phone].

## 2021-03-12 NOTE — PROGRESS NOTES
Kika Barroso is a 80 y.o. female, evaluated via audio-only technology on 3/12/2021 for Peripheral Edema (right leg is seeping fulid  started 21st of feb. ) Mary Liutrenton Camacho comes in for follow-up. She started her treatment for ovarian cancer. She is being followed by Dr. Elizabeth Cabral who is her oncologist.  She reports her treatments are once a month and she had the second treatment yesterday. Her first treatment was on February 11, 2021 she reports having a little bit of upset stomach yesterday but had cereal this morning and is feeling better. She reports that she is also being followed by dermatologist for skin cancer. She is receiving chemotherapeutic injection to shrink the lesion. She does not know the name of this medication, but it was started in January. She has had appointments on February 2, March 4, and the next will be March 18. Her biggest concern today is blisters forming on her right leg. This started on February 21, 2021. The weeping is significant and she is to keep it wrapped or will let her clothing/ bedding. She reports that none of the other specialists seem to know what may have caused this abnormal skin development. She denies any pain, swelling, erythema, or itching in the area. She also is seeing a pulmonologist in has a nebulizer that is being sent to her home. She also started taking a prednisone prescribed by the pulmonologist.  Her first dose of that was today. She also reports that she had her Covid vaccine. She received her first dose of the maternal vaccine on February 24, 2021 and will receive her second dose on March 24, 2021. Assessment & Plan:  
Diagnoses and all orders for this visit: 1. Right leg swelling Is unclear what is causing this blistering and weeping of the right lower leg. We discussed the possibility that it may be related to the medication for her skin cancer or an interaction between both of the chemotherapy medications.   I advised her to cleanse the leg with antibacterial soap to use sterile saline to rinse the area and apply Polysporin. I advised doing this in order to prevent a secondary infection since the open blisters may be a source of infection. I advised him to wrap the areas and a light gauze. I will reach out to Dr. Carson Zapien her dermatologist to try to get the name of the medication for her skin cancer. She was advised to follow-up with me in 3 weeks. 12 
Subjective:  
 
 
Prior to Admission medications Medication Sig Start Date End Date Taking? Authorizing Provider  
eszopiclone (LUNESTA) 1 mg tablet Take 1 Tab by mouth nightly. Max Daily Amount: 1 mg. 3/11/21  Yes Daysi Snell MD  
hydroCHLOROthiazide (MICROZIDE) 12.5 mg capsule TAKE 1 CAPSULE BY MOUTH EVERY DAY 3/1/21  Yes Park Morse MD  
tiotropium-olodateroL (Stiolto Respimat) 2.5-2.5 mcg/actuation inhaler Take  by inhalation. 2 puffs each night   Yes Provider, Historical  
Eliquis 5 mg tablet TAKE 1 TABLET EVERY 12     HOURS TO PREVENT STROKE IN ATRIAL FIBRILLATION 1/18/21  Yes Mango GONZALES NP  
nebivoloL (Bystolic) 2.5 mg tablet TAKE 1 TABLET DAILY 1/6/21  Yes Park Morse MD  
diclofenac (VOLTAREN) 1 % gel APPLY 1 APPLICATION TOPICALLY 4 (FOUR) TIMES A DAY AS NEEDED (PAIN) 11/25/20  Yes Provider, Historical  
rosuvastatin (CRESTOR) 20 mg tablet TAKE 1 TABLET NIGHTLY 11/17/20  Yes Daysi Snell MD  
melatonin 5 mg tablet Take 5 mg by mouth as needed. Yes Provider, Historical  
sucralfate (CARAFATE) 100 mg/mL suspension Take  by mouth as needed. Yes Provider, Historical  
timolol (TIMOPTIC-XE) 0.5 % ophthalmic gel-forming Administer 1 Drop to both eyes daily. Yes Provider, Historical  
CYANOCOBALAMIN, VITAMIN B-12, (VITAMIN B-12 PO) Take 1,000 mcg by mouth daily. Yes Provider, Historical  
latanoprost (XALATAN) 0.005 % ophthalmic solution Administer 1 Drop to both eyes nightly.  6/8/15  Yes Provider, Historical  
cycloSPORINE (RESTASIS) 0.05 % ophthalmic emulsion Administer 1 Drop to both eyes two (2) times a day. Yes Provider, Historical  
VIT C/E/ZN/COPPR/LUTEIN/ZEAXAN (PRESERVISION AREDS 2 PO) Take 2 capsules by mouth two (2) times a day. Yes Other, MD Mercedes  
esomeprazole (NEXIUM) 40 mg capsule Take 1 Cap by mouth Daily (before breakfast). 8/6/14  Yes Trisha Mckinney MD  
Cholecalciferol, Vitamin D3, (VITAMIN D3) 1,000 unit cap Take 1 Cap by mouth two (2) times a day. Yes Provider, Historical  
omega-3 fatty acids-vitamin e (FISH OIL) 1,000 mg Cap Take 1 Cap by mouth two (2) times a day. Yes Provider, Historical  
CALCIUM CARBONATE/VITAMIN D3 (CALCIUM 600 + D,3, PO) Take 1 Cap by mouth two (2) times a day. Yes Provider, Historical  
multivitamins-minerals-lutein (CENTRUM SILVER) Tab Take 1 Tab by mouth daily. Yes Provider, Historical  
 
Patient Active Problem List  
Diagnosis Code  Glaucoma H40.9  Vitamin D deficiency E55.9  Hypercholesteremia E78.00  Arthritis M19.90  
 History  of basal cell carcinoma  History of breast cancer Z85.3  Vertigo R42  Wears hearing aid Z97.4  
 HTN (hypertension) I10  
 PVC's (premature ventricular contractions) I49.3  Colitis K52.9  First degree AV block I44.0  Paroxysmal ventricular tachycardia (HCC) I47.2  Shortness of breath R06.02  
 PAD (peripheral artery disease) (HCC) I73.9  Atherosclerosis of native arteries of the extremities with intermittent claudication I70.219  
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  
Diagnosis Date  Arthritis  Cancer ([facility] [address] breast    (leg,ear,arm-skin cancer)  GERD (gastroesophageal reflux disease)  Hypertension  Other ill-defined conditions(799.89)   
 glaucoma  Other ill-defined conditions(799.89)   
 cellulitis left arm  Other ill-defined conditions(799.89)   
 carpal tunnel syndrome left hand  Other ill-defined conditions(799.89)   
 vertigo Past Surgical History:  
Procedure Laterality Date  CARDIAC CATHETERIZATION  1/10/2013  HX APPENDECTOMY  HX CARPAL TUNNEL RELEASE    
 left hand  HX CATARACT REMOVAL    
 bilateral  
 HX COLONOSCOPY  4/2012  HX KNEE ARTHROSCOPY    
 left  HX MASTECTOMY    
 bilateral  
 HX OTHER SURGICAL Basal cell skin cancer removed from left leg, left arm and neck  HX OTHER SURGICAL    
 cyst removed from left thigh  HX OREN AND BSO  HX TONSILLECTOMY  VASCULAR SURGERY PROCEDURE UNLIST    
 vascular blockages removed. Review of Systems Constitutional: Negative. HENT: Negative. Respiratory: Negative. Cardiovascular: Negative. Gastrointestinal: Negative. Genitourinary: Negative. Skin:  
     Right leg swelling Neurological: Negative. Psychiatric/Behavioral: Negative. Patient-Reported Vitals 3/12/2021 Patient-Reported Weight -  
Patient-Reported Height -  
Patient-Reported Pulse 80 Patient-Reported Temperature - Patient-Reported Systolic  983 Patient-Reported Diastolic 63 Nora Ramirez, who was evaluated through a patient-initiated, synchronous (real-time) audio only encounter, and/or her healthcare decision maker, is aware that it is a billable service, with coverage as determined by her insurance carrier. She provided verbal consent to proceed: Yes. She has not had a related appointment within my department in the past 7 days or scheduled within the next 24 hours. Total Time: minutes: 21-30 minutes Manda Armstrong MD

## 2021-03-15 NOTE — TELEPHONE ENCOUNTER
----- Message from Rona Guy sent at 3/15/2021 12:27 PM EDT -----  Regarding: /Telephone  General Message/Vendor Calls    Caller's first and last name: gamal Arias      Reason for call: Dr. Jen Skinner prescribed Eszopiclone 1mg sleep aid but per pharmacy insurance will not cover more than 90 pills per year. Patient needs Dr. Jen Skinner to override amount of Rx available per year or a pre-authorization sent to [facility] pharmacy.        Callback required yes/no and why: Yes, to discuss      Best contact number(s): [phone]      Details to clarify the request: N/A      Message from [facility]

## 2021-03-15 NOTE — TELEPHONE ENCOUNTER
----- Message from Juana Mahajan sent at 3/15/2021  2:48 PM EDT -----  Regarding: Dr. Laura Briscoe first and last name: Ulysses Fujita. [facility] pharmacy      Reason for call:prior auth      Callback required yes/no and why: no. If questions      Best contact number(s): [phone]      Details to clarify the request: [facility] needs a prior auth eszopiclone 1 mg [phone] (insurance override) ins only will pay for 90 tablets for a year.  After that she needs a prior auth ot she can't get them until August     Message from [facility]

## 2021-03-19 NOTE — TELEPHONE ENCOUNTER
Pt needs a sleep aid, she states she is only getting 1-2 hours a night. She also states she has an open wound on her leg that is seeping. She states she changes wet [employer] multiple times a day, right leg about the ankle, clear liquid, thin like water. No swelling, no odor, no fever.     Please call to advise  [phone]

## 2021-03-22 NOTE — LETTER
3/22/2021 Patient: Ryan Marr YOB: 1927 Date of Visit: 3/22/2021 Shmuel Trinidad MD 
[address] [address] [address] [employer] Dear Shmuel Trinidad MD, Thank you for referring Ms. Kailyn Hairston to [address] [place] [address] for evaluation. My notes for this consultation are attached. If you have questions, please do not hesitate to call me. I look forward to following your patient along with you.  
 
 
Sincerely, 
 
Mitra Xiao MD

## 2021-03-22 NOTE — PROGRESS NOTES
1. Have you been to the ER, urgent care clinic since your last visit? Hospitalized since your last visit? No. 
 
2. Have you seen or consulted any other health care providers outside of the [address] [place] [address] since your last visit? Include any pap smears or colon screening. Receiving chemo for skin cancer and ovarian cancer. Chief Complaint Patient presents with  Hypertension 3 month-  soboe, very fatigued- rt leg weeping  Cholesterol Problem

## 2021-03-22 NOTE — PROGRESS NOTES
[address]  [phone] Subjective:  
  
Emily Dennis is a 80 y.o. female is here for 3 mos follow up. She has a PMHx of PAF, aortic stenosis, HTN, HLD, ovarian cancer and hx of breast cancer s/p bilateral mastectomy. Last OV 12/2020: Test follow up re echo and NST. Negative NST in 11/2020. Normal EF moderate AS mild-mod MR severe TR and mod PHTN  per echo 11/2020 Recently diagnosed with Ovarian ca. Followed by Dr Shalonda Garcia Today, she is still short of breath, and has oximetry studies pending by pulmonology for her RV dilatation and moderate pulmonary hypertension. Notes some leg heaviness, edema, and weeping of the lower extremities. The patient denies chest pain, orthopnea, PND, LE edema, palpitations, syncope, or presyncope. Patient Active Problem List  
 Diagnosis Date Noted  Mixed hyperlipidemia 10/15/2020  Nonrheumatic aortic valve stenosis 10/15/2020  Bicuspid aortic valve 02/01/2018  PAF (paroxysmal atrial fibrillation) ([address] 02/01/2018  Basilar artery aneurysm ([address] 09/15/2015  Orthostatic hypotension 06/05/2015  Leg edema 01/30/2014  Hemorrhoids 10/29/2013  BRBPR (bright red blood per rectum) 10/17/2013  PAD (peripheral artery disease) ([address] 06/04/2013  Atherosclerosis of native arteries of the extremities with intermittent claudication 06/04/2013  Paroxysmal ventricular tachycardia ([address] 01/10/2013  Shortness of breath 01/10/2013  PVC's (premature ventricular contractions) 12/18/2012  Colitis 12/18/2012  First degree AV block 12/18/2012  
 HTN (hypertension) 12/05/2012  Glaucoma 11/07/2012  Vitamin D deficiency 11/07/2012  Hypercholesteremia 11/07/2012  Arthritis 11/07/2012  History  of basal cell carcinoma 11/07/2012  History of breast cancer 11/07/2012  Vertigo 11/07/2012  Wears hearing aid 11/07/2012 Sharon Gamino MD 
Past Medical History: Diagnosis Date  Arthritis  Cancer ([facility] [address] breast    (leg,ear,arm-skin cancer)  GERD (gastroesophageal reflux disease)  Hypertension  Other ill-defined conditions(799.89)   
 glaucoma  Other ill-defined conditions(799.89)   
 cellulitis left arm  Other ill-defined conditions(799.89)   
 carpal tunnel syndrome left hand  Other ill-defined conditions(799.89)   
 vertigo Past Surgical History:  
Procedure Laterality Date  CARDIAC CATHETERIZATION  1/10/2013  HX APPENDECTOMY  HX CARPAL TUNNEL RELEASE    
 left hand  HX CATARACT REMOVAL    
 bilateral  
 HX COLONOSCOPY  2012  HX KNEE ARTHROSCOPY    
 left  HX MASTECTOMY    
 bilateral  
 HX OTHER SURGICAL Basal cell skin cancer removed from left leg, left arm and neck  HX OTHER SURGICAL    
 cyst removed from left thigh  HX OREN AND BSO  HX TONSILLECTOMY  VASCULAR SURGERY PROCEDURE UNLIST    
 vascular blockages removed. Allergies Allergen Reactions  Augmentin [Amoxicillin-Pot Clavulanate] Hives and Itching  Doxycycline Hives and Itching  Keflex [Cephalexin] Hives and Itching  Monodox [Doxycycline Monohydrate] Other (comments)  Sulfa (Sulfonamide Antibiotics) Rash Family History Problem Relation Age of Onset  Heart Disease Mother  Heart Disease Father   
      age 55  
 Heart Disease Brother  Stroke Brother  Cancer Sister Colon Cancer  Diabetes Sister  Heart Disease Sister  Heart Disease Sister  Diabetes Sister  Other Brother MVA  Heart Disease Brother  Heart Disease Son   
      age 52 Social History Socioeconomic History  Marital status:  Spouse name: Not on file  Number of children: Not on file  Years of education: Not on file  Highest education level: Not on file Occupational History  Not on file Social Needs  Financial resource strain: Not on file  Food insecurity Worry: Not on file Inability: Not on file  Transportation needs Medical: Not on file Non-medical: Not on file Tobacco Use  Smoking status: Former Smoker Types: Cigarettes Quit date: 1955 Years since quittin.2  Smokeless tobacco: Never Used Substance and Sexual Activity  Alcohol use: Yes Comment: rare  Drug use: Never  Sexual activity: Not Currently Lifestyle  Physical activity Days per week: Not on file Minutes per session: Not on file  Stress: Not on file Relationships  Social connections Talks on phone: Not on file Gets together: Not on file Attends Protestant service: Not on file Active member of club or organization: Not on file Attends meetings of clubs or organizations: Not on file Relationship status: Not on file  Intimate partner violence Fear of current or ex partner: Not on file Emotionally abused: Not on file Physically abused: Not on file Forced sexual activity: Not on file Other Topics Concern  Not on file Social History Narrative  Not on file Current Outpatient Medications Medication Sig  Breo Ellipta 200-25 mcg/dose inhaler TAKE 1 PUFF DAILY  bimatoprost (LUMIGAN) 0.01 % ophthalmic drops 1 Drop.  dexAMETHasone (DECADRON) 4 mg tablet TAKE 1 TAB WITH FOOD TWICE A DAY FOR 3 DAYS AFTER EACH CHEMO  
 ondansetron (ZOFRAN ODT) 8 mg disintegrating tablet TAKE 1 TABLET BY MOUTH THREE TIMES A DAY AS NEEDED FOR NAUSEA  albuterol-ipratropium (DUO-NEB) 2.5 mg-0.5 mg/3 ml nebu 3 mL by Nebulization route every four (4) hours as needed for Wheezing.  hydroCHLOROthiazide (MICROZIDE) 12.5 mg capsule TAKE 1 CAPSULE BY MOUTH EVERY DAY  Eliquis 5 mg tablet TAKE 1 TABLET EVERY 12     HOURS TO PREVENT STROKE IN ATRIAL FIBRILLATION  
 nebivoloL (Bystolic) 2.5 mg tablet TAKE 1 TABLET DAILY  diclofenac (VOLTAREN) 1 % gel APPLY 1 APPLICATION TOPICALLY 4 (FOUR) TIMES A DAY AS NEEDED (PAIN)  rosuvastatin (CRESTOR) 20 mg tablet TAKE 1 TABLET NIGHTLY  melatonin 5 mg tablet Take 5 mg by mouth as needed.  sucralfate (CARAFATE) 100 mg/mL suspension Take  by mouth as needed.  timolol (TIMOPTIC-XE) 0.5 % ophthalmic gel-forming Administer 1 Drop to both eyes daily.  CYANOCOBALAMIN, VITAMIN B-12, (VITAMIN B-12 PO) Take 1,000 mcg by mouth daily.  latanoprost (XALATAN) 0.005 % ophthalmic solution Administer 1 Drop to both eyes nightly.  cycloSPORINE (RESTASIS) 0.05 % ophthalmic emulsion Administer 1 Drop to both eyes two (2) times a day.  VIT C/E/ZN/COPPR/LUTEIN/ZEAXAN (PRESERVISION AREDS 2 PO) Take 2 capsules by mouth two (2) times a day.  esomeprazole (NEXIUM) 40 mg capsule Take 1 Cap by mouth Daily (before breakfast).  Cholecalciferol, Vitamin D3, (VITAMIN D3) 1,000 unit cap Take 1 Cap by mouth two (2) times a day.  omega-3 fatty acids-vitamin e (FISH OIL) 1,000 mg Cap Take 1 Cap by mouth two (2) times a day.  CALCIUM CARBONATE/VITAMIN D3 (CALCIUM 600 + D,3, PO) Take 1 Cap by mouth two (2) times a day.  multivitamins-minerals-lutein (CENTRUM SILVER) Tab Take 1 Tab by mouth daily.  eszopiclone (LUNESTA) 1 mg tablet Take 1 Tab by mouth nightly. Max Daily Amount: 1 mg.  tiotropium-olodateroL (Stiolto Respimat) 2.5-2.5 mcg/actuation inhaler Take  by inhalation. 2 puffs each night No current facility-administered medications for this visit. Review of Symptoms: 
11 systems reviewed, negative other than as stated in the HPI Physical ExamPhysical Exam:   
Vitals:  
 03/22/21 1448 BP: (!) 140/56 Pulse: 67 Resp: 18 SpO2: 98% Weight: 148 lb 6.4 oz (67.3 kg) Height: 5' 4\" (1.626 m) Body mass index is 25.47 kg/m². General PE Gen:  NAD Mental Status - Alert. General Appearance - Not in acute distress. HEENT: 
PERRL, no carotid bruits or JVD Chest and Lung Exam  
Inspection: Accessory muscles - No use of accessory muscles in breathing. Auscultation:  
Breath sounds: - Normal.  
Cardiovascular Inspection: Jugular vein - Bilateral - Inspection Normal.  
Palpation/Percussion:  
Apical Impulse: - Normal.  
Auscultation: Rhythm - Regular. Heart Sounds - S1 WNL and S2 WNL. No S3 or S4. Murmurs & Other Heart Sounds: Auscultation of the heart reveals -2/6 KERRIE Peripheral Vascular Upper Extremity: Inspection - Bilateral - No Cyanotic nailbeds or Digital clubbing. Lower Extremity:  
Palpation: Edema - Bilateral -trace edema, stockings in place, large Band-Aids over a couple shin wounds. Abdomen:   Soft, non-tender, bowel sounds are active. Neuro: A&O times 3, CN and motor grossly WNL Labs:  
Lab Results Component Value Date/Time Cholesterol, total 127 10/07/2020 09:43 AM  
 Cholesterol, total 130 07/08/2019 09:33 AM  
 Cholesterol, total 126 12/19/2018 01:01 PM  
 Cholesterol, total 137 05/09/2018 09:19 AM  
 Cholesterol, total 156 06/08/2017 09:47 AM  
 HDL Cholesterol 44 10/07/2020 09:43 AM  
 HDL Cholesterol 44 07/08/2019 09:33 AM  
 HDL Cholesterol 53 12/19/2018 01:01 PM  
 HDL Cholesterol 43 05/09/2018 09:19 AM  
 HDL Cholesterol 53 06/08/2017 09:47 AM  
 LDL,Direct 85 10/30/2014 09:36 AM  
 LDL,Direct 80 02/06/2014 09:11 AM  
 LDL, calculated 58 10/07/2020 09:43 AM  
 LDL, calculated 55 07/08/2019 09:33 AM  
 LDL, calculated 50 12/19/2018 01:01 PM  
 LDL, calculated 60 05/09/2018 09:19 AM  
 LDL, calculated 79 06/08/2017 09:47 AM  
 LDL, calculated 59 09/08/2016 08:51 AM  
 Triglyceride 144 10/07/2020 09:43 AM  
 Triglyceride 157 (H) 07/08/2019 09:33 AM  
 Triglyceride 115 12/19/2018 01:01 PM  
 Triglyceride 171 (H) 05/09/2018 09:19 AM  
 Triglyceride 120 06/08/2017 09:47 AM  
 
Lab Results Component Value Date/Time CK 53 11/25/2014 12:00 PM  
 
Lab Results Component Value Date/Time  Sodium 138 10/07/2020 09:43 AM  
 Potassium 4.3 10/07/2020 09:43 AM  
 Chloride 99 10/07/2020 09:43 AM  
 CO2 24 10/07/2020 09:43 AM  
 Anion gap 7 2019 12:44 PM  
 Glucose 134 (H) 10/07/2020 09:43 AM  
 BUN 17 10/07/2020 09:43 AM  
 Creatinine 1.17 (H) 10/07/2020 09:43 AM  
 BUN/Creatinine ratio 15 10/07/2020 09:43 AM  
 GFR est AA 46 (L) 10/07/2020 09:43 AM  
 GFR est non-AA 40 (L) 10/07/2020 09:43 AM  
 Calcium 9.7 10/07/2020 09:43 AM  
 Bilirubin, total 0.6 10/07/2020 09:43 AM  
 Alk. phosphatase 132 (H) 10/07/2020 09:43 AM  
 Protein, total 7.2 10/07/2020 09:43 AM  
 Albumin 4.0 10/07/2020 09:43 AM  
 Globulin 4.3 (H) 2019 12:44 PM  
 A-G Ratio 1.3 10/07/2020 09:43 AM  
 ALT (SGPT) 17 10/07/2020 09:43 AM  
 
 
EKG: 
Normal sinus rhythm, nonspecific changes that have been seen for years on EKG Assessment: 1. Permanent atrial fibrillation ([address] 2. Essential hypertension 3. Mixed hyperlipidemia 4. Nonrheumatic aortic valve stenosis 5. ALLAN (dyspnea on exertion) Orders Placed This Encounter  AMB POC EKG ROUTINE W/ 12 LEADS, INTER & REP Order Specific Question:   Reason for Exam: Answer:   routine  Breo Ellipta 200-25 mcg/dose inhaler Sig: TAKE 1 PUFF DAILY  bimatoprost (LUMIGAN) 0.01 % ophthalmic drops Si Drop.  dexAMETHasone (DECADRON) 4 mg tablet Sig: TAKE 1 TAB WITH FOOD TWICE A DAY FOR 3 DAYS AFTER EACH CHEMO  
 ondansetron (ZOFRAN ODT) 8 mg disintegrating tablet Sig: TAKE 1 TABLET BY MOUTH THREE TIMES A DAY AS NEEDED FOR NAUSEA  albuterol-ipratropium (DUO-NEB) 2.5 mg-0.5 mg/3 ml nebu Sig: 3 mL by Nebulization route every four (4) hours as needed for Wheezing. Plan: ALLAN Persistent symptoms since 2019 after diagnosis of PNA Now diagnosed with ovarian cancer Non-contrast Chest CT in 2020 unremarkable Negative NST in 2020 Echocardiogram reveals moderate RV dilatation and moderate pulmonary hypertension, only mild aortic stenosis with a mean gradient of 15 mmHg.   Patient is visibly dyspneic with walking in the office, and with pulse ox only dropping to 94% on room air with ambulation approximately 150 feet in the office at office visit 12/22/2020. Moderate pulmonary hypertension was previously seen in 2018. She follows with Dr. Aziza Dickey of pulmonary. Reviewed last pulmonary note from February 2020. At that time, they had performed a chest CT which revealed complete resolution of bilateral apical pneumonia. As oximetry study is pending with Dr. Aziza Dickey. Discussed findings on nuclear stress test, and echo. Dr. Mirna Nguyen performed chest CT which was relatively unremarkable. Permanent atrial fibrillation Continue BB therapyrate is controlled Continue Eliquis for stroke prevention Cr 1.17 in 10/2020 Hgb stable at 13.8 in 10/2020 Aortic stenosis / Mitral Insufficiency Normal EF moderate AS mild-mod MR severe TR and mod PHTN per echo 11/2020 Echo done 5/2018 with mild AS, mild MR with mod TR and mod PHTN with preserved LVEF 55-60% 
 
  Mixed hyperlipidemia 10/2020 LDL 58  On statin labs and lipids per PCP Essential hypertension Bp up some---Has not taken BB Continue anti-hypertensive therapy and low sodium diet Vascular: 
Notes some leg heaviness, edema, and weeping of the lower extremities. Check BENTON and venous reflux study. Refer to Dr. Kit Lee if abnormal. 
 
Ovarian cancer: Followed by Dr. Taty Martinez and Dr. Mirna Nguyen. Continue current care and follow-up with me in 6 months, with Dr. Kit Lee if vascular studies are abnormal. 
 
Please note that the patient was seen by me without nurse practitioner today.  
 
Woo Wilson MD

## 2021-03-26 NOTE — TELEPHONE ENCOUNTER
Spoke Malu at [employer]. [phone]. Lunesta  1 mg for 90 day supply was approved. From 2/24-21 to March 26, 2022. Message was left for pharmacy with approval information. Mailbox was full to inform patient.

## 2021-03-29 NOTE — TELEPHONE ENCOUNTER
Pt. Called. Verified patient with two patient identifiers. States both legs are now \"weeping\", clear liquid. States she is having to wrap both legs every 1-1.5 hours. Asking for her testing to moved up to this week. Also asking if there is anything else to do for the weeping. Will forward to NIRMAL Galloway, Dr. Leonidas Camarillo nurse. Pls advise.

## 2021-03-30 NOTE — ED NOTES
Assumed care for pt from EMS with CC of a fall at home. Per EMS, the pt was ambulating when she had a ground level fall and hit her head and leg. Per EMS, the pt has a L leg laceration that has been bandaged but bleeding is not fully controlled. EMS also reports the pt has a laceration on the L side of her head and that bleeding is under control. Pt is taking Eliquis. Pt A&Ox4 at the moment and denies dizziness, LOC with fall, or vision changes. Pt reports that she believes that she tripped when ambulating, causing the fall, but is not completely sure. Pt on monitor x2, and VSS. Will continue to monitor.

## 2021-03-30 NOTE — ED NOTES
PA at bedside assessing pt. Viktor Jordan Bedside and Verbal shift change report given to Regina Monterroso RN (oncoming nurse) by Andrew Vaughn RN (offgoing nurse). Report included the following information SBAR, ED Summary, MAR and Recent Results.

## 2021-03-30 NOTE — ED NOTES
Wilber Charles reviewed discharge instructions with the patient. The patient verbalized understanding. All questions and concerns were addressed. The patient departed by a wheelchair and discharged into the care of family members with instructions and prescriptions in hand. Pt is alert and oriented x 4. Respirations are clear and unlabored.

## 2021-03-30 NOTE — ED PROVIDER NOTES
EMERGENCY DEPARTMENT HISTORY AND PHYSICAL EXAM 
 
 
Date: 3/30/2021 Patient Name: Saima Bright History of Presenting Illness Chief Complaint Patient presents with  Fall  Laceration History Provided By: Patient HPI: Saima Bright, 80 y.o. female presents to the ED with cc bleeding from a large wound on her left lower leg as well as hitting her head and having some bleeding there after a fall while patient was getting dressed. She had her walker with her and is wondering if she hit her leg against the walker. She did not lose consciousness and denies any headache, ringing in her ears or double vision. She thinks her tetanus shot is more than 5 years. EMS was called and applied a dressing to the wound to help control bleeding. The patient does take Eliquis at baseline. She denies any chest pain, shortness of breath, palpitations before the fall during the fall or after the fall or currently. There are no other complaints, changes, or physical findings at this time. PCP: Ashley Aragon MD 
 
No current facility-administered medications on file prior to encounter. Current Outpatient Medications on File Prior to Encounter Medication Sig Dispense Refill  bimatoprost (LUMIGAN) 0.01 % ophthalmic drops 1 Drop.  ondansetron (ZOFRAN ODT) 8 mg disintegrating tablet TAKE 1 TABLET BY MOUTH THREE TIMES A DAY AS NEEDED FOR NAUSEA  albuterol-ipratropium (DUO-NEB) 2.5 mg-0.5 mg/3 ml nebu 3 mL by Nebulization route every four (4) hours as needed for Wheezing.  eszopiclone (LUNESTA) 1 mg tablet Take 1 Tab by mouth nightly. Max Daily Amount: 1 mg. 30 Tab 4  
 hydroCHLOROthiazide (MICROZIDE) 12.5 mg capsule TAKE 1 CAPSULE BY MOUTH EVERY DAY 90 Cap 0  
 tiotropium-olodateroL (Stiolto Respimat) 2.5-2.5 mcg/actuation inhaler Take  by inhalation. 2 puffs each night  Eliquis 5 mg tablet TAKE 1 TABLET EVERY 12     HOURS TO PREVENT STROKE IN ATRIAL FIBRILLATION 180 Tab 2  nebivoloL (Bystolic) 2.5 mg tablet TAKE 1 TABLET DAILY 90 Tab 3  
 rosuvastatin (CRESTOR) 20 mg tablet TAKE 1 TABLET NIGHTLY 90 Tab 1  
 melatonin 5 mg tablet Take 5 mg by mouth as needed.  sucralfate (CARAFATE) 100 mg/mL suspension Take  by mouth as needed.  timolol (TIMOPTIC-XE) 0.5 % ophthalmic gel-forming Administer 1 Drop to both eyes daily.  CYANOCOBALAMIN, VITAMIN B-12, (VITAMIN B-12 PO) Take 1,000 mcg by mouth daily.  latanoprost (XALATAN) 0.005 % ophthalmic solution Administer 1 Drop to both eyes nightly.  cycloSPORINE (RESTASIS) 0.05 % ophthalmic emulsion Administer 1 Drop to both eyes two (2) times a day.  VIT C/E/ZN/COPPR/LUTEIN/ZEAXAN (PRESERVISION AREDS 2 PO) Take 2 capsules by mouth two (2) times a day.  esomeprazole (NEXIUM) 40 mg capsule Take 1 Cap by mouth Daily (before breakfast). 90 Cap 1  Cholecalciferol, Vitamin D3, (VITAMIN D3) 1,000 unit cap Take 1 Cap by mouth two (2) times a day.  omega-3 fatty acids-vitamin e (FISH OIL) 1,000 mg Cap Take 1 Cap by mouth two (2) times a day.  CALCIUM CARBONATE/VITAMIN D3 (CALCIUM 600 + D,3, PO) Take 1 Cap by mouth two (2) times a day.  multivitamins-minerals-lutein (CENTRUM SILVER) Tab Take 1 Tab by mouth daily.  Breo Ellipta 200-25 mcg/dose inhaler TAKE 1 PUFF DAILY  dexAMETHasone (DECADRON) 4 mg tablet TAKE 1 TAB WITH FOOD TWICE A DAY FOR 3 DAYS AFTER EACH CHEMO  diclofenac (VOLTAREN) 1 % gel APPLY 1 APPLICATION TOPICALLY 4 (FOUR) TIMES A DAY AS NEEDED (PAIN) Past History Past Medical History: 
Past Medical History:  
Diagnosis Date  Arthritis  Cancer ([address] breast    (leg,ear,arm-skin cancer)  GERD (gastroesophageal reflux disease)  Hypertension  Other ill-defined conditions(799.89)   
 glaucoma  Other ill-defined conditions(799.89)   
 cellulitis left arm  Other ill-defined conditions(799.89)   
 carpal tunnel syndrome left hand  Other ill-defined conditions(799.89)   
 vertigo  Ovarian cancer ([facility] [address] 2020 Past Surgical History: 
Past Surgical History:  
Procedure Laterality Date  CARDIAC CATHETERIZATION  1/10/2013  HX APPENDECTOMY  HX CARPAL TUNNEL RELEASE    
 left hand  HX CATARACT REMOVAL    
 bilateral  
 HX COLONOSCOPY  2012  HX KNEE ARTHROSCOPY    
 left  HX MASTECTOMY    
 bilateral  
 HX OTHER SURGICAL Basal cell skin cancer removed from left leg, left arm and neck  HX OTHER SURGICAL    
 cyst removed from left thigh  HX PARTIAL HYSTERECTOMY  HX OREN AND BSO  HX TONSILLECTOMY  VASCULAR SURGERY PROCEDURE UNLIST    
 vascular blockages removed. Family History: 
Family History Problem Relation Age of Onset  Heart Disease Mother  Heart Disease Father   
      age 55  
 Heart Disease Brother  Stroke Brother  Cancer Sister Colon Cancer  Diabetes Sister  Heart Disease Sister  Heart Disease Sister  Diabetes Sister  Other Brother MVA  Heart Disease Brother  Heart Disease Son   
      age 52 Social History: 
Social History Tobacco Use  Smoking status: Former Smoker Types: Cigarettes Quit date: 1955 Years since quittin.2  Smokeless tobacco: Never Used Substance Use Topics  Alcohol use: Not Currently Comment: rare  Drug use: Never Allergies: Allergies Allergen Reactions  Augmentin [Amoxicillin-Pot Clavulanate] Hives and Itching  Doxycycline Hives and Itching  Keflex [Cephalexin] Hives and Itching  Monodox [Doxycycline Monohydrate] Other (comments)  Sulfa (Sulfonamide Antibiotics) Rash Review of Systems Review of Systems Constitutional: Negative for activity change and appetite change. Respiratory: Negative for chest tightness and shortness of breath. Cardiovascular: Negative for chest pain and palpitations.   
Gastrointestinal: Negative for abdominal pain. Musculoskeletal: Negative for back pain and neck pain. Neurological: Negative for dizziness and light-headedness. All other systems reviewed and are negative. Physical Exam  
Physical Exam  
Vital signs and nursing notes reviewed CONSTITUTIONAL: Alert, in moderate distress; well-developed; well-nourished. GCS equals 15 HEAD:  Normocephalic, small subcentimeter laceration with 1 cm avulsion adjacent on the scalp, not actively bleeding. Matted dried blood on her hair. EYES: PERRL; EOM's intact. ENTM: Nose: no rhinorrhea; Throat: no erythema or exudate, mucous membranes moist 
Neck:  Supple. trachea is midline. No midline C-spine tenderness on direct palpation. RESP: Chest clear, equal breath sounds. - W/R/R 
CV: S1 and S2 WNL; No murmurs, gallops or rubs. 2+ radial and DP pulses bilaterally. GI: non-distended, normal bowel sounds, abdomen soft and non-tender. No masses or organomegaly. Pelvis is stable nontender. : No costo-vertebral angle tenderness. BACK:  Non-tender, normal appearance UPPER EXT:  Normal inspection. no joint or soft tissue swelling LOWER EXT: Large, nonbleeding skin tear, semilunar shape along the left anterior shin. No visible or palpable foreign bodies. Distal PMS is intact and lower leg compartment is soft. No visible bone. NEURO: Alert and oriented x3, 5/5 strength and light touch sensation intact in bilateral upper and lower extremities. SKIN: No rashes; Warm and dry PSYCH: Normal mood, normal affect Diagnostic Study Results Labs - No results found for this or any previous visit (from the past 12 hour(s)). Radiologic Studies -  
CT HEAD WO CONT Final Result No acute changes. XR TIB/FIB LT Final Result 1 mm radiodense foreign body in the soft tissue wound anterior to the distal  
tibia. No fracture nor osteomyelitis. CT Results  (Last 48 hours)  03/30/21 0703  CT HEAD WO CONT Final result Impression:  No acute changes. Narrative:  EXAM: CT HEAD WO CONT INDICATION: Status post fall with blunt trauma to the head, on Eliquis, eval for  
intracranial bleed. COMPARISON: 2014 CONTRAST: None. TECHNIQUE: Unenhanced CT of the head was performed using 5 mm images. Brain and  
bone windows were generated. Coronal and sagittal reformats. CT dose reduction  
was achieved through use of a standardized protocol tailored for this  
examination and automatic exposure control for dose modulation. FINDINGS:  
There is no extra-axial fluid collection, hemorrhage shift or masses. There is  
mild atrophy and nonspecific white matter changes. CXR Results  (Last 48 hours) None Medical Decision Making I am the first provider for this patient. I reviewed the vital signs, available nursing notes, past medical history, past surgical history, family history and social history. Vital Signs-Reviewed the patient's vital signs. Patient Vitals for the past 12 hrs: 
 Temp Pulse Resp BP SpO2  
03/30/21 0536 97.9 °F (36.6 °C) 81 16 (!) 174/85 98 % Records Reviewed: Nursing notes Provider Notes (Medical Decision Making):  
70-year-old female here with mechanical fall, bleeding controlled with reassuring imaging for no intracranial bleed, single staple applied for scalp wound edge approximation as well as primary closure of the wound of the left leg. Tetanus updated here. Discussed home care and follow-up for staples and suture removals. Discussed return precautions as well as signs of infection for patient at home. ED Course:  
Initial assessment performed. The patients presenting problems have been discussed, and they are in agreement with the care plan formulated and outlined with them. I have encouraged them to ask questions as they arise throughout their visit. PROCEDURES Wound Repair 
 
Date/Time: 3/30/2021 8:16 AM Performed by: JODIE GillespiePreparation: skin prepped with ChloraPrep Pre-procedure re-eval: Immediately prior to the procedure, the patient was reevaluated and found suitable for the planned procedure and any planned medications. Location details: left leg Wound length:2.6 - 7.5 cm Anesthesia: local infiltration Anesthesia: 
Local Anesthetic: lidocaine 2% with epinephrine Anesthetic total: 8 mL Irrigation solution: saline Irrigation method: syringe Skin closure: 4-0 nylon Number of sutures: 7 Technique: simple Approximation: close Dressing: 4x4 and gauze roll Patient tolerance: patient tolerated the procedure well with no immediate complications My total time at bedside, performing this procedure was 16-30 minutes. Procedure Note - Laceration Repair: 
9:06 AM 
Procedure by Montana Mckeon MD 
 
Complexity: simple 1 cm linear laceration to scalp  was irrigated copiously with NS under jet lavage, prepped with Chlorprep and draped in a sterile fashion. The wound was explored with the following results: No foreign bodies found. The wound was repaired with 1 staples. The wound was closed with good hemostasis and approximation. Sterile dressing applied. Estimated blood loss: < 1cc The procedure took 1-15 minutes, and pt tolerated well. Disposition: 
Discharge Discharge Note: 
9:07 AM 
The pt is ready for discharge. The pt's signs, symptoms, diagnosis, and discharge instructions have been discussed and pt has conveyed their understanding. The pt is to follow up as recommended or return to ER should their symptoms worsen. Plan has been discussed and pt is in agreement. DISCHARGE PLAN: 
1. Current Discharge Medication List  
  
 
2. Follow-up Information Follow up With Specialties Details Why Contact Info  Jada Arambula MD Family Medicine  Please follow-up with your primary care doctor in 7 to 10 days for reevaluation of your wounds and whether the sutures in your leg can come out and the staple in your scalp can come out. Please use bacitracin on your wounds and keep them clean and dry to prevent infection. , If symptoms worsen RadhaArmani Mcdonald [address] [facility] 
[phone] [facility] [place] EMERGENCY DEPT Emergency Medicine  Including sudden severe headache, if you pass out, get a fever or other new concerning symptoms. Be on the look out for redness and swelling or pus drainage which could be signs of infection. [address] [facility] 
[phone] 3. Return to ED if worse Diagnosis Clinical Impression: 1. Laceration of scalp, initial encounter 2. Laceration of left leg excluding thigh, initial encounter 3. Fall, initial encounter Attestations: 
 
Nestor Castro MD 
 
Please note that this dictation was completed with [employer], the computer voice recognition software. Quite often unanticipated grammatical, syntax, homophones, and other interpretive errors are inadvertently transcribed by the computer software. Please disregard these errors. Please excuse any errors that have escaped final proofreading. Thank you.

## 2021-03-30 NOTE — TELEPHONE ENCOUNTER
----- Message from Olya Levin sent at 3/30/2021  3:05 PM EDT -----  Regarding: Dr. Kody Galvan: [phone]  Patient return call    Caller's first and last name and relationship (if not the patient):pt      Best contact number(s):[phone]      Whose call is being returned:Shyann      Details to clarify the request: Returning call      Message from [facility]

## 2021-03-30 NOTE — TELEPHONE ENCOUNTER
[place] approval faxed to [facility]. Noted in chart patient is at ED for wound evaluation.

## 2021-03-30 NOTE — TELEPHONE ENCOUNTER
Received Fax from [facility] that [place] mg was approved  The authorization is valid fo 270 every 90 days from 2/24/2021 to 3/26/2022 Copy of authorization faxed to [facility].

## 2021-03-30 NOTE — TELEPHONE ENCOUNTER
Patient was seen in ED today, needs follow up appointment next week to remove stapels and  Sutures. Appointment was scheduled April 7, 2021 at 1:30 pm. Message was left for patient  To contact office  To confirm  Or reschedule appointment .

## 2021-03-31 NOTE — TELEPHONE ENCOUNTER
Identified patient 2 identifiers verified. Patient is aware of appointment on 4/7/21 for ED Follow up and suture removal at 1:30pm This is an in office appointment.

## 2021-04-05 NOTE — TELEPHONE ENCOUNTER
Message left she had a fall and was not able to do BENTON will try and arrange BENTON with Dr Chris Langston visit for completion of testing.

## 2021-04-05 NOTE — TELEPHONE ENCOUNTER
Patient called wanting to know if Dr. Braxton Vale has her test results yet. Please call [phone].     Thanks,  Juan Johns

## 2021-04-06 PROBLEM — D64.9 ANEMIA: Status: ACTIVE | Noted: 2021-01-01

## 2021-04-06 PROBLEM — E87.1 HYPONATREMIA: Status: ACTIVE | Noted: 2021-01-01

## 2021-04-06 PROBLEM — I48.21 PERMANENT ATRIAL FIBRILLATION (HCC): Status: ACTIVE | Noted: 2021-01-01

## 2021-04-06 PROBLEM — C56.9 OVARIAN CANCER (HCC): Status: ACTIVE | Noted: 2021-01-01

## 2021-04-06 NOTE — ED NOTES
Patient still having 8/10 pain in left leg. [place] served Dr. Jaime Justin requesting more pain medication

## 2021-04-06 NOTE — ED NOTES
TRANSFER - OUT REPORT: 
 
Verbal report given to Jaci (name) on René Flores  being transferred to Ortho (unit) for routine progression of care Report consisted of patients Situation, Background, Assessment and  
Recommendations(SBAR). Information from the following report(s) SBAR, ED Summary, [facility] and Recent Results was reviewed with the receiving nurse. Lines:  
Peripheral IV 04/06/21 Left Antecubital (Active) Opportunity for questions and clarification was provided. Patient transported with: 
 Tamr

## 2021-04-06 NOTE — WOUND CARE
Wound Care consult:  Chart reviewed and patient assessed for her left lower leg wounds. Patient fell on 3- and was seen in the ED here at [facility]. She had an X-ray which revealed a soft tissue wound but no fracture. Patient is 80years old and has osteopenia consistent with age. With the continued pain in the leg, she had a repeat x-ray today which shows the same. Patient has osteoarthritis in the knees. Pt. Has a Dermatologist named Dr. Luiza Ross (Sp?) who is treating her lower leg skin Cancer. Patient also being treated for ovarian cancer per her family member in the room. Assessment: The dressing was just a vaseline gauze covered with diane. There was no absorbent dressing in between to absorb the drainage. This is why the leg is macerated. 7 stitches in the left lower leg to secure the skin flap done on 3-30-21. They are still intact. The wound is purple with some necrotic tissue and hematoma and about 20% Granulation tissue. The most distal open wound is a Cancer wound. 4-6-21 - left lower leg wound with hematoma, granulation and An open Cancer Wound that is being treated by her Dermatologist 
Using an Injectable chemo, which is currently on hold. Lower leg / ankle pictured left in the photo. Upper shin to the right. Treatment today: The old dressing was removed and the wound was cleansed with Caraklenz spray and wiped with gauze to remove the old drainage. Mepitel One Silicone dressings were applied x 2 and then Xeroform and then two OptiLock absorbent dressings. Wrapped with diane and then a light ACE wrap to further pad the leg wound. Pt. Tolerated the wound care but even with gentle wound care c/o \"heavy handed\" wound care. Plan: Wound Care orders written for the wound care to be done Every other day for this wound. Float the heels. I placed the heel cushions on the heels as well as a pillow.   
Jayla Gilbert, RN, BSN, [employer]

## 2021-04-06 NOTE — PROGRESS NOTES
Pharmacy Clarification of the Prior to Admission Medication Regimen Retrospective to the Admission Medication Reconciliation The patient was not interviewed regarding clarification of the prior to admission medication regimen. Spoke with Jared Goff (Niece) to verify current medications and last dose taken. She was questioned regarding use of any other inhalers, topical products, over the counter medications, herbal medications, vitamin products or ophthalmic/nasal/otic medication use. Information Obtained From: query, notes, Dr. Leandra Young office, liliana Recommendations/Findings: The following amendments were made to the patient's active medication list on file at [address]:  
 
1) Additions:  
Carboplatin IV Procrit Inj 
Dexamethasone IV Akynzeo IV HCTZ Clobetasol spray Klacks sol 2) Removals:  
Melatonin Stiolto respimat 3) Changes: 
 
4) Pertinent Pharmacy Findings: 
Updated patients preferred outpatient pharmacy to: [facility] 
Identified High Alert Medication Information Current Anticoagulants: 
Name: Eliquis Current IV Chemotherapy Regimen:Carboplatin every 28 days for 6 cycles  received cycle 2 on 3/11/21 Oncologist: Amanuel Dye Location receiving chemotherapy: [facility] medication list was corrected to the following:  
 
Prior to Admission Medications Prescriptions Last Dose Informant Taking? Breo Ellipta 200-25 mcg/dose inhaler 2021 at Unknown time Family Member Yes Sig: TAKE 1 PUFF DAILY CALCIUM CARBONATE/VITAMIN D3 (CALCIUM 600 + D,3, PO) 2021 at Unknown time Family Member Yes Sig: Take 1 Cap by mouth two (2) times a day. CARBOPLATIN IV 3/11/2021 at Unknown time Other Yes Si mg by IntraVENous route. Every 28 days for 6 cycles CYANOCOBALAMIN, VITAMIN B-12, (VITAMIN B-12 PO) 2021 at Unknown time Family Member Yes Sig: Take 1,000 mcg by mouth daily.   
Cholecalciferol, Vitamin D3, (VITAMIN D3) 1,000 unit cap 2021 at Unknown time Family Member Yes Sig: Take 1 Cap by mouth two (2) times a day. Eliquis 5 mg tablet 2021 at Unknown time Family Member Yes Sig: TAKE 1 TABLET EVERY 12     HOURS TO PREVENT STROKE IN ATRIAL FIBRILLATION  
VIT C/E/ZN/COPPR/LUTEIN/ZEAXAN (PRESERVISION AREDS 2 PO) 2021 at Unknown time Family Member Yes Sig: Take 2 capsules by mouth two (2) times a day. albuterol-ipratropium (DUO-NEB) 2.5 mg-0.5 mg/3 ml nebu 2021 at Unknown time Family Member Yes Sig: 3 mL by Nebulization route every four (4) hours as needed for Wheezing. bimatoprost (LUMIGAN) 0.01 % ophthalmic drops 2021 at Unknown time Family Member Yes Sig: Administer 1 Drop to both eyes daily. clobetasoL 0.05 % spry 3/30/2021 at Unknown time Family Member Yes Sig: by Apply Externally route. APPLY TO ITCHY DRY AREAS OF SKIN TWICE DAILY, MIX SOLUTION WITH 1LB JAR OF MOISTURIZING CREAM  
cycloSPORINE (RESTASIS) 0.05 % ophthalmic emulsion 2021 at Unknown time Family Member Yes Sig: Administer 1 Drop to both eyes two (2) times a day. dexAMETHasone (DECADRON) 4 mg tablet 3/14/2021 at Unknown time Family Member Yes Sig: TAKE 1 TAB WITH FOOD TWICE A DAY FOR 3 DAYS AFTER EACH CHEMO  
dexamethasone in 0.9 % sod chl (DEXAMETHASONE-0.9 % SOD. CHLOR IV) 3/11/2021 at Unknown time Other Yes Sig: 10 mg by IntraVENous route. Every 28 days pre treatment  
diclofenac (VOLTAREN) 1 % gel  Family Member Yes Sig: APPLY 1 APPLICATION TOPICALLY 4 (FOUR) TIMES A DAY AS NEEDED (PAIN)  
epoetin pablo (Procrit) 20,000 unit/mL injection 3/29/2021 at Unknown time Other Yes Si,000 Units by SubCUTAneous route every seven (7) days. esomeprazole (NEXIUM) 40 mg capsule 2021 at Unknown time Family Member Yes Sig: Take 1 Cap by mouth Daily (before breakfast). eszopiclone (LUNESTA) 1 mg tablet 2021 at Unknown time Family Member Yes Sig: Take 1 Tab by mouth nightly.  Max Daily Amount: 1 mg.  
fosnetupitant-palonosetron Melvin Sheppard fosnetupitant,) 235-0.25 mg solr 3/11/2021 at Unknown time Other Yes Sig: by IntraVENous route. Every 28 days pre treatment  
hydroCHLOROthiazide (HYDRODIURIL) 12.5 mg tablet 4/5/2021 at Unknown time Family Member Yes Sig: Take 12.5 mg by mouth daily. klack's mixture Solution  Family Member Yes Sig: Take 5 mL by mouth four (4) times daily. Diphenhydramine elixir 12.5mg/ml 500ml, Nystatin suspension 120ml,Tetracycline 500mg capsules  12 capsules (6g), Hydrocortisone 20mg tablet 12 tablets (240mg), Water for irrigation QS ad 1000ml Swish & Swallow  
latanoprost (XALATAN) 0.005 % ophthalmic solution 4/5/2021 at Unknown time Family Member Yes Sig: Administer 1 Drop to both eyes nightly. multivitamins-minerals-lutein (CENTRUM SILVER) Tab 4/5/2021 at Unknown time Family Member Yes Sig: Take 1 Tab by mouth daily. nebivoloL (Bystolic) 2.5 mg tablet 2/3/5793 at Unknown time Family Member Yes Sig: TAKE 1 TABLET DAILY  
omega-3 fatty acids-vitamin e (FISH OIL) 1,000 mg Cap 4/5/2021 at Unknown time Family Member Yes Sig: Take 1 Cap by mouth two (2) times a day. ondansetron (ZOFRAN ODT) 8 mg disintegrating tablet  Family Member Yes Sig: TAKE 1 TABLET BY MOUTH THREE TIMES A DAY AS NEEDED FOR NAUSEA  
rosuvastatin (CRESTOR) 20 mg tablet 4/5/2021 at Unknown time Family Member Yes Sig: TAKE 1 TABLET NIGHTLY  
sucralfate (CARAFATE) 100 mg/mL suspension 3/6/2021 at Unknown time Family Member Yes Sig: Take 1-2 tsp by mouth four (4) times daily as needed. On empty stomach  
timolol (TIMOPTIC-XE) 0.5 % ophthalmic gel-forming 4/5/2021 at Unknown time Family Member Yes Sig: Administer 1 Drop to both eyes daily. Facility-Administered Medications: None Thank you, 
Yesi Maldonado CPHT Medication History Pharmacy Technician

## 2021-04-06 NOTE — ED NOTES
Bedside and Verbal shift change report given to Shawn Fine (oncoming nurse) by Luis E Antonio (offgoing nurse). Report included the following information SBAR, Kardex, ED Summary, Intake/Output, MAR, Recent Results and Med Rec Status.

## 2021-04-06 NOTE — PROGRESS NOTES
End of Shift Note Bedside shift change report given to Dayami Pereira RN (oncoming nurse) by Mata Huston RN (offgoing nurse). Report included the following information SBAR, Kardex, Recent Results and Cardiac Rhythm A fib Shift worked:  7-3 Shift summary and any significant changes:  
  Patient remains DTV - oncoming RN aware. C/o dressing feeling wet to her, upon assessment dressing feels cool to touch around vasoline gauze but does not feel wet. Medicated for pain per MAR. Labs sent. Patient refused Meds earlier stating they were not what she regularly takes but unable to recall names of what she regularly takes - asked for Pharmacy assistance with complete med rec. Concerns for physician to address:  Poor mobility - PT/OT? Poor appetite - nutrition consult for supplements? Zone phone for oncoming shift:    
  
 
Activity: 
Activity Level: Bed Rest 
Number times ambulated in hallways past shift: 0 Number of times OOB to chair past shift: 0 Cardiac:  
Cardiac Monitoring: Yes     
Cardiac Rhythm: Atrial fibrillation Access:  
Current line(s): PIV Genitourinary:  
Urinary status: due to void Respiratory:  
O2 Device: Room air Chronic home O2 use?: NO Incentive spirometer at bedside: NO 
  
GI: 
Last Bowel Movement Date: (PTA) Current diet:  DIET ONE TIME MESSAGE 
DIET CARDIAC Regular; FR 1200ML Passing flatus: YES Tolerating current diet: YES 
% Diet Eaten: 20 % Pain Management:  
Patient states pain is manageable on current regimen: YES Skin: 
Dick Score: 15 Interventions: turn team, float heels, increase time out of bed and internal/external urinary devices Patient Safety: 
Fall Score: Total Score: 5 Interventions: bed/chair alarm, assistive device (walker, cane, etc), gripper socks and pt to call before getting OOB High Fall Risk: Yes Length of Stay: 
Expected LOS: 2d 14h Actual LOS: 0 Mata Huston RN

## 2021-04-06 NOTE — PROGRESS NOTES
Patient arrives from ED via stretcher. A&Ox4 but forgetful, VSS on RA, pain tolerable. Bilateral hearing aids but  at home. Complains that family member took glasses home and she cannot see well without them. Verbalizing that she was told to drink anything but water - provided with gingerale to sip on. Reports have \"flooded\" the bed just prior to transfer. Oriented to unit, room, and plan of care. Adequate lighting provided, bed alarm in place, call bell within reach, and bed in lowest position. Staples TABBY to posterior, left scalp. Dressing to LLE CDI. Complains it feels wet to her - not we upon assessment. Feels cool to touch above vaseline gauze.

## 2021-04-06 NOTE — CONSULTS
[facility] COnsult Note NAME: Fawn Frazier :  1927 MRN:  [id number] Date/Time: 2021 Risk of deterioration: low Assessment:    Plan: 
Acute hyponatremia-RO SIADH 
(L) le wound Hypokalemia Hx of breast ca ANemia Holding hctz Repeat sodium up to 122 
k low-add neutrophos (na, k,phos) x 4 doses She;s 89-does she really need to be taking a statin? Calcium corrects to normal 
Check iron studies/ferritin=quite anemic Restrict water-she admits to 48 oz of water a day Urine indices (P)- 
  
Asked to see for hyponatremia in the setting of hctz and increased water intake. Daughter speaks of increased BLE But I don't appreciate any edema today. Has been on hctz for years. Subjective: Chief Complaint:  My leg hurts Review of Systems: no n/v/cp/sob/f/c Objective: VITALS:  
Last 24hrs VS reviewed since prior progress note. Most recent are: 
Visit Vitals BP (!) 122/53 Pulse 76 Temp 97.8 °F (36.6 °C) Resp 20 Ht 5' 4\" (1.626 m) Wt 68.3 kg (150 lb 9.2 oz) SpO2 100% BMI 25.85 kg/m² SpO2 Readings from Last 6 Encounters:  
21 100% 21 98% 21 98% 21 97% 20 94% 10/15/20 94% Intake/Output Summary (Last 24 hours) at 2021 1343 Last data filed at 2021 9962 Gross per 24 hour Intake 1000 ml Output  Net 1000 ml Telemetry Reviewed PHYSICAL EXAM: 
 
General   well developed, well nourished, appears stated age, in no acute distress EENT  Normocephalic, Atraumatic, EOMI Respiratory   Clear To Auscultation bilaterally Cardiology  Regular Rate and Rythmn Abdominal  Soft, non-tender, non-distended, positive bowel sounds, no hepatosplenomegaly Extremities  No clubbing, cyanosis, or edema.  
(LLE) wrapped Lab Data Reviewed: (see below) Medications Reviewed: (see below) PMH/SH reviewed - no change compared to H&P 
 
___________________________________________________ Attending Physician: Laila Castrejon MD  
 
____________________________________________________ MEDICATIONS: 
Current Facility-Administered Medications Medication Dose Route Frequency  sodium chloride (NS) flush 5-10 mL  5-10 mL IntraVENous PRN  
 sodium chloride (NS) flush 5-40 mL  5-40 mL IntraVENous Q8H  
 sodium chloride (NS) flush 5-40 mL  5-40 mL IntraVENous PRN  
 acetaminophen (TYLENOL) tablet 650 mg  650 mg Oral Q6H PRN Or  
 acetaminophen (TYLENOL) suppository 650 mg  650 mg Rectal Q6H PRN  polyethylene glycol (MIRALAX) packet 17 g  17 g Oral DAILY PRN  
 ondansetron (ZOFRAN) injection 4 mg  4 mg IntraVENous Q6H PRN  
 apixaban (ELIQUIS) tablet 5 mg  5 mg Oral BID  melatonin tablet 4.5 mg  4.5 mg Oral QHS  rosuvastatin (CRESTOR) tablet 20 mg  20 mg Oral QHS  timolol (TIMOPTIC) 0.5 % ophthalmic solution 1 Drop  1 Drop Both Eyes DAILY  oxyCODONE IR (ROXICODONE) tablet 5 mg  5 mg Oral Q4H PRN  
 morphine injection 2 mg  2 mg IntraVENous Q2H PRN  
 metoprolol succinate (TOPROL-XL) XL tablet 25 mg  25 mg Oral DAILY  potassium, sodium phosphates (NEUTRA-PHOS) packet 2 Packet  2 Packet Oral QID  
  
 
LABS: 
Recent Labs 04/06/21 
5623 WBC 6.8 HGB 7.2* HCT 23.0*  
 Recent Labs 04/06/21 
1020 04/06/21 
0331 * 119*  
K 3.3* 3.6 CL 90* 88* CO2 22 21 BUN 23* 22* CREA 0.97 0.99 * 101* CA 8.1* 8.5 URICA 5.2  --   
 
Recent Labs 04/06/21 
4710 ALT 30 * TBILI 0.9 TP 6.5 ALB 2.8*  
GLOB 3.7 Recent Labs 04/06/21 
2094 INR 1.5* PTP 15.8* APTT 37.6* No results for input(s): FE, TIBC, PSAT, FERR in the last 72 hours. No results for input(s): PH, PCO2, PO2 in the last 72 hours. Recent Labs 04/06/21 
6759 TROIQ 0.05* Lab Results Component Value Date/Time  Glucose (POC) 102 (H) 11/23/2020 01:15 PM  
 Glucose  04/16/2013 03:58 PM

## 2021-04-06 NOTE — ED PROVIDER NOTES
EMERGENCY DEPARTMENT HISTORY AND PHYSICAL EXAM 
 
 
Date: 4/6/2021 Patient Name: Artis Wang History of Presenting Illness Chief Complaint Patient presents with  Leg Pain Patient arrives via EMS from home for increasing L leg pain. Patient recently had fall where she required 7 stitches in L leg, patient with increased pain & sweeling since. Unrelieved by tylenol  Leg Swelling L leg History Provided By: Patient and Patient's Daughter HPI: Artis Wang, 80 y.o. female presents to the ED with cc of left leg pain. Patient was seen on Saturday, April 3 secondary to a large skin tear to the left lower leg. At the time sutures were placed patient was sent home with Tylenol for pain. She states over the last couple of days there has been a significant amount of pain and discomfort associated with that leg. She states in addition to that she has had increased swelling to the left lower extremity in addition to weeping serous fluid from the skin. She states that had been there prior however since the leg wound it has worsened as well as her pain. She does have weeping of the right lower leg in addition to the left. She states her pain is currently a 10 out of 10 there is no alleviating factors and pain is exacerbated by any movement of the foot or leg or trying to bear weight. In addition patient states she just cannot find any position of comfort. She has been taking Tylenol at home without any relief. She denies any fever or chills. She denies any chest pain or shortness of breath. She has had some fatigue and daughter has noticed her to be pale. She does have a history of ovarian cancer is currently on chemotherapy and has been getting Procrit injections in the past most recently she was told that there may be a potential that she may need a transfusion of blood at her next hemoglobin check and Procrit injection. She denies any abdominal pain, nausea vomiting or diarrhea. There are no other complaints, changes, or physical findings at this time. PCP: Bailey Calvillo MD 
 
No current facility-administered medications on file prior to encounter. Current Outpatient Medications on File Prior to Encounter Medication Sig Dispense Refill  Breo Ellipta 200-25 mcg/dose inhaler TAKE 1 PUFF DAILY  bimatoprost (LUMIGAN) 0.01 % ophthalmic drops 1 Drop.  dexAMETHasone (DECADRON) 4 mg tablet TAKE 1 TAB WITH FOOD TWICE A DAY FOR 3 DAYS AFTER EACH CHEMO    
 ondansetron (ZOFRAN ODT) 8 mg disintegrating tablet TAKE 1 TABLET BY MOUTH THREE TIMES A DAY AS NEEDED FOR NAUSEA  albuterol-ipratropium (DUO-NEB) 2.5 mg-0.5 mg/3 ml nebu 3 mL by Nebulization route every four (4) hours as needed for Wheezing.  eszopiclone (LUNESTA) 1 mg tablet Take 1 Tab by mouth nightly. Max Daily Amount: 1 mg. 30 Tab 4  
 hydroCHLOROthiazide (MICROZIDE) 12.5 mg capsule TAKE 1 CAPSULE BY MOUTH EVERY DAY 90 Cap 0  
 tiotropium-olodateroL (Stiolto Respimat) 2.5-2.5 mcg/actuation inhaler Take  by inhalation. 2 puffs each night  Eliquis 5 mg tablet TAKE 1 TABLET EVERY 12     HOURS TO PREVENT STROKE IN ATRIAL FIBRILLATION 180 Tab 2  
 nebivoloL (Bystolic) 2.5 mg tablet TAKE 1 TABLET DAILY 90 Tab 3  
 diclofenac (VOLTAREN) 1 % gel APPLY 1 APPLICATION TOPICALLY 4 (FOUR) TIMES A DAY AS NEEDED (PAIN)  rosuvastatin (CRESTOR) 20 mg tablet TAKE 1 TABLET NIGHTLY 90 Tab 1  
 melatonin 5 mg tablet Take 5 mg by mouth as needed.  sucralfate (CARAFATE) 100 mg/mL suspension Take  by mouth as needed.  timolol (TIMOPTIC-XE) 0.5 % ophthalmic gel-forming Administer 1 Drop to both eyes daily.  CYANOCOBALAMIN, VITAMIN B-12, (VITAMIN B-12 PO) Take 1,000 mcg by mouth daily.  latanoprost (XALATAN) 0.005 % ophthalmic solution Administer 1 Drop to both eyes nightly.  cycloSPORINE (RESTASIS) 0.05 % ophthalmic emulsion Administer 1 Drop to both eyes two (2) times a day.     
 VIT C/E/ZN/COPPR/LUTEIN/ZEAXAN (PRESERVISION AREDS 2 PO) Take 2 capsules by mouth two (2) times a day.  esomeprazole (NEXIUM) 40 mg capsule Take 1 Cap by mouth Daily (before breakfast). 90 Cap 1  Cholecalciferol, Vitamin D3, (VITAMIN D3) 1,000 unit cap Take 1 Cap by mouth two (2) times a day.  omega-3 fatty acids-vitamin e (FISH OIL) 1,000 mg Cap Take 1 Cap by mouth two (2) times a day.  CALCIUM CARBONATE/VITAMIN D3 (CALCIUM 600 + D,3, PO) Take 1 Cap by mouth two (2) times a day.  multivitamins-minerals-lutein (CENTRUM SILVER) Tab Take 1 Tab by mouth daily. Past History Past Medical History: 
Past Medical History:  
Diagnosis Date  Arthritis  Cancer ([address] breast    (leg,ear,arm-skin cancer)  GERD (gastroesophageal reflux disease)  Hypertension  Other ill-defined conditions(799.89)   
 glaucoma  Other ill-defined conditions(799.89)   
 cellulitis left arm  Other ill-defined conditions(799.89)   
 carpal tunnel syndrome left hand  Other ill-defined conditions(799.89)   
 vertigo  Ovarian cancer ([address] 2020 Past Surgical History: 
Past Surgical History:  
Procedure Laterality Date  CARDIAC CATHETERIZATION  1/10/2013  HX APPENDECTOMY  HX CARPAL TUNNEL RELEASE    
 left hand  HX CATARACT REMOVAL    
 bilateral  
 HX COLONOSCOPY  2012  HX KNEE ARTHROSCOPY    
 left  HX MASTECTOMY    
 bilateral  
 HX OTHER SURGICAL Basal cell skin cancer removed from left leg, left arm and neck  HX OTHER SURGICAL    
 cyst removed from left thigh  HX PARTIAL HYSTERECTOMY  HX OREN AND BSO  HX TONSILLECTOMY  VASCULAR SURGERY PROCEDURE UNLIST    
 vascular blockages removed. Family History: 
Family History Problem Relation Age of Onset  Heart Disease Mother  Heart Disease Father   
      age 55  
 Heart Disease Brother  Stroke Brother  Cancer Sister Colon Cancer  Diabetes Sister  Heart Disease Sister  Heart Disease Sister  Diabetes Sister  Other Brother MVA  Heart Disease Brother  Heart Disease Son   
      age 52 Social History: 
Social History Tobacco Use  Smoking status: Former Smoker Types: Cigarettes Quit date: 1955 Years since quittin.3  Smokeless tobacco: Never Used Substance Use Topics  Alcohol use: Not Currently Comment: rare  Drug use: Never Allergies: Allergies Allergen Reactions  Augmentin [Amoxicillin-Pot Clavulanate] Hives and Itching  Doxycycline Hives and Itching  Keflex [Cephalexin] Hives and Itching  Monodox [Doxycycline Monohydrate] Other (comments)  Sulfa (Sulfonamide Antibiotics) Rash Review of Systems Review of Systems Constitutional: Positive for fatigue. Negative for appetite change, chills and fever. HENT: Negative. Negative for congestion, rhinorrhea, sinus pressure and sore throat. Eyes: Negative. Respiratory: Negative. Negative for cough, choking, chest tightness, shortness of breath and wheezing. Cardiovascular: Negative. Negative for chest pain, palpitations and leg swelling. Gastrointestinal: Negative for abdominal pain, constipation, diarrhea, nausea and vomiting. Endocrine: Negative. Genitourinary: Negative. Negative for difficulty urinating, dysuria, flank pain and urgency. Musculoskeletal: Negative. Leg pain, L worse than the right Skin: Positive for pallor and wound (Left lower leg with stitches ). Neurological: Negative. Negative for dizziness, speech difficulty, weakness, light-headedness, numbness and headaches. Psychiatric/Behavioral: Negative. All other systems reviewed and are negative. Physical Exam  
Physical Exam 
Vitals signs and nursing note reviewed. Constitutional:   
   General: She is not in acute distress. Appearance: She is well-developed.  She is not diaphoretic. Comments: Elderly female appears very uncomfortable secondary to pain HENT:  
   Head: Normocephalic and atraumatic. Mouth/Throat:  
   Mouth: Mucous membranes are moist.  
   Pharynx: No oropharyngeal exudate. Eyes:  
   Extraocular Movements: Extraocular movements intact. Conjunctiva/sclera: Conjunctivae normal.  
   Pupils: Pupils are equal, round, and reactive to light. Neck: Musculoskeletal: Normal range of motion and neck supple. Vascular: No JVD. Trachea: No tracheal deviation. Cardiovascular:  
   Rate and Rhythm: Normal rate. Rhythm irregular. Heart sounds: Murmur present. Pulmonary:  
   Effort: Pulmonary effort is normal. No respiratory distress. Breath sounds: Normal breath sounds. No stridor. No wheezing or rales. Abdominal:  
   General: There is no distension. Palpations: Abdomen is soft. Tenderness: There is no abdominal tenderness. There is no guarding or rebound. Musculoskeletal: Normal range of motion. Legs: 
 
Skin: 
   General: Skin is warm and dry. Capillary Refill: Capillary refill takes 2 to 3 seconds. Coloration: Skin is pale. Neurological:  
   Mental Status: She is alert and oriented to person, place, and time. Cranial Nerves: No cranial nerve deficit. Comments: No gross motor or sensory deficits Psychiatric:     
   Mood and Affect: Mood normal.     
   Behavior: Behavior normal.  
 
 
 
Diagnostic Study Results Labs - Recent Results (from the past 12 hour(s)) CBC WITH AUTOMATED DIFF Collection Time: 04/06/21  3:31 AM  
Result Value Ref Range WBC 6.8 3.6 - 11.0 K/uL  
 RBC 2.76 (L) 3.80 - 5.20 M/uL HGB 7.2 (L) 11.5 - 16.0 g/dL HCT 23.0 (L) 35.0 - 47.0 % MCV 83.3 80.0 - 99.0 FL  
 MCH 26.1 26.0 - 34.0 PG  
 MCHC 31.3 30.0 - 36.5 g/dL  
 RDW 18.0 (H) 11.5 - 14.5 % PLATELET 779 006 - 148 K/uL MPV 10.2 8.9 - 12.9 FL  
 NRBC 1.2 (H) 0  WBC ABSOLUTE NRBC 0.08 (H) 0.00 - 0.01 K/uL NEUTROPHILS 58 32 - 75 % LYMPHOCYTES 18 12 - 49 % MONOCYTES 15 (H) 5 - 13 % EOSINOPHILS 3 0 - 7 % BASOPHILS 0 0 - 1 % IMMATURE GRANULOCYTES 6 (H) 0.0 - 0.5 % ABS. NEUTROPHILS 4.0 1.8 - 8.0 K/UL  
 ABS. LYMPHOCYTES 1.2 0.8 - 3.5 K/UL  
 ABS. MONOCYTES 1.0 0.0 - 1.0 K/UL  
 ABS. EOSINOPHILS 0.2 0.0 - 0.4 K/UL  
 ABS. BASOPHILS 0.0 0.0 - 0.1 K/UL  
 ABS. IMM. GRANS. 0.4 (H) 0.00 - 0.04 K/UL  
 DF SMEAR SCANNED    
 RBC COMMENTS ANISOCYTOSIS 1+ 
    
 RBC COMMENTS POLYCHROMASIA PRESENT 
    
METABOLIC PANEL, COMPREHENSIVE Collection Time: 04/06/21  3:31 AM  
Result Value Ref Range Sodium 119 (LL) 136 - 145 mmol/L Potassium 3.6 3.5 - 5.1 mmol/L Chloride 88 (L) 97 - 108 mmol/L  
 CO2 21 21 - 32 mmol/L Anion gap 10 5 - 15 mmol/L Glucose 101 (H) 65 - 100 mg/dL BUN 22 (H) 6 - 20 MG/DL Creatinine 0.99 0.55 - 1.02 MG/DL  
 BUN/Creatinine ratio 22 (H) 12 - 20 GFR est AA >60 >60 ml/min/1.73m2 GFR est non-AA 52 (L) >60 ml/min/1.73m2 Calcium 8.5 8.5 - 10.1 MG/DL Bilirubin, total 0.9 0.2 - 1.0 MG/DL  
 ALT (SGPT) 30 12 - 78 U/L  
 AST (SGOT) 28 15 - 37 U/L Alk. phosphatase 118 (H) 45 - 117 U/L Protein, total 6.5 6.4 - 8.2 g/dL Albumin 2.8 (L) 3.5 - 5.0 g/dL Globulin 3.7 2.0 - 4.0 g/dL A-G Ratio 0.8 (L) 1.1 - 2.2 PROTHROMBIN TIME + INR Collection Time: 04/06/21  3:31 AM  
Result Value Ref Range INR 1.5 (H) 0.9 - 1.1 Prothrombin time 15.8 (H) 9.0 - 11.1 sec PTT Collection Time: 04/06/21  3:31 AM  
Result Value Ref Range aPTT 37.6 (H) 22.1 - 31.0 sec  
 aPTT, therapeutic range     58.0 - 77.0 SECS  
TROPONIN I Collection Time: 04/06/21  3:31 AM  
Result Value Ref Range Troponin-I, Qt. 0.05 (H) <0.05 ng/mL SED RATE (ESR) Collection Time: 04/06/21  3:31 AM  
Result Value Ref Range Sed rate, automated 63 (H) 0 - 30 mm/hr NT-PRO BNP  Collection Time: 04/06/21  3:31 AM  
Result Value Ref Range NT pro-BNP 5,843 (H) <450 PG/ML  
EKG, 12 LEAD, INITIAL Collection Time: 04/06/21  3:32 AM  
Result Value Ref Range Ventricular Rate 80 BPM  
 Atrial Rate 82 BPM  
 QRS Duration 98 ms Q-T Interval 410 ms QTC Calculation (Bezet) 472 ms Calculated R Axis 12 degrees Calculated T Axis -170 degrees Diagnosis Atrial fibrillation Marked ST abnormality, possible inferior subendocardial injury When compared with ECG of 25-NOV-2014 11:53, Atrial fibrillation has replaced Sinus rhythm ST now depressed in Inferior leads T wave inversion now evident in Inferior leads T wave inversion now evident in Lateral leads QT has lengthened POC LACTIC ACID Collection Time: 04/06/21  3:41 AM  
Result Value Ref Range Lactic Acid (POC) 1.40 0.40 - 2.00 mmol/L Radiologic Studies -  
XR CHEST PORT Final Result Cardiomegaly with mild bilateral subsegmental atelectasis. XR TIB/FIB LT Final Result No acute abnormality. CT Results  (Last 48 hours) None CXR Results  (Last 48 hours) 04/06/21 0434  XR CHEST PORT Final result Impression:  Cardiomegaly with mild bilateral subsegmental atelectasis. Narrative:  INDICATION: None provided COMPARISON: 2/4/2021 FINDINGS: AP portable imaging of the chest performed at 4:01 AM demonstrates  
moderate cardiomegaly. Right internal jugular Port-A-Cath terminates at the  
level of the distal SVC. There is subsegmental atelectasis in the left upper  
lobe and at both lung bases. Surgical clips overlie the bilateral axillae. There  
are degenerative changes in the shoulders and thoracic spine. Medical Decision Making I am the first provider for this patient. I reviewed the vital signs, available nursing notes, past medical history, past surgical history, family history and social history. Vital Signs-Reviewed the patient's vital signs.  
Patient Vitals for the past 12 hrs: 
 Temp Pulse Resp BP SpO2  
04/06/21 0430  71 12 (!) 138/53 100 % 04/06/21 0400  81 17 131/60 93 % 04/06/21 0300  77 20 (!) 129/46 99 % 04/06/21 0230   22 (!) 128/51 94 % 04/06/21 0227 97.3 °F (36.3 °C) 80 22 (!) 132/41 100 % EKG interpretation: (Preliminary) Afib rate 80, diffuse ST abnormality seen on prior EKGs, normal qrs, Humphrey ,  
 
 
Records Reviewed: Nursing Notes, Old Medical Records, Previous Radiology Studies and Previous Laboratory Studies Provider Notes (Medical Decision Making): DDx- Electrolyte abnormality, Acute renal failure, Acute CHF, cellulitis, fracture, hematoma ED Course:  
Initial assessment performed. The patients presenting problems have been discussed, and they are in agreement with the care plan formulated and outlined with them. I have encouraged them to ask questions as they arise throughout their visit. Given the pain and appearance of the leg blood work was obtained to evaluate for potential infection as well as x-ray to ensure there was no underlying fracture and/or gas within the soft tissue. X-ray of the lower extremity was unremarkable for any gas in the soft tissue there is no bony injury noted. Patient is anemic with a hemoglobin of 7.2 which seems to be in line with some of her previous results however those lab results were probably through the [address] [place], [place] [address]way which I do not have access to at this time. Patient's weeping edema from both lower legs likely to be associated both with high boat hypoalbuminemia as well as hyponatremia. Patient's sodium is 119 last sodium from 2020 patient sodium is normal.  Given this we will discuss with hospitalist for admission for further evaluation and work-up. Consult: 
Case discussed with Dr. Sy Matos he will evaluate admit the patient. Patient's pain has improved will have sterile dressings wrapped.   I have discussed with the patient and her daughter the plan of care. 
 
Disposition: 
Admit Diagnosis Clinical Impression: 1. Hyponatremia 2. Anemia, unspecified type 3. Bilateral leg edema Attestations: 
 
Laura Case, DO Please note that this dictation was completed with [employer], the computer voice recognition software. Quite often unanticipated grammatical, syntax, homophones, and other interpretive errors are inadvertently transcribed by the computer software. Please disregard these errors. Please excuse any errors that have escaped final proofreading. Thank you.

## 2021-04-06 NOTE — CONSULTS
[facility]Y [facility] Date of  Admission: 4/6/2021  2:19 AM  
 
Admission type:Emergency Consult for: falls on eliquis (please note this is my long-term clinic patient and I am continuing her outpatient care/initiating her in-hospital care for this admission) Consult by: Hospitalists Subjective:  
 
Ariana Calderon is a 80 y.o. female with PMH a fib, HLD, PAD, basal cell carcinoma, arthritis, bicuspid aortic valve, aortic stenosis, HTN who was  admitted for Hyponatremia [E87.1]. Per ED provider note Ariana Calderon presented to the ED with c/o left leg pain. Recent fall with skin tear to the left leg. Patient also hit her head and received staples from recent fall. Cardiology consulted to establish initial hospital visit for established patient of Dr. Parker Paredes for falls on eliquis. On assessment, Ariana Calderon endorses the above. Her leg is feeling better since admission. She states she fell getting out of the bed in the middle of the night when her toes curled under, and she hit her head on the doorway. She denies being dizzy, lightheaded, or passing out. SOB at times. No chest pain. Ariana Calderon  follows with Dr. Parker Paredes for cardiology. Last ECHO 11/20 with EF 55-60%; TERE; mod dilated RV; mod AS; mild to mod MR; mod pHTN. Cath 01/13 with minor CAD. Patient Active Problem List  
 Diagnosis Date Noted  Hyponatremia 04/06/2021  Permanent atrial fibrillation ([address] 04/06/2021  
 Ovarian cancer ([address] 04/06/2021  Anemia 04/06/2021  Mixed hyperlipidemia 10/15/2020  Nonrheumatic aortic valve stenosis 10/15/2020  Bicuspid aortic valve 02/01/2018  PAF (paroxysmal atrial fibrillation) ([address] 02/01/2018  Basilar artery aneurysm ([address] 09/15/2015  Orthostatic hypotension 06/05/2015  Leg edema 01/30/2014  Hemorrhoids 10/29/2013  BRBPR (bright red blood per rectum) 10/17/2013  PAD (peripheral artery disease) ([facility] 2013  Atherosclerosis of native arteries of the extremities with intermittent claudication 2013  Paroxysmal ventricular tachycardia ([facility] 01/10/2013  Shortness of breath 01/10/2013  PVC's (premature ventricular contractions) 2012  Colitis 2012  First degree AV block 2012  
 HTN (hypertension) 2012  Glaucoma 2012  Vitamin D deficiency 2012  Hypercholesteremia 2012  Arthritis 2012  History  of basal cell carcinoma 2012  History of breast cancer 2012  Vertigo 2012  Wears hearing aid 2012 Ajay Bucio MD 
Past Medical History:  
Diagnosis Date  Arthritis  Cancer ([facility] breast    (leg,ear,arm-skin cancer)  GERD (gastroesophageal reflux disease)  Hypertension  Other ill-defined conditions(799.89)   
 glaucoma  Other ill-defined conditions(799.89)   
 cellulitis left arm  Other ill-defined conditions(799.89)   
 carpal tunnel syndrome left hand  Other ill-defined conditions(799.89)   
 vertigo  Ovarian cancer ([facility] 2020 Social History Socioeconomic History  Marital status:  Spouse name: Not on file  Number of children: Not on file  Years of education: Not on file  Highest education level: Not on file Tobacco Use  Smoking status: Former Smoker Types: Cigarettes Quit date: 1955 Years since quittin.3  Smokeless tobacco: Never Used Substance and Sexual Activity  Alcohol use: Not Currently Comment: rare  Drug use: Never  Sexual activity: Not Currently Allergies Allergen Reactions  Augmentin [Amoxicillin-Pot Clavulanate] Hives and Itching  Doxycycline Hives and Itching  Keflex [Cephalexin] Hives and Itching  Monodox [Doxycycline Monohydrate] Other (comments)  Sulfa (Sulfonamide Antibiotics) Rash Family History Problem Relation Age of Onset  Heart Disease Mother  Heart Disease Father   
      age 55  
 Heart Disease Brother  Stroke Brother  Cancer Sister Colon Cancer  Diabetes Sister  Heart Disease Sister  Heart Disease Sister  Diabetes Sister  Other Brother MVA  Heart Disease Brother  Heart Disease Son   
      age 52 Current Facility-Administered Medications Medication Dose Route Frequency  sodium chloride (NS) flush 5-10 mL  5-10 mL IntraVENous PRN  
 sodium chloride (NS) flush 5-40 mL  5-40 mL IntraVENous Q8H  
 sodium chloride (NS) flush 5-40 mL  5-40 mL IntraVENous PRN  
 acetaminophen (TYLENOL) tablet 650 mg  650 mg Oral Q6H PRN Or  
 acetaminophen (TYLENOL) suppository 650 mg  650 mg Rectal Q6H PRN  polyethylene glycol (MIRALAX) packet 17 g  17 g Oral DAILY PRN  
 ondansetron (ZOFRAN) injection 4 mg  4 mg IntraVENous Q6H PRN  
 [Held by provider] apixaban (ELIQUIS) tablet 5 mg  5 mg Oral BID  melatonin tablet 4.5 mg  4.5 mg Oral QHS  rosuvastatin (CRESTOR) tablet 20 mg  20 mg Oral QHS  timolol (TIMOPTIC) 0.5 % ophthalmic solution 1 Drop  1 Drop Both Eyes DAILY  oxyCODONE IR (ROXICODONE) tablet 5 mg  5 mg Oral Q4H PRN  
 morphine injection 2 mg  2 mg IntraVENous Q2H PRN  
 metoprolol succinate (TOPROL-XL) XL tablet 25 mg  25 mg Oral DAILY  potassium, sodium phosphates (NEUTRA-PHOS) packet 2 Packet  2 Packet Oral QID  potassium bicarb-citric acid (EFFER-K) tablet 40 mEq  40 mEq Oral Q6H  
 [START ON 2021] aspirin delayed-release tablet 81 mg  81 mg Oral DAILY Review of Symptoms:  
11 systems reviewed, negative other than as stated in the HPI Objective:  
  
Visit Vitals BP (!) 126/42 (BP Patient Position: At rest) Pulse 79 Temp 97.6 °F (36.4 °C) Resp 20 Ht 5' 4\" (1.626 m) Wt 150 lb 9.2 oz (68.3 kg) SpO2 92% BMI 25.85 kg/m² Physical:  
Gen:  Pleasant, elderly  female resting in bed in NAD HENT: Head: Normocephalic. Eyes: Conjunctivae are normal.  
Neck: Neck supple. Cardiovascular: irregular rate and rhythm; 3/6 systolic murmur Pulmonary/Chest: Effort normal and breath sounds normal.  
Musculoskeletal:     
   General: 2+ edema LLE Neurological: She is alert and oriented to person, place, and time. Skin: Skin is warm and dry and pale. Dressing in place LLE;  Staples to scalp Psychiatric: She has a normal mood and affect. Data Review:  
Recent Labs 04/06/21 
1160 WBC 6.8 HGB 7.2* HCT 23.0*  
 Recent Labs 04/06/21 
1727 04/06/21 
1020 04/06/21 
0331 * 122* 119*  
K 4.0 3.3* 3.6 CL 91* 90* 88* CO2 23 22 21 * 115* 101* BUN 25* 23* 22* CREA 1.07* 0.97 0.99 CA 8.3* 8.1* 8.5 ALB  --   --  2.8* TBILI  --   --  0.9 ALT  --   --  30 INR  --   --  1.5* Recent Labs 04/06/21 
2733 TROIQ 0.05* Intake/Output Summary (Last 24 hours) at 4/6/2021 2043 Last data filed at 4/6/2021 1454 Gross per 24 hour Intake 1360 ml Output  Net 1360 ml  
  
 
Cardiographics Telemetry: a fib with PVCs ECG: a fib - similar to prior Echocardiogram: last as above CXRAY: \"Cardiomegaly with mild bilateral subsegmental atelectasis. \" 
 
 
 Assessment:  
  
 Active Problems: 
  HTN (hypertension) (12/5/2012) PAF (paroxysmal atrial fibrillation) ([facility] (2/1/2018) Mixed hyperlipidemia (10/15/2020) Hyponatremia (4/6/2021) Permanent atrial fibrillation ([address] (4/6/2021) Ovarian cancer ([facility] (4/6/2021) Anemia (4/6/2021) Plan:  
 
 
Scott Partida is a 80 y.o. female who presented to the ED with LLE pain and swelling and was admitted for hyponatremia. Recent fall with sutures to the LLE and staples to her scalp. Patient also states skin cancer near that area on her leg. Patient in permanent a fib and normally takes eliquis for this. Hg 7.2; Na 119/122; K 3.3; troponin 0.05; proBNP 2843 · recommend holding eliquis with patient's severe anemia and recent fall. ASA for now. Can consider restarting in the future if benefits outweigh risk. · Continue BB for a fib and HTN  
· Renal following hyponatremia Thank you for consulting [facility] Lucinda Chung NP 
DNP, RN, AGACNP-BC Patient seen and examined by me with the above nurse practitioner. I personally performed all components of the history, physical, and medical decision making and agree with the assessment and plan with minor modifications as noted. Today the patient presents with a recent fall where she hit her head, thankfully not hard, and significant anemia in the setting of chemotherapy for ovarian cancer. General PE Gen:  NAD Mental Status - Alert. General Appearance - Not in acute distress. HEENT: 
PERRL, no carotid bruits or JVD Chest and Lung Exam  
Inspection: Accessory muscles - No use of accessory muscles in breathing. Auscultation:  
Breath sounds: - Normal.  
Cardiovascular Inspection: Jugular vein - Bilateral - Inspection Normal.  
Palpation/Percussion:  
Apical Impulse: - Normal.  
Auscultation: Rhythm - irregular. Heart Sounds - S1 WNL and S2 WNL. No S3 or S4. Murmurs & Other Heart Sounds: Auscultation of the heart reveals - No Murmurs. Peripheral Vascular Upper Extremity: Inspection - Bilateral - No Cyanotic nailbeds or Digital clubbing. Lower Extremity:  
Palpation: Edema - Bilateral - No edema. Abdomen:   Soft, non-tender, bowel sounds are active. Neuro: A&O times 3, CN and motor grossly WNL Hold Eliquis at this time. Risks exceed benefits with recent fall, severe anemia, frail status and advanced age. If she has marked improvement in her strength and/or anemia in the future we can reconsider.

## 2021-04-06 NOTE — H&P
Hospitalist Admission Note NAME: Zainab Verdin :  1927 MRN:  [id number] Date/Time:  2021 7:07 AM 
 
Patient PCP: Juana Bernal MD 
______________________________________________________________________ Given the patient's current clinical presentation, I have a high level of concern for decompensation if discharged from the emergency department. Complex decision making was performed, which includes reviewing the patient's available past medical records, laboratory results, and x-ray films. My assessment of this patient's clinical condition and my plan of care is as follows. Assessment / Plan: Hyponatremia 
-Appears clinically hypervolemic 
-Check serum Osm 
-Hold HCTZ, which may be culprit 
-Patient also experiencing refractory pain in bilateral lower extremities which could contribute to SIADH 
-Nephrology consult 
-We will give a dose of Lasix LLE leg wound ? LE venous insufficiency 
-s/p recent fall, laceration repair in the ED 
-wound care consult 
-Bilateral lower extremity weeping, no erythema. No fever or leukocytosis 
-Would hold on antibiotics 
-Dose of Lasix as above 
-Had Dopplers done recently, no DVT, +venous insufficiency, to follow with Dr Saúl Roberto Permanent Afib 
-cont eliquis for now, given recent fall with head strike, conversation is in order as to whether appropriate to consider anticoagulation. -will ask cardiology consult to weigh in on [facility] for her Afib going forward. Could potentially switch to asa only? 
-cont bystolic Ovarian CA Remote Hx of breast CA 
-on monthly chemo regimen 
-c/o loss of taste/appetite -op f/u with Dr Karrie Perez, her hematologist 
 
HTN 
-cont bystolic 
-hold HCTZ as above, given hyponatremia HLD 
-cont statin Code Status: Full Surrogate Decision Maker: DVT Prophylaxis: eliquis for now GI Prophylaxis: not indicated Subjective: CHIEF COMPLAINT: LE edema, weeping, pain HISTORY OF PRESENT ILLNESS:    
Bang Muniz is a 80 y.o.  female with past medical history of permanent A. fib, bilateral lower extremity edema, PAD, HTN, HLD history of breast CA, recently ovarian CA for which she is currently on chemotherapy who was recently seen in the ED here 3/30/2021 after sustaining a fall at home causing a left lower extremity laceration as well as a scalp wound. The scalp wound was approximated with a single staple and the LLE wound with sutures during the ED visit. .  Of note, patient also has had ongoing lower extremity edema in both legs and recently had Dopplers done consistent with venous insufficiency. Her presenting complaint is that the weeping in her legs has worsened and is accompanied by a significant amount of pain poorly controlled with Tylenol so far. She denies fevers or chills or N/V We were asked to admit for work up and evaluation of the above problems. Past Medical History:  
Diagnosis Date  Arthritis  Cancer ([address] breast    (leg,ear,arm-skin cancer)  GERD (gastroesophageal reflux disease)  Hypertension  Other ill-defined conditions(799.89)   
 glaucoma  Other ill-defined conditions(799.89)   
 cellulitis left arm  Other ill-defined conditions(799.89)   
 carpal tunnel syndrome left hand  Other ill-defined conditions(799.89)   
 vertigo  Ovarian cancer ([address] 11/2020 Past Surgical History:  
Procedure Laterality Date  CARDIAC CATHETERIZATION  1/10/2013  HX APPENDECTOMY  HX CARPAL TUNNEL RELEASE    
 left hand  HX CATARACT REMOVAL    
 bilateral  
 HX COLONOSCOPY  4/2012  HX KNEE ARTHROSCOPY    
 left  HX MASTECTOMY    
 bilateral  
 HX OTHER SURGICAL Basal cell skin cancer removed from left leg, left arm and neck  HX OTHER SURGICAL    
 cyst removed from left thigh  HX PARTIAL HYSTERECTOMY  HX OREN AND BSO  HX TONSILLECTOMY  VASCULAR SURGERY PROCEDURE UNLIST    
 vascular blockages removed. Social History Tobacco Use  Smoking status: Former Smoker Types: Cigarettes Quit date: 1955 Years since quittin.3  Smokeless tobacco: Never Used Substance Use Topics  Alcohol use: Not Currently Comment: rare Family History Problem Relation Age of Onset  Heart Disease Mother  Heart Disease Father   
      age 55  
 Heart Disease Brother  Stroke Brother  Cancer Sister Colon Cancer  Diabetes Sister  Heart Disease Sister  Heart Disease Sister  Diabetes Sister  Other Brother MVA  Heart Disease Brother  Heart Disease Son   
      age 52 Allergies Allergen Reactions  Augmentin [Amoxicillin-Pot Clavulanate] Hives and Itching  Doxycycline Hives and Itching  Keflex [Cephalexin] Hives and Itching  Monodox [Doxycycline Monohydrate] Other (comments)  Sulfa (Sulfonamide Antibiotics) Rash Prior to Admission medications Medication Sig Start Date End Date Taking? Authorizing Provider Bredesiree Ellipta 200-25 mcg/dose inhaler TAKE 1 PUFF DAILY 3/3/21   Provider, Historical  
bimatoprost (LUMIGAN) 0.01 % ophthalmic drops 1 Drop. Provider, Historical  
dexAMETHasone (DECADRON) 4 mg tablet TAKE 1 TAB WITH FOOD TWICE A DAY FOR 3 DAYS AFTER EACH CHEMO 21   Provider, Historical  
ondansetron (ZOFRAN ODT) 8 mg disintegrating tablet TAKE 1 TABLET BY MOUTH THREE TIMES A DAY AS NEEDED FOR NAUSEA 21   Provider, Historical  
albuterol-ipratropium (DUO-NEB) 2.5 mg-0.5 mg/3 ml nebu 3 mL by Nebulization route every four (4) hours as needed for Wheezing. Provider, Historical  
eszopiclone (LUNESTA) 1 mg tablet Take 1 Tab by mouth nightly.  Max Daily Amount: 1 mg. 3/11/21   Stephanie Schuler MD  
hydroCHLOROthiazide (MICROZIDE) 12.5 mg capsule TAKE 1 CAPSULE BY MOUTH EVERY DAY 3/1/21   Colletta Alpers, MD  
tiotropium-olodateroL (Stiolto Respimat) 2.5-2.5 mcg/actuation inhaler Take  by inhalation. 2 puffs each night    Provider, Historical  
Eliquis 5 mg tablet TAKE 1 TABLET EVERY 12     HOURS TO PREVENT STROKE IN ATRIAL FIBRILLATION 1/18/21   Bran GONZALES NP  
nebivoloL (Bystolic) 2.5 mg tablet TAKE 1 TABLET DAILY 1/6/21   Colletta Alpers, MD  
diclofenac (VOLTAREN) 1 % gel APPLY 1 APPLICATION TOPICALLY 4 (FOUR) TIMES A DAY AS NEEDED (PAIN) 11/25/20   Provider, Historical  
rosuvastatin (CRESTOR) 20 mg tablet TAKE 1 TABLET NIGHTLY 11/17/20   Stephanie Schuler MD  
melatonin 5 mg tablet Take 5 mg by mouth as needed. Provider, Historical  
sucralfate (CARAFATE) 100 mg/mL suspension Take  by mouth as needed. Provider, Historical  
timolol (TIMOPTIC-XE) 0.5 % ophthalmic gel-forming Administer 1 Drop to both eyes daily. Provider, Historical  
CYANOCOBALAMIN, VITAMIN B-12, (VITAMIN B-12 PO) Take 1,000 mcg by mouth daily. Provider, Historical  
latanoprost (XALATAN) 0.005 % ophthalmic solution Administer 1 Drop to both eyes nightly. 6/8/15   Provider, Historical  
cycloSPORINE (RESTASIS) 0.05 % ophthalmic emulsion Administer 1 Drop to both eyes two (2) times a day. Provider, Historical  
VIT C/E/ZN/COPPR/LUTEIN/ZEAXAN (PRESERVISION AREDS 2 PO) Take 2 capsules by mouth two (2) times a day. Other, MD Mercedes  
esomeprazole (NEXIUM) 40 mg capsule Take 1 Cap by mouth Daily (before breakfast). 8/6/14   Denise Magallanes MD  
Cholecalciferol, Vitamin D3, (VITAMIN D3) 1,000 unit cap Take 1 Cap by mouth two (2) times a day. Provider, Historical  
omega-3 fatty acids-vitamin e (FISH OIL) 1,000 mg Cap Take 1 Cap by mouth two (2) times a day. Provider, Historical  
CALCIUM CARBONATE/VITAMIN D3 (CALCIUM 600 + D,3, PO) Take 1 Cap by mouth two (2) times a day. Provider, Historical  
multivitamins-minerals-lutein (CENTRUM SILVER) Tab Take 1 Tab by mouth daily. Provider, Historical  
 
 
REVIEW OF SYSTEMS:    
I am not able to complete the review of systems because:  The patient is intubated and sedated The patient has altered mental status due to his acute medical problems The patient has baseline aphasia from prior stroke(s) The patient has baseline dementia and is not reliable historian The patient is in acute medical distress and unable to provide information Total of 12 systems reviewed as follows:   
   POSITIVE= underlined text  Negative = text not underlined General:  fever, chills, sweats, generalized weakness, weight loss/gain,  
   loss of appetite Eyes:    blurred vision, eye pain, loss of vision, double vision ENT:    rhinorrhea, pharyngitis Respiratory:   cough, sputum production, SOB, ALLAN, wheezing, pleuritic pain  
Cardiology:   chest pain, palpitations, orthopnea, PND, edema, syncope Gastrointestinal:  abdominal pain , N/V, diarrhea, dysphagia, constipation, bleeding Genitourinary:  frequency, urgency, dysuria, hematuria, incontinence Muskuloskeletal :  arthralgia, myalgia, back pain Hematology:  easy bruising, nose or gum bleeding, lymphadenopathy Dermatological: rash, ulceration, pruritis, color change / jaundice, weeping of LEs +edema Endocrine:   hot flashes or polydipsia Neurological:  headache, dizziness, confusion, focal weakness, paresthesia, Speech difficulties, memory loss, gait difficulty Psychological: Feelings of anxiety, depression, agitation Objective: VITALS:   
Visit Vitals BP (!) 143/51 Pulse 66 Temp 97.3 °F (36.3 °C) Resp 13 Ht 5' 4\" (1.626 m) Wt 68.3 kg (150 lb 9.2 oz) SpO2 100% BMI 25.85 kg/m² PHYSICAL EXAM: 
 
General:    Alert, cooperative, mod discomfort, appears stated age. HEENT: Atraumatic, anicteric sclerae, pink conjunctivae No oral ulcers, mucosa moist, throat clear, dentition fair Neck:  Supple, symmetrical,  thyroid: non tender Lungs:   Clear to auscultation bilaterally. No Wheezing or Rhonchi. No rales.  
Chest wall:  No tenderness  No Accessory muscle use. 
Heart:   Regular  rhythm,  No  murmur   Mod non-pitting edema b/l LEs Abdomen:   Soft, non-tender. Not distended. Bowel sounds normal 
Extremities: No cyanosis. No clubbing, weeping of b/l LEs +edema. LLE has pretibial ulcerated lesion and sutures in place Skin turgor normal, Capillary refill normal, Radial dial pulse 2+ Skin:     Not pale. Not Jaundiced  No rashes Psych:  Not depressed. Not anxious or agitated. Neurologic: EOMs intact. No facial asymmetry. No aphasia or slurred speech. Symmetrical strength, Sensation grossly intact. Alert and oriented X 4.  
 
_______________________________________________________________________ Care Plan discussed with: 
  Comments Patient x Family  x liliana RAY x Care Manager Consultant:  alix WONG MD  
_______________________________________________________________________ Expected  Disposition:  
Home with Family x HH/PT/OT/RN x  
SNF/LTC   
ADA   
________________________________________________________________________ TOTAL TIME: 65 Minutes Critical Care Provided     Minutes non procedure based Comments  
 x Reviewed previous records  
>50% of visit spent in counseling and coordination of care x Discussion with patient and/or family and questions answered 
  
 
________________________________________________________________________ Signed: Salazar Peña DO 
 
Procedures: see electronic medical records for all procedures/Xrays and details which were not copied into this note but were reviewed prior to creation of Plan. LAB DATA REVIEWED:   
Recent Results (from the past 24 hour(s)) CBC WITH AUTOMATED DIFF Collection Time: 04/06/21  3:31 AM  
Result Value Ref Range WBC 6.8 3.6 - 11.0 K/uL  
 RBC 2.76 (L) 3.80 - 5.20 M/uL HGB 7.2 (L) 11.5 - 16.0 g/dL HCT 23.0 (L) 35.0 - 47.0 % MCV 83.3 80.0 - 99.0 FL  
 MCH 26.1 26.0 - 34.0 PG  
 MCHC 31.3 30.0 - 36.5 g/dL  
 RDW 18.0 (H) 11.5 - 14.5 % PLATELET 869 330 - 688 K/uL MPV 10.2 8.9 - 12.9 FL  
 NRBC 1.2 (H) 0  WBC ABSOLUTE NRBC 0.08 (H) 0.00 - 0.01 K/uL NEUTROPHILS 58 32 - 75 % LYMPHOCYTES 18 12 - 49 % MONOCYTES 15 (H) 5 - 13 % EOSINOPHILS 3 0 - 7 % BASOPHILS 0 0 - 1 % IMMATURE GRANULOCYTES 6 (H) 0.0 - 0.5 % ABS. NEUTROPHILS 4.0 1.8 - 8.0 K/UL  
 ABS. LYMPHOCYTES 1.2 0.8 - 3.5 K/UL  
 ABS. MONOCYTES 1.0 0.0 - 1.0 K/UL  
 ABS. EOSINOPHILS 0.2 0.0 - 0.4 K/UL  
 ABS. BASOPHILS 0.0 0.0 - 0.1 K/UL  
 ABS. IMM. GRANS. 0.4 (H) 0.00 - 0.04 K/UL  
 DF SMEAR SCANNED    
 RBC COMMENTS ANISOCYTOSIS 1+ 
    
 RBC COMMENTS POLYCHROMASIA PRESENT 
    
METABOLIC PANEL, COMPREHENSIVE Collection Time: 04/06/21  3:31 AM  
Result Value Ref Range Sodium 119 (LL) 136 - 145 mmol/L Potassium 3.6 3.5 - 5.1 mmol/L Chloride 88 (L) 97 - 108 mmol/L  
 CO2 21 21 - 32 mmol/L Anion gap 10 5 - 15 mmol/L Glucose 101 (H) 65 - 100 mg/dL BUN 22 (H) 6 - 20 MG/DL Creatinine 0.99 0.55 - 1.02 MG/DL  
 BUN/Creatinine ratio 22 (H) 12 - 20 GFR est AA >60 >60 ml/min/1.73m2 GFR est non-AA 52 (L) >60 ml/min/1.73m2 Calcium 8.5 8.5 - 10.1 MG/DL Bilirubin, total 0.9 0.2 - 1.0 MG/DL  
 ALT (SGPT) 30 12 - 78 U/L  
 AST (SGOT) 28 15 - 37 U/L Alk. phosphatase 118 (H) 45 - 117 U/L Protein, total 6.5 6.4 - 8.2 g/dL Albumin 2.8 (L) 3.5 - 5.0 g/dL Globulin 3.7 2.0 - 4.0 g/dL A-G Ratio 0.8 (L) 1.1 - 2.2 PROTHROMBIN TIME + INR Collection Time: 04/06/21  3:31 AM  
Result Value Ref Range INR 1.5 (H) 0.9 - 1.1 Prothrombin time 15.8 (H) 9.0 - 11.1 sec PTT Collection Time: 04/06/21  3:31 AM  
Result Value Ref Range aPTT 37.6 (H) 22.1 - 31.0 sec  
 aPTT, therapeutic range     58.0 - 77.0 SECS  
TROPONIN I Collection Time: 04/06/21  3:31 AM  
Result Value Ref Range Troponin-I, Qt. 0.05 (H) <0.05 ng/mL SED RATE (ESR) Collection Time: 04/06/21  3:31 AM  
Result Value Ref Range  Sed rate, automated 63 (H) 0 - 30 mm/hr NT-PRO BNP Collection Time: 04/06/21  3:31 AM  
Result Value Ref Range NT pro-BNP 5,843 (H) <450 PG/ML  
EKG, 12 LEAD, INITIAL Collection Time: 04/06/21  3:32 AM  
Result Value Ref Range Ventricular Rate 80 BPM  
 Atrial Rate 82 BPM  
 QRS Duration 98 ms Q-T Interval 410 ms QTC Calculation (Bezet) 472 ms Calculated R Axis 12 degrees Calculated T Axis -170 degrees Diagnosis Atrial fibrillation Marked ST abnormality, possible inferior subendocardial injury When compared with ECG of 25-NOV-2014 11:53, Atrial fibrillation has replaced Sinus rhythm ST now depressed in Inferior leads T wave inversion now evident in Inferior leads T wave inversion now evident in Lateral leads QT has lengthened POC LACTIC ACID Collection Time: 04/06/21  3:41 AM  
Result Value Ref Range  Lactic Acid (POC) 1.40 0.40 - 2.00 mmol/L

## 2021-04-07 NOTE — PROGRESS NOTES
[facility] Progress Note NAME: Vanessa Hunt :  1927 MRN:  [id number] Date/Time: 2021 Risk of deterioration: low Assessment:    Plan: 
Acute hyponatremia-RO SIADH 
(L) le wound Hypokalemia Hx of breast ca Ovarian cancer-Jonathan ANemia Holding hctz Repeat sodium 120 with k of 5.1 after repletion Check thyroid, cortisol levels, iron studies, bmp at noon Lasix x 1 today as urine electrolytes c/w SIADH Calcium corrects to normal 
Check iron studies/ferritin=quite anemic Restrict water-she admits to 48 oz of water a day COnsult DR Flores Antonio for [facility]-pt with ongoing treatment for ovarian cancer. Consider tolvaptan if sodium levels continue to decrease Asked to see for hyponatremia in the setting of hctz and increased water intake. Daughter speaks of increased BLE But I don't appreciate any edema today. Has been on hctz for years. Subjective: Chief Complaint:  My leg hurts Review of Systems: no n/v/cp/sob/f/c Objective: VITALS:  
Last 24hrs VS reviewed since prior progress note. Most recent are: 
Visit Vitals BP (!) 145/73 (BP 1 Location: Right upper arm, BP Patient Position: At rest) Pulse 93 Temp 98.2 °F (36.8 °C) Resp 16 Ht 5' 4\" (1.626 m) Wt 66.9 kg (147 lb 8 oz) SpO2 94% BMI 25.32 kg/m² SpO2 Readings from Last 6 Encounters:  
21 94% 21 98% 21 98% 21 97% 20 94% 10/15/20 94% Intake/Output Summary (Last 24 hours) at 2021 1010 Last data filed at 2021 3675 Gross per 24 hour Intake 840 ml Output 350 ml Net 490 ml Telemetry Reviewed PHYSICAL EXAM: 
 
General   well developed, well nourished, appears stated age, in no acute distress EENT  Normocephalic, Atraumatic, EOMI Respiratory   Clear To Auscultation bilaterally. (R) port Cardiology  Regular Rate and Rythmn Abdominal  Soft, non-tender, non-distended, positive bowel sounds, no hepatosplenomegaly Extremities  No edema on (R) 
(LLE) wrapped Lab Data Reviewed: (see below) Medications Reviewed: (see below) PMH/SH reviewed - no change compared to H&P 
 
___________________________________________________ Attending Physician: Rayna Berger MD  
 
____________________________________________________ MEDICATIONS: 
Current Facility-Administered Medications Medication Dose Route Frequency  sodium chloride (NS) flush 5-10 mL  5-10 mL IntraVENous PRN  
 sodium chloride (NS) flush 5-40 mL  5-40 mL IntraVENous Q8H  
 sodium chloride (NS) flush 5-40 mL  5-40 mL IntraVENous PRN  
 acetaminophen (TYLENOL) tablet 650 mg  650 mg Oral Q6H PRN Or  
 acetaminophen (TYLENOL) suppository 650 mg  650 mg Rectal Q6H PRN  polyethylene glycol (MIRALAX) packet 17 g  17 g Oral DAILY PRN  
 ondansetron (ZOFRAN) injection 4 mg  4 mg IntraVENous Q6H PRN  
 [Held by provider] apixaban (ELIQUIS) tablet 5 mg  5 mg Oral BID  melatonin tablet 4.5 mg  4.5 mg Oral QHS  rosuvastatin (CRESTOR) tablet 20 mg  20 mg Oral QHS  timolol (TIMOPTIC) 0.5 % ophthalmic solution 1 Drop  1 Drop Both Eyes DAILY  oxyCODONE IR (ROXICODONE) tablet 5 mg  5 mg Oral Q4H PRN  
 morphine injection 2 mg  2 mg IntraVENous Q2H PRN  
 metoprolol succinate (TOPROL-XL) XL tablet 25 mg  25 mg Oral DAILY  aspirin delayed-release tablet 81 mg  81 mg Oral DAILY  
  
 
LABS: 
Recent Labs 04/07/21 
2943 04/06/21 
9774 WBC 9.3 6.8 HGB 7.3* 7.2* HCT 23.3* 23.0*  
 246 Recent Labs 04/07/21 
0346 04/06/21 
1727 04/06/21 
1020 * 122* 122*  
K 5.1 4.0 3.3*  
CL 89* 91* 90* CO2 22 23 22 BUN 27* 25* 23* CREA 1.10* 1.07* 0.97 * 141* 115* CA 8.4* 8.3* 8.1*  
MG 1.9  --   --   
PHOS 4.5  --   --   
URICA  --   --  5.2 Recent Labs 04/07/21 
7530 04/06/21 
6960 ALT 37 30  118* TBILI 1.0 0.9 TP 6.5 6.5 ALB 2.9* 2.8*  
GLOB 3.6 3.7 Recent Labs   04/06/21 
0348 INR 1.5* PTP 15.8* APTT 37.6* No results for input(s): FE, TIBC, PSAT, FERR in the last 72 hours. No results for input(s): PH, PCO2, PO2 in the last 72 hours. Recent Labs 04/06/21 
5204 TROIQ 0.05* Lab Results Component Value Date/Time  Glucose (POC) 102 (H) 11/23/2020 01:15 PM  
 Glucose  04/16/2013 03:58 PM

## 2021-04-07 NOTE — PROGRESS NOTES
Orders received, chart reviewed and patient evaluated by occupational therapy. Pending progression with skilled acute occupational therapy, recommend: 
Therapy up to 5 days/week in SNF setting Recommend with nursing ADLs with supervision/setup, OOB to chair 3x/day and toileting via bed pan VS beside commode with two-person assist and walker. Thank you for completing as able in order to maintain patient strength, endurance and independence. Full evaluation to follow.   
 
Jeri Ureña OTR/L

## 2021-04-07 NOTE — CONSULTS
Hematology Oncology Consultation Reason for consult: Ovarian Cancer Admitting Diagnosis: Hyponatremia [E87.1] Impression:  
*) Stage IV Ovarian Cancer, peritoneal carcinomatosis: 
- Diagnosed in 2021 with Dr. Abisai Rahman, also follows with Dr. Delvin Zambrano given pmh of breast cancer. 
- Receiving palliative single agnet Carboplatin AUC 5 q 28 days, C2D1 3/11, today is C2D27 today and was due to chemo  but being held currently given inpatient status. *) chemo induced anemia: 
-hgb 7.3 today, transfuse for hgb <7 
-  check iron studies, ferritin, pending from this am, will also check b12/fa 
- Iron studies on 3/22 in clinic WNL 
- getting procrit as outpt, last dose 20K on 3/29, will give dose today of retacrit. *) Hyponatremia: 
- Dr Michael Arnold following - Likely SIADH, restricting water intake. - Na improving to 122 today *) A Fib: 
- holding Eliquis given fall and head strike, cardiology consulted this admission *) LLE Wound: 
- wound care Thank you for this kind referral,we will follow along with you. Discussion: Ariana Calderon is a  80y.o. year old seen in consultation at the request of Dr. Michael Arnold for ovarian cancer. Ms Delta Verma has pmh of A fib, PAD, Htn, Hld, breast cancer remotely and newly dx ovarian cancer who had a fall and came to ED on 3/30 s/p fall with LLE laceration and scalp wound. She went home and came back on  with worsening drainage from leg. She was also found to have hyponatremia with Na 129. Today, her daughter is at bedside, she is eating lunch in a chair wanting to take a  Nap, states she is tired but denies pain, N/V, fever, chills, Imagin21 chest xray IMPRESSION Cardiomegaly with mild bilateral subsegmental atelectasis. Labs: 
 
Recent Results (from the past 24 hour(s)) OSMOLALITY, SERUM/PLASMA Collection Time: 21  2:55 PM  
Result Value Ref Range Osmolality, serum/plasma 262 mOsm/kg H7Q  
METABOLIC PANEL, BASIC  Collection Time: 04/06/21  5:27 PM  
Result Value Ref Range Sodium 122 (L) 136 - 145 mmol/L Potassium 4.0 3.5 - 5.1 mmol/L Chloride 91 (L) 97 - 108 mmol/L  
 CO2 23 21 - 32 mmol/L Anion gap 8 5 - 15 mmol/L Glucose 141 (H) 65 - 100 mg/dL BUN 25 (H) 6 - 20 MG/DL Creatinine 1.07 (H) 0.55 - 1.02 MG/DL  
 BUN/Creatinine ratio 23 (H) 12 - 20 GFR est AA 58 (L) >60 ml/min/1.73m2 GFR est non-AA 48 (L) >60 ml/min/1.73m2 Calcium 8.3 (L) 8.5 - 10.1 MG/DL  
CBC WITH AUTOMATED DIFF Collection Time: 04/07/21  3:46 AM  
Result Value Ref Range WBC 9.3 3.6 - 11.0 K/uL  
 RBC 2.77 (L) 3.80 - 5.20 M/uL HGB 7.3 (L) 11.5 - 16.0 g/dL HCT 23.3 (L) 35.0 - 47.0 % MCV 84.1 80.0 - 99.0 FL  
 MCH 26.4 26.0 - 34.0 PG  
 MCHC 31.3 30.0 - 36.5 g/dL  
 RDW 17.9 (H) 11.5 - 14.5 % PLATELET 640 025 - 534 K/uL MPV 10.1 8.9 - 12.9 FL  
 NRBC 1.9 (H) 0  WBC ABSOLUTE NRBC 0.18 (H) 0.00 - 0.01 K/uL NEUTROPHILS 65 32 - 75 % BAND NEUTROPHILS 3 % LYMPHOCYTES 13 12 - 49 % MONOCYTES 17 (H) 5 - 13 % EOSINOPHILS 1 0 - 7 % BASOPHILS 0 0 - 1 % MYELOCYTES 1 % IMMATURE GRANULOCYTES 0 0.0 - 0.5 % ABS. NEUTROPHILS 6.3 1.8 - 8.0 K/UL  
 ABS. LYMPHOCYTES 1.2 0.8 - 3.5 K/UL  
 ABS. MONOCYTES 1.6 (H) 0.0 - 1.0 K/UL  
 ABS. EOSINOPHILS 0.1 0.0 - 0.4 K/UL  
 ABS. BASOPHILS 0.0 0.0 - 0.1 K/UL  
 ABS. IMM. GRANS. 0.0 0.00 - 0.04 K/UL  
 DF AUTOMATED    
 RBC COMMENTS ANISOCYTOSIS 
1+ METABOLIC PANEL, COMPREHENSIVE Collection Time: 04/07/21  3:46 AM  
Result Value Ref Range Sodium 120 (L) 136 - 145 mmol/L Potassium 5.1 3.5 - 5.1 mmol/L Chloride 89 (L) 97 - 108 mmol/L  
 CO2 22 21 - 32 mmol/L Anion gap 9 5 - 15 mmol/L Glucose 134 (H) 65 - 100 mg/dL BUN 27 (H) 6 - 20 MG/DL Creatinine 1.10 (H) 0.55 - 1.02 MG/DL  
 BUN/Creatinine ratio 25 (H) 12 - 20 GFR est AA 56 (L) >60 ml/min/1.73m2 GFR est non-AA 46 (L) >60 ml/min/1.73m2  Calcium 8.4 (L) 8.5 - 10.1 MG/DL  
 Bilirubin, total 1.0 0.2 - 1.0 MG/DL  
 ALT (SGPT) 37 12 - 78 U/L  
 AST (SGOT) 29 15 - 37 U/L Alk. phosphatase 114 45 - 117 U/L Protein, total 6.5 6.4 - 8.2 g/dL Albumin 2.9 (L) 3.5 - 5.0 g/dL Globulin 3.6 2.0 - 4.0 g/dL A-G Ratio 0.8 (L) 1.1 - 2.2 MAGNESIUM Collection Time: 04/07/21  3:46 AM  
Result Value Ref Range Magnesium 1.9 1.6 - 2.4 mg/dL PHOSPHORUS Collection Time: 04/07/21  3:46 AM  
Result Value Ref Range Phosphorus 4.5 2.6 - 4.7 MG/DL History: 
Past Medical History:  
Diagnosis Date  Arthritis  Cancer ([facility] breast    (leg,ear,arm-skin cancer)  GERD (gastroesophageal reflux disease)  Hypertension  Other ill-defined conditions(799.89)   
 glaucoma  Other ill-defined conditions(799.89)   
 cellulitis left arm  Other ill-defined conditions(799.89)   
 carpal tunnel syndrome left hand  Other ill-defined conditions(799.89)   
 vertigo  Ovarian cancer ([facility] 11/2020 Past Surgical History:  
Procedure Laterality Date  CARDIAC CATHETERIZATION  1/10/2013  HX APPENDECTOMY  HX CARPAL TUNNEL RELEASE    
 left hand  HX CATARACT REMOVAL    
 bilateral  
 HX COLONOSCOPY  4/2012  HX KNEE ARTHROSCOPY    
 left  HX MASTECTOMY    
 bilateral  
 HX OTHER SURGICAL Basal cell skin cancer removed from left leg, left arm and neck  HX OTHER SURGICAL    
 cyst removed from left thigh  HX PARTIAL HYSTERECTOMY  HX OREN AND BSO  HX TONSILLECTOMY  VASCULAR SURGERY PROCEDURE UNLIST    
 vascular blockages removed. Prior to Admission medications Medication Sig Start Date End Date Taking? Authorizing Provider CARBOPLATIN  mg by IntraVENous route. Every 28 days for 6 cycles   Yes Provider, Historical  
epoetin pablo (Procrit) 20,000 unit/mL injection 20,000 Units by SubCUTAneous route every seven (7) days. Yes Provider, Historical  
dexamethasone in 0.9 % sod chl (DEXAMETHASONE-0.9 % SOD.  CHLOR IV) 10 mg by IntraVENous route. Every 28 days pre treatment   Yes Provider, Historical  
fosnetupitant-palonosetron (Akynzeo, fosnetupitant,) 235-0.25 mg solr by IntraVENous route. Every 28 days pre treatment   Yes Provider, Historical  
klack's mixture Solution Take 5 mL by mouth four (4) times daily. Diphenhydramine elixir 12.5mg/ml 500ml, Nystatin suspension 120ml,Tetracycline 500mg capsules  12 capsules (6g), Hydrocortisone 20mg tablet 12 tablets (240mg), Water for irrigation QS ad 1000ml Swish & Swallow   Yes Provider, Historical  
clobetasoL 0.05 % spry by Apply Externally route. APPLY TO ITCHY DRY AREAS OF SKIN TWICE DAILY, MIX SOLUTION WITH 1LB JAR OF MOISTURIZING CREAM   Yes Provider, Historical  
hydroCHLOROthiazide (HYDRODIURIL) 12.5 mg tablet Take 12.5 mg by mouth daily. Yes Provider, Historical  
Breo Ellipta 200-25 mcg/dose inhaler TAKE 1 PUFF DAILY 3/3/21  Yes Provider, Historical  
bimatoprost (LUMIGAN) 0.01 % ophthalmic drops Administer 1 Drop to both eyes daily. Yes Provider, Historical  
dexAMETHasone (DECADRON) 4 mg tablet TAKE 1 TAB WITH FOOD TWICE A DAY FOR 3 DAYS AFTER EACH CHEMO 2/5/21  Yes Provider, Historical  
ondansetron (ZOFRAN ODT) 8 mg disintegrating tablet TAKE 1 TABLET BY MOUTH THREE TIMES A DAY AS NEEDED FOR NAUSEA 2/5/21  Yes Provider, Historical  
albuterol-ipratropium (DUO-NEB) 2.5 mg-0.5 mg/3 ml nebu 3 mL by Nebulization route every four (4) hours as needed for Wheezing. Yes Provider, Historical  
eszopiclone (LUNESTA) 1 mg tablet Take 1 Tab by mouth nightly.  Max Daily Amount: 1 mg. 3/11/21  Yes Juana Bernal MD  
Eliquis 5 mg tablet TAKE 1 TABLET EVERY 12     HOURS TO PREVENT STROKE IN ATRIAL FIBRILLATION 1/18/21  Yes Zaheer GONZALES, NP  
nebivoloL (Bystolic) 2.5 mg tablet TAKE 1 TABLET DAILY 1/6/21  Yes Declan Mendes MD  
diclofenac (VOLTAREN) 1 % gel APPLY 1 APPLICATION TOPICALLY 4 (FOUR) TIMES A DAY AS NEEDED (PAIN) 11/25/20  Yes Provider, Historical rosuvastatin (CRESTOR) 20 mg tablet TAKE 1 TABLET NIGHTLY 20  Yes Martin Knox MD  
sucralfate (CARAFATE) 100 mg/mL suspension Take 1-2 tsp by mouth four (4) times daily as needed. On empty stomach   Yes Provider, Historical  
timolol (TIMOPTIC-XE) 0.5 % ophthalmic gel-forming Administer 1 Drop to both eyes daily. Yes Provider, Historical  
CYANOCOBALAMIN, VITAMIN B-12, (VITAMIN B-12 PO) Take 1,000 mcg by mouth daily. Yes Provider, Historical  
latanoprost (XALATAN) 0.005 % ophthalmic solution Administer 1 Drop to both eyes nightly. 6/8/15  Yes Provider, Historical  
cycloSPORINE (RESTASIS) 0.05 % ophthalmic emulsion Administer 1 Drop to both eyes two (2) times a day. Yes Provider, Historical  
VIT C/E/ZN/COPPR/LUTEIN/ZEAXAN (PRESERVISION AREDS 2 PO) Take 2 capsules by mouth two (2) times a day. Yes Other, MD Mercedes  
esomeprazole (NEXIUM) 40 mg capsule Take 1 Cap by mouth Daily (before breakfast). 14  Yes Radha Mckinney MD  
Cholecalciferol, Vitamin D3, (VITAMIN D3) 1,000 unit cap Take 1 Cap by mouth two (2) times a day. Yes Provider, Historical  
omega-3 fatty acids-vitamin e (FISH OIL) 1,000 mg Cap Take 1 Cap by mouth two (2) times a day. Yes Provider, Historical  
multivitamins-minerals-lutein (CENTRUM SILVER) Tab Take 1 Tab by mouth daily. Yes Provider, Historical  
 
Allergies Allergen Reactions  Augmentin [Amoxicillin-Pot Clavulanate] Hives and Itching  Doxycycline Hives and Itching  Keflex [Cephalexin] Hives and Itching  Monodox [Doxycycline Monohydrate] Other (comments)  Sulfa (Sulfonamide Antibiotics) Rash Social History Tobacco Use  Smoking status: Former Smoker Types: Cigarettes Quit date: 1955 Years since quittin.3  Smokeless tobacco: Never Used Substance Use Topics  Alcohol use: Not Currently Comment: rare Family History Problem Relation Age of Onset  Heart Disease Mother  Heart Disease Father  age 55  
 Heart Disease Brother  Stroke Brother  Cancer Sister Colon Cancer  Diabetes Sister  Heart Disease Sister  Heart Disease Sister  Diabetes Sister  Other Brother MVA  Heart Disease Brother  Heart Disease Son   
      age 52 Current Medications: 
Current Facility-Administered Medications Medication Dose Route Frequency  sodium chloride (NS) flush 5-10 mL  5-10 mL IntraVENous PRN  
 sodium chloride (NS) flush 5-40 mL  5-40 mL IntraVENous Q8H  
 sodium chloride (NS) flush 5-40 mL  5-40 mL IntraVENous PRN  
 acetaminophen (TYLENOL) tablet 650 mg  650 mg Oral Q6H PRN Or  
 acetaminophen (TYLENOL) suppository 650 mg  650 mg Rectal Q6H PRN  polyethylene glycol (MIRALAX) packet 17 g  17 g Oral DAILY PRN  
 ondansetron (ZOFRAN) injection 4 mg  4 mg IntraVENous Q6H PRN  
 [Held by provider] apixaban (ELIQUIS) tablet 5 mg  5 mg Oral BID  melatonin tablet 4.5 mg  4.5 mg Oral QHS  rosuvastatin (CRESTOR) tablet 20 mg  20 mg Oral QHS  timolol (TIMOPTIC) 0.5 % ophthalmic solution 1 Drop  1 Drop Both Eyes DAILY  oxyCODONE IR (ROXICODONE) tablet 5 mg  5 mg Oral Q4H PRN  
 morphine injection 2 mg  2 mg IntraVENous Q2H PRN  
 metoprolol succinate (TOPROL-XL) XL tablet 25 mg  25 mg Oral DAILY  aspirin delayed-release tablet 81 mg  81 mg Oral DAILY Review of Systems: 
Constitutional No fevers, chills, night sweats,+ excessive fatigue No weight loss. Allergic/Immunologic No recent allergic reactions Eyes No significant visual difficulties. No diplopia. ENMT No problems with hearing, no sore throat, no sinus drainage. Endocrine No hot flashes or night sweats. No cold intolerance, polyuria, or polydipsia Hematologic/Lymphatic No easy bruising or bleeding. The patient denies any tender or palpable lymph nodes Breasts No abnormal masses of breast, nipple discharge or pain.   
Respiratory No dyspnea on exertion, orthopnea, chest pain, cough or hemoptysis. Cardiovascular No anginal chest pain, irregular heart beat, tachycardia, palpitations or orthopnea. Gastrointestinal No nausea, vomiting, diarrhea, constipation, cramping, dysphagia, reflux, heartburn, GI bleeding, or early satiety. No change in bowel habits. Genitourinary (M) No hematuria, dysuria, increased frequency, urgency, hesitancy or incontinence. Musculoskeletal No joint pain, swelling or redness. No decreased range of motion. Integumentary No chronic rashes, inflammation, ulcerations, pruritus, petechiae, purpura, ecchymoses, or skin changes. Neurologic No headache, blurred vision, and no areas of focal weakness or numbness. Normal gait. No sensory problems. Psychiatric No insomnia, depression, skylar or mood swings. No psychotropic drugs. Physical Exam: 
Constitutional Elderly appearing female in NAD. Alert, cooperative, oriented. Mood and affect appropriate. Appears close to chronological age. Well nourished. Well developed. Head Normocephalic; no scars Eyes Conjunctivae and sclerae are clear and without icterus. Pupils are reactive and equal.  
ENMT Sinuses are nontender. No oral exudates, ulcers, masses, thrush or mucositis. Oropharynx clear. Tongue normal.  
Neck Supple without masses or thyromegaly. No jugular venous distension. Hematologic/Lymphatic No petechiae or purpura. No tender or palpable lymph nodes in the cervical, supraclavicular, axillary or inguinal area. Respiratory Lungs are clear to auscultation without rhonchi or wheezing. Cardiovascular Regular rate and rhythm of heart without murmurs, gallops or rubs. Chest / Line Site Chest is symmetric with no chest wall deformities. Abdomen Non-tender, non-distended, no masses, ascites or hepatosplenomegaly. Good bowel sounds. No guarding or rebound tenderness. No pulsatile masses.   
Musculoskeletal No tenderness or swelling, normal range of motion without obvious weakness. Extremities Bandage of LLE Skin No rashes, scars, or lesions suggestive of malignancy. No petechiae, purpura, or ecchymoses. No excoriations. Neurologic No sensory or motor deficits, normal cerebellar function, normal gait, cranial nerves intact. Psychiatric Alert and oriented times three. Coherent speech. Verbalizes understanding of our discussions today.

## 2021-04-07 NOTE — PROGRESS NOTES
[address] [place] [address]  [phone] Cardiology Progress Note 4/7/2021 345 PM 
 
Admit Date: 4/6/2021 Admit Diagnosis: Hyponatremia [E87.1] Interval History/Subjective:  
 
Gela Land is a 80 y.o. female with PMH a fib, HLD, PAD, basal cell carcinoma, arthritis, bicuspid aortic valve, aortic stenosis, HTN who was  admitted for Hyponatremia [E87.1]. -VSS 
-Hg steady low; K up to 1.32; K 5.3 
-weight down 3# 
-Ms. Mcclain Neither states she's feeling okay. Intermittent LLE pain. Some SOB after getting back to bed from the chair. Visit Vitals BP (!) 130/39 Pulse 89 Temp 97.6 °F (36.4 °C) Resp 18 Ht 5' 4\" (1.626 m) Wt 66.9 kg (147 lb 8 oz) SpO2 94% BMI 25.32 kg/m² Current Facility-Administered Medications Medication Dose Route Frequency  epoetin pablo-epbx (RETACRIT) injection 8,000 Units  8,000 Units SubCUTAneous Q MON, WED & FRI  sodium chloride (NS) flush 5-10 mL  5-10 mL IntraVENous PRN  
 sodium chloride (NS) flush 5-40 mL  5-40 mL IntraVENous Q8H  
 sodium chloride (NS) flush 5-40 mL  5-40 mL IntraVENous PRN  
 acetaminophen (TYLENOL) tablet 650 mg  650 mg Oral Q6H PRN Or  
 acetaminophen (TYLENOL) suppository 650 mg  650 mg Rectal Q6H PRN  polyethylene glycol (MIRALAX) packet 17 g  17 g Oral DAILY PRN  
 ondansetron (ZOFRAN) injection 4 mg  4 mg IntraVENous Q6H PRN  
 [Held by provider] apixaban (ELIQUIS) tablet 5 mg  5 mg Oral BID  melatonin tablet 4.5 mg  4.5 mg Oral QHS  rosuvastatin (CRESTOR) tablet 20 mg  20 mg Oral QHS  timolol (TIMOPTIC) 0.5 % ophthalmic solution 1 Drop  1 Drop Both Eyes DAILY  oxyCODONE IR (ROXICODONE) tablet 5 mg  5 mg Oral Q4H PRN  
 morphine injection 2 mg  2 mg IntraVENous Q2H PRN  
 metoprolol succinate (TOPROL-XL) XL tablet 25 mg  25 mg Oral DAILY  aspirin delayed-release tablet 81 mg  81 mg Oral DAILY Objective:  
  
Physical Exam: 
Gen:  Pleasant, elderly  female resting in bed in NAD HENT:  
Head: Normocephalic. Eyes: Conjunctivae are normal.  
Neck: Neck supple. Cardiovascular: irregular rate and rhythm; 3/6 systolic murmur Pulmonary/Chest: Effort normal and breath sounds normal.  
Musculoskeletal:     
   General: 1+ edema LLE Neurological: She is alert and oriented to person, place, and time. Skin: Skin is warm and dry and pale. Dressing in place LLE;  Staples to scalp Psychiatric: She has a normal mood and affect.  
  
 
Data Review:  
Recent Labs 04/07/21 
7936 04/06/21 
5043 WBC 9.3 6.8 HGB 7.3* 7.2* HCT 23.3* 23.0*  
 246 Recent Labs 04/07/21 
1023 04/07/21 
0346 04/06/21 
1727 04/06/21 
0331 04/06/21 
1003 * 120* 122*   < > 119*  
K 5.3* 5.1 4.0   < > 3.6 CL 88* 89* 91*   < > 88* CO2 23 22 23   < > 21 * 134* 141*   < > 101* BUN 29* 27* 25*   < > 22* CREA 1.32* 1.10* 1.07*   < > 0.99 CA 8.2* 8.4* 8.3*   < > 8.5 MG  --  1.9  --   --   --   
PHOS  --  4.5  --   --   --   
ALB  --  2.9*  --   --  2.8* TBILI  --  1.0  --   --  0.9 ALT  --  37  --   --  30 INR  --   --   --   --  1.5*  
 < > = values in this interval not displayed. Recent Labs 04/06/21 
1343 TROIQ 0.05* Intake/Output Summary (Last 24 hours) at 4/7/2021 1625 Last data filed at 4/7/2021 1305 Gross per 24 hour Intake 780 ml Output 595 ml Net 185 ml Telemetry: a fib with PVCs ECG: a fib - similar to prior CXRAY: \"Cardiomegaly with mild bilateral subsegmental atelectasis. \" 
 
Assessment:  
 
Active Problems: 
  HTN (hypertension) (12/5/2012) PAF (paroxysmal atrial fibrillation) ([facility] (2/1/2018) Mixed hyperlipidemia (10/15/2020) Hyponatremia (4/6/2021) Permanent atrial fibrillation ([facility] (4/6/2021) Ovarian cancer ([facility] (4/6/2021) Anemia (4/6/2021) Plan:  
 
Permanent a fib: rate controlled. · Continue BB · Continue to hold eliquis.   Risks out weigh benefit at this time with anemia, falls, advanced age. · ASA 
 
 
HTN: stable · Continue BB Lupe Madison, GINA 
DNP, RN, AGACNP-BC Patient seen and examined by me with the above nurse practitioner. I personally performed all components of the history, physical, and medical decision making and agree with the assessment and plan with minor modifications as noted. Today the patient remains unsteady on her feet and severely anemic. General PE Gen:  NAD Mental Status - Alert. General Appearance - Not in acute distress. HEENT: 
PERRL, no carotid bruits or JVD Chest and Lung Exam  
Inspection: Accessory muscles - No use of accessory muscles in breathing. Auscultation:  
Breath sounds: - Normal.  
Cardiovascular Inspection: Jugular vein - Bilateral - Inspection Normal.  
Palpation/Percussion:  
Apical Impulse: - Normal.  
Auscultation: Rhythm - Regular. Heart Sounds - S1 WNL and S2 WNL. No S3 or S4. Murmurs & Other Heart Sounds: Auscultation of the heart reveals - No Murmurs. Peripheral Vascular Upper Extremity: Inspection - Bilateral - No Cyanotic nailbeds or Digital clubbing. Lower Extremity:  
Palpation: Edema - Bilateral - No edema. Abdomen:   Soft, non-tender, bowel sounds are active. Neuro: A&O times 3, CN and motor grossly WNL Today the patient remains unsteady on her feet and severely anemic. High fall risk at high risk of progressive anemia. Continue to hold Eliquis. Follow-up in 3 to 4 weeks as an outpatient and will reassess whether she has recovered enough to reconsider Eliquis. Thanks for consult. We will sign off for now. Please call if we can assist further during this hospitalization.

## 2021-04-07 NOTE — PROGRESS NOTES
End of Shift Note Bedside shift change report given to Kamini Choudhury RN (oncoming nurse) by Giuseppe Bains (offgoing nurse). Report included the following information output, ed summary, Tele: A fib, MAR, and results Shift worked:  Day Shift summary and any significant changes:  
 Patient does not have much of an appetite. UP to chair with therapy but trouble returning to bed. Patient resting and tired most of day. Labs sent down and consult called Concerns for physician to address:  Discharge planning Zone phone for oncoming shift:   [phone] Activity: 
Activity Level: Up with Assistance Number times ambulated in hallways past shift: 0 Number of times OOB to chair past shift: 1 Cardiac:  
Cardiac Monitoring: Yes     
Cardiac Rhythm: Atrial fibrillation Access:  
Current line(s): PIV Genitourinary:  
Urinary status: external catheter Respiratory:  
O2 Device: Nasal cannula Chronic home O2 use?: NO Incentive spirometer at bedside: NO 
  
GI: 
Last Bowel Movement Date: 04/05/21 Current diet:  DIET ONE TIME MESSAGE 
DIET CARDIAC Regular; FR 1200ML 
DIET NUTRITIONAL SUPPLEMENTS Breakfast, Dinner; Nepro Passing flatus: YES Tolerating current diet: YES 
% Diet Eaten: 20 % Pain Management:  
Patient states pain is manageable on current regimen: YES Skin: 
Dick Score: 13 Interventions: float heels, increase time out of bed and limit briefs Patient Safety: 
Fall Score: Total Score: 4 Interventions: bed/chair alarm, assistive device (walker, cane, etc), gripper socks, pt to call before getting OOB and stay with me (per policy) High Fall Risk: Yes Length of Stay: 
Expected LOS: 2d 14h Actual LOS: 1 Giuseppe Bains

## 2021-04-07 NOTE — PROGRESS NOTES
Problem: Falls - Risk of 
Goal: *Absence of Falls Description: Document Abhijeet Duval Fall Risk and appropriate interventions in the flowsheet. Outcome: Progressing Towards Goal 
Note: Fall Risk Interventions: 
Mobility Interventions: Bed/chair exit alarm, Patient to call before getting OOB Mentation Interventions: Eyeglasses and hearing aids, Bed/chair exit alarm Medication Interventions: Patient to call before getting OOB, Evaluate medications/consider consulting pharmacy Elimination Interventions: Call light in reach, Patient to call for help with toileting needs History of Falls Interventions: Door open when patient unattended Problem: Pressure Injury - Risk of 
Goal: *Prevention of pressure injury Description: Document Dick Scale and appropriate interventions in the flowsheet. Outcome: Progressing Towards Goal 
Note: Pressure Injury Interventions: 
  
 
Moisture Interventions: Internal/External urinary devices Activity Interventions: Increase time out of bed, Pressure redistribution bed/mattress(bed type), PT/OT evaluation Mobility Interventions: Pressure redistribution bed/mattress (bed type), PT/OT evaluation, Float heels Nutrition Interventions: Document food/fluid/supplement intake Problem: Pain Goal: *Control of Pain Outcome: Progressing Towards Goal

## 2021-04-07 NOTE — PROGRESS NOTES
Transition of Care Plan: 
RUR: 34% Disposition: SNF- pending choice Follow up appointments: oncology, PCP  
DME needed: n/a Transportation at Discharge: likely BLS [place] or means to access home: n/a IM Medicare letter: to be provide prior to d/c Caregiver Contact: gamal Villafana [phone] Discharge Caregiver contacted prior to discharge? yes Reason for Admission:  Hyponatremia RUR Score: 34% PCP: First and Last name:  Lima Heaton MD 
 Name of Practice: [facility] Are you a current patient: Yes/No: yes Approximate date of last visit: 3/12/21 Can you do a virtual visit with your PCP:  no 
          
Resources/supports as identified by patient/family:  2 Sebastian thorpe is more physical support than mPOA Freya West Top Challenges facing patient (as identified by patient/family and CM): Finances/Medication cost? No concerns identified Transportation? Pt relies on her niece Brittny Anglin for all transport needs, Brittny Anglin accompanies pt to all doctors appointments Support system or lack thereof?  2 Sebastian thorpe is more physical support than mPOA Freya West Living arrangements? Pt technically lives alone in a single level home with 0 anthony. However, pt's niece, Brittny Anglin has been with patient almost consistently since March 2020 providing care/support Self-care/ADLs/Cognition? At baseline, prior to patient falling on 3/30 patient was independent with ALDs. However, since pt's fall, she has declined dramatically. Pt was requiring assistance with ALDs and transfers Current Advanced Directive/Advance Care Plan: to be discussed with patient when less drowsy. Pt's niece Sebastian Villafana confirmed that her sister Freya West is the mPOA but will need to verify with patient as well as discuss code status.   
 
Healthcare Decision Maker:  
Click here to complete [address] including selection of the Healthcare Decision Maker Relationship (ie \"Primary\") Payor Source Payor: [place] MEDICARE / Plan: [place] MEDICARE PART A & B / Product Type: Medicare /  
                       
Plan for utilizing home health:   Per recommendation Transition of Care Plan:               
 
Patient is a 81 y/o woman that was admitted to [facility] on 4/6/21 for hyponatremia. Patient has a pmhx of HTN, permanent afib, hx of breast cancer. Pt currently has ovarian cancer stage lV which she is actively getting palliative chemotherapy for once monthly. CM made room visit with patient who was alert and oriented but drowsy. CM introduced self and role. Patient requested CM to contact her niece/emergency contact Monik Camargo [phone], to complete initial assessment and discuss d/c planning. CM contacted pt's niece, Monik Camargo, and introduced self and role. Pt's niece confirmed demographics, insurance, and emergency contact on file. Per Mohini Michaels, her sister Lian Sparrow is listed as mPOA in ACP. Both Mohini Michaels and Maury live in [place]. Mohini Michaels is the only one in the family that is retired and able to stay with patient to provide care. Per Mohini Michaels she has been with patient almost consistently since March 2020, except for some weekends she went back home. Patient lives in a single level home with 0 anthony. When patient's niece Mohini Michaels is not with her, pt is alone, but this does not occur often. Pt has access to a quad care, rollator, RW, transport chair, and shower chair at home. At baseline, prior to patient falling on 3/30 patient was independent with ALDs. However, since pt's fall, she has declined dramatically. Pt was requiring assistance with ALDs and transfers. Per niece, pt's leg was consistently hurting and made it very difficult to ambulate/transfer from the bed to transfer chair.  Pt's niece would have to push pt in transfer chair around the house as pt couldn't ambulate from room to room. Pt has used [facility] about 5 years ago for wound care. Patient has no hx of rehab. Pt's niece, Yonny Retana, reported that she feels that patient would really benefit from going to a rehab facility. Yonny Retana does not like the idea of patient going to a facility but knows this is in the best interest of the patient. CM provided verbal support and informed niece that therapy is also recommending rehab. CM obtained Carmen's e-mail ([email or web address] and sent a SNF list to her for review. CM requested 2-3+ choices. CM will continue to follow to assist with d/c planning. Care Management Interventions PCP Verified by CM: Yes Mode of Transport at Discharge: BLS Transition of Care Consult (CM Consult): Discharge Planning, SNF Discharge Durable Medical Equipment: No 
Physical Therapy Consult: Yes Occupational Therapy Consult: Yes Speech Therapy Consult: No 
Current Support Network: Lives Alone, Own Home, Other(Pt technically lives alone in a single level home with 0 anthony. However, pt's niece, Yonny Retana has been with patient almost consistently since March 2020 providing care/support) Confirm Follow Up Transport: Family Discharge Location Discharge Placement: Skilled nursing facility [facility], [address]

## 2021-04-07 NOTE — PROGRESS NOTES
Problem: Mobility Impaired (Adult and Pediatric) Goal: *Acute Goals and Plan of Care (Insert Text) Description: FUNCTIONAL STATUS PRIOR TO ADMISSION: Patient was modified independent using a rolling walker and wheelchair for functional mobility. HOME SUPPORT PRIOR TO ADMISSION: The patient lived alone with niece to provide assistance. Physical Therapy Goals Initiated 4/7/2021 1. Patient will move from supine to sit and sit to supine  in bed with moderate assistance  within 7 day(s). 2.  Patient will transfer from bed to chair and chair to bed with moderate assistance  using the least restrictive device within 7 day(s). 3.  Patient will perform sit to stand with minimal assistance/contact guard assist within 7 day(s). 4.  Patient will ambulate with moderate assistance  for 5 feet with the least restrictive device within 7 day(s). Outcome: Not Met PHYSICAL THERAPY EVALUATION Patient: Lizeth Ford (52 y.o. female) Date: 4/7/2021 Primary Diagnosis: Hyponatremia [E87.1] Precautions:   Fall ASSESSMENT Based on the objective data described below, the patient presents with generalized weakness, decreased activity tolerance, impaired balance and altered gait. Pt was received in supine and cleared by nursing to mobilize. Pt with self limiting behavior and easily fatigued. She needed significant encouragement to perform any mobility. Required 2 person to get to the EOB. Several attempts required to stand with RW and get to the chair. She was left sitting up in the chair. Current Level of Function Impacting Discharge (mobility/balance): mod Ax 2 Functional Outcome Measure: The patient scored Total: 15/100 on the Barthel Index which is indicative of 85% impaired ability to care for basic self needs/dependency on others. Other factors to consider for discharge:  
  
Patient will benefit from skilled therapy intervention to address the above noted impairments.     
 
PLAN : Recommendations and Planned Interventions: bed mobility training, transfer training, gait training, therapeutic exercises, patient and family training/education, and therapeutic activities Frequency/Duration: Patient will be followed by physical therapy:  3 times a week to address goals. Recommendation for discharge: (in order for the patient to meet his/her long term goals) Therapy up to 5 days/week in SNF setting This discharge recommendation: 
Has not yet been discussed the attending provider and/or case management IF patient discharges home will need the following DME: to be determined (TBD) SUBJECTIVE:  
Patient stated I want water.  pt not allowed to have water OBJECTIVE DATA SUMMARY:  
HISTORY:   
Past Medical History:  
Diagnosis Date Arthritis Cancer ([facility] [address] breast    (leg,ear,arm-skin cancer) GERD (gastroesophageal reflux disease) Hypertension Other ill-defined conditions(799.89)   
 glaucoma Other ill-defined conditions(799.89)   
 cellulitis left arm Other ill-defined conditions(799.89)   
 carpal tunnel syndrome left hand Other ill-defined conditions(799.89)   
 vertigo Ovarian cancer ([facility] [address] 11/2020 Past Surgical History:  
Procedure Laterality Date CARDIAC CATHETERIZATION  1/10/2013 HX APPENDECTOMY HX CARPAL TUNNEL RELEASE    
 left hand HX CATARACT REMOVAL    
 bilateral  
 HX COLONOSCOPY  4/2012 HX KNEE ARTHROSCOPY    
 left HX MASTECTOMY    
 bilateral  
 HX OTHER SURGICAL Basal cell skin cancer removed from left leg, left arm and neck HX OTHER SURGICAL    
 cyst removed from left thigh HX PARTIAL HYSTERECTOMY HX OREN AND BSO    
 HX TONSILLECTOMY    
 VASCULAR SURGERY PROCEDURE UNLIST    
 vascular blockages removed. Personal factors and/or comorbidities impacting plan of care:  
 
Home Situation Home Environment: Private residence # Steps to Enter: 0 One/Two Story Residence: One story Living Alone: Yes Support Systems: Family member(s) Current DME Used/Available at Home: Wheelchair, Walker, rolling, Shower chair, Grab bars Tub or Shower Type: Shower EXAMINATION/PRESENTATION/DECISION MAKING:  
Critical Behavior: 
Neurologic State: Alert, Appropriate for age Orientation Level: Oriented X4 Cognition: Follows commands, Appropriate safety awareness, Appropriate for age attention/concentration, Appropriate decision making Hearing: Auditory Auditory Impairment: Hard of hearing, bilateral( at home) Skin:  dressing intact Edema: none Range Of Motion: 
AROM: Generally decreased, functional 
  
  
  
PROM: Generally decreased, functional 
  
  
  
Strength:   
Strength: Generally decreased, functional 
  
  
  
  
  
  
Tone & Sensation:  
  
  
  
  
  
  
  
  
  
   
Coordination: 
Coordination: Generally decreased, functional 
Vision:  
  
Functional Mobility: 
Bed Mobility: 
Rolling: Maximum assistance Supine to Sit: Moderate assistance;Assist x2 Scooting: Maximum assistance Transfers: 
Sit to Stand: Moderate assistance;Assist x2 Stand to Sit: Moderate assistance;Assist x2 Bed to Chair: Moderate assistance;Assist x2 Balance:  
Sitting: Impaired Sitting - Static: Fair (occasional) Sitting - Dynamic: Fair (occasional) Standing: Impaired Standing - Static: Fair;Constant support Standing - Dynamic : Poor;Constant support Functional Measure: 
Barthel Index: 
 
Bathin Bladder: 0 Bowels: 5 Groomin Dressin Feedin Mobility: 0 Stairs: 0 Toilet Use: 0 Transfer (Bed to Chair and Back): 5 Total: 15/100 The Barthel ADL Index: Guidelines 1. The index should be used as a record of what a patient does, not as a record of what a patient could do. 2. The main aim is to establish degree of independence from any help, physical or verbal, however minor and for whatever reason.  
3. The need for supervision renders the patient not independent. 4. A patient's performance should be established using the best available evidence. Asking the patient, friends/relatives and nurses are the usual sources, but direct observation and common sense are also important. However direct testing is not needed. 5. Usually the patient's performance over the preceding 24-48 hours is important, but occasionally longer periods will be relevant. 6. Middle categories imply that the patient supplies over 50 per cent of the effort. 7. Use of aids to be independent is allowed. Nela Zamorano., Barthel, DArmaniW. ([id number]). Functional evaluation: the Barthel Index. [address] [facility] (14)2. Kaveh Head kevan Jeanine, DRWEF, June Villarreal., Van Rivera., [facility], [address] (1999). Measuring the change indisability after inpatient rehabilitation; comparison of the responsiveness of the Barthel Index and Functional [place] Measure. Journal of Neurology, Neurosurgery, and Psychiatry, 66(4), 473-633. Reginald Anderson, N.J.A, JEAN Greene, & Heike Durand MArmaniA. (2004.) Assessment of post-stroke quality of life in cost-effectiveness studies: The usefulness of the Barthel Index and the EuroQoL-5D. [facility], 91, [phone] Physical Therapy Evaluation Charge Determination History Examination Presentation Decision-Making HIGH Complexity :3+ comorbidities / personal factors will impact the outcome/ POC  MEDIUM Complexity : 3 Standardized tests and measures addressing body structure, function, activity limitation and / or participation in recreation  MEDIUM Complexity : Evolving with changing characteristics  Other outcome measures barthel  HIGH Based on the above components, the patient evaluation is determined to be of the following complexity level: MEDIUM Activity Tolerance:  
Poor After treatment patient left in no apparent distress:  
Sitting in chair and Call bell within reach COMMUNICATION/EDUCATION:  
The patients plan of care was discussed with: Occupational therapist and Registered nurse. Patient understands intent and goals of therapy, but is neutral about his/her participation. Thank you for this referral. 
Veena Knox, PT, DPT Time Calculation: 48 mins

## 2021-04-07 NOTE — PROGRESS NOTES
Problem: Self Care Deficits Care Plan (Adult) Goal: *Acute Goals and Plan of Care (Insert Text) Description: FUNCTIONAL STATUS PRIOR TO ADMISSION: Patient was modified independent using a rolling walker for functional mobility. Patient reports for past week, has largely been using a wheelchair, completing stand-pivot transfers and ADLs independently. HOME SUPPORT: The patient lived alone with niece assisting with transportation. Per patient, niece would be at patient's home for several weeks at a time before returning to [place]. Occupational Therapy Goals Initiated 4/7/2021 1. Patient will perform lower body dressing using compensatory strategies and/or AE at moderate assistance level within 7 days. 2. Patient will perform toilet transfers at minimal assistance level using RW prn within 7 days. 3. Patient will perform all aspects of toileting at moderate assistance level within 7 days. 4. Patient will tolerate 1 standing grooming task with RW and CGA for balance within 7 days. 5. Patient will perform seated self-feeding or grooming with supervision/set-up and no LOB within 7 days. Outcome: Progressing Towards Goal 
 OCCUPATIONAL THERAPY EVALUATION Patient: Marina Jarquin (19 y.o. female) Date: 4/7/2021 Primary Diagnosis: Hyponatremia [E87.1] Precautions: Fall ASSESSMENT Based on the objective data described below, the patient presents with generalized weakness, impaired functional mobility and balance, baseline impaired vision, decreased activity tolerance, LLE pain, and self-limiting tendencies. Per patient reports, lives alone at baseline (typically ambulatory with RW) with intermittent assistance available from family member. Patient reports within past week she has been transferring to/from wheelchair and completing BADLs independently. This session, patient received in bed, perseverative on long medical history, pain, and perceived inability to mobilize with therapy. Patient overall requiring max, constant encouragement, education, and cues for next step. Session requiring significantly increased time 2/2 patient need for frequent rest breaks, decreased attention to task, and self-limiting behaviors. Patient ultimately able to complete stand-pivot to recliner chair with mod assist x2 and RW. Note multiple trials needed prior to successful stand - poor effort initially and repeated sitting without warning despite standing well. Of note, patient reporting impaired baseline vision - fair/good accuracy of reach to RW, however once sitting, patient reporting \"unable\" to find her cup (on tray table directly in front of her) with little effort observed to attempt. Anticipate slow gains in therapy - patient is well below reported baseline at this time, unsafe to return to prior living situation. Recommend SNF-level rehab at discharge to maximize functional outcomes. If patient were to discharge home, would require , 24/7 supervision/assist for all mobility/activity,  
 
Current Level of Function Impacting Discharge (ADLs/self-care): up to total assist, self-limiting and quick to say she cannot Functional Outcome Measure: The patient scored 20/100 on the Barthel Index. Other factors to consider for discharge: lived alone and reports largely independent (questionable based on today's observations) Patient will benefit from skilled therapy intervention to address the above noted impairments. PLAN : 
Recommendations and Planned Interventions: self care training, functional mobility training, therapeutic exercise, balance training, therapeutic activities, endurance activities, patient education, and home safety training Frequency/Duration: Patient will be followed by occupational therapy 3 times a week to address goals. Recommendation for discharge: (in order for the patient to meet his/her long term goals) Therapy up to 5 days/week in SNF setting This discharge recommendation: 
Has been made in collaboration with the attending provider and/or case management IF patient discharges home will need the following DME: TBD SUBJECTIVE:  
Patient stated I have hypertension of the lungs - that's high blood pressure.  OBJECTIVE DATA SUMMARY:  
HISTORY:  
Past Medical History:  
Diagnosis Date Arthritis Cancer ([facility] [address] breast    (leg,ear,arm-skin cancer) GERD (gastroesophageal reflux disease) Hypertension Other ill-defined conditions(799.89)   
 glaucoma Other ill-defined conditions(799.89)   
 cellulitis left arm Other ill-defined conditions(799.89)   
 carpal tunnel syndrome left hand Other ill-defined conditions(799.89)   
 vertigo Ovarian cancer ([facility] [address] 11/2020 Past Surgical History:  
Procedure Laterality Date CARDIAC CATHETERIZATION  1/10/2013 HX APPENDECTOMY HX CARPAL TUNNEL RELEASE    
 left hand HX CATARACT REMOVAL    
 bilateral  
 HX COLONOSCOPY  4/2012 HX KNEE ARTHROSCOPY    
 left HX MASTECTOMY    
 bilateral  
 HX OTHER SURGICAL Basal cell skin cancer removed from left leg, left arm and neck HX OTHER SURGICAL    
 cyst removed from left thigh HX PARTIAL HYSTERECTOMY HX OREN AND BSO    
 HX TONSILLECTOMY    
 VASCULAR SURGERY PROCEDURE UNLIST    
 vascular blockages removed. Expanded or extensive additional review of patient history:  
 
Home Situation Home Environment: Private residence # Steps to Enter: 0 One/Two Story Residence: One story Living Alone: Yes Support Systems: Family member(s) Current DME Used/Available at Home: Wheelchair, Walker, rolling, Shower chair, Grab bars Tub or Shower Type: Shower Hand dominance: Right EXAMINATION OF PERFORMANCE DEFICITS: 
Cognitive/Behavioral Status: 
Neurologic State: Alert Orientation Level: Oriented to person Cognition: Decreased command following;Decreased attention/concentration Perception: Verbal;Tactile Perseveration: Perseverates during conversation;Verbal cues provided; Tactile cues provided;Perseverates during mobility Safety/Judgement: Decreased insight into deficits; Fall prevention;Decreased awareness of environment Skin: LLE and B foot dressings intact, clean per observation Hearing: Auditory Auditory Impairment: Hard of hearing, bilateral( at home) Vision/Perceptual:   
Acuity: (reports no vision L eye, very limited in R eye) Note questionable eyesight - note good accuracy of reach for RW, however \"unable\" to \"find\" cup on tray table directly in front of her at close range. Range of Motion: 
AROM: Generally decreased, functional 
PROM: Generally decreased, functional 
 
Strength: 
Strength: Generally decreased, functional 
 
Coordination: 
Coordination: Generally decreased, functional 
Fine Motor Skills-Upper: Left Intact; Right Intact Gross Motor Skills-Upper: Left Intact; Right Intact Balance: 
Sitting: Impaired Sitting - Static: Fair (occasional) Sitting - Dynamic: Fair (occasional) Standing: Impaired Standing - Static: Fair;Constant support Standing - Dynamic : Poor;Constant support Functional Mobility and Transfers for ADLs: 
Bed Mobility: 
Rolling: Maximum assistance Supine to Sit: Moderate assistance;Assist x2 Scooting: Maximum assistance Transfers: 
Sit to Stand: Moderate assistance;Assist x2 Stand to Sit: Moderate assistance;Assist x2 Bed to Chair: Moderate assistance;Assist x2 Toilet Transfer : Moderate assistance;Assist x2;Adaptive equipment; Additional time(infer to [facility] via SPT) ADL Assessment: 
Feeding: Setup; Moderate assistance Oral Facial Hygiene/Grooming: Setup; Moderate assistance Upper Body Dressing: Setup; Moderate assistance Lower Body Dressing: Total assistance Toileting: Maximum assistance ADL Intervention and task modifications: 
Feeding Container Management: Maximum assistance Drink to Mouth: Stand-by assistance Cues: Verbal cues provided;Physical assistance; Tactile cues provided Lower Body Dressing Assistance Dressing Assistance: Total assistance(dependent) Socks: Total assistance (dependent) Position Performed: Supine Cues: Verbal cues provided;Physical assistance Toileting Toileting Assistance: Total assistance(dependent) Bladder Hygiene: Total assistance (dependent)(for purewick placement/management) Cognitive Retraining Orientation Retraining: Awareness of environment Problem Solving: Awareness of environment; Identifying the problem Executive Functions: Executing cognitive plans Organizing/Sequencing: Breaking task down Attention to Task: Distractibility Safety/Judgement: Decreased insight into deficits; Fall prevention;Decreased awareness of environment Functional Measure: 
Barthel Index: 
 
Bathin Bladder: 0 Bowels: 5 Groomin Dressin Feedin Mobility: 0 Stairs: 0 Toilet Use: 0 Transfer (Bed to Chair and Back): 5 Total: 20/100 The Barthel ADL Index: Guidelines 1. The index should be used as a record of what a patient does, not as a record of what a patient could do. 2. The main aim is to establish degree of independence from any help, physical or verbal, however minor and for whatever reason. 3. The need for supervision renders the patient not independent. 4. A patient's performance should be established using the best available evidence. Asking the patient, friends/relatives and nurses are the usual sources, but direct observation and common sense are also important. However direct testing is not needed. 5. Usually the patient's performance over the preceding 24-48 hours is important, but occasionally longer periods will be relevant. 6. Middle categories imply that the patient supplies over 50 per cent of the effort. 7. Use of aids to be independent is allowed. Casimiro Bowens., Barthel, D.W. ([id number]). Functional evaluation: the Barthel Index.  [address] [facility] ([address] [facility], MARIAH, Roge Garcia., Adolfo Taylor., Bisi Aguilera. (1999). Measuring the change indisability after inpatient rehabilitation; comparison of the responsiveness of the Barthel Index and Functional [place] Measure. Journal of Neurology, Neurosurgery, and Psychiatry, 66(4), 359-203. Carter Sandhoff, N.J.A, JEAN Greene, & Johnson Francis M.A. (2004.) Assessment of post-stroke quality of life in cost-effectiveness studies: The usefulness of the Barthel Index and the EuroQoL-5D. [facility], 01, 143-79 Occupational Therapy Evaluation Charge Determination History Examination Decision-Making LOW Complexity : Brief history review  MEDIUM Complexity : 3-5 performance deficits relating to physical, cognitive , or psychosocial skils that result in activity limitations and / or participation restrictions MEDIUM Complexity : Patient may present with comorbidities that affect occupational performnce. Miniml to moderate modification of tasks or assistance (eg, physical or verbal ) with assesment(s) is necessary to enable patient to complete evaluation Based on the above components, the patient evaluation is determined to be of the following complexity level: LOW Pain Rating: 
Patient reporting significant LLE pain at wound site, received meds prior to session. Activity Tolerance:  
Poor and requires frequent rest breaks After treatment patient left in no apparent distress:   
Sitting in chair and Call bell within reach COMMUNICATION/EDUCATION:  
The patients plan of care was discussed with: Physical therapist and Registered nurse. Home safety education was provided and the patient/caregiver indicated understanding., Patient/family have participated as able in goal setting and plan of care. , and Patient/family agree to work toward stated goals and plan of care. Thank you for this referral. 
[place] OT Time Calculation: 48 mins

## 2021-04-07 NOTE — PROGRESS NOTES
Hospitalist Progress Note NAME: Neel Lackey :  1927 MRN:  [id number] Admit date: 2021 Today's date: 21 PCP: MD Jessie Ackerman M.D. Cell [phone] Assessment / Plan: Hyponatremia Na 119 POA Probable SIADH vs HCTZ effect POA 
-Appears clinically euvolemic 
-urine Na 77, urine osm 330 looks C/w SIADH 
-Hold HCTZ 
-Nephrology consult appreciated - Po fluid restrict 
- lasix - Cortisol 37.8, TSH 2.19 
- Serial labs 
  
LLE leg wound with weeping POA ? LE venous insufficiency POA 
- s/p recent fall, laceration repair in the ED 
- wound care consult - Bilateral lower extremity weeping, no erythema. No fever or leukocytosis 
- PRN lasix - Bilateral LE Dopplers 3/31/2021 with no DVT, + venous insufficiency - Cardiology and renal following 
- keeps legs up as much as possible - ? Effect of intraabdominal compression from the cancer, check CT scan abdomen/pelvis in Am 
  
Permanent Atrial fib POA 
-Holding eliquis with fall risk per cards - Will reconsider use if improves as outpatientn. 
-cont bystolic  
  
Ovarian Cancer Remote Hx of breast CA 
-on monthly chemo regimen 
-c/o loss of taste/appetite 
-Oncology following 
  
Essential HTN POA 
-cont bystolic 
-hold HCTZ as above, given hyponatremia 
  
Hyperlipidemia POA 
-cont statin Overweight POA Body mass index is 25.32 kg/m². 
  
Code Status: Full, will address further with patient Surrogate Decision Maker: 
  
DVT Prophylaxis: eliquis for now GI Prophylaxis: not indicated Subjective: Chief Complaint / Reason for Physician Visit \"My legs are painful\". Discussed with RN events overnight. Review of Systems: 
Symptom Y/N Comments  Symptom Y/N Comments Fever/Chills n   Chest Pain n   
Poor Appetite    Edema Cough n   Abdominal Pain n   
Sputum    Joint Pain SOB/ALLAN n   Headache Nausea/vomit n   Tolerating PT/OT Diarrhea n   Tolerating Diet y Constipation    Other Could NOT obtain due to:   
 
Objective: VITALS:  
Last 24hrs VS reviewed since prior progress note. Most recent are: 
Patient Vitals for the past 24 hrs: 
 Temp Pulse Resp BP SpO2  
04/07/21 1528 97.6 °F (36.4 °C) 89 18 (!) 130/39 94 % 04/07/21 1130 97.7 °F (36.5 °C) 84 14 (!) 116/55 97 % 04/07/21 0754 98.2 °F (36.8 °C) 93 16 (!) 145/73 94 % 04/07/21 0342 97.5 °F (36.4 °C) 76 16 (!) 125/51 98 % 04/06/21 2316 98.5 °F (36.9 °C) 82 20 (!) 113/50 96 % 04/06/21 1949 97.6 °F (36.4 °C) 79 20 (!) 126/42 92 % Intake/Output Summary (Last 24 hours) at 4/7/2021 1843 Last data filed at 4/7/2021 1840 Gross per 24 hour Intake 1280 ml Output 1095 ml Net 185 ml Wt Readings from Last 12 Encounters:  
04/07/21 66.9 kg (147 lb 8 oz) 03/30/21 67.1 kg (148 lb)  
03/22/21 67.3 kg (148 lb 6.4 oz) 02/08/21 74.5 kg (164 lb 3.2 oz)  
12/22/20 69.4 kg (153 lb)  
11/20/20 70.8 kg (156 lb)  
11/20/20 70.3 kg (155 lb) 10/15/20 70.5 kg (155 lb 8 oz) 02/24/20 65.8 kg (145 lb) 12/20/19 65.8 kg (145 lb) 12/12/19 65.8 kg (145 lb 1 oz) 12/11/19 66.7 kg (147 lb) PHYSICAL EXAM: 
 
I had a face to face encounter and independently examined this patient on 04/07/21 as outlined below: 
 
General: WD, WN. Alert, cooperative, no acute distress EENT:  PERRL. Anicteric sclerae. MMM Neck:  No meningismus, no thyromegaly Resp:  CTA bilaterally, no wheezing or rales. No accessory muscle use CV:  Regular  rhythm,  No edema GI:  Soft, Non distended, Non tender. +Bowel sounds, no rebound Neurologic:  Alert and oriented X 3, normal speech, non focal motor exam 
Psych:   Good insight. Not anxious nor agitated Skin:  No rashes. No jaundice Reviewed most current lab test results and cultures  YES Reviewed most current radiology test results   YES Review and summation of old records today    NO Reviewed patient's current orders and MAR    YES 
PMH/SH reviewed - no change compared to H&P 
________________________________________________________________________ Care Plan discussed with: 
  Comments Patient x Family RN x Care Manager Consultant Multidiciplinary team rounds were held today with , nursing, pharmacist and clinical coordinator. Patient's plan of care was discussed; medications were reviewed and discharge planning was addressed. ________________________________________________________________________ Comments >50% of visit spent in counseling and coordination of care    
________________________________________________________________________ Reyes Borjas MD  
 
Procedures: see electronic medical records for all procedures/Xrays and details which were not copied into this note but were reviewed prior to creation of Plan. LABS: 
I reviewed today's most current labs and imaging studies. Pertinent labs include: 
Recent Labs 04/07/21 
8198 04/06/21 
2212 WBC 9.3 6.8 HGB 7.3* 7.2* HCT 23.3* 23.0*  
 246 Recent Labs 04/07/21 
1023 04/07/21 
0346 04/06/21 
1727 04/06/21 
0331 04/06/21 
8388 * 120* 122*   < > 119*  
K 5.3* 5.1 4.0   < > 3.6 CL 88* 89* 91*   < > 88* CO2 23 22 23   < > 21 * 134* 141*   < > 101* BUN 29* 27* 25*   < > 22* CREA 1.32* 1.10* 1.07*   < > 0.99 CA 8.2* 8.4* 8.3*   < > 8.5 MG  --  1.9  --   --   --   
PHOS  --  4.5  --   --   --   
ALB  --  2.9*  --   --  2.8* TBILI  --  1.0  --   --  0.9 ALT  --  37  --   --  30 INR  --   --   --   --  1.5*  
 < > = values in this interval not displayed.

## 2021-04-08 NOTE — PROGRESS NOTES
Transition of Care Plan: 
RUR: 36% Disposition: SNF- referrals sent to [facility] and [facility] Follow up appointments: oncology, PCP  
DME needed: n/a Transportation at Discharge: likely BLS [place] or means to access home: n/a IM Medicare letter: to be provide prior to d/c Caregiver Contact: gamal Meredith [phone] Discharge Caregiver contacted prior to discharge? yes CM spoke with patient's niece, Igor Montenegro, who reported she and her sister has chosen [facility] and [facility] for SNF. Per niece she has a tour at [facility] at 11:00am and then will schedule a virtual tour with [facility] if and when pt is medically accepted by facility. CM has sent referrals to both facilities. Niece to inform CM which facility is her first choice after tour. Josselin Hicks, [address]

## 2021-04-08 NOTE — PROGRESS NOTES
Hospitalist Progress Note NAME: Idalmis Asencio :  1927 MRN:  [id number] Admit date: 2021 Today's date: 21 PCP: Florance Aschoff, MD Liam Darnel, M.D. Cell [phone] Assessment / Plan: Hyponatremia Na 119 POA Probable SIADH vs HCTZ effect POA 
-Appears clinically euvolemic 
-urine Na 77, urine osm 330 looks C/w SIADH 
-Hold HCTZ 
-Nephrology consult appreciated - Po fluid restrict 
- lasix - Cortisol 37.8, TSH 2.19 
- Serial labs 
  
LLE leg wound with weeping POA ? LE venous insufficiency POA 
- s/p recent fall, laceration repair in the ED 
- wound care consult - Bilateral lower extremity weeping, no erythema. No fever or leukocytosis 
- PRN lasix - Bilateral LE Dopplers 3/31/2021 with no DVT, + venous insufficiency - Cardiology and renal following 
- keeps legs up as much as possible - ? Effect of intraabdominal compression from the cancer, check CT scan abdomen/pelvis in Am 
  
Permanent Atrial fib POA 
-Holding eliquis with fall risk per cards - Will reconsider use if improves as outpatientn. 
-cont bystolic  
  
Ovarian Cancer Remote Hx of breast CA 
-on monthly chemo regimen 
-c/o loss of taste/appetite 
-Oncology following 
  
Essential HTN POA 
-cont bystolic 
-hold HCTZ as above, given hyponatremia 
  
Hyperlipidemia POA 
-cont statin Overweight POA Body mass index is 25.58 kg/m². 
  
Code Status: Full, will address further with patient Surrogate Decision Maker: 
  
DVT Prophylaxis: eliquis for now GI Prophylaxis: not indicated Subjective: Chief Complaint / Reason for Physician Visit \"My legs are painful\". Discussed with RN events overnight. Review of Systems: 
Symptom Y/N Comments  Symptom Y/N Comments Fever/Chills n   Chest Pain n   
Poor Appetite    Edema Cough n   Abdominal Pain n   
Sputum    Joint Pain SOB/ALLAN n   Headache Nausea/vomit n   Tolerating PT/OT Diarrhea n   Tolerating Diet y Constipation    Other Could NOT obtain due to:   
 
Objective: VITALS:  
Last 24hrs VS reviewed since prior progress note. Most recent are: 
Patient Vitals for the past 24 hrs: 
 Temp Pulse Resp BP SpO2  
04/08/21 1101 98.2 °F (36.8 °C) 82 16 (!) 119/54 100 % 04/08/21 0810     92 % 04/08/21 0800 97.4 °F (36.3 °C) 87 16 (!) 138/52 98 % 04/08/21 0400  86     
04/08/21 0310 97.6 °F (36.4 °C) 80 16 (!) 142/65 95 % 04/08/21 0000 97.1 °F (36.2 °C) 78 18 (!) 121/53 94 % 04/07/21 2000  80     
04/07/21 1936 97.5 °F (36.4 °C) 78 18 (!) 112/43 92 % Intake/Output Summary (Last 24 hours) at 4/8/2021 1539 Last data filed at 4/8/2021 8715 Gross per 24 hour Intake 721 ml Output 500 ml Net 221 ml Wt Readings from Last 12 Encounters:  
04/08/21 67.6 kg (149 lb)  
03/30/21 67.1 kg (148 lb)  
03/22/21 67.3 kg (148 lb 6.4 oz) 02/08/21 74.5 kg (164 lb 3.2 oz)  
12/22/20 69.4 kg (153 lb)  
11/20/20 70.8 kg (156 lb)  
11/20/20 70.3 kg (155 lb) 10/15/20 70.5 kg (155 lb 8 oz) 02/24/20 65.8 kg (145 lb) 12/20/19 65.8 kg (145 lb) 12/12/19 65.8 kg (145 lb 1 oz) 12/11/19 66.7 kg (147 lb) PHYSICAL EXAM: 
 
I had a face to face encounter and independently examined this patient on 04/08/21 as outlined below: 
 
General: WD, WN. Alert, cooperative, no acute distress EENT:  PERRL. Anicteric sclerae. MMM Neck:  No meningismus, no thyromegaly Resp:  CTA bilaterally, no wheezing or rales. No accessory muscle use CV:  Regular  rhythm,  No edema GI:  Soft, Non distended, Non tender. +Bowel sounds, no rebound Neurologic:  Alert and oriented X 3, normal speech, non focal motor exam 
Psych:   Good insight. Not anxious nor agitated Skin:  No rashes. No jaundice Reviewed most current lab test results and cultures  YES Reviewed most current radiology test results   YES Review and summation of old records today    NO Reviewed patient's current orders and MAR    YES 
PMH/SH reviewed - no change compared to H&P 
________________________________________________________________________ Care Plan discussed with: 
  Comments Patient x Family RN x Care Manager Consultant Multidiciplinary team rounds were held today with , nursing, pharmacist and clinical coordinator. Patient's plan of care was discussed; medications were reviewed and discharge planning was addressed. ________________________________________________________________________ Comments >50% of visit spent in counseling and coordination of care    
________________________________________________________________________ To Freitas MD  
 
Procedures: see electronic medical records for all procedures/Xrays and details which were not copied into this note but were reviewed prior to creation of Plan. LABS: 
I reviewed today's most current labs and imaging studies. Pertinent labs include: 
Recent Labs 04/08/21 
4109 04/07/21 
3789 04/06/21 
6963 WBC 9.2 9.3 6.8 HGB 7.0* 7.3* 7.2* HCT 22.8* 23.3* 23.0*  
 263 246 Recent Labs 04/08/21 
1457 04/08/21 
6461 04/07/21 
1023 04/07/21 
0346 04/06/21 
0331 04/06/21 
2587 * 120* 122* 120*   < > 119* K  --  5.3* 5.3* 5.1   < > 3.6 CL  --  89* 88* 89*   < > 88* CO2  --  23 23 22   < > 21 GLU  --  128* 159* 134*   < > 101* BUN  --  31* 29* 27*   < > 22* CREA  --  1.18* 1.32* 1.10*   < > 0.99 CA  --  8.2* 8.2* 8.4*   < > 8.5 MG  --  1.9  --  1.9  --   --   
PHOS  --  3.7  --  4.5  --   --   
ALB  --  2.7*  --  2.9*  --  2.8* TBILI  --  1.0  --  1.0  --  0.9 ALT  --  38  --  37  --  30 INR  --   --   --   --   --  1.5*  
 < > = values in this interval not displayed.

## 2021-04-08 NOTE — PROGRESS NOTES
-Hematology / Oncology ([facility]) - 
-Primary Oncologist- Dr. Robson Croft 
--\" I feel better\" 
 
-S-  She states she feels improved after getting rest overnight.  
 
-O-  
  
Patient Vitals for the past 24 hrs: 
 Temp Pulse Resp BP SpO2  
04/08/21 1101 98.2 °F (36.8 °C) 82 16 (!) 119/54 100 % 04/08/21 0810     92 % 04/08/21 0800 97.4 °F (36.3 °C) 87 16 (!) 138/52 98 % 04/08/21 0400  86     
04/08/21 0310 97.6 °F (36.4 °C) 80 16 (!) 142/65 95 % 04/08/21 0000 97.1 °F (36.2 °C) 78 18 (!) 121/53 94 % 04/07/21 2000  80     
04/07/21 1936 97.5 °F (36.4 °C) 78 18 (!) 112/43 92 % 04/07/21 1528 97.6 °F (36.4 °C) 89 18 (!) 130/39 94 % 04/08 0701 - 04/08 1900 In: 221 [P.O.:221] Out: - Review of Systems: 
Constitutional No fevers, chills, night sweats,+ excessive fatigue No weight loss. Allergic/Immunologic No recent allergic reactions Eyes No significant visual difficulties. No diplopia. ENMT No problems with hearing, no sore throat, no sinus drainage. Endocrine No hot flashes or night sweats. No cold intolerance, polyuria, or polydipsia Hematologic/Lymphatic No easy bruising or bleeding. The patient denies any tender or palpable lymph nodes Breasts No abnormal masses of breast, nipple discharge or pain. Respiratory No dyspnea on exertion, orthopnea, chest pain, cough or hemoptysis. Cardiovascular No anginal chest pain, irregular heart beat, tachycardia, palpitations or orthopnea. Gastrointestinal No nausea, vomiting, diarrhea, constipation, cramping, dysphagia, reflux, heartburn, GI bleeding, or early satiety. No change in bowel habits. Genitourinary (M) No hematuria, dysuria, increased frequency, urgency, hesitancy or incontinence. Musculoskeletal No joint pain, swelling or redness. No decreased range of motion. Integumentary No chronic rashes, inflammation, ulcerations, pruritus, petechiae, purpura, ecchymoses, or skin changes.   
Neurologic No headache, blurred vision, and no areas of focal weakness or numbness. Normal gait. No sensory problems. Psychiatric No insomnia, depression, skylar or mood swings. No psychotropic drugs.  
  
  
Physical Exam: 
Constitutional Elderly appearing female in NAD. Alert, cooperative, oriented. Mood and affect appropriate. Appears close to chronological age. Well nourished. Well developed. Head Normocephalic; no scars Eyes Conjunctivae and sclerae are clear and without icterus. Pupils are reactive and equal.  
ENMT Sinuses are nontender. No oral exudates, ulcers, masses, thrush or mucositis. Oropharynx clear. Tongue normal.  
Neck Supple without masses or thyromegaly. No jugular venous distension. Hematologic/Lymphatic No petechiae or purpura. No tender or palpable lymph nodes in the cervical, supraclavicular, axillary or inguinal area. Respiratory Lungs are clear to auscultation without rhonchi or wheezing. Cardiovascular KERRIE IV/VII in RUSB, irregularly irregular rate Chest / Line Site Chest is symmetric with no chest wall deformities. Abdomen Non-tender, non-distended, no masses, ascites or hepatosplenomegaly. Good bowel sounds. No guarding or rebound tenderness. No pulsatile masses. Musculoskeletal No tenderness or swelling, normal range of motion without obvious weakness. Extremities Bandage of LLE Skin No rashes, scars, or lesions suggestive of malignancy. No petechiae, purpura, or ecchymoses. No excoriations. Neurologic No sensory or motor deficits, normal cerebellar function, normal gait, cranial nerves intact. Psychiatric Alert and oriented times three. Coherent speech. Verbalizes understanding of our discussions today.  
A[place] 13 04/08/21 
0378 04/07/21 
1023 04/07/21 
3715 04/06/21 
1727 04/06/21 
0331 04/06/21 
4093 WBC 9.2  --  9.3  --   --  6.8 HGB 7.0*  --  7.3*  --   --  7.2*  
  --  263  --   --  246 ANEU 7.7  --  6.3  --   --  4.0 INR  --   --   --   --   --  1.5* APTT  --   --   --   --   --  37.6* * 122* 120* 122*   < > 119*  
K 5.3* 5.3* 5.1 4.0   < > 3.6 * 159* 134* 141*   < > 101* BUN 31* 29* 27* 25*   < > 22* CREA 1.18* 1.32* 1.10* 1.07*   < > 0.99 ALT 38  --  37  --   --  30  
TBILI 1.0  --  1.0  --   --  0.9   --  114  --   --  118* CA 8.2* 8.2* 8.4* 8.3*   < > 8.5 MG 1.9  --  1.9  --   --   --   
PHOS 3.7  --  4.5  --   --   --   
 < > = values in this interval not displayed. -Imaging-  
4/6/21 chest xray IMPRESSION Cardiomegaly with mild bilateral subsegmental atelectasis.   
 
-Assessment + Plan-    
*) Stage IV Ovarian Cancer, peritoneal carcinomatosis: 
- Diagnosed in January 2021 with Dr. Madie Devine, also follows with Dr. Dex Morales given pmh of breast cancer. 
- Receiving palliative single agnet Carboplatin AUC 5 q 28 days, C2D1 3/11, today is C2D28 today and was due to chemo  Today 4/8 but being held currently given inpatient status.   
*) chemo induced anemia: 
-hgb 7.0 today, transfuse for hgb <7 
-  iron studies on low/low normal side, will give Venofer 300mg IV x 2 
- getting procrit as outpt, last dose 20K on 3/29,  retacrit given 4/7 
  
*) Hyponatremia: 
- Dr Caitlin Wang following - Likely SIADH vs hctz, restricting water intake. - Na 120 today 
  
*) A Fib: 
- holding Eliquis given fall and head strike, cardiology consulted this admission 
  
*) LLE Wound: 
- wound care

## 2021-04-08 NOTE — PROGRESS NOTES
[facility] Progress Note NAME: Melanie Quiroz :  1927 MRN:  [id number] Date/Time: 2021 Risk of deterioration: low Assessment:    Plan: 
Acute hyponatremia--likely due to SIADH + hctz (L) le wound Hypokalemia Hx of breast ca Ovarian cancer-Jonathan ANemia Holding hctz Repeat sodium 120 with k of 5.3 after repletion Checked thyroid, cortisol levels, iron studies Iron replacement per heme Tolvaptan to start today 
 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 
 
Appreciate DR English seeing pt--pt with ongoing treatment for ovarian cancer. Subjective: Chief Complaint:  I'm feeling better today Review of Systems: no n/v/cp/sob/f/c Objective: VITALS:  
Last 24hrs VS reviewed since prior progress note. Most recent are: 
Visit Vitals BP (!) 119/54 Pulse 82 Temp 98.2 °F (36.8 °C) Resp 16 Ht 5' 4\" (1.626 m) Wt 67.6 kg (149 lb) SpO2 100% BMI 25.58 kg/m² SpO2 Readings from Last 6 Encounters:  
21 100% 21 98% 21 98% 21 97% 20 94% 10/15/20 94% O2 Flow Rate (L/min): 2 l/min Intake/Output Summary (Last 24 hours) at 2021 1437 Last data filed at 2021 3139 Gross per 24 hour Intake 721 ml Output 500 ml Net 221 ml Telemetry Reviewed PHYSICAL EXAM: 
 
General   well developed, well nourished, appears stated age, in no acute distress EENT  Normocephalic, Atraumatic, EOMI Respiratory   Clear To Auscultation bilaterally. (R) port Cardiology  Regular Rate and Rythmn Abdominal  Soft, non-tender, non-distended, positive bowel sounds, no hepatosplenomegaly Extremities  No edema on (R) 
(LLE) wrapped Lab Data Reviewed: (see below) Medications Reviewed: (see below) PMH/SH reviewed - no change compared to H&P 
 
___________________________________________________ Attending Physician: Joel Pino MD  
 ____________________________________________________ MEDICATIONS: 
Current Facility-Administered Medications Medication Dose Route Frequency  tolvaptan (SAMSCA) tablet 15 mg  15 mg Oral DAILY  iron sucrose (VENOFER) 300 mg in 0.9% sodium chloride 250 mL IVPB  300 mg IntraVENous Q24H  
 epoetin pablo-epbx (RETACRIT) injection 8,000 Units  8,000 Units SubCUTAneous Q MON, WED & FRI  metoprolol succinate (TOPROL-XL) XL tablet 12.5 mg  12.5 mg Oral DAILY  latanoprost (XALATAN) 0.005 % ophthalmic solution 1 Drop  1 Drop Both Eyes QHS  arformoterol 15 mcg/budesonide 0.5 mg neb solution   Nebulization BID  sodium chloride (NS) flush 5-10 mL  5-10 mL IntraVENous PRN  
 sodium chloride (NS) flush 5-40 mL  5-40 mL IntraVENous Q8H  
 sodium chloride (NS) flush 5-40 mL  5-40 mL IntraVENous PRN  
 acetaminophen (TYLENOL) tablet 650 mg  650 mg Oral Q6H PRN Or  
 acetaminophen (TYLENOL) suppository 650 mg  650 mg Rectal Q6H PRN  polyethylene glycol (MIRALAX) packet 17 g  17 g Oral DAILY PRN  
 ondansetron (ZOFRAN) injection 4 mg  4 mg IntraVENous Q6H PRN  
 [Held by provider] apixaban (ELIQUIS) tablet 5 mg  5 mg Oral BID  melatonin tablet 4.5 mg  4.5 mg Oral QHS  rosuvastatin (CRESTOR) tablet 20 mg  20 mg Oral QHS  timolol (TIMOPTIC) 0.5 % ophthalmic solution 1 Drop  1 Drop Both Eyes DAILY  oxyCODONE IR (ROXICODONE) tablet 5 mg  5 mg Oral Q4H PRN  
 morphine injection 2 mg  2 mg IntraVENous Q2H PRN  
 aspirin delayed-release tablet 81 mg  81 mg Oral DAILY  
  
 
LABS: 
Recent Labs 04/08/21 
3308 04/07/21 
4733 WBC 9.2 9.3 HGB 7.0* 7.3* HCT 22.8* 23.3*  
 263 Recent Labs 04/08/21 
0312 04/07/21 
1023 04/07/21 
0346 04/06/21 
1020 04/06/21 
1020 * 122* 120*   < > 122*  
K 5.3* 5.3* 5.1   < > 3.3*  
CL 89* 88* 89*   < > 90* CO2 23 23 22   < > 22 BUN 31* 29* 27*   < > 23* CREA 1.18* 1.32* 1.10*   < > 0.97 * 159* 134*   < > 115* CA 8.2* 8.2* 8.4*   < > 8.1*  
MG 1.9  --  1.9  --   --   
PHOS 3.7  --  4.5  --   --   
URICA 6.6*  --   --   --  5.2  
 < > = values in this interval not displayed. Recent Labs 04/08/21 
7106 04/07/21 
0843 04/06/21 
2429 ALT 38 37 30  114 118* TBILI 1.0 1.0 0.9 TP 6.3* 6.5 6.5 ALB 2.7* 2.9* 2.8*  
GLOB 3.6 3.6 3.7 Recent Labs 04/06/21 
9002 INR 1.5* PTP 15.8* APTT 37.6* Recent Labs 04/07/21 
1023 TIBC 365 PSAT 5*  
FERR 65 No results for input(s): PH, PCO2, PO2 in the last 72 hours. Recent Labs 04/06/21 
7331 TROIQ 0.05* Lab Results Component Value Date/Time  Glucose (POC) 102 (H) 11/23/2020 01:15 PM  
 Glucose  04/16/2013 03:58 PM

## 2021-04-08 NOTE — PROGRESS NOTES
Bedside shift change report given to Eun Lopez RN (oncoming nurse) by Cristiane Charles RN (offgoing nurse). Report included the following information SBAR, Kardex, Procedure Summary, Intake/Output, MAR, Accordion, Recent Results and Cardiac Rhythm afib.

## 2021-04-09 NOTE — PROGRESS NOTES
[facility] Progress Note NAME: Matt Rader :  1927 MRN:  [id number] Date/Time: 2021 Risk of deterioration: low Assessment:    Plan: 
Acute hyponatremia--likely due to SIADH + hctz (L) le wound Hypokalemia Hx of breast ca Ovarian cancer-Vinson ANemia Holding hctz, would not resume on dc Sodium slowly rising --up to 130 today Shouldn't need more tolvaptan Mentation is clear Will follow labs over weekend Subjective: Chief Complaint:  I'm feeling better today Review of Systems: no n/v/cp/sob/f/c Objective: VITALS:  
Last 24hrs VS reviewed since prior progress note. Most recent are: 
Visit Vitals BP (!) 115/50 (BP Patient Position: At rest) Pulse 81 Temp 97.7 °F (36.5 °C) Resp 16 Ht 5' 4\" (1.626 m) Wt 67.6 kg (149 lb) SpO2 98% BMI 25.58 kg/m² SpO2 Readings from Last 6 Encounters:  
21 98% 21 98% 21 98% 21 97% 20 94% 10/15/20 94% O2 Flow Rate (L/min): 2 l/min Intake/Output Summary (Last 24 hours) at 2021 1157 Last data filed at 2021 2323 Gross per 24 hour Intake 581 ml Output 1625 ml Net -1044 ml Telemetry Reviewed PHYSICAL EXAM: 
 
General   well developed, well nourished, appears stated age, in no acute distress EENT  Normocephalic, Atraumatic, EOMI Respiratory   Clear To Auscultation bilaterally. (R) port Cardiology  Regular Rate and Rythmn Abdominal  Soft, non-tender, non-distended, positive bowel sounds, no hepatosplenomegaly Extremities  No edema on (R) 
(LLE) wrapped Lab Data Reviewed: (see below) Medications Reviewed: (see below) PMH/SH reviewed - no change compared to H&P 
 
___________________________________________________ Attending Physician: José Luis Roman MD  
 
____________________________________________________ MEDICATIONS: 
Current Facility-Administered Medications Medication Dose Route Frequency  [START ON 4/10/2021] tolvaptan (SAMSCA) tablet 15 mg  15 mg Oral ONCE  
 nitrofurantoin (macrocrystal-monohydrate) (MACROBID) capsule 100 mg  100 mg Oral BID  iron sucrose (VENOFER) 300 mg in 0.9% sodium chloride 250 mL IVPB  300 mg IntraVENous Q24H  
 epoetin pablo-epbx (RETACRIT) injection 8,000 Units  8,000 Units SubCUTAneous Q MON, WED & FRI  metoprolol succinate (TOPROL-XL) XL tablet 12.5 mg  12.5 mg Oral DAILY  latanoprost (XALATAN) 0.005 % ophthalmic solution 1 Drop  1 Drop Both Eyes QHS  arformoterol 15 mcg/budesonide 0.5 mg neb solution   Nebulization BID  sodium chloride (NS) flush 5-10 mL  5-10 mL IntraVENous PRN  
 sodium chloride (NS) flush 5-40 mL  5-40 mL IntraVENous Q8H  
 sodium chloride (NS) flush 5-40 mL  5-40 mL IntraVENous PRN  
 acetaminophen (TYLENOL) tablet 650 mg  650 mg Oral Q6H PRN Or  
 acetaminophen (TYLENOL) suppository 650 mg  650 mg Rectal Q6H PRN  polyethylene glycol (MIRALAX) packet 17 g  17 g Oral DAILY PRN  
 ondansetron (ZOFRAN) injection 4 mg  4 mg IntraVENous Q6H PRN  
 [Held by provider] apixaban (ELIQUIS) tablet 5 mg  5 mg Oral BID  melatonin tablet 4.5 mg  4.5 mg Oral QHS  rosuvastatin (CRESTOR) tablet 20 mg  20 mg Oral QHS  timolol (TIMOPTIC) 0.5 % ophthalmic solution 1 Drop  1 Drop Both Eyes DAILY  oxyCODONE IR (ROXICODONE) tablet 5 mg  5 mg Oral Q4H PRN  
 morphine injection 2 mg  2 mg IntraVENous Q2H PRN  
 aspirin delayed-release tablet 81 mg  81 mg Oral DAILY  
  
 
LABS: 
Recent Labs 04/09/21 
8212 04/08/21 
2196 WBC 9.9 9.2 HGB 7.6* 7.0*  
HCT 24.8* 22.8*  
 252 Recent Labs 04/09/21 
1095 04/09/21 
3314 04/08/21 
2220 04/08/21 
0878 04/08/21 
2279 04/07/21 
1023 04/07/21 
4348 * 129*  128* 125*   < > 120* 122* 120* K  --  4.8  --   --  5.3* 5.3* 5.1 CL  --  96*  --   --  89* 88* 89* CO2  --  27  --   --  23 23 22 BUN  --  25*  --   --  31* 29* 27* CREA  --  1.01  --   --  1.18* 1.32* 1.10* GLU  --  109*  --   --  128* 159* 134* CA  --  8.9  --   --  8.2* 8.2* 8.4* MG  --   --   --   --  1.9  --  1.9 PHOS  --   --   --   --  3.7  --  4.5 URICA  --   --   --   --  6.6*  --   --   
 < > = values in this interval not displayed. Recent Labs 04/09/21 
4820 04/08/21 
1257 04/07/21 
8364 ALT 38 38 37  110 114 TBILI 0.9 1.0 1.0 TP 6.4 6.3* 6.5 ALB 2.8* 2.7* 2.9*  
GLOB 3.6 3.6 3.6 No results for input(s): INR, PTP, APTT, INREXT, INREXT in the last 72 hours. Recent Labs 04/07/21 
1023 TIBC 365 PSAT 5*  
FERR 65 No results for input(s): PH, PCO2, PO2 in the last 72 hours. No results for input(s): CPK, CKNDX, TROIQ in the last 72 hours. No lab exists for component: CPKMB Lab Results Component Value Date/Time  Glucose (POC) 102 (H) 11/23/2020 01:15 PM  
 Glucose  04/16/2013 03:58 PM

## 2021-04-09 NOTE — PROGRESS NOTES
-Hematology / Oncology ([facility]) - 
-Primary Oncologist- Dr. Reynaldo Elkins 
-CC-\" I feel better\" 
 
-S-  No new complaints. -O-  
  
Patient Vitals for the past 24 hrs: 
 Temp Pulse Resp BP SpO2  
04/09/21 1114 97.7 °F (36.5 °C) 81 16 (!) 115/50 98 % 04/09/21 0817 97.6 °F (36.4 °C) 89 16 (!) 146/36 98 % 04/09/21 0343 98 °F (36.7 °C) 86 16 116/60 97 % 04/08/21 2320 97.8 °F (36.6 °C) 80 16 (!) 116/50 95 % 04/08/21 1943 98 °F (36.7 °C) 80 16 117/60 99 % 04/08/21 1555 98.1 °F (36.7 °C) 76 16 (!) 121/48 99 % No intake/output data recorded. Review of Systems: 
Constitutional No fevers, chills, night sweats,+ excessive fatigue No weight loss. Allergic/Immunologic No recent allergic reactions Eyes No significant visual difficulties. No diplopia. ENMT No problems with hearing, no sore throat, no sinus drainage. Endocrine No hot flashes or night sweats. No cold intolerance, polyuria, or polydipsia Hematologic/Lymphatic No easy bruising or bleeding. The patient denies any tender or palpable lymph nodes Breasts No abnormal masses of breast, nipple discharge or pain. Respiratory No dyspnea on exertion, orthopnea, chest pain, cough or hemoptysis. Cardiovascular No anginal chest pain, irregular heart beat, tachycardia, palpitations or orthopnea. Gastrointestinal No nausea, vomiting, diarrhea, constipation, cramping, dysphagia, reflux, heartburn, GI bleeding, or early satiety. No change in bowel habits. Genitourinary (M) No hematuria, dysuria, increased frequency, urgency, hesitancy or incontinence. Musculoskeletal No joint pain, swelling or redness. No decreased range of motion. Integumentary No chronic rashes, inflammation, ulcerations, pruritus, petechiae, purpura, ecchymoses, or skin changes. Neurologic No headache, blurred vision, and no areas of focal weakness or numbness. Normal gait. No sensory problems. Psychiatric No insomnia, depression, skylar or mood swings. No psychotropic drugs.   
  
Physical Exam: 
Constitutional Elderly appearing female in NAD. Alert, cooperative, oriented. Mood and affect appropriate. Appears close to chronological age. Well nourished. Well developed. Head Normocephalic; no scars Eyes Conjunctivae and sclerae are clear and without icterus. Pupils are reactive and equal.  
ENMT Sinuses are nontender. No oral exudates, ulcers, masses, thrush or mucositis. Oropharynx clear. Tongue normal.  
Neck Supple without masses or thyromegaly. No jugular venous distension. Hematologic/Lymphatic No petechiae or purpura. No tender or palpable lymph nodes in the cervical, supraclavicular, axillary or inguinal area. Respiratory Lungs are clear to auscultation without rhonchi or wheezing. Cardiovascular KERRIE IV/VII in RUSB, irregularly irregular rate Chest / Line Site Chest is symmetric with no chest wall deformities. Abdomen Non-tender, non-distended, no masses, ascites or hepatosplenomegaly. Good bowel sounds. No guarding or rebound tenderness. No pulsatile masses. Musculoskeletal No tenderness or swelling, normal range of motion without obvious weakness. Extremities Bandage of LLE Skin No rashes, scars, or lesions suggestive of malignancy. No petechiae, purpura, or ecchymoses. No excoriations. Neurologic No sensory or motor deficits, normal cerebellar function, normal gait, cranial nerves intact. Psychiatric Alert and oriented times three. Coherent speech. Verbalizes understanding of our discussions today.  
[place] 13 04/09/21 
6538 04/09/21 
6368 04/08/21 
2220 04/08/21 
1457 04/08/21 
9794 04/07/21 
1023 04/07/21 
5208 WBC  --  9.9  --   --  9.2  --  9.3 HGB  --  7.6*  --   --  7.0*  --  7.3*  
PLT  --  225  --   --  252  --  263 ANEU  --   --   --   --  7.7  --  6.3 * 129*  128* 125* 124* 120* 122* 120* K  --  4.8  --   --  5.3* 5.3* 5.1 GLU  --  109*  --   --  128* 159* 134* BUN  --  25*  --   --  31* 29* 27* CREA  --  1.01  --   --  1.18* 1.32* 1.10* ALT  --  38  --   --  38  --  37  
TBILI  --  0.9  --   --  1.0  --  1.0 AP  --  111  --   --  110  --  114 CA  --  8.9  --   --  8.2* 8.2* 8.4* MG  --   --   --   --  1.9  --  1.9 PHOS  --   --   --   --  3.7  --  4.5  
 
 
-Imaging-  
4/6/21 chest xray IMPRESSION Cardiomegaly with mild bilateral subsegmental atelectasis.   
4/8/21 CT A/P: 
1. No acute findings. 2. Incidentals as above. 
 
-Assessment + Plan-    
*) Stage IV Ovarian Cancer, peritoneal carcinomatosis: 
- Diagnosed in January 2021 with Dr. Lucila Brower, also follows with Dr. Radha Muñoz given pmh of breast cancer. 
- Receiving palliative single agnet Carboplatin AUC 5 q 28 days, C2D1 3/11, today is C2D28 today and was due to chemo  Today 4/8 but being held currently given inpatient status.   
*) chemo induced anemia: 
-hgb 7.6 today, transfuse for hgb <7 
-  iron studies on low/low normal side, will give Venofer 300mg IV x 2 
- getting procrit as outpt, last dose 20K on 3/29,  retacrit started here 4/7 
  
*) Hyponatremia: 
- Dr Cherrie Godfrey following - Likely SIADH vs hctz, restricting water intake. - Na imroving and 130 today.   
*) A Fib: 
- holding Eliquis given fall and head strike, cardiology consulted this admission 
  
*) LLE Wound: 
- wound care Please call with questions over the weekend, otherwise service will pick back up on Monday.

## 2021-04-09 NOTE — PROGRESS NOTES
Problem: Mobility Impaired (Adult and Pediatric) Goal: *Acute Goals and Plan of Care (Insert Text) Description: FUNCTIONAL STATUS PRIOR TO ADMISSION: Patient was modified independent using a rolling walker and wheelchair for functional mobility. HOME SUPPORT PRIOR TO ADMISSION: The patient lived alone with niece to provide assistance. Physical Therapy Goals Initiated 4/7/2021 1. Patient will move from supine to sit and sit to supine  in bed with moderate assistance  within 7 day(s). 2.  Patient will transfer from bed to chair and chair to bed with moderate assistance  using the least restrictive device within 7 day(s). 3.  Patient will perform sit to stand with minimal assistance/contact guard assist within 7 day(s). 4.  Patient will ambulate with moderate assistance  for 5 feet with the least restrictive device within 7 day(s). Outcome: Progressing Towards Goal 
 PHYSICAL THERAPY TREATMENT Patient: René Flores (57 y.o. female) Date: 4/9/2021 Diagnosis: Hyponatremia [E87.1] Precautions: Fall Chart, physical therapy assessment, plan of care and goals were reviewed. ASSESSMENT: Patient continues with skilled PT services and is progressing towards goals, pt is very self limiting and dramatic, needs max encouragement for all mobility, did fair with both bed mob and transfers, able to partial weight bear on LLE, max vc's for safety and proper RW use. Current Level of Function Impacting Discharge (mobility/balance): mod assist x2 PLAN : Patient continues to benefit from skilled intervention to address the above impairments. Continue treatment per established plan of care 
to address goals. Recommendation for discharge: (in order for the patient to meet his/her long term goals) Therapy up to 5 days/week in SNF setting This discharge recommendation: Has not yet been discussed the attending provider and/or case management IF patient discharges home will need the following DME: rolling walker OBJECTIVE DATA SUMMARY:  
 
Critical Behavior: 
Neurologic State: Alert Orientation Level: Oriented X4 Cognition: Appropriate for age attention/concentration, Follows commands Safety/Judgement: Decreased insight into deficits, Fall prevention, Decreased awareness of environment Functional Mobility Training: 
Bed Mobility: 
Supine to Sit: Contact guard assistance;Assist x1;Additional time Scooting: Stand-by assistance; Additional time Level of Assistance: Stand-by assistance Interventions: Verbal cues Transfers: 
Sit to Stand: Moderate assistance;Assist x2; Additional time Stand to Sit: Minimum assistance;Assist x2 Bed to Chair: Moderate assistance;Assist x2; Additional time Interventions: Tactile cues; Verbal cues Level of Assistance: Moderate assistance;Assist x2; Additional time Balance: 
Sitting: Intact Sitting - Static: Good (unsupported) Sitting - Dynamic: Not tested Standing: Impaired; With support Standing - Static: Fair;Constant support Standing - Dynamic : Fair;Constant support Ambulation/Gait Training: unable at this time Pain Rating: unable to rate Activity Tolerance: Poor After treatment patient left in no apparent distress: Sitting in chair and Call bell within reach COMMUNICATION/COLLABORATION:  
The patients plan of care was discussed with: Registered nurse. Nithin Ibrahim PTA Time Calculation: 25 mins

## 2021-04-09 NOTE — PROGRESS NOTES
Transition of Care Plan: 
RUR: 36% Disposition: SNF- referrals sent to [facility] and [facility]- family to determine which facility is first choice COVID test: needed within 72 hours of d/c Follow up appointments: oncology, PCP  
DME needed: n/a Transportation at [address] or means to access home: n/a IM Medicare letter: to be provide prior to d/c Caregiver Contact: gamal Gonsalez [employer] [phone] Discharge Caregiver contacted prior to discharge? yes 3:30pm 
CM contacted patient's niece, [employer] [phone]. Niluis confirmed that she and her sister did the tour at [employer] today and [facility] that they are going to review this evening. CM provided weekend phone number for niluis to contact tomorrow to provide first choice. [facility] has accepted patient. [facility] referral is still pending. Josselin Solomon, [phone] \[facility] [place] [facility]\""

## 2021-04-09 NOTE — PROGRESS NOTES
Bedside and Verbal shift change report given to Maria R Espana RN (oncoming nurse) by Maria Isabel Baker student nurse and Uvaldo Barney RN (offgoing nurse). Report included the following information SBAR, Kardex, ED Summary, Intake/Output and MAR.

## 2021-04-09 NOTE — PROGRESS NOTES
Hospitalist Progress Note NAME: Obie Rodriguez :  1927 MRN:  [id number] Admit date: 2021 Today's date: 21 PCP: MD Michelle Combs M.D. 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 
-Appears clinically euvolemic 
-urine Na 77, urine osm 330 looks C/w SIADH 
-Hold HCTZ 
-Nephrology consult appreciated - Po fluid restrict 
- lasix - Cortisol 37.8, TSH 2.19 
- S/p tolvaptan, Na rising, serial labs 
  
LLE leg wound with weeping POA ? LE venous insufficiency POA Moderate Pulmonary HTN with reduced RV function POA 
- s/p recent fall, laceration repair in the ED 
- wound care consult - Bilateral lower extremity weeping, no erythema. No fever or leukocytosis 
- PRN lasix - Bilateral LE Dopplers 3/31/2021 with no DVT, + venous insufficiency - Cardiology and renal following 
- keeps legs up as much as possible 
- intraabdominal CT without masses or MICHAEL 
  
Permanent Atrial fib POA 
- Holding eliquis with fall risk per cards - Will reconsider use if improves as outpatientn. - cont bystolic  
  
Ovarian Cancer Remote Hx of breast CA 
-on monthly chemo regimen 
-c/o loss of taste/appetite 
-Oncology following 
  
Essential HTN POA 
- cont bystolic 
- stop HCTZ as above, given hyponatremia 
  
Hyperlipidemia POA 
-cont statin Overweight POA Body mass index is 25.58 kg/m². 
  
Code Status: Full, will address further with patient Surrogate Decision Maker: 
  
DVT Prophylaxis: eliquis for now GI Prophylaxis: not indicated Subjective: Chief Complaint / Reason for Physician Visit f/u hyponatremia \"I feel SOB\". Discussed with RN events overnight. Winded with walking, legs remain tender, jodie left Na improving on tolvaptan Sees pulmonary, ALLAN present x months Review of Systems: 
Symptom Y/N Comments  Symptom Y/N Comments Fever/Chills n   Chest Pain n   
Poor Appetite    Edema Cough n   Abdominal Pain n   
Sputum    Joint Pain SOB/ALLAN n   Headache Nausea/vomit n   Tolerating PT/OT Diarrhea n   Tolerating Diet y Constipation    Other Could NOT obtain due to:   
 
Objective: VITALS:  
Last 24hrs VS reviewed since prior progress note. Most recent are: 
Patient Vitals for the past 24 hrs: 
 Temp Pulse Resp BP SpO2  
04/09/21 1114 97.7 °F (36.5 °C) 81 16 (!) 115/50 98 % 04/09/21 0817 97.6 °F (36.4 °C) 89 16 (!) 146/36 98 % 04/09/21 0343 98 °F (36.7 °C) 86 16 116/60 97 % 04/08/21 2320 97.8 °F (36.6 °C) 80 16 (!) 116/50 95 % 04/08/21 1943 98 °F (36.7 °C) 80 16 117/60 99 % 04/08/21 1555 98.1 °F (36.7 °C) 76 16 (!) 121/48 99 % Intake/Output Summary (Last 24 hours) at 4/9/2021 1320 Last data filed at 4/8/2021 2323 Gross per 24 hour Intake 360 ml Output 1625 ml Net -1265 ml Wt Readings from Last 12 Encounters:  
04/08/21 67.6 kg (149 lb)  
03/30/21 67.1 kg (148 lb)  
03/22/21 67.3 kg (148 lb 6.4 oz) 02/08/21 74.5 kg (164 lb 3.2 oz)  
12/22/20 69.4 kg (153 lb)  
11/20/20 70.8 kg (156 lb)  
11/20/20 70.3 kg (155 lb) 10/15/20 70.5 kg (155 lb 8 oz) 02/24/20 65.8 kg (145 lb) 12/20/19 65.8 kg (145 lb) 12/12/19 65.8 kg (145 lb 1 oz) 12/11/19 66.7 kg (147 lb) PHYSICAL EXAM: 
 
I had a face to face encounter and independently examined this patient on 04/09/21 as outlined below: 
 
General: WD, WN. Alert, cooperative, no acute distress EENT:  PERRL. Anicteric sclerae. MMM Resp:  CTA bilaterally, no wheezing or rales. No accessory muscle use CV:  Regular  rhythm,  No edema GI:  Soft, Non distended, Non tender. +Bowel sounds, no rebound Neurologic:  Alert and oriented X 3, normal speech, non focal motor exam 
Psych:   Good insight. Not anxious nor agitated Skin:  No rashes. No jaundice Reviewed most current lab test results and cultures  YES Reviewed most current radiology test results   YES Review and summation of old records today    NO Reviewed patient's current orders and MAR    YES 
PMH/SH reviewed - no change compared to H&P 
________________________________________________________________________ Care Plan discussed with: 
  Comments Patient x Family RN x Care Manager Consultant Multidiciplinary team rounds were held today with , nursing, pharmacist and clinical coordinator. Patient's plan of care was discussed; medications were reviewed and discharge planning was addressed. ________________________________________________________________________ Comments >50% of visit spent in counseling and coordination of care    
________________________________________________________________________ Dixie Márquez MD  
 
Procedures: see electronic medical records for all procedures/Xrays and details which were not copied into this note but were reviewed prior to creation of Plan. LABS: 
I reviewed today's most current labs and imaging studies. Pertinent labs include: 
Recent Labs 04/09/21 
4523 04/08/21 
5242 04/07/21 
7864 WBC 9.9 9.2 9.3 HGB 7.6* 7.0* 7.3* HCT 24.8* 22.8* 23.3*  
 252 263 Recent Labs 04/09/21 
3681 04/09/21 
3492 04/08/21 
2220 04/08/21 
8077 04/08/21 
9520 04/07/21 
1023 04/07/21 
3713 * 129*  128* 125*   < > 120* 122* 120* K  --  4.8  --   --  5.3* 5.3* 5.1 CL  --  96*  --   --  89* 88* 89* CO2  --  27  --   --  23 23 22 GLU  --  109*  --   --  128* 159* 134* BUN  --  25*  --   --  31* 29* 27* CREA  --  1.01  --   --  1.18* 1.32* 1.10* CA  --  8.9  --   --  8.2* 8.2* 8.4* MG  --   --   --   --  1.9  --  1.9 PHOS  --   --   --   --  3.7  --  4.5 ALB  --  2.8*  --   --  2.7*  --  2.9* TBILI  --  0.9  --   --  1.0  --  1.0 ALT  --  38  --   --  38  --  37  
 < > = values in this interval not displayed.

## 2021-04-09 NOTE — PROGRESS NOTES
Physician Progress Note Olaf Coy 
CSN #:                  [id number] :                       1927 ADMIT DATE:       2021 2:19 AM 
DISCH DATE: 
RESPONDING 
PROVIDER #:        Servando Stafford MD 
 
 
 
 
QUERY TEXT: 
 
Pt admitted with hyponatremia Pt noted to have abnormal urine analysis. If possible, please document in the progress notes and discharge summary if you are evaluating and/or treating any of the following: The medical record reflects the following: 
Risk Factors: 81 y/o female to ED with leg pain, found to have hyponatremia, left lower extremity leg wound. to Note abnormal urine analysis on chart;  urine culture + E-Coli Final 
 
Clinical Indicators: 
2021 UA 09:37 Color: YELLOW/STRAW Appearance: CLOUDY (A) Blood: MODERATE (A) Nitrites: Positive (A) Leukocyte Esterase: MODERATE (A) Bacteria: 1+ (A) 
 
21 Urine Culture result: ESCHERICHIA COLI   Final 
 
Treatment: admit, hospitalist consult, Urine analysis, urine culture Options provided: 
-- Urinary Tract Infection (UTI) 
-- Bacteriuria 
-- Urinary Tract Infection (UTI) ruled out 
-- Other - I will add my own diagnosis -- Disagree - Not applicable / Not valid -- Disagree - Clinically unable to determine / Unknown 
-- Refer to Clinical Documentation Reviewer PROVIDER RESPONSE TEXT: 
 
This patient has a UTI. Query created by: Suellen Frank on 2021 11:50 AM 
 
 
QUERY TEXT: 
 
Pt admitted with Hyponatremia. Pt noted to have SIADH. If possible, please document in progress notes and discharge summary the relationship, if any, between Hyponatremia and SIADH. The medical record reflects the following: 
Risk Factors: 81 y/o female to ED with leg pain, found to have hyponatremia (119 on admit), SIADH VS HCTZ effect Clinical Indicators: 
21MD PN: Hyponatremia Na 119 POA Probable SIADH vs HCTZ effect POA 
-urine Na 77, urine osm 330 looks C/w SIADH 
-Cortisol 37.8, TSH 2.19 
4/8/21 Nephrology consult: Acute hyponatremia--likely due to SIADH + hctz 
4/6/2021 09:37 Sodium,urine random: 77; Potassium urine, random: 28; Chloride,urine random: 96; Osmolality,urine: 330 
4/7/2021 10:23; Cortisol, random: 37. 8;TSH: 2.19 
4/6/2021 03:31 Sodium: 119 (LL) 
4/6/2021 10:20 Sodium: 122 (L) 
4/8/2021 03:12 Sodium: 120 (L) Treatment: admit, hospitalist consult, Nephrology consult, Labs - urine chemistry, Cortisol, TSH,serial labs,  hold HCTZ, PO fluid restriction, Lasix, Options provided: 
-- Hyponatremia due to SIADH 
-- Hyponatremia unrelated to SIADH 
-- Hyponatremia due to SIADH and HCTZ effect 
-- Other - I will add my own diagnosis -- Disagree - Not applicable / Not valid -- Disagree - Clinically unable to determine / Unknown 
-- Refer to Clinical Documentation Reviewer PROVIDER RESPONSE TEXT: 
 
This patient has Hyponatremia due to SIADH and HCTZ effect.  
 
Query created by: Darell Jang on 4/9/2021 8:17 AM 
 
 
Electronically signed by:  Ana Luisa Muro MD 4/9/2021 1:21 PM

## 2021-04-09 NOTE — PROGRESS NOTES
Problem: Self Care Deficits Care Plan (Adult) Goal: *Acute Goals and Plan of Care (Insert Text) Description: FUNCTIONAL STATUS PRIOR TO ADMISSION: Patient was modified independent using a rolling walker for functional mobility. Patient reports for past week, has largely been using a wheelchair, completing stand-pivot transfers and ADLs independently. HOME SUPPORT: The patient lived alone with niece assisting with transportation. Per patient, niece would be at patient's home for several weeks at a time before returning to [place]. Occupational Therapy Goals Initiated 4/7/2021 1. Patient will perform lower body dressing using compensatory strategies and/or AE at moderate assistance level within 7 days. 2. Patient will perform toilet transfers at minimal assistance level using RW prn within 7 days. 3. Patient will perform all aspects of toileting at moderate assistance level within 7 days. 4. Patient will tolerate 1 standing grooming task with RW and CGA for balance within 7 days. 5. Patient will perform seated self-feeding or grooming with supervision/set-up and no LOB within 7 days. Outcome: Not Progressing Towards Goal 
 OCCUPATIONAL THERAPY TREATMENT Patient: Leyla Davila (4 y.o. female) Date: 4/9/2021 Diagnosis: Hyponatremia [E87.1] <principal problem not specified> Precautions: Fall Chart, occupational therapy assessment, plan of care, and goals were reviewed. ASSESSMENT Patient continues with skilled OT services and is not progressing towards goals. Pt declined bed mobility and sitting/standing at EOB this date. She was agreeable to performing bed level exercises to increase strength for bed mobility and adls. She was able to tolerate up to 5 repetitions of each exercise-frequent rest breaks provided.    Pt was able to perform grooming at bed level with set up, but needed assistance to find dropped objects (hearing aids, hair pins, comb) in her sheets due to impaired vision. Pt reports that she has macular degeneration at baseline that has worsened due to chemotherapy treatments. Overall, pt demonstrates poor tolerance for adls and mobility. She will need 24 hour care at discharge. Current Level of Function Impacting Discharge (ADLs): supervision with exercises and simple grooming Other factors to consider for discharge: complex medical condition, stage 4 Ovarian CA dx, due to start chemotherapy PLAN : 
Patient continues to benefit from skilled intervention to address the above impairments. Continue treatment per established plan of care to address goals. Recommend with staff: encourage pt to participate in bed level exercises and encourage OOB to chair or upright in chair position at bed level. Recommend next OT session: functional mobility, participation in self care Recommendation for discharge: (in order for the patient to meet his/her long term goals) To be determined: will need snf--24 hour care This discharge recommendation: 
Has not yet been discussed the attending provider and/or case management IF patient discharges home will need the following DME: tbd SUBJECTIVE:  
Patient stated I'm NOT getting OOB.  OBJECTIVE DATA SUMMARY:  
Cognitive/Behavioral Status: 
Neurologic State: Alert Orientation Level: Oriented to person;Oriented to place Cognition: Appropriate for age attention/concentration; Follows commands Perception: (impaired--pt has low vision at baseline due to mac degen.) Perseveration: No perseveration noted(pt is talkative) Safety/Judgement: Decreased awareness of environment Functional Mobility and Transfers for ADLs: 
Bed Mobility: 
Rolling: (declined to participate in bed level mobility) Transfers:   declined Balance: 
Sitting: (pt refused to sit EOB. tolerated chair position at bed level) Sitting - Static: Good (unsupported) Sitting - Dynamic: Not tested Standing: (declined to sit or stand at EOB) ADL Intervention: 
  
 
Grooming Grooming Assistance: (set up, needs assist to find dropped items due to poor vision) Position Performed: Long sitting on bed(in semi chair position) Brushing/Combing Hair: Set-up(able to doff/don hair ornaments and avoid scalp staple) Cues: (will need assist if objects drop due to poor vision) Toileting Bladder Hygiene: (pure wick at bed level) Cognitive Retraining Safety/Judgement: Decreased awareness of environment due to impaired vision Therapeutic Exercises: frequent rest breaks provided during and after each set prn. Bed level: semi upright and upright positions Trunk forward flex/ext. 10 reps. Encouraged participation holding onto bed rails outside of tx session. Trunk rotation with functional reach:  5 reps R and L , 2 sets Shoulder protraction/retraction with and without mild resistance 5 reps R and L Biceps curls with and without mild resistance 5 reps/2 sets. Deep breathing exercise pre and post using BUE shoulder flexion and abduction--3 reps pre exercises as warm up and 3 reps post 
 
 
Pain: 
Complained of pain in LLE with movement--did not rate. Activity Tolerance:  
Poor and requires frequent rest breaks After treatment patient left in no apparent distress:  
Sitting in chair position at bed level, Call bell within reach, and Caregiver / family present COMMUNICATION/COLLABORATION:  
The patients plan of care was discussed with: Registered nurse. KAREN Gracia/L Time Calculation: 30 mins

## 2021-04-10 NOTE — PROGRESS NOTES
Contacted Dr. Rosi Barkley about last nights HGB of 6.8. He ordered a STAT CBC and type and screen. Both labs completed and sent to the lab. HGB resulted at 7.2.

## 2021-04-10 NOTE — PROGRESS NOTES
[facility] Progress Note NAME: Dot Moore :  1927 MRN:  [id number] Date/Time: 4/10/2021 Risk of deterioration: low Assessment:    Plan: 
Acute hyponatremia--likely due to SIADH + hctz (L) le wound Hypokalemia Hx of breast ca Ovarian cancer-[place] ANemia Holding hctz, would not resume on dc Sodium slowly rising --up to 130 today Poor po. Start salt tabs. Tighten fluid restriction - I discussed this at length with the patients. Tolvaptan as needed. Appears to be asymptomatic. Mentation is clear Subjective: Chief Complaint:  \"I feel OK. \"  No N/V. No dyspnea. No pain. Objective: VITALS:  
Last 24hrs VS reviewed since prior progress note. Most recent are: 
Visit Vitals BP (!) 114/42 (BP 1 Location: Right arm, BP Patient Position: At rest) Pulse 76 Temp 97.4 °F (36.3 °C) Resp 18 Ht 5' 4\" (1.626 m) Wt 67.6 kg (149 lb) SpO2 95% BMI 25.58 kg/m² SpO2 Readings from Last 6 Encounters:  
04/10/21 95% 21 98% 21 98% 21 97% 20 94% 10/15/20 94% O2 Flow Rate (L/min): 2 l/min Intake/Output Summary (Last 24 hours) at 4/10/2021 1400 Last data filed at 4/10/2021 0830 Gross per 24 hour Intake  Output 1200 ml Net -1200 ml Telemetry Reviewed PHYSICAL EXAM: 
 
General   well developed, well nourished, appears stated age, in no acute distress EENT  Normocephalic, Atraumatic, EOMI Respiratory   Clear To Auscultation bilaterally. (R) port Cardiology  Regular Rate and Rythmn Abdominal  Soft, non-tender, non-distended, positive bowel sounds, no hepatosplenomegaly Extremities  No edema on (R) 
(LLE) wrapped Lab Data Reviewed: (see below) Medications Reviewed: (see below) PMH/SH reviewed - no change compared to H&P 
 
___________________________________________________ Attending Physician: Michael Richardson MD  
 ____________________________________________________ MEDICATIONS: 
Current Facility-Administered Medications Medication Dose Route Frequency  sodium chloride soluble tablet 1,000 mg  1 g Oral TID  nitrofurantoin (macrocrystal-monohydrate) (MACROBID) capsule 100 mg  100 mg Oral BID  epoetin pablo-epbx (RETACRIT) injection 8,000 Units  8,000 Units SubCUTAneous Q MON, WED & FRI  metoprolol succinate (TOPROL-XL) XL tablet 12.5 mg  12.5 mg Oral DAILY  latanoprost (XALATAN) 0.005 % ophthalmic solution 1 Drop  1 Drop Both Eyes QHS  arformoterol 15 mcg/budesonide 0.5 mg neb solution   Nebulization BID  sodium chloride (NS) flush 5-10 mL  5-10 mL IntraVENous PRN  
 sodium chloride (NS) flush 5-40 mL  5-40 mL IntraVENous Q8H  
 sodium chloride (NS) flush 5-40 mL  5-40 mL IntraVENous PRN  
 acetaminophen (TYLENOL) tablet 650 mg  650 mg Oral Q6H PRN Or  
 acetaminophen (TYLENOL) suppository 650 mg  650 mg Rectal Q6H PRN  polyethylene glycol (MIRALAX) packet 17 g  17 g Oral DAILY PRN  
 ondansetron (ZOFRAN) injection 4 mg  4 mg IntraVENous Q6H PRN  
 [Held by provider] apixaban (ELIQUIS) tablet 5 mg  5 mg Oral BID  melatonin tablet 4.5 mg  4.5 mg Oral QHS  rosuvastatin (CRESTOR) tablet 20 mg  20 mg Oral QHS  timolol (TIMOPTIC) 0.5 % ophthalmic solution 1 Drop  1 Drop Both Eyes DAILY  oxyCODONE IR (ROXICODONE) tablet 5 mg  5 mg Oral Q4H PRN  
 morphine injection 2 mg  2 mg IntraVENous Q2H PRN  
 aspirin delayed-release tablet 81 mg  81 mg Oral DAILY  
  
 
LABS: 
Recent Labs 04/10/21 
3239 04/09/21 
8498 WBC 14.2* 9.9 HGB 6.8* 7.6* HCT 23.0* 24.8*  
 225 Recent Labs 04/10/21 
1100 04/10/21 
0455 04/09/21 
2328 04/09/21 
2947 04/09/21 
2322 04/08/21 
8058 04/08/21 
6932 * 128* 128*   < > 129*  128*   < > 120* K  --  4.2  --   --  4.8  --  5.3*  
CL  --  97  --   --  96*  --  89* CO2  --  25  --   --  27  --  23 BUN  --  20  --   --  25*  -- 31*  
CREA  --  0.95  --   --  1.01  --  1.18* GLU  --  126*  --   --  109*  --  128* CA  --  8.8  --   --  8.9  --  8.2* MG  --   --   --   --   --   --  1.9 PHOS  --   --   --   --   --   --  3.7 URICA  --   --   --   --   --   --  6.6*  
 < > = values in this interval not displayed. Recent Labs 04/10/21 
7520 04/09/21 
4174 04/08/21 
8434 ALT 30 38 38  111 110 TBILI 1.1* 0.9 1.0 TP 6.0* 6.4 6.3* ALB 2.6* 2.8* 2.7*  
GLOB 3.4 3.6 3.6 No results for input(s): INR, PTP, APTT, INREXT, INREXT in the last 72 hours. No results for input(s): FE, TIBC, PSAT, FERR in the last 72 hours. No results for input(s): PH, PCO2, PO2 in the last 72 hours. No results for input(s): CPK, CKNDX, TROIQ in the last 72 hours. No lab exists for component: CPKMB Lab Results Component Value Date/Time  Glucose (POC) 102 (H) 11/23/2020 01:15 PM  
 Glucose  04/16/2013 03:58 PM

## 2021-04-10 NOTE — PROGRESS NOTES
End of Shift Note Bedside shift change report given to CORY JEFFERS (oncoming nurse) by Yareil Torres (offgoing nurse). Report included the following information SBAR, Kardex, Intake/Output, MAR, Recent Results and Cardiac Rhythm A fib Shift worked:  Day Shift summary and any significant changes:  
  Patient's labs sent down and HGB went up doctor aware. Patient eating improved today still not much appetite. Family at bedside. Wound care completed in afternoon. Sodium pills started today. Concerns for physician to address:  Sodium levels and HGB Zone phone for oncoming shift:   [phone] Activity: 
Activity Level: Up with Assistance Number times ambulated in hallways past shift: 0 Number of times OOB to chair past shift: 0 Cardiac:  
Cardiac Monitoring: Yes     
Cardiac Rhythm: Atrial fibrillation Access:  
Current line(s): PIV Genitourinary:  
Urinary status: external catheter Respiratory:  
O2 Device: None (Room air) Chronic home O2 use?: NO Incentive spirometer at bedside: NO 
  
GI: 
Last Bowel Movement Date: 04/06/21 Current diet:  DIET ONE TIME MESSAGE 
DIET NUTRITIONAL SUPPLEMENTS Breakfast, Dinner; Nepro DIET CARDIAC Regular; FR 1000ML Passing flatus: YES Tolerating current diet: YES 
% Diet Eaten: 95 % Pain Management:  
Patient states pain is manageable on current regimen: YES Skin: 
Dick Score: 15 Interventions: float heels, increase time out of bed, limit briefs and internal/external urinary devices Patient Safety: 
Fall Score: Total Score: 4 Interventions: assistive device (walker, cane, etc), gripper socks, pt to call before getting OOB and stay with me (per policy) High Fall Risk: Yes Length of Stay: 
Expected LOS: 2d 14h Actual LOS: 4 Yariel Torres

## 2021-04-11 NOTE — PROGRESS NOTES
End of Shift Note Bedside shift change report given to Shirin Joy (oncoming nurse) by Maribel Ferreira RN (offgoing nurse). Report included the following information SBAR, Kardex, Procedure Summary, Intake/Output, MAR, Recent Results and Cardiac Rhythm AFib Shift worked:  [facility] Shift summary and any significant changes:  
 Voiding on bedpan  Cooperative to limit fluid intake Concerns for physician to address:   
  
Zone phone for oncoming shift:   [phone] Activity: 
Activity Level: Up with Assistance Number times ambulated in hallways past shift: 0 Number of times OOB to chair past shift: 0 Cardiac:  
Cardiac Monitoring: Yes     
Cardiac Rhythm: Atrial fibrillation Access:  
Current line(s): PIV Genitourinary:  
Urinary status: voiding Respiratory:  
O2 Device: None (Room air) Chronic home O2 use?: NO Incentive spirometer at bedside: N/A 
  
GI: 
Last Bowel Movement Date: 04/06/21 Current diet:  DIET ONE TIME MESSAGE 
DIET NUTRITIONAL SUPPLEMENTS Breakfast, Dinner; Nepro DIET CARDIAC Regular; FR 1000ML Passing flatus: YES Tolerating current diet: YES 
% Diet Eaten: 95 % Pain Management:  
Patient states pain is manageable on current regimen: YES Skin: 
Dick Score: 15 Interventions: float heels, PT/OT consult, limit briefs and internal/external urinary devices Patient Safety: 
Fall Score: Total Score: 4 Interventions: assistive device (walker, cane, etc), gripper socks, pt to call before getting OOB and stay with me (per policy) High Fall Risk: Yes Length of Stay: 
Expected LOS: 2d 14h Actual LOS: 5 Maribel Ferreira RN

## 2021-04-11 NOTE — PROGRESS NOTES
End of Shift Note Bedside shift change report given to CORY Carcamo (oncoming nurse) by Ale Jones (offgoing nurse). Report included the following information SBAR, Kardex, ED Summary, Intake/Output, MAR, Recent Results and Cardiac Rhythm A. Fib Shift worked:  Day Shift summary and any significant changes:  
  Patient doing well taking oxycodone for leg pain. Voiding. Sodium and HGB levels have improved. Patient waiting for family to pick rehab facility. Monitoring fluid intake and not giving water as instructed Concerns for physician to address:  discharge Zone phone for oncoming shift:   [phone] Activity: 
Activity Level: Up with Assistance Number times ambulated in hallways past shift: 0 Number of times OOB to chair past shift: 0 Cardiac:  
Cardiac Monitoring: Yes     
Cardiac Rhythm: Atrial fibrillation Access:  
Current line(s): PIV Genitourinary:  
Urinary status: voiding Respiratory:  
O2 Device: None (Room air) Chronic home O2 use?: NO Incentive spirometer at bedside: YES 
  
GI: 
Last Bowel Movement Date: 04/06/21 Current diet:  DIET ONE TIME MESSAGE 
DIET NUTRITIONAL SUPPLEMENTS Breakfast, Dinner; Nepro DIET CARDIAC Regular; FR 1000ML Passing flatus: YES Tolerating current diet: YES 
% Diet Eaten: 95 % Pain Management:  
Patient states pain is manageable on current regimen: YES Skin: 
Dick Score: 15 Interventions: float heels, increase time out of bed and limit briefs Patient Safety: 
Fall Score: Total Score: 4 Interventions: assistive device (walker, cane, etc), gripper socks, pt to call before getting OOB and stay with me (per policy) High Fall Risk: Yes Length of Stay: 
Expected LOS: 2d 14h Actual LOS: 5 Ale Jones

## 2021-04-11 NOTE — PROGRESS NOTES
Hospitalist Progress Note NAME: Vanessa Hunt :  1927 MRN:  [id number] Admit date: 2021 Today's date: 04/10/21 PCP: MD Narcisa Phelps M.D. 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 
-Appears clinically euvolemic 
-urine Na 77, urine osm 330 looks C/w SIADH 
-Hold HCTZ 
-Nephrology consult appreciated - Po fluid restrict 
- lasix - Cortisol 37.8, TSH 2.19 
- S/p tolvaptan, Na rising, serial labs 
  
LLE leg wound with weeping POA ? LE venous insufficiency POA Moderate Pulmonary HTN with reduced RV function POA 
- s/p recent fall, laceration repair in the ED 
- wound care consult - Bilateral lower extremity weeping, no erythema. No fever or leukocytosis 
- PRN lasix - Bilateral LE Dopplers 3/31/2021 with no DVT, + venous insufficiency - Cardiology and renal following 
- keeps legs up as much as possible 
- intraabdominal CT without masses or MICHAEL 
  
Permanent Atrial fib POA 
- Holding eliquis with fall risk per cards - Will reconsider use if improves as outpatientn. - cont bystolic  
  
Ovarian Cancer Remote Hx of breast CA 
-on monthly chemo regimen 
-c/o loss of taste/appetite 
-Oncology following 
  
Essential HTN POA 
- cont bystolic 
- stop HCTZ as above, given hyponatremia 
  
Hyperlipidemia POA 
-cont statin Overweight POA Body mass index is 25.58 kg/m². 
  
Code Status: Full, will address further with patient Surrogate Decision Maker: 
  
DVT Prophylaxis: eliquis for now GI Prophylaxis: not indicated Subjective: Chief Complaint / Reason for Physician Visit f/u hyponatremia \"I feel SOB\". Discussed with RN events overnight. Winded with walking, legs remain tender, jodie left Na improving on tolvaptan Sees pulmonary, ALLAN present x months Review of Systems: 
Symptom Y/N Comments  Symptom Y/N Comments Fever/Chills n   Chest Pain n   
Poor Appetite    Edema Cough n   Abdominal Pain n   
Sputum    Joint Pain SOB/ALLAN n   Headache Nausea/vomit n   Tolerating PT/OT Diarrhea n   Tolerating Diet y Constipation    Other Could NOT obtain due to:   
 
Objective: VITALS:  
Last 24hrs VS reviewed since prior progress note. Most recent are: 
Patient Vitals for the past 24 hrs: 
 Temp Pulse Resp BP SpO2  
04/10/21 2030 97.9 °F (36.6 °C) 60 18 (!) 138/47 94 % 04/10/21 1943     98 % 04/10/21 1617 97.7 °F (36.5 °C) 83 18 (!) 120/52 100 % 04/10/21 1205 97.4 °F (36.3 °C) 76 18 (!) 114/42 95 % 04/10/21 0837     99 % 04/10/21 0728 97.5 °F (36.4 °C) 72 18 (!) 128/55 99 % 04/10/21 0318 97.8 °F (36.6 °C) 73 16 (!) 124/45 93 % 04/09/21 2340 97.8 °F (36.6 °C) 83 16 (!) 116/45 94 % Intake/Output Summary (Last 24 hours) at 4/10/2021 2259 Last data filed at 4/10/2021 2238 Gross per 24 hour Intake 620 ml Output 1550 ml Net -930 ml Wt Readings from Last 12 Encounters:  
04/08/21 67.6 kg (149 lb)  
03/30/21 67.1 kg (148 lb)  
03/22/21 67.3 kg (148 lb 6.4 oz) 02/08/21 74.5 kg (164 lb 3.2 oz)  
12/22/20 69.4 kg (153 lb)  
11/20/20 70.8 kg (156 lb)  
11/20/20 70.3 kg (155 lb) 10/15/20 70.5 kg (155 lb 8 oz) 02/24/20 65.8 kg (145 lb) 12/20/19 65.8 kg (145 lb) 12/12/19 65.8 kg (145 lb 1 oz) 12/11/19 66.7 kg (147 lb) PHYSICAL EXAM: 
 
I had a face to face encounter and independently examined this patient on 04/10/21 as outlined below: 
 
General: WD, WN. Alert, cooperative, no acute distress EENT:  PERRL. Anicteric sclerae. MMM Resp:  CTA bilaterally, no wheezing or rales. No accessory muscle use CV:  Regular  rhythm,  No edema GI:  Soft, Non distended, Non tender. +Bowel sounds, no rebound Neurologic:  Alert and oriented X 3, normal speech, non focal motor exam 
Psych:   Good insight. Not anxious nor agitated Skin:  No rashes. No jaundice Reviewed most current lab test results and cultures  YES Reviewed most current radiology test results   YES Review and summation of old records today    NO Reviewed patient's current orders and MAR    YES 
PMH/SH reviewed - no change compared to H&P 
________________________________________________________________________ Care Plan discussed with: 
  Comments Patient x Family RN x Care Manager Consultant Multidiciplinary team rounds were held today with , nursing, pharmacist and clinical coordinator. Patient's plan of care was discussed; medications were reviewed and discharge planning was addressed. ________________________________________________________________________ Comments >50% of visit spent in counseling and coordination of care    
________________________________________________________________________ Reyes Borjas MD  
 
Procedures: see electronic medical records for all procedures/Xrays and details which were not copied into this note but were reviewed prior to creation of Plan. LABS: 
I reviewed today's most current labs and imaging studies. Pertinent labs include: 
Recent Labs 04/10/21 
1354 04/10/21 
0455 04/09/21 
6663 WBC 14.0* 14.2* 9.9 HGB 7.2* 6.8* 7.6* HCT 23.5* 23.0* 24.8*  
 218 225 Recent Labs 04/10/21 
1100 04/10/21 
0455 04/09/21 
2328 04/09/21 
0285 04/09/21 
9944 04/08/21 
6072 04/08/21 
7796 * 128* 128*   < > 129*  128*   < > 120* K  --  4.2  --   --  4.8  --  5.3*  
CL  --  97  --   --  96*  --  89* CO2  --  25  --   --  27  --  23  
GLU  --  126*  --   --  109*  --  128* BUN  --  20  --   --  25*  --  31* CREA  --  0.95  --   --  1.01  --  1.18* CA  --  8.8  --   --  8.9  --  8.2* MG  --   --   --   --   --   --  1.9 PHOS  --   --   --   --   --   --  3.7 ALB  --  2.6*  --   --  2.8*  --  2.7* TBILI  --  1.1*  --   --  0.9  --  1.0 ALT  --  30  --   --  38  --  38  
 < > = values in this interval not displayed.

## 2021-04-11 NOTE — PROGRESS NOTES
Hospitalist Progress Note NAME: Igor Torres :  1927 MRN:  [id number] Admit date: 2021 Discharge date: 21 PCP: Jeffrey Velazco MD 
 
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 
-Appears clinically euvolemic 
-urine Na 77, urine osm 330 looks C/w SIADH 
-stop HCTZ 
-Nephrology consult appreciated - Po fluid restrict 
- lasix - Cortisol 37.8, TSH 2.19 
- S/p tolvaptan, Na rising to 129-132, serial labs - Spoke with nieces  
- Discharge to SNF on PO fluid restriction and serial labs 
  
LLE leg wound with weeping POA ? LE venous insufficiency POA Moderate Pulmonary HTN with reduced RV function POA Moderate aortic stenosis POA LVEF 55 to 60% 
- s/p recent fall, laceration repair in the ED 
- wound care consult, continue would care - Bilateral lower extremity weeping, no erythema. No fever or leukocytosis 
- PRN lasix - Bilateral LE Dopplers 3/31/2021 with no DVT, + venous insufficiency - Cardiology and renal followed 
- keeps legs up as much as possible 
- intraabdominal CT without masses or MICHAEL 
- Suspect some RV dysfunction and venous insufficiency causing the edema and the weeping - Keep legs up as much as possible, PRN lasix 40 mg for edema 
  
Permanent Atrial fib POA 
- Holding eliquis with fall risk per cards, outpatient follow up to decide if safe to resume in future 
- cont toprol XL 
  
Ovarian Cancer Remote Hx of breast CA 
-on monthly chemo regimen 
-c/o loss of taste/appetite 
-Oncology following 
  
Essential HTN POA 
- toprol XL 
- stop HCTZ as above, given hyponatremia 
  
Hyperlipidemia POA 
-cont statin Overweight POA Body mass index is 25.58 kg/m². 
  
Code Status: Full Surrogate Decision Maker: 
  
DVT Prophylaxis: eliquis for now GI Prophylaxis: not indicated Subjective: Chief Complaint / Reason for Physician Visit f/u hyponatremia \"No complaints\".   Discussed with RN events overnight. Na improving, no new complaints Sees pulmonary, ALLAN present x months Review of Systems: 
Symptom Y/N Comments  Symptom Y/N Comments Fever/Chills n   Chest Pain n   
Poor Appetite    Edema Cough n   Abdominal Pain n   
Sputum    Joint Pain SOB/ALLAN n   Headache Nausea/vomit n   Tolerating PT/OT Diarrhea n   Tolerating Diet y Constipation    Other Could NOT obtain due to:   
 
Objective: VITALS:  
Last 24hrs VS reviewed since prior progress note. Most recent are: 
Patient Vitals for the past 24 hrs: 
 Temp Pulse Resp BP SpO2  
04/11/21 1608 97.5 °F (36.4 °C) 75 18 113/62 95 % 04/11/21 1108 97.6 °F (36.4 °C) 84 18 (!) 112/54 95 % 04/11/21 0753     99 % 04/11/21 0744 97.6 °F (36.4 °C) 94 18 128/72 99 % 04/11/21 0333 97.8 °F (36.6 °C) 86 18 (!) 151/53 97 % 04/10/21 2349 97.5 °F (36.4 °C) 82 18 (!) 137/53 98 % 04/10/21 2030 97.9 °F (36.6 °C) 60 18 (!) 138/47 94 % 04/10/21 1943     98 % Intake/Output Summary (Last 24 hours) at 4/11/2021 1724 Last data filed at 4/11/2021 1707 Gross per 24 hour Intake 650 ml Output 900 ml Net -250 ml Wt Readings from Last 12 Encounters:  
04/08/21 67.6 kg (149 lb)  
03/30/21 67.1 kg (148 lb)  
03/22/21 67.3 kg (148 lb 6.4 oz) 02/08/21 74.5 kg (164 lb 3.2 oz)  
12/22/20 69.4 kg (153 lb)  
11/20/20 70.8 kg (156 lb)  
11/20/20 70.3 kg (155 lb) 10/15/20 70.5 kg (155 lb 8 oz) 02/24/20 65.8 kg (145 lb) 12/20/19 65.8 kg (145 lb) 12/12/19 65.8 kg (145 lb 1 oz) 12/11/19 66.7 kg (147 lb) PHYSICAL EXAM: 
 
I had a face to face encounter and independently examined this patient on 04/11/21 as outlined below: 
 
General: WD, WN. Alert, cooperative, no acute distress EENT:  PERRL. Anicteric sclerae. MMM Resp:  CTA bilaterally, no wheezing or rales. No accessory muscle use CV:  Regular  rhythm,  No edema GI:  Soft, Non distended, Non tender. +Bowel sounds, no rebound Neurologic:  Alert and oriented X 3, normal speech, non focal motor exam 
Psych:   Good insight. Not anxious nor agitated Skin:  No rashes. No jaundice Reviewed most current lab test results and cultures  YES Reviewed most current radiology test results   YES Review and summation of old records today    NO Reviewed patient's current orders and MAR    YES 
PMH/SH reviewed - no change compared to H&P 
________________________________________________________________________ Care Plan discussed with: 
  Comments Patient x Family RN x Care Manager Consultant Multidiciplinary team rounds were held today with , nursing, pharmacist and clinical coordinator. Patient's plan of care was discussed; medications were reviewed and discharge planning was addressed. ________________________________________________________________________ Comments >50% of visit spent in counseling and coordination of care    
________________________________________________________________________ Maral Vieira MD  
 
Procedures: see electronic medical records for all procedures/Xrays and details which were not copied into this note but were reviewed prior to creation of Plan. LABS: 
I reviewed today's most current labs and imaging studies. Pertinent labs include: 
Recent Labs 04/11/21 
0406 04/10/21 
1354 04/10/21 
0455 WBC 11.6* 14.0* 14.2* HGB 7.4* 7.2* 6.8* HCT 24.9* 23.5* 23.0*  
 230 218 Recent Labs 04/11/21 
0406 04/10/21 
1100 04/10/21 
0455 04/09/21 
0349 04/09/21 
8113 * 131* 128*   < > 129*  128* K 4.7  --  4.2  --  4.8  
  --  97  --  96* CO2 27  --  25  --  27 *  --  126*  --  109* BUN 17  --  20  --  25* CREA 0.95  --  0.95  --  1.01  
CA 8.9  --  8.8  --  8.9 ALB  --   --  2.6*  --  2.8* TBILI  --   --  1.1*  --  0.9 ALT  --   --  30  --  38  
 < > = values in this interval not displayed.

## 2021-04-11 NOTE — PROGRESS NOTES
[facility] Progress Note NAME: Dot Moore :  1927 MRN:  [id number] Date/Time: 2021 Risk of deterioration: low Assessment:    Plan: 
Acute hyponatremia--likely due to SIADH + hctz (L) le wound Hypokalemia Hx of breast ca Ovarian cancer-[place] ANemia Holding hctz, would not resume on dc Sodium up to 132 today. Poor po.  continiue salt tabs. Continue fluid restriction - I discussed this at length with the patients. OK for DC from my point of view. Subjective: Chief Complaint:  Scooter Chi I go home? \"  No N/V. No dyspnea. No pain. Objective: VITALS:  
Last 24hrs VS reviewed since prior progress note. Most recent are: 
Visit Vitals BP (!) 112/54 Pulse 84 Temp 97.6 °F (36.4 °C) Resp 18 Ht 5' 4\" (1.626 m) Wt 67.6 kg (149 lb) SpO2 95% BMI 25.58 kg/m² SpO2 Readings from Last 6 Encounters:  
21 95% 21 98% 21 98% 21 97% 20 94% 10/15/20 94% O2 Flow Rate (L/min): 2 l/min Intake/Output Summary (Last 24 hours) at 2021 1210 Last data filed at 2021 0900 Gross per 24 hour Intake 620 ml Output 900 ml Net -280 ml Telemetry Reviewed PHYSICAL EXAM: 
 
General   well developed, well nourished, appears stated age, in no acute distress EENT  Normocephalic, Atraumatic, EOMI Respiratory   Clear To Auscultation bilaterally. (R) port Cardiology  Regular Rate and Rythmn Abdominal  Soft, non-tender, non-distended, positive bowel sounds, no hepatosplenomegaly Extremities  No edema on (R) 
(LLE) wrapped Lab Data Reviewed: (see below) Medications Reviewed: (see below) PMH/SH reviewed - no change compared to H&P 
 
___________________________________________________ Attending Physician: Michael Richardson, MD  
 
____________________________________________________ MEDICATIONS: 
Current Facility-Administered Medications Medication Dose Route Frequency  sodium chloride tablet 1 g  1 g Oral TID WITH MEALS  nitrofurantoin (macrocrystal-monohydrate) (MACROBID) capsule 100 mg  100 mg Oral BID  epoetin pablo-epbx (RETACRIT) injection 8,000 Units  8,000 Units SubCUTAneous Q MON, WED & FRI  metoprolol succinate (TOPROL-XL) XL tablet 12.5 mg  12.5 mg Oral DAILY  latanoprost (XALATAN) 0.005 % ophthalmic solution 1 Drop  1 Drop Both Eyes QHS  arformoterol 15 mcg/budesonide 0.5 mg neb solution   Nebulization BID  sodium chloride (NS) flush 5-10 mL  5-10 mL IntraVENous PRN  
 sodium chloride (NS) flush 5-40 mL  5-40 mL IntraVENous Q8H  
 sodium chloride (NS) flush 5-40 mL  5-40 mL IntraVENous PRN  
 acetaminophen (TYLENOL) tablet 650 mg  650 mg Oral Q6H PRN Or  
 acetaminophen (TYLENOL) suppository 650 mg  650 mg Rectal Q6H PRN  polyethylene glycol (MIRALAX) packet 17 g  17 g Oral DAILY PRN  
 ondansetron (ZOFRAN) injection 4 mg  4 mg IntraVENous Q6H PRN  
 [Held by provider] apixaban (ELIQUIS) tablet 5 mg  5 mg Oral BID  melatonin tablet 4.5 mg  4.5 mg Oral QHS  rosuvastatin (CRESTOR) tablet 20 mg  20 mg Oral QHS  timolol (TIMOPTIC) 0.5 % ophthalmic solution 1 Drop  1 Drop Both Eyes DAILY  oxyCODONE IR (ROXICODONE) tablet 5 mg  5 mg Oral Q4H PRN  
 morphine injection 2 mg  2 mg IntraVENous Q2H PRN  
 aspirin delayed-release tablet 81 mg  81 mg Oral DAILY  
  
 
LABS: 
Recent Labs 04/11/21 
0406 04/10/21 
1354 WBC 11.6* 14.0* HGB 7.4* 7.2* HCT 24.9* 23.5*  
 230 Recent Labs 04/11/21 
0406 04/10/21 
1100 04/10/21 
0455 04/09/21 
0349 04/09/21 
5500 * 131* 128*   < > 129*  128* K 4.7  --  4.2  --  4.8  
  --  97  --  96* CO2 27  --  25  --  27 BUN 17  --  20  --  25* CREA 0.95  --  0.95  --  1.01  
*  --  126*  --  109* CA 8.9  --  8.8  --  8.9  
 < > = values in this interval not displayed. Recent Labs 04/10/21 
0166 04/09/21 
7762 ALT 30 38  [address] TBILI 1.1* 0.9 TP 6.0* 6.4 ALB 2.6* 2.8*  
GLOB 3.4 3.6 No results for input(s): INR, PTP, APTT, INREXT, INREXT in the last 72 hours. No results for input(s): FE, TIBC, PSAT, FERR in the last 72 hours. No results for input(s): PH, PCO2, PO2 in the last 72 hours. No results for input(s): CPK, CKNDX, TROIQ in the last 72 hours. No lab exists for component: [facility] Lab Results Component Value Date/Time  Glucose (POC) 102 (H) 11/23/2020 01:15 PM  
 Glucose  04/16/2013 03:58 PM

## 2021-04-12 NOTE — PROGRESS NOTES
[facility] Progress Note NAME: Stephany Brunner :  1927 MRN:  [id number] Date/Time: 2021 Risk of deterioration: low Assessment:    Plan: 
Acute hyponatremia--likely due to SIADH + hctz (L) le wound Hypokalemia Hx of breast ca Ovarian cancer-[place] ANemia Holding hctz, would not resume on dc Sodium down a little today. Poor po.  continiue salt tabs. Continue fluid restriction - I discussed this again with the patient. Subjective: Chief Complaint:  \"I feel OK. \"  No N/V. No dyspnea. No pain. Objective: VITALS:  
Last 24hrs VS reviewed since prior progress note. Most recent are: 
Visit Vitals BP (!) 177/72 Pulse (!) 109 Temp 97.9 °F (36.6 °C) Resp 18 Ht 5' 4\" (1.626 m) Wt 67.6 kg (149 lb) SpO2 96% BMI 25.58 kg/m² SpO2 Readings from Last 6 Encounters:  
21 96% 21 98% 21 98% 21 97% 20 94% 10/15/20 94% O2 Flow Rate (L/min): 2 l/min Intake/Output Summary (Last 24 hours) at 2021 1055 Last data filed at 2021 1023 Gross per 24 hour Intake 600 ml Output 250 ml Net 350 ml Telemetry Reviewed PHYSICAL EXAM: 
 
General   well developed, well nourished, appears stated age, in no acute distress EENT  Normocephalic, Atraumatic, EOMI Respiratory   Clear To Auscultation bilaterally. (R) port Cardiology  Regular Rate and Rythmn Abdominal  Soft, non-tender, non-distended, positive bowel sounds, no hepatosplenomegaly Extremities  No edema on (R) 
(LLE) wrapped Lab Data Reviewed: (see below) Medications Reviewed: (see below) PMH/SH reviewed - no change compared to H&P 
 
___________________________________________________ Attending Physician: Yadiel Moons, MD  
 
____________________________________________________ MEDICATIONS: 
Current Facility-Administered Medications Medication Dose Route Frequency  sodium chloride tablet 1 g  1 g Oral TID WITH MEALS  
 epoetin pablo-epbx (RETACRIT) injection 8,000 Units  8,000 Units SubCUTAneous Q MON, WED & FRI  metoprolol succinate (TOPROL-XL) XL tablet 12.5 mg  12.5 mg Oral DAILY  latanoprost (XALATAN) 0.005 % ophthalmic solution 1 Drop  1 Drop Both Eyes QHS  arformoterol 15 mcg/budesonide 0.5 mg neb solution   Nebulization BID  sodium chloride (NS) flush 5-10 mL  5-10 mL IntraVENous PRN  
 sodium chloride (NS) flush 5-40 mL  5-40 mL IntraVENous Q8H  
 sodium chloride (NS) flush 5-40 mL  5-40 mL IntraVENous PRN  
 acetaminophen (TYLENOL) tablet 650 mg  650 mg Oral Q6H PRN Or  
 acetaminophen (TYLENOL) suppository 650 mg  650 mg Rectal Q6H PRN  polyethylene glycol (MIRALAX) packet 17 g  17 g Oral DAILY PRN  
 ondansetron (ZOFRAN) injection 4 mg  4 mg IntraVENous Q6H PRN  
 melatonin tablet 4.5 mg  4.5 mg Oral QHS  rosuvastatin (CRESTOR) tablet 20 mg  20 mg Oral QHS  timolol (TIMOPTIC) 0.5 % ophthalmic solution 1 Drop  1 Drop Both Eyes DAILY  oxyCODONE IR (ROXICODONE) tablet 5 mg  5 mg Oral Q4H PRN  
 morphine injection 2 mg  2 mg IntraVENous Q2H PRN  
 aspirin delayed-release tablet 81 mg  81 mg Oral DAILY  
  
 
LABS: 
Recent Labs 04/12/21 
0346 04/11/21 
0406 WBC 11.7* 11.6* HGB 8.2* 7.4* HCT 28.9* 24.9*  
 229 Recent Labs 04/12/21 
0346 04/11/21 
0406 04/10/21 
1100 04/10/21 
0455 * 132* 131* 128* K 4.3 4.7  --  4.2 CL 99 100  --  97  
CO2 23 27  --  25 BUN 18 17  --  20  
CREA 1.10* 0.95  --  0.95 * 130*  --  126* CA 8.9 8.9  --  8.8 Recent Labs 04/10/21 
0455 ALT 30  TBILI 1.1* TP 6.0* ALB 2.6*  
GLOB 3.4 No results for input(s): INR, PTP, APTT, INREXT, INREXT in the last 72 hours. No results for input(s): FE, TIBC, PSAT, FERR in the last 72 hours. No results for input(s): PH, PCO2, PO2 in the last 72 hours.  
No results for input(s): CPK, CKNDX, TROIQ in the last 72 hours. 
 
No lab exists for component: [facility] Lab Results Component Value Date/Time  Glucose (POC) 102 (H) 11/23/2020 01:15 PM  
 Glucose  04/16/2013 03:58 PM

## 2021-04-12 NOTE — PROGRESS NOTES
Transition of Care Plan to SNF/Rehab 
 
SNF/Rehab Transition: 
Patient has been accepted to [facility] and meets criteria for admission. Patient will transported by [facility] and expected to leave at 3:00pm. 
 
Communication to Patient/Family: Met with patient and niece (identified care giver) and they are agreeable to the transition plan. Communication to SNF/Rehab: 
Bedside RN, Delfina Iniguez, has been notified to update the transition plan to the facility and call report (phone number [phone]). Discharge information has been updated on the AVS. Discharge instructions to be fax'd to facility at [facility] # [phone]). Nursing Please include all hard scripts for controlled substances, med rec and dc summary, and AVS in packet. Reviewed and confirmed with facility, [facility], can manage the patient care needs for the following:  
 
Darnell Ibarra with (X) only those applicable: 
 
Medication: 
[x]  Medications will be available at the facility []  IV Antibiotics [x]  Controlled Substance - hard copy to be sent with patient  
[]  Weekly Labs Documents: 
[x] Hard RX [x] MAR [x] Kardex [x] AVS 
[x]Transfer Summary 
[x]Discharge Equipment: 
[]  CPAP/BiPAP []  Wound Vacuum 
[]  [place] or Urinary Device 
[]  PICC/Central Line 
[]  Nebulizer 
[]  Ventilator Treatment: 
[]Isolation (for MRSA, VRE, etc.) []Surgical Drain Management []Tracheostomy Care 
[]Dressing Changes []Dialysis with transportation and chair time. []PEG Care []Oxygen []Daily Weights for Heart Failure Dietary: 
[x]Any diet limitations cardiac  
[]Tube Feedings []Total Parenteral Management (TPN) Eligible for Medicaid Long Term Services and Supports Yes: 
[] Eligible for medical assistance or will become eligible within 180 days and UAI completed. [] Provider/Patient and/or support system has requested screening.  
[] UAI copy provided to patient or responsible party 
[] UAI unavailable at discharge will send once processed to SNF provider. [] UAI unavailable at discharged mailed to patient No:  
[] Private pay and is not financially eligible for Medicaid within the next 180 days. [] Reside out-of-state. [] A residents of a state owned/operated facility that is licensed 
by [address] [facility] or [facility] 
[] Enrollment in [facility] hospice services 
[] [address] 
[x] Patient /Family declines to have screening completed or provide financial information for screening Financial Resources: 
Medicaid   
[] Initiated and application pending  
[] Full coverage Advanced Care Plan: 
[]Surrogate Decision Maker of Care 
[]POA [x]Communicated Code Status full code Other Verlin Handing, [place], [address]

## 2021-04-12 NOTE — DISCHARGE SUMMARY
Hospitalist Discharge  Note NAME: Aileen Denver :  1927 MRN:  [id number] Admit date: 2021 Today's date: 21 PCP: Caesar Christie MD 
 
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 
  
LLE leg wound with weeping POA 
 
LE venous insufficiency POA Moderate Pulmonary HTN with reduced RV function POA Moderate aortic stenosis POA LVEF 55 to 60% 
  
Permanent Atrial fib POA 
  
Ovarian Cancer on chremotherapy Remote Hx of breast CA 
  
Essential HTN POA 
  
Hyperlipidemia POA Overweight POA Body mass index is 25.58 kg/m². 
  
Code Status: DNR/DNI Discharge Medications: 
Current Discharge Medication List  
  
START taking these medications Details  
aspirin delayed-release 81 mg tablet Take 1 Tab by mouth daily. Qty: 30 Tab, Refills: 5  
  
metoprolol succinate (TOPROL-XL) 25 mg XL tablet Take 0.5 Tabs by mouth daily. Qty: 30 Tab, Refills: 5  
  
sodium chloride 1 gram tablet Take 1 Tab by mouth three (3) times daily (with meals) for 10 days. Qty: 30 Tab, Refills: 0  
  
oxyCODONE IR (ROXICODONE) 5 mg immediate release tablet Take 1 Tab by mouth every four (4) hours as needed for Pain for up to 14 days. Max Daily Amount: 30 mg. 
Qty: 20 Tab, Refills: 0 Associated Diagnoses: Malignant neoplasm of ovary, unspecified laterality ([facility] [address] CONTINUE these medications which have CHANGED Details  
albuterol-ipratropium (DUO-NEB) 2.5 mg-0.5 mg/3 ml nebu 3 mL by Nebulization route every four (4) hours as needed for Wheezing or Shortness of Breath. Qty: 30 Nebule, Refills: 3 CONTINUE these medications which have NOT CHANGED Details  
dexamethasone in 0.9 % sod chl (DEXAMETHASONE-0.9 % SOD. CHLOR IV) 10 mg by IntraVENous route. Every 28 days pre treatment Breo Ellipta 200-25 mcg/dose inhaler TAKE 1 PUFF DAILY  
  
diclofenac (VOLTAREN) 1 % gel APPLY 1 APPLICATION TOPICALLY 4 (FOUR) TIMES A DAY AS NEEDED (PAIN) rosuvastatin (CRESTOR) 20 mg tablet TAKE 1 TABLET NIGHTLY Qty: 90 Tab, Refills: 1  
  
timolol (TIMOPTIC-XE) 0.5 % ophthalmic gel-forming Administer 1 Drop to both eyes daily. CYANOCOBALAMIN, VITAMIN B-12, (VITAMIN B-12 PO) Take 1,000 mcg by mouth daily. Associated Diagnoses: Essential hypertension  
  
latanoprost (XALATAN) 0.005 % ophthalmic solution Administer 1 Drop to both eyes nightly. cycloSPORINE (RESTASIS) 0.05 % ophthalmic emulsion Administer 1 Drop to both eyes two (2) times a day. Associated Diagnoses: Orthostatic hypotension; Basilar artery aneurysm (HCC)  
  
esomeprazole (NEXIUM) 40 mg capsule Take 1 Cap by mouth Daily (before breakfast). Qty: 90 Cap, Refills: 1 Associated Diagnoses: Nausea; Acid reflux Cholecalciferol, Vitamin D3, (VITAMIN D3) 1,000 unit cap Take 1 Cap by mouth two (2) times a day. omega-3 fatty acids-vitamin e (FISH OIL) 1,000 mg Cap Take 1 Cap by mouth two (2) times a day. multivitamins-minerals-lutein (CENTRUM SILVER) Tab Take 1 Tab by mouth daily. STOP taking these medications  CARBOPLATIN IV Comments:  
Reason for Stopping:   
   
 epoetin pablo (Procrit) 20,000 unit/mL injection Comments:  
Reason for Stopping:   
   
 fosnetupitant-palonosetron (Akynzeo, fosnetupitant,) 235-0.25 mg solr Comments:  
Reason for Stopping:   
   
 klack's mixture Solution Comments:  
Reason for Stopping:   
   
 clobetasoL 0.05 % spry Comments:  
Reason for Stopping:   
   
 hydroCHLOROthiazide (HYDRODIURIL) 12.5 mg tablet Comments:  
Reason for Stopping:   
   
 bimatoprost (LUMIGAN) 0.01 % ophthalmic drops Comments:  
Reason for Stopping:   
   
 dexAMETHasone (DECADRON) 4 mg tablet Comments:  
Reason for Stopping:   
   
 ondansetron (ZOFRAN ODT) 8 mg disintegrating tablet Comments:  
Reason for Stopping:   
   
 eszopiclone (LUNESTA) 1 mg tablet Comments:  
Reason for Stopping:   
   
 Eliquis 5 mg tablet Comments:  
Reason for Stopping:   
   
 nebivoloL (Bystolic) 2.5 mg tablet Comments:  
Reason for Stopping:   
   
 sucralfate (CARAFATE) 100 mg/mL suspension Comments:  
Reason for Stopping: VIT C/E/ZN/COPPR/LUTEIN/ZEAXAN (PRESERVISION AREDS 2 PO) Comments:  
Reason for Stopping:   
   
 hydroCHLOROthiazide (MICROZIDE) 12.5 mg capsule Comments:  
Reason for Stopping:   
   
 tiotropium-olodateroL (Stiolto Respimat) 2.5-2.5 mcg/actuation inhaler Comments:  
Reason for Stopping:   
   
 melatonin 5 mg tablet Comments:  
Reason for Stopping: CALCIUM CARBONATE/VITAMIN D3 (CALCIUM 600 + D,3, PO) Comments:  
Reason for Stopping: Follow-up Information Follow up With Specialties Details Why Contact Info Krissy Gonzalez MD Cardiology, [address] [place] [address] Vascular Surgery, Internal Medicine Schedule an appointment as soon as possible for a visit in 1 month  [address] [facility] 
[phone] [place] [address] 
[phone] Ernestine Méndez MD Family Medicine Schedule an appointment as soon as possible for a visit in 3 weeks  [address] [facility] [place] [place] [address] [facility] 
[phone] Carmella Mccollum MD Nephrology Schedule an appointment as soon as possible for a visit in 3 weeks  Loida Chaudhry  
[address] [facility] 
[phone] Recommended diet: regular Diet. PO fluid restrict to 1000 cc/day of all liquids Recommended activity: Activity as tolerated Wound care:  
Every OTHER Day on Even days - Wound Care for the Left lower leg:  Remove all dressings and cleanse the wound with Caraklenz spray and gently wipe with gauze to remove the old drainage. Apply the Mepitel One silicone dressing on the wound bed first and then with Xeroform on the lower part of the wound only.  Cover with the OptiLock dressings x 2 and wrap with Daniel (NOT kerlix). Wrap lightly with an ACE bandage. Indwelling devices:  None Supplemental Oxygen: None Required Lab work: Weekly BMP and Weekly CBC, every wednesday, results to covering MD 
 
Glucose management:  None Code status: Full Salt tablets 1 gm PO TID x 10 days, continue further if remains persistently with Na < 130 Time spent on discharge:   I spent greater than 30 minutes on discharge, seeing and examining the patient, reconciling home meds and new meds, coordinating care with case management, doing the discharge papers and the D/C summary Discharge disposition: SNF Discharge Condition: Stable Summary of admission H+P(copied from Dr Sonia Velasquez Note): CHIEF COMPLAINT: LE edema, weeping, pain 
  
HISTORY OF PRESENT ILLNESS:    
Bang Muniz is a 80 y.o.  female with past medical history of permanent A. fib, bilateral lower extremity edema, PAD, HTN, HLD history of breast CA, recently ovarian CA for which she is currently on chemotherapy who was recently seen in the ED here 3/30/2021 after sustaining a fall at home causing a left lower extremity laceration as well as a scalp wound. The scalp wound was approximated with a single staple and the LLE wound with sutures during the ED visit. .  Of note, patient also has had ongoing lower extremity edema in both legs and recently had Dopplers done consistent with venous insufficiency. Her presenting complaint is that the weeping in her legs has worsened and is accompanied by a significant amount of pain poorly controlled with Tylenol so far. She denies fevers or chills or N/V 
  
We were asked to admit for work up and evaluation of the above problems.  
  
    
Past Medical History:  
Diagnosis Date  Arthritis    
 Cancer [facility])    
  breast    (leg,ear,arm-skin cancer)  GERD (gastroesophageal reflux disease)    
 Hypertension    
 Other ill-defined conditions(609.89)    
  glaucoma  Other ill-defined conditions(799.89)     cellulitis left arm  Other ill-defined conditions(799.89)    
  carpal tunnel syndrome left hand  Other ill-defined conditions(799.89)    
  vertigo  Ovarian cancer ([facility] [address] 11/2020 Left Tib-Fib X-rays read by radiology FINDINGS:  
AP and lateral  views of the left tibia and fibula demonstrate no 
fracture or other acute osseous, articular or soft tissue abnormality. There is 
moderately severe osteoarthritis in the knee. IMPRESSION No acute abnormality pCXR read by radiology FINDINGS:  
AP portable imaging of the chest performed at 4:01 AM demonstrates 
moderate cardiomegaly. Right internal jugular Port-A-Cath terminates at the 
level of the distal SVC. There is subsegmental atelectasis in the left upper 
lobe and at both lung bases. Surgical clips overlie the bilateral axillae. There 
are degenerative changes in the shoulders and thoracic spine. IMPRESSION Cardiomegaly with mild bilateral subsegmental atelectasis. CT abdomen/pelvis read by cardiology FINDINGS:  
LOWER THORAX: The heart size is prominent. There is a mild sized right pleural 
effusion. There is a small left pleural reaction. There is interstitial 
prominence at the lung bases likely chronic. Remote right lower rib fractures. Prior mastectomy. LIVER: No mass. BILIARY TREE: Gallstones in a nondistended gallbladder. . CBD is not dilated. SPLEEN: within normal limits. PANCREAS: No focal abnormality. ADRENALS: Unremarkable. KIDNEYS/URETERS: Bilateral intrarenal calcifications. No obstruction. STOMACH: Unremarkable. SMALL BOWEL: No dilatation or wall thickening. COLON: No dilatation or wall thickening. Mild sigmoid diverticulosis of. APPENDIX: Appendectomy. PERITONEUM: No ascites or pneumoperitoneum. RETROPERITONEUM: No lymphadenopathy or aortic aneurysm. REPRODUCTIVE ORGANS: Hysterectomy, oophorectomy. URINARY BLADDER: No mass or calculus. BONES: No destructive bone lesion. ABDOMINAL WALL: No mass or hernia. ADDITIONAL COMMENTS: N/A IMPRESSION 1. No acute findings. 2. Incidentals as above. 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 
-Appears clinically euvolemic 
-urine Na 77, urine osm 330 looks C/w SIADH 
-stop HCTZ 
-Nephrology consult appreciated - Po fluid restrict to 1000 ml all fluids - Salts tablets x 10 days, additional if remains persistently hyponatremic - Cortisol 37.8, TSH 2.19 
- S/p tolvaptan, Na rising to 129-132, serial labs - Salt tablets x 10 days, serial Na at [facility] 
- Spoke with nieces 4/11 
- Discharge to SNF on PO fluid restriction and serial labs 
  
LLE leg wound with weeping POA ? LE venous insufficiency POA Moderate Pulmonary HTN with reduced RV function POA Moderate aortic stenosis POA LVEF 55 to 60% 
- s/p recent fall, laceration repair in the ED 
- wound care consult, continue would care - Bilateral lower extremity weeping, no erythema. No fever or leukocytosis 
- PRN lasix - Bilateral LE Dopplers 3/31/2021 with no DVT, + venous insufficiency - Cardiology and renal followed 
- keeps legs up as much as possible 
- intraabdominal CT without masses or MICHAEL 
- Suspect some RV dysfunction and venous insufficiency causing the edema and the weeping - Keep legs up as much as possible, PRN lasix 40 mg for edema 
  
Permanent Atrial fib POA 
- Holding eliquis with fall risk per cards, outpatient follow up to decide if safe to resume in future 
- cont toprol XL 
  
Ovarian Cancer Remote Hx of breast CA 
-on monthly chemo regimen 
-c/o loss of taste/appetite 
-Oncology following 
  
Essential HTN POA 
- toprol XL 
- stop HCTZ as above, given hyponatremia 
  
Hyperlipidemia POA 
-cont statin Overweight POA Body mass index is 25.58 kg/m². 
  
Code Status: DNR/DNI Surrogate Decision Maker: 
  
DVT Prophylaxis: SQ heparin GI Prophylaxis: not indicated Subjective: Chief Complaint / Reason for Physician Visit f/u hyponatremia \"I feel okay\". Discussed with RN events overnight. No new complaints D/W patient, she wants to be DNR/DNI Review of Systems: 
Symptom Y/N Comments  Symptom Y/N Comments Fever/Chills n   Chest Pain n   
Poor Appetite    Edema Cough n   Abdominal Pain n   
Sputum    Joint Pain SOB/ALLAN n   Headache Nausea/vomit n   Tolerating PT/OT Diarrhea n   Tolerating Diet y Constipation    Other Could NOT obtain due to:   
 
Objective: VITALS:  
Last 24hrs VS reviewed since prior progress note. Most recent are: 
Patient Vitals for the past 24 hrs: 
 Temp Pulse Resp BP SpO2  
04/12/21 0801     96 % 04/12/21 0755 97.9 °F (36.6 °C) (!) 109 18 (!) 177/72 94 % 04/12/21 0322 97.6 °F (36.4 °C) 87 18 (!) 175/81 95 % 04/11/21 2337 97.4 °F (36.3 °C) 94 18 (!) 168/74 94 % 04/11/21 2203     94 % 04/11/21 1958 97.2 °F (36.2 °C) 83 18 (!) 141/68 98 % 04/11/21 1608 97.5 °F (36.4 °C) 75 18 113/62 95 % 04/11/21 1108 97.6 °F (36.4 °C) 84 18 (!) 112/54 95 % Intake/Output Summary (Last 24 hours) at 4/12/2021 1053 Last data filed at 4/12/2021 1023 Gross per 24 hour Intake 600 ml Output 250 ml Net 350 ml Wt Readings from Last 12 Encounters:  
04/08/21 67.6 kg (149 lb)  
03/30/21 67.1 kg (148 lb)  
03/22/21 67.3 kg (148 lb 6.4 oz) 02/08/21 74.5 kg (164 lb 3.2 oz)  
12/22/20 69.4 kg (153 lb)  
11/20/20 70.8 kg (156 lb)  
11/20/20 70.3 kg (155 lb) 10/15/20 70.5 kg (155 lb 8 oz) 02/24/20 65.8 kg (145 lb) 12/20/19 65.8 kg (145 lb) 12/12/19 65.8 kg (145 lb 1 oz) 12/11/19 66.7 kg (147 lb) PHYSICAL EXAM: 
 
I had a face to face encounter and independently examined this patient on 04/12/21 as outlined below: 
 
General: WD, WN. Alert, cooperative, no acute distress EENT:  PERRL. Anicteric sclerae. MMM Resp:  CTA bilaterally, no wheezing or rales. No accessory muscle use CV:  Regular  rhythm,  No edema GI:  Soft, Non distended, Non tender.   +Bowel sounds, no rebound Neurologic:  Alert and oriented X 3, normal speech, non focal motor exam 
Psych:   Good insight. Not anxious nor agitated Skin:  No rashes. No jaundice Reviewed most current lab test results and cultures  YES Reviewed most current radiology test results   YES Review and summation of old records today    NO Reviewed patient's current orders and MAR    YES 
PMH/SH reviewed - no change compared to H&P 
________________________________________________________________________ Care Plan discussed with: 
  Comments Patient x Family  x Niece navid RN x Care Manager Consultant Multidiciplinary team rounds were held today with , nursing, pharmacist and clinical coordinator. Patient's plan of care was discussed; medications were reviewed and discharge planning was addressed. ________________________________________________________________________ Comments >50% of visit spent in counseling and coordination of care    
________________________________________________________________________ Rory High MD  
 
Procedures: see electronic medical records for all procedures/Xrays and details which were not copied into this note but were reviewed prior to creation of Plan. LABS: 
I reviewed today's most current labs and imaging studies. Pertinent labs include: 
Recent Labs 04/12/21 
0346 04/11/21 
0406 04/10/21 
1354 WBC 11.7* 11.6* 14.0* HGB 8.2* 7.4* 7.2* HCT 28.9* 24.9* 23.5*  
 229 230 Recent Labs 04/12/21 
0346 04/11/21 
0406 04/10/21 
1100 04/10/21 
0455 * 132* 131* 128* K 4.3 4.7  --  4.2 CL 99 100  --  97  
CO2 23 27  --  25 * 130*  --  126* BUN 18 17  --  20  
CREA 1.10* 0.95  --  0.95  
CA 8.9 8.9  --  8.8 ALB  --   --   --  2.6* TBILI  --   --   --  1.1* ALT  --   --   --  30

## 2021-04-12 NOTE — PROGRESS NOTES
Transition of Care Plan: 
RUR: 26% Disposition: SNF- [facility] COVID test: rapid test ordered, RN to complete prior to pt being able to d/c Follow up appointments: oncology, PCP  
DME needed: n/a Transportation at Discharge: [facility] @ 3:00pm 
[place] or means to access home: n/a IM Medicare letter: to be provide prior to d/c Caregiver Contact: niluis Paulino [facility] [phone] Discharge Caregiver contacted prior to discharge? yes [facility] @ 3:00pm 
Call report [phone] 
 
1:00pm 
[facility] arranged for 3:00pm. Bedside RN notified and informed that rapid test still needs to be completed. CM contacted pt's niece Jacquelin Sherman to inform her of d/c and time, Jacquelin Sherman agreed with plan. CM made room visit with patient to inform her of d/c and time, pt agreed and signed 2nd IM letter. Medicare pt has received, reviewed, and signed 2nd IM letter informing them of their right to appeal the discharge. Signed copy has been placed on pt bedside chart. 10:00am 
GONSALO spoke with pt's niece who reported they have chosen [facility] for first choice. GONSALO spoke with Geremias Esteves BRIEN/Adam liaison who confirmed they can accept patient today if she has a rapid covid test prior to coming. GONSALO messaged MD to inquire if pt ready for d/c and requested rapid covid test. MD placed d/c order and order for rapid test.  
 
Josselin Marie, [address]

## 2021-04-12 NOTE — PROGRESS NOTES
Problem: Falls - Risk of 
Goal: *Absence of Falls Description: Document Virginie Ochoa Fall Risk and appropriate interventions in the flowsheet. Outcome: Progressing Towards Goal 
Note: Fall Risk Interventions: 
Mobility Interventions: Bed/chair exit alarm, Communicate number of staff needed for ambulation/transfer, Patient to call before getting OOB, PT Consult for assist device competence Mentation Interventions: Adequate sleep, hydration, pain control Medication Interventions: Patient to call before getting OOB, Teach patient to arise slowly Elimination Interventions: Call light in reach, Patient to call for help with toileting needs History of Falls Interventions: Bed/chair exit alarm, Investigate reason for fall, Room close to nurse's station Problem: Patient Education: Go to Patient Education Activity Goal: Patient/Family Education Outcome: Progressing Towards Goal 
  
Problem: Pressure Injury - Risk of 
Goal: *Prevention of pressure injury Description: Document Dick Scale and appropriate interventions in the flowsheet. Outcome: Progressing Towards Goal 
Note: Pressure Injury Interventions: 
Sensory Interventions: Assess changes in LOC, Float heels, Keep linens dry and wrinkle-free, Minimize linen layers Moisture Interventions: Absorbent underpads, Internal/External urinary devices Activity Interventions: Assess need for specialty bed, Increase time out of bed, Pressure redistribution bed/mattress(bed type) Mobility Interventions: Assess need for specialty bed, Float heels, HOB 30 degrees or less, Pressure redistribution bed/mattress (bed type) Nutrition Interventions: Document food/fluid/supplement intake Friction and Shear Interventions: Apply protective barrier, creams and emollients, HOB 30 degrees or less, Lift sheet, Minimize layers

## 2021-04-12 NOTE — ACP (ADVANCE CARE PLANNING)
Advance Care Planning Note NAME: Idalmis Asencio :  1927 MRN:  [id number] Date/Time:  2021 5:03 PM 
 
Active Diagnoses: 
Hospital Problems  Date Reviewed: 3/29/2021 Codes Class Noted POA Hyponatremia ICD-10-CM: E87.1 ICD-9-CM: 276.1  2021 Unknown Permanent atrial fibrillation (HCC) ICD-10-CM: X74.28 ICD-9-CM: 427.31  2021 Yes Ovarian cancer ([facility] [address] ICD-10-CM: C56.9 ICD-9-CM: 183.0  2021 Unknown Anemia ICD-10-CM: D64.9 ICD-9-CM: 285.9  2021 Unknown Mixed hyperlipidemia ICD-10-CM: E78.2 ICD-9-CM: 272.2  10/15/2020 Yes PAF (paroxysmal atrial fibrillation) (HCC) ICD-10-CM: I48.0 ICD-9-CM: 427.31  2018 Yes HTN (hypertension) ICD-10-CM: I10 
ICD-9-CM: 401.9  2012 Yes These active diagnoses are of sufficient risk that focused discussion on advance care planning is indicated in order to allow the patient to thoughtfully consider personal goals of care, and if situations arise that prevent the ability to personally give input, to ensure appropriate representation of their personal desires for different levels and aggressiveness of care. Discussion:  
Full code upon admission Discussed patient's wishes about life support and CPR with patient and with gamal Matta She has decided she wants to be a DNR / DNI. We discussed what this means, we also discussed that is I mean she cannot get treatments, we will just not placed on life support or do ACLS if she has a cardiac arrest. 
We discussed with the patient would want if she got to the point she could not or did not want to eat She was very clear she did not want a feeding tube or PEG tube Patient  would like to assign gamal Moss as the surrogate decision maker. Persons present and participating in discussion: Ramirez Garcia MD, gamal Matta Time Spent:  
Total time spent face-to-face in education and discussion:   16  minutes.   
 
 
 
Nery Gordillo MD  
Hospitalist

## 2021-04-12 NOTE — PROGRESS NOTES
-Hematology / Oncology ([facility]) - 
-Primary Oncologist- Dr. Liz Da Silva 
-CC-\" I feel better\" 
 
-S-  No new complaints. -O-  
  
Patient Vitals for the past 24 hrs: 
 Temp Pulse Resp BP SpO2  
04/12/21 0801     96 % 04/12/21 0755 97.9 °F (36.6 °C) (!) 109 18 (!) 177/72 94 % 04/12/21 0322 97.6 °F (36.4 °C) 87 18 (!) 175/81 95 % 04/11/21 2337 97.4 °F (36.3 °C) 94 18 (!) 168/74 94 % 04/11/21 2203     94 % 04/11/21 1958 97.2 °F (36.2 °C) 83 18 (!) 141/68 98 % 04/11/21 1608 97.5 °F (36.4 °C) 75 18 113/62 95 % 04/11/21 1108 97.6 °F (36.4 °C) 84 18 (!) 112/54 95 % 04/12 0701 - 04/12 1900 In: 150 [P.O.:150] Out: - Review of Systems: 
negative PE Gen NAD Resp no distress Psych normal 
 
-Labs-   
Recent Labs 04/12/21 
0346 04/11/21 
0406 04/10/21 
1354 04/10/21 
1100 04/10/21 
0455 WBC 11.7* 11.6* 14.0*  --  14.2* HGB 8.2* 7.4* 7.2*  --  6.8*  
 229 230  --  218 ANEU  --  8.0 11.6*  --  10.7* * 132*  --  131* 128* K 4.3 4.7  --   --  4.2 * 130*  --   --  126* BUN 18 17  --   --  20  
CREA 1.10* 0.95  --   --  0.95 ALT  --   --   --   --  30  
TBILI  --   --   --   --  1.1* AP  --   --   --   --  115 CA 8.9 8.9  --   --  8.8  
 
 
-Imaging-  
4/6/21 chest xray IMPRESSION Cardiomegaly with mild bilateral subsegmental atelectasis.   
4/8/21 CT A/P: 
1. No acute findings. 2. Incidentals as above. 
 
-Assessment + Plan-    
*) Stage IV Ovarian Cancer, peritoneal carcinomatosis: 
- Diagnosed in January 2021 with Dr. Nunu Burnette, also follows with Dr. Liz Da Silva given pmh of breast cancer. 
- Receiving palliative single agnet Carboplatin AUC 5 q 28 days, C2D1 3/11, today is C2D28 today and was due to chemo being held currently given inpatient status.  
-f/u with Dr. Liz Da Silva as outpatient 
  
*) chemo induced anemia: 
-hgb 8.2 today, transfuse for hgb <7 
-  iron studies on low/low normal side, got Venofer 300mg IV x 2 
- getting procrit as outpt, last dose 20K on 3/29,  retacrit started here 4/7 
  
*) Hyponatremia: 
- Dr Veronique Milner following - Likely SIADH vs hctz, restricting water intake. - Na overall better 
  
*) A Fib: 
- holding Eliquis given fall and head strike, cardiology consulted this admission 
  
*) LLE Wound: 
- wound care

## 2021-04-12 NOTE — PROGRESS NOTES
[facility] to [address] 80 y.o.   female [address] [facility]   Room: [facility] [address] [facility]  Unit Phone# :  [phone] [address] 
[facility] 3 ORTHOPEDICS 
[address] [place] [address] Dept: [phone] Loc: [id number] SITUATION Admitted:  4/6/2021         Attending Provider:  Twan Blair MD    
 
Consultations:  IP CONSULT TO NEPHROLOGY 
IP CONSULT TO HEMATOLOGY 
IP CONSULT TO CARDIOLOGY 
 
PCP:  Caesar Christie MD   [phone] Treatment Team: Attending Provider: Twan Blair MD; Consulting Provider: Bo Sanchez MD; Utilization Review: Jeff Trammell RN; Consulting Provider: Flor Alcazar; Consulting Provider: Shalini Hannah MD; Care Manager: Mo Soria Primary Nurse: Josselin Talley RN; Utilization Review: Luz Baker RN; Primary Nurse: Chas Mckeon Admitting Dx:  Hyponatremia [E87.1] Principal Problem: <principal problem not specified> * No surgery found * of  
  
BY: * Surgery not found *             ON: * No surgery found * Code Status: Full Code Advance Directives:  
Advance Care Planning 4/7/2021 Patient's Healthcare Decision Maker is: - Confirm Advance Directive Yes, on file (Send w/patient) No Doesnt Have Isolation:  There are currently no Active Isolations       MDRO: No current active infections Pain Medications given:Oxycodone   Last dose: 4/12/2021 at  1503 Special Equipment needed: no  Type of equipment: 
 
  
  BACKGROUND Allergies: Allergies Allergen Reactions  Augmentin [Amoxicillin-Pot Clavulanate] Hives and Itching  Doxycycline Hives and Itching  Keflex [Cephalexin] Hives and Itching  Monodox [Doxycycline Monohydrate] Other (comments)  Sulfa (Sulfonamide Antibiotics) Rash Past Medical History:  
Diagnosis Date  Arthritis  Cancer ([facility] [address] breast    (leg,ear,arm-skin cancer)  GERD (gastroesophageal reflux disease)  Hypertension  Other ill-defined conditions(799.89)   
 glaucoma  Other ill-defined conditions(799.89)   
 cellulitis left arm  Other ill-defined conditions(799.89)   
 carpal tunnel syndrome left hand  Other ill-defined conditions(799.89)   
 vertigo  Ovarian cancer ([facility] [address] 11/2020 Past Surgical History:  
Procedure Laterality Date  CARDIAC CATHETERIZATION  1/10/2013  HX APPENDECTOMY  HX CARPAL TUNNEL RELEASE    
 left hand  HX CATARACT REMOVAL    
 bilateral  
 HX COLONOSCOPY  4/2012  HX KNEE ARTHROSCOPY    
 left  HX MASTECTOMY    
 bilateral  
 HX OTHER SURGICAL Basal cell skin cancer removed from left leg, left arm and neck  HX OTHER SURGICAL    
 cyst removed from left thigh  HX PARTIAL HYSTERECTOMY  HX OREN AND BSO  HX TONSILLECTOMY  VASCULAR SURGERY PROCEDURE UNLIST    
 vascular blockages removed. Medications Prior to Admission Medication Sig  CARBOPLATIN  mg by IntraVENous route. Every 28 days for 6 cycles  epoetin pablo (Procrit) 20,000 unit/mL injection 20,000 Units by SubCUTAneous route every seven (7) days.  dexamethasone in 0.9 % sod chl (DEXAMETHASONE-0.9 % SOD. CHLOR IV) 10 mg by IntraVENous route. Every 28 days pre treatment  fosnetupitant-palonosetron (Akynzeo, fosnetupitant,) 235-0.25 mg solr by IntraVENous route. Every 28 days pre treatment  klack's mixture Solution Take 5 mL by mouth four (4) times daily. Diphenhydramine elixir 12.5mg/ml 500ml, Nystatin suspension 120ml,Tetracycline 500mg capsules  12 capsules (6g), Hydrocortisone 20mg tablet 12 tablets (240mg), Water for irrigation QS ad 1000ml Swish & Swallow  clobetasoL 0.05 % spry by Apply Externally route.  APPLY TO ITCHY DRY AREAS OF SKIN TWICE DAILY, MIX SOLUTION WITH 1LB JAR OF MOISTURIZING CREAM  
 hydroCHLOROthiazide (HYDRODIURIL) 12.5 mg tablet Take 12.5 mg by mouth daily.  Breo Ellipta 200-25 mcg/dose inhaler TAKE 1 PUFF DAILY  bimatoprost (LUMIGAN) 0.01 % ophthalmic drops Administer 1 Drop to both eyes daily.  dexAMETHasone (DECADRON) 4 mg tablet TAKE 1 TAB WITH FOOD TWICE A DAY FOR 3 DAYS AFTER EACH CHEMO  
 ondansetron (ZOFRAN ODT) 8 mg disintegrating tablet TAKE 1 TABLET BY MOUTH THREE TIMES A DAY AS NEEDED FOR NAUSEA  eszopiclone (LUNESTA) 1 mg tablet Take 1 Tab by mouth nightly. Max Daily Amount: 1 mg.  Eliquis 5 mg tablet TAKE 1 TABLET EVERY 12     HOURS TO PREVENT STROKE IN ATRIAL FIBRILLATION  
 nebivoloL (Bystolic) 2.5 mg tablet TAKE 1 TABLET DAILY  diclofenac (VOLTAREN) 1 % gel APPLY 1 APPLICATION TOPICALLY 4 (FOUR) TIMES A DAY AS NEEDED (PAIN)  rosuvastatin (CRESTOR) 20 mg tablet TAKE 1 TABLET NIGHTLY  sucralfate (CARAFATE) 100 mg/mL suspension Take 1-2 tsp by mouth four (4) times daily as needed. On empty stomach  timolol (TIMOPTIC-XE) 0.5 % ophthalmic gel-forming Administer 1 Drop to both eyes daily.  CYANOCOBALAMIN, VITAMIN B-12, (VITAMIN B-12 PO) Take 1,000 mcg by mouth daily.  latanoprost (XALATAN) 0.005 % ophthalmic solution Administer 1 Drop to both eyes nightly.  cycloSPORINE (RESTASIS) 0.05 % ophthalmic emulsion Administer 1 Drop to both eyes two (2) times a day.  VIT C/E/ZN/COPPR/LUTEIN/ZEAXAN (PRESERVISION AREDS 2 PO) Take 2 capsules by mouth two (2) times a day.  esomeprazole (NEXIUM) 40 mg capsule Take 1 Cap by mouth Daily (before breakfast).  Cholecalciferol, Vitamin D3, (VITAMIN D3) 1,000 unit cap Take 1 Cap by mouth two (2) times a day.  omega-3 fatty acids-vitamin e (FISH OIL) 1,000 mg Cap Take 1 Cap by mouth two (2) times a day.  multivitamins-minerals-lutein (CENTRUM SILVER) Tab Take 1 Tab by mouth daily.   
 [DISCONTINUED] albuterol-ipratropium (DUO-NEB) 2.5 mg-0.5 mg/3 ml nebu 3 mL by Nebulization route every four (4) hours as needed for Wheezing. Hard scripts included in transfer packet yes Vaccinations:   
Immunization History Administered Date(s) Administered  Influenza High Dose Vaccine PF 09/26/2017  Influenza Vaccine PF 10/17/2013  Influenza Vaccine Split 09/01/2012  Pneumococcal Conjugate (PCV-13) 08/15/2015  Pneumococcal Polysaccharide (PPSV-23) 10/17/2013  Pneumococcal Vaccine (Unspecified Type) 01/01/2009  Tdap 02/12/2016, 03/30/2021 Readmission Risks:    Known Risks: The Charlson CoMorbitiy Index tool is an evidenced based tool that has more automatic generated information. The tool looks at many different items such as the age of the patient, how many times they were admitted in the last calendar year, current length of stay in the hospital and their diagnosis. All of these items are pulled automatically from information documented in the chart from various places and will generate a score that predicts whether a patient is at low (less than 13), medium (13-20) or high (21 or greater) risk of being readmitted. ASSESSMENT Temp: 97.4 °F (36.3 °C) (04/12/21 1211) Pulse (Heart Rate): 64 (04/12/21 1211) Resp Rate: 18 (04/12/21 1211)           BP: (!) 114/52 (04/12/21 1211) O2 Sat (%): 91 % (04/12/21 1211) Weight: 67.6 kg (149 lb)    Height: 5' 4\" (162.6 cm) (04/06/21 0227) If above not within 1 hour of discharge: 
 
BP:_____  P:____  R:____ T:_____ O2 Sat: ___%  O2: ______ Active Orders Diet DIET CARDIAC Regular; FR 1000ML Orientation: oriented to time, place, person and situation Active Behaviors: None Active Lines/Drains:  (Peg Tube / Carlton / CL or S/L?): yes Urinary Status: Voiding     Last BM: Last Bowel Movement Date: 04/06/21 Skin Integrity: Laceration (comment), Wound (add Wound LDA) Mobility: Very limited Weight Bearing Status: WBAT (Weight Bearing as Tolerated) Lab Results Component Value Date/Time Glucose 125 (H) 04/12/2021 03:46 AM  
 Hemoglobin A1c 6.0 (H) 02/12/2020 09:27 AM  
 INR 1.5 (H) 04/06/2021 03:31 AM  
 INR 1.0 10/08/2013 09:36 AM  
 HGB 8.2 (L) 04/12/2021 03:46 AM  
 HGB 7.4 (L) 04/11/2021 04:06 AM  
  
  RECOMMENDATION See After Visit Summary (AVS) for: · Discharge instructions · After [facility] · Special equipment needed (entered pre-discharge by Care Management) · Medication Reconciliation · Follow up Appointment(s) Report given/sent by:  Autumn Trejo      
            
Verbal report given to: Sonya Lee  FAXED to:     
    
Estimated discharge time:  4/12/2021 at 1500

## 2021-04-12 NOTE — PROGRESS NOTES
End of Shift Note Bedside shift change report given to Best Kaur (oncoming nurse) by Phyllis Bearden (offgoing nurse). Report included the following information SBAR, Kardex, Intake/Output, MAR, Recent Results, Med Rec Status and Cardiac Rhythm A-fib Shift worked:  [id number] Shift summary and any significant changes:  
  Patient slept for a good part of the night, voided using the bed pan per patient request, very minimal fluid intake overnight, no water given. Concerns for physician to address:  dc? 
  
Zone phone for oncoming shift:   [phone] Activity: 
Activity Level: Up with Assistance Number times ambulated in hallways past shift: 0 Number of times OOB to chair past shift: 0 Cardiac:  
Cardiac Monitoring: Yes     
Cardiac Rhythm: Atrial fibrillation Access:  
Current line(s): PIV Genitourinary:  
Urinary status: voiding Respiratory:  
O2 Device: None (Room air) Chronic home O2 use?: NO Incentive spirometer at bedside: NO 
  
GI: 
Last Bowel Movement Date: 04/06/21 Current diet:  DIET ONE TIME MESSAGE 
DIET NUTRITIONAL SUPPLEMENTS Breakfast, Dinner; Nepro DIET CARDIAC Regular; FR 1000ML Passing flatus: YES Tolerating current diet: YES 
% Diet Eaten: 95 % Pain Management:  
Patient states pain is manageable on current regimen: YES Skin: 
Dick Score: 15 Interventions: float heels and foam dressing Patient Safety: 
Fall Score: Total Score: 4 Interventions: assistive device (walker, cane, etc), gripper socks and pt to call before getting OOB High Fall Risk: Yes Length of Stay: 
Expected LOS: 2d 14h Actual LOS: 6 Phyllis Bearden

## 2021-04-12 NOTE — DISCHARGE INSTRUCTIONS
HOSPITALIST DISCHARGE INSTRUCTIONS    NAME: Leyla Davila   :  1927   MRN:  [id number]     Date/Time:  2021 10:40 AM    ADMIT DATE: 2021     DISCHARGE DATE: 2021     Attending Physician: Mary Leach MD    DISCHARGE DIAGNOSIS:    Hyponatremia        Medications: Per above medication reconciliation. Pain Management: per above medications    Recommended diet: Cardiac Diet. PO fluid restrict to 1000 cc/day of all liquids    Recommended activity: Activity as tolerated    Wound care:   Every OTHER Day on Even days - Wound Care for the Left lower leg:  Remove all dressings and cleanse the wound with Caraklenz spray and gently wipe with gauze to remove the old drainage. Apply the Mepitel One silicone dressing on the wound bed first and then with Xeroform on the lower part of the wound only. Cover with the OptiLock dressings x 2 and wrap with Daniel (NOT kerlix). Wrap lightly with an ACE bandage. Indwelling devices:  None    Supplemental Oxygen: None    Required Lab work: Weekly BMP and Weekly CBC, every wednesday, results to covering MD    Glucose management:  None    Code status: Full    Salt tablets 1 gm PO TID x 10 days, continue further if remains persistently with Na < 130    Outside physician follow up:     Follow-up Information     Follow up With Specialties Details Why Contact Info    Delpha Boas, MD Cardiology, [facility], Internal Medicine Schedule an appointment as soon as possible for a visit in 1 month  [facility] [address] [place] [address]    Long Mcarthur MD Family Medicine Schedule an appointment as soon as possible for a visit in 3 weeks  [address] [facility]  [address] [facility]      Binta Keith MD Nephrology Schedule an appointment as soon as possible for a visit in 3 weeks  [address]  [address]  [phone]                 Skilled nursing facility/ SNF MD responsible for above on discharge. Information obtained by :  I understand that if any problems occur once I am at home I am to contact my physician. I understand and acknowledge receipt of the instructions indicated above.                                                                                                                                            Physician's or R.N.'s Signature                                                                  Date/Time                                                                                                                                              Patient or Repres

## 2021-04-13 NOTE — PROGRESS NOTES
[facility] present to transport patient. IV removed. All patient belongings removed from the room. Patient being transported with AVS, H&P, MAR report, hard RX, and nurse SBAR report.

## 2021-04-21 NOTE — PROGRESS NOTES
Post Acute Facility Update *The information contained in this note was received during a weekly care coordination call with the post acute facility* Current Facility: [address] [place] [address] ([facility]) Anticipated Discharge Date: TBD Facility Nursing Update: no current updates Facility Social Work Update: Pt has requested to leave facility. Staff met with pt and neice to discuss issues and/or concerns. Pt has agreed to stay for now. Pt has limited support at home but has the means to pay for private caregivers and aids if needed. No d/c date as of yet. Upper Extremity Assistance: Independent Lower Extremity Assistance: Stand by Assist - Presence and Cueing Bed Mobility: Contact Guard Assist - hands on patient for balance Transfers: Contact Guard Assist - hands on patient for balance Ambulation: Contact Guard Assist - hands on patient for balance How far (in feet) is the patient ambulating? 36 Device Used: walker Barriers to Discharge: TBD Leobardo CALDERON, RN  BillyEx

## 2021-05-06 PROBLEM — L97.929 LEG ULCER, LEFT (HCC): Status: ACTIVE | Noted: 2021-01-01

## 2021-05-06 NOTE — ED PROVIDER NOTES
HPI .  Patient is alert and conversant. Niece who lives with her most of the time over the last year gives most of the history. Patient has a history of moderate aortic stenosis, moderate pulmonary hypertension, hypertension, hyperlipidemia and glaucoma. Patient has a history of remote breast cancer and more recent ovarian cancer. Patient had a Port-A-Cath placed for chemotherapy but chemotherapy has been paused since her most recent illness that started about 5 weeks ago. Patient fell over her walker and lacerated her left lower shin. She had sutures placed in her leg and also a staple placed for a scalp wound. .  About a week later patient was admitted for a wound issue. After about a week patient was transferred from the hospital to [facility]. She was discharged from [facility] and was supposed to see a surgeon about her wound today. They sent a picture of the wound and the surgeon reportedly told the patient to come to the hospital because the wound was to serious to be treated adequately in the office in the office. Past Medical History:  
Diagnosis Date  Arthritis  Cancer ([address] breast    (leg,ear,arm-skin cancer)  GERD (gastroesophageal reflux disease)  Hypertension  Other ill-defined conditions(799.89)   
 glaucoma  Other ill-defined conditions(799.89)   
 cellulitis left arm  Other ill-defined conditions(799.89)   
 carpal tunnel syndrome left hand  Other ill-defined conditions(799.89)   
 vertigo  Ovarian cancer ([address] 11/2020 Past Surgical History:  
Procedure Laterality Date  CARDIAC CATHETERIZATION  1/10/2013  HX APPENDECTOMY  HX BILATERAL MASTECTOMY Bilateral   
 HX CARPAL TUNNEL RELEASE    
 left hand  HX CATARACT REMOVAL    
 bilateral  
 HX COLONOSCOPY  4/2012  HX KNEE ARTHROSCOPY    
 left  HX OTHER SURGICAL Basal cell skin cancer removed from left leg, left arm and neck  HX OTHER SURGICAL    
 cyst removed from left thigh  HX PARTIAL HYSTERECTOMY  HX OREN AND BSO  HX TONSILLECTOMY  VASCULAR SURGERY PROCEDURE UNLIST    
 vascular blockages removed. Family History:  
Problem Relation Age of Onset  Heart Disease Mother  Heart Disease Father   
      age 55  
 Heart Disease Brother  Stroke Brother  Cancer Sister Colon Cancer  Diabetes Sister  Heart Disease Sister  Heart Disease Sister  Diabetes Sister  Other Brother MVA  Heart Disease Brother  Heart Disease Son   
      age 52 Social History Socioeconomic History  Marital status:  Spouse name: Not on file  Number of children: Not on file  Years of education: Not on file  Highest education level: Not on file Occupational History  Not on file Social Needs  Financial resource strain: Not on file  Food insecurity Worry: Not on file Inability: Not on file  Transportation needs Medical: Not on file Non-medical: Not on file Tobacco Use  Smoking status: Former Smoker Types: Cigarettes Quit date: 1955 Years since quittin.3  Smokeless tobacco: Never Used Substance and Sexual Activity  Alcohol use: Not Currently Comment: rare  Drug use: Never  Sexual activity: Not Currently Lifestyle  Physical activity Days per week: Not on file Minutes per session: Not on file  Stress: Not on file Relationships  Social connections Talks on phone: Not on file Gets together: Not on file Attends Rastafari service: Not on file Active member of club or organization: Not on file Attends meetings of clubs or organizations: Not on file Relationship status: Not on file  Intimate partner violence Fear of current or ex partner: Not on file Emotionally abused: Not on file Physically abused: Not on file Forced sexual activity: Not on file Other Topics Concern  Not on file Social History Narrative  Not on file ALLERGIES: Augmentin [amoxicillin-pot clavulanate], Doxycycline, Keflex [cephalexin], Monodox [doxycycline monohydrate], and Sulfa (sulfonamide antibiotics) Review of Systems All other systems reviewed and are negative. Vitals:  
 05/06/21 1333 05/06/21 1415 BP: (!) 140/55 (!) 122/57 Pulse: 83 77 Resp: 18 27 Temp: 97.6 °F (36.4 °C) 97.7 °F (36.5 °C) SpO2: 100% Weight:  63.5 kg (140 lb) Height:  5' 4\" (1.626 m) Physical Exam 
Vitals signs and nursing note reviewed. Constitutional:   
   Appearance: She is well-developed. HENT:  
   Head: Normocephalic and atraumatic. Eyes:  
   Pupils: Pupils are equal, round, and reactive to light. Neck: Musculoskeletal: Normal range of motion and neck supple. Cardiovascular:  
   Rate and Rhythm: Normal rate. Rhythm irregular. Heart sounds: Murmur present. No friction rub. No gallop. Pulmonary:  
   Effort: Pulmonary effort is normal. No respiratory distress. Breath sounds: No wheezing or rales. Abdominal:  
   Palpations: Abdomen is soft. Tenderness: There is no abdominal tenderness. There is no rebound. Musculoskeletal: Normal range of motion. General: No tenderness. Skin: 
   Findings: No erythema. Comments: Deep ulcer left lower shin. Yellowish discoloration; some edema bilaterally; atrophic skin bilaterally; apparent tendon visible in the wound Neurological:  
   Mental Status: She is alert. Cranial Nerves: No cranial nerve deficit. Comments: Motor; symmetric Psychiatric:     
   Behavior: Behavior normal.  
 
  
 
MDM Procedures ED EKG interpretation: 
Rhythm: atrial fib; and irregular. Rate (approx.): 70; Axis: normal; P wave: ; QRS interval: normal ; ST/T wave: non-specific changes; in  Lead: ; Other findings: . This EKG was interpreted by Ana Srinivasan MD,ED Provider.  3:15 PM 
 Note: Patient has very been skin in her legs. She has some weeping edema. Lactate is elevated. She just completed a course of Levaquin yesterday. Rocephin will be started. I am not ordering saline at this time due to edema and concerns that more edema will delay wound healing. Aaron Leonardo MD 
5:16 PM 
 
CONSULT NOTE: 
Spoke to Dr Nikolas Ac concerning the patient. The patient's history, presentation, physical findings, and results were all discussed. 6:28 PM 
 
 
Perfect Serve Consult for Admission 6:29 PM 
 
ED Room Number: YM77/33 Patient Name and age:  Robin Fraser 80 y.o.  female Working Diagnosis: 1. Ulcer of lower extremity with fat layer exposed, unspecified laterality ([address] 2. Cellulitis of left lower extremity 3. Elevated lactic acid level COVID-19 Suspicion:  no 
Sepsis present:  no  Reassessment needed: yes Code Status:  Full Code Readmission: no 
Isolation Requirements:  no 
Recommended Level of Care:  telemetry Department:[facility] Adult ED - [phone] Other:

## 2021-05-06 NOTE — ED TRIAGE NOTES
TRIAGE NOTE:  Patient arrives with c/o wound infection. Patient reports rehab nurse took pictures of wound and sent to Dr. Matty Shetty, who told patient to come to ER. Patient discharged from rehab facility today and finished antibiotic treatment yesterday. (Levaquin).

## 2021-05-06 NOTE — H&P
[facility] History and Physical 
 
Primary Care Provider: Latesha Asencio MD 
Date of Service:  5/6/2021 Subjective:  
 
Artis Wang is a 80 y.o. female with hx aortic stenosis, pulm htn, GERD, glaucoma, remote breast CA, HTN, and recent ovarian cancer with port-a-cath s/p chemo times two who presents with complications of traumatic wound. She had skin cancer on her LLE and subsequently sustained a traumatic wound after a fall in the same area. The wound had progress, and they were pending and appt with surgeon Dr. Eva Newby today. The nurse from the rehab facility where patient was being discharged sent a picture to the clinic, and patient was advised to come to the hospital instead for management of wound. In the ED, VSS. Labs are significant for lactate of 4, CRP 6.32, ESR 34, and normocytic anemia with Hgb 10.4. L tib/fib XR with pretibial soft tissue ulceration and no noted osseous abnormality. In the ED, she was treated with vancomycin Review of Systems: 
 
All other review of systems were negative except for that written in the History of Present Illness. Past Medical History:  
Diagnosis Date  Arthritis  Cancer ([address] breast    (leg,ear,arm-skin cancer)  GERD (gastroesophageal reflux disease)  Hypertension  Other ill-defined conditions(799.89)   
 glaucoma  Other ill-defined conditions(799.89)   
 cellulitis left arm  Other ill-defined conditions(799.89)   
 carpal tunnel syndrome left hand  Other ill-defined conditions(799.89)   
 vertigo  Ovarian cancer ([address] 11/2020 Past Surgical History:  
Procedure Laterality Date  CARDIAC CATHETERIZATION  1/10/2013  HX APPENDECTOMY  HX BILATERAL MASTECTOMY Bilateral   
 HX CARPAL TUNNEL RELEASE    
 left hand  HX CATARACT REMOVAL    
 bilateral  
 HX COLONOSCOPY  4/2012  HX KNEE ARTHROSCOPY    
 left  HX OTHER SURGICAL  Basal cell skin cancer removed from left leg, left arm and neck  HX OTHER SURGICAL    
 cyst removed from left thigh  HX PARTIAL HYSTERECTOMY  HX OREN AND BSO  HX TONSILLECTOMY  VASCULAR SURGERY PROCEDURE UNLIST    
 vascular blockages removed. Prior to Admission medications Medication Sig Start Date End Date Taking? Authorizing Provider  
rosuvastatin (CRESTOR) 20 mg tablet TAKE 1 TABLET NIGHTLY 4/19/21   Maritn Knox MD  
albuterol-ipratropium (DUO-NEB) 2.5 mg-0.5 mg/3 ml nebu 3 mL by Nebulization route every four (4) hours as needed for Wheezing or Shortness of Breath. 4/12/21   Hanna Tracy MD  
aspirin delayed-release 81 mg tablet Take 1 Tab by mouth daily. 4/13/21   Hanna Tracy MD  
metoprolol succinate (TOPROL-XL) 25 mg XL tablet Take 0.5 Tabs by mouth daily. 4/13/21   Hanna Tracy MD  
dexamethasone in 0.9 % sod chl (DEXAMETHASONE-0.9 % SOD. CHLOR IV) 10 mg by IntraVENous route. Every 28 days pre treatment    Provider, Historical  
Breo Ellipta 200-25 mcg/dose inhaler TAKE 1 PUFF DAILY 3/3/21   Provider, Historical  
diclofenac (VOLTAREN) 1 % gel APPLY 1 APPLICATION TOPICALLY 4 (FOUR) TIMES A DAY AS NEEDED (PAIN) 11/25/20   Provider, Historical  
timolol (TIMOPTIC-XE) 0.5 % ophthalmic gel-forming Administer 1 Drop to both eyes daily. Provider, Historical  
CYANOCOBALAMIN, VITAMIN B-12, (VITAMIN B-12 PO) Take 1,000 mcg by mouth daily. Provider, Historical  
latanoprost (XALATAN) 0.005 % ophthalmic solution Administer 1 Drop to both eyes nightly. 6/8/15   Provider, Historical  
cycloSPORINE (RESTASIS) 0.05 % ophthalmic emulsion Administer 1 Drop to both eyes two (2) times a day. Provider, Historical  
esomeprazole (NEXIUM) 40 mg capsule Take 1 Cap by mouth Daily (before breakfast). 8/6/14   Dc Queen MD  
Cholecalciferol, Vitamin D3, (VITAMIN D3) 1,000 unit cap Take 1 Cap by mouth two (2) times a day.     Provider, Historical  
omega-3 fatty acids-vitamin e (FISH OIL) 1,000 mg Cap Take 1 Cap by mouth two (2) times a day. Provider, Historical  
multivitamins-minerals-lutein (CENTRUM SILVER) Tab Take 1 Tab by mouth daily. Provider, Historical  
 
Allergies Allergen Reactions  Augmentin [Amoxicillin-Pot Clavulanate] Hives and Itching  Doxycycline Hives and Itching  Keflex [Cephalexin] Hives and Itching  Monodox [Doxycycline Monohydrate] Other (comments)  Sulfa (Sulfonamide Antibiotics) Rash Family History Problem Relation Age of Onset  Heart Disease Mother  Heart Disease Father   
      age 55  
 Heart Disease Brother  Stroke Brother  Cancer Sister Colon Cancer  Diabetes Sister  Heart Disease Sister  Heart Disease Sister  Diabetes Sister  Other Brother MVA  Heart Disease Brother  Heart Disease Son   
      age 52 SOCIAL HISTORY: 
Patient resides at Home. Patient ambulates with walker/wheelchair Smoking history: none Alcohol history: none Objective:  
 
 
Physical Exam:  
Visit Vitals BP (!) 149/60 Pulse 82 Temp 97.7 °F (36.5 °C) Resp 26 Ht 5' 4\" (1.626 m) Wt 63.5 kg (140 lb) SpO2 96% BMI 24.03 kg/m² General:  Alert, cooperative, no distress, appears stated age. Head:  Normocephalic, without obvious abnormality, atraumatic. Eyes:  Conjunctivae/corneas clear. EOMs intact. Throat: Lips, mucosa, and tongue normal.  
Neck: Supple, symmetrical, trachea midline Back:   Symmetric, no curvature. ROM normal.   
Lungs:   Clear to auscultation bilaterally. Chest wall:  No tenderness or deformity. R port-a-cath Heart:  Regular rate and rhythm, S1, S2 with occasional extrasystoles normal, no murmur, click, rub or gallop. Abdomen:   Soft, non-tender. Bowel sounds normal. No masses,  No organomegaly. Extremities: B/l LE edema with serous drainage.   
 LLE wound, picture taken from patient caregiver's iphone on 5/6/2021 Pulses: 2+ and symmetric all extremities. Skin: Skin color, texture, turgor normal. No rashes or lesions. ECG:  Ordered, pending Data Review: All diagnostic labs and studies have been reviewed. Assessment:  
 
Active Problems: * No active hospital problems. * 
 
 
Plan: #LLE wound 
-surgery consulted (Dr. Dayami Villanueva), appreciate rec's 
-vanc and ceftriaxone 
-wound care 
-tylenol/roxicodone for pain 
-follow blood cultures #Reactive airway disease 
-brovana and pulmicort per hosp formulary #Glaucoma 
-continue xalatan, and timolol #Dyslipidemia #PVD 
-continue statin 
-hold ASA #Permantent Afib 
-not on anticoag 
-rate controlled #HTN 
-continue metoprolol #Hx breast and active ovarian CA 
-port R chest 
-chemo on hold due to patient with multiple hospitalizations and rehab stays FEN: regular diet, npo mn 
dvt ppx; heparin MPOA: navid wong (daughter) Code: DNR 
 
FUNCTIONAL STATUS PRIOR TO HOSPITALIZATION Ambulatory with Use of Assistive Devices Signed By: Mary Packer MD   
 May 6, 2021

## 2021-05-07 NOTE — PROGRESS NOTES
Problem: Mobility Impaired (Adult and Pediatric) Goal: *Acute Goals and Plan of Care (Insert Text) Description: FUNCTIONAL STATUS PRIOR TO ADMISSION: The patient was functional at the wheelchair level and was independent for transfers to the chair. She was admitted from SNF due to LLE wound, but was otherwise ready for D/C home. HOME SUPPORT PRIOR TO ADMISSION: The patient lived alone with niece to provide assistance. Physical Therapy Goals Initiated 5/7/2021 1. Patient will move from supine to sit and sit to supine  and roll side to side in bed with modified independence within 7 day(s). 2.  Patient will transfer from bed to chair and chair to bed with modified independence using the least restrictive device within 7 day(s). 3.  Patient will perform sit to stand with modified independence within 7 day(s). 4.  Patient will ambulate with supervision/set-up for 20 feet with the least restrictive device within 7 day(s). Outcome: Progressing Towards Goal 
 PHYSICAL THERAPY EVALUATION Patient: Lizeth Ford (61 y.o. female) Date: 5/7/2021 Primary Diagnosis: Leg ulcer, left ([facility] [Z35.693] Precautions:     
 
 
ASSESSMENT Based on the objective data described below, the patient presents with limited mobility at wheelchair transfer level after being admitted from SNF with LLE wound. She had been scheduled to return home, where her niece assists her, but she needed to be admitted for possible surgical intervention for her wound. She has a long medical history, but was modified independent at a wheelchair level, and she has a wheelchair and walker at home. On exam, she demonstrates good overall safety awareness, strength and transfers from bed to BSC/chair.   Highly recommend she be OOB to the chair for all meals (with LEs elevated) and to [facility] with nursing assist.  Given her age and comorbidites, she is certainly at high risk for decline in her functional status, so we will follow up Monday to ensure that she has continued to improve her mobility as she has been in rehab. Her readiness for discharge home will largely be determined by what intervention is needed for her LLE. Current Level of Function Impacting Discharge (mobility/balance): contact guard for basic transfers Functional Outcome Measure: The patient scored Total: 65/100 on the Barthel Index which is indicative of minimally impaired ability to care for basic self needs/dependency on others. Other factors to consider for discharge: awaiting plan from surgery for LLE Patient will benefit from skilled therapy intervention to address the above noted impairments. PLAN : 
Recommendations and Planned Interventions: bed mobility training, transfer training, gait training, therapeutic exercises, patient and family training/education, and therapeutic activities Frequency/Duration: Patient will be followed by physical therapy:  3 times a week to address goals. Recommendation for discharge: (in order for the patient to meet his/her long term goals) To be determined: likely home with HHPT and family support ,but depends on what is needed for her LLE and how it impacts her mobility This discharge recommendation: 
Has not yet been discussed the attending provider and/or case management IF patient discharges home will need the following DME: patient owns DME required for discharge SUBJECTIVE:  
Patient stated I really want some water and something to eat. It has been since yesterday at lunch.  OBJECTIVE DATA SUMMARY:  
HISTORY:   
Past Medical History:  
Diagnosis Date Arthritis Cancer ([facility] breast    (leg,ear,arm-skin cancer) GERD (gastroesophageal reflux disease) Hypertension Other ill-defined conditions(799.89)   
 glaucoma Other ill-defined conditions(799.89)   
 cellulitis left arm Other ill-defined conditions(799.89)   
 carpal tunnel syndrome left hand  Other ill-defined conditions(799.89)   
 vertigo Ovarian cancer ([facility] [address] 2020 Past Surgical History:  
Procedure Laterality Date CARDIAC CATHETERIZATION  1/10/2013 HX APPENDECTOMY HX BILATERAL MASTECTOMY Bilateral   
 HX CARPAL TUNNEL RELEASE    
 left hand HX CATARACT REMOVAL    
 bilateral  
 HX COLONOSCOPY  2012 HX KNEE ARTHROSCOPY    
 left HX OTHER SURGICAL Basal cell skin cancer removed from left leg, left arm and neck HX OTHER SURGICAL    
 cyst removed from left thigh HX PARTIAL HYSTERECTOMY HX OREN AND BSO    
 HX TONSILLECTOMY    
 VASCULAR SURGERY PROCEDURE UNLIST    
 vascular blockages removed. Personal factors and/or comorbidities impacting plan of care: as noted above EXAMINATION/PRESENTATION/DECISION MAKING:  
Critical Behavior: 
  
  
  
  
Hearing: Auditory Auditory Impairment: Hard of hearing, bilateral, Hearing aid(s) Skin:  dressing in place LLE, RLE noted to have fragile, mildly erythematous skin Edema: mild LE edema bilaterally Range Of Motion: 
AROM: Within functional limits Strength:   
Strength: Generally decreased, functional 
  
  
  
  
  
  
Tone & Sensation:  
Tone: Normal 
  
  
  
  
Sensation: Intact Coordination: 
Coordination: Within functional limits Vision:  
  
Functional Mobility: 
Bed Mobility: 
  
Supine to Sit: Modified independent Sit to Supine: (up in chair after) Transfers: 
Sit to Stand: Additional time;Contact guard assistance Stand to Sit: Contact guard assistance; Additional time Bed to Chair: Contact guard assistance; Additional time Balance:  
Sitting: Intact; Without support Standing: With support; Intact(needs hands on a support) Ambulation/Gait Training: 
  
  
  
  
  
  
  
  
  
  
  
  
  
  
  
  
  
  
 Stairs: Therapeutic Exercises:  
 
 
Functional Measure: 
Barthel Index: 
 
Bathin Bladder: 10 Bowels: 10 Groomin Dressing: 10 Feeding: 10 Mobility: 5 Stairs: 0 Toilet Use: 5 Transfer (Bed to Chair and Back): 10 Total: 65/100 The Barthel ADL Index: Guidelines 1. The index should be used as a record of what a patient does, not as a record of what a patient could do. 2. The main aim is to establish degree of independence from any help, physical or verbal, however minor and for whatever reason. 3. The need for supervision renders the patient not independent. 4. A patient's performance should be established using the best available evidence. Asking the patient, friends/relatives and nurses are the usual sources, but direct observation and common sense are also important. However direct testing is not needed. 5. Usually the patient's performance over the preceding 24-48 hours is important, but occasionally longer periods will be relevant. 6. Middle categories imply that the patient supplies over 50 per cent of the effort. 7. Use of aids to be independent is allowed. Harjinder Guerrier., Barthel, D.W. ([id number]). Functional evaluation: the Barthel Index. [address] [facility] (14)2. [employer] MARIAH Solorzano, Lisa Ponce., Dereck Womack., Arnold, [address] [facility] (). Measuring the change indisability after inpatient rehabilitation; comparison of the responsiveness of the Barthel Index and Functional [place] Measure. Journal of Neurology, Neurosurgery, and Psychiatry, 66(4), 231-326. Annabelle Delgado, N.J.A, JEAN Greene, & Jessica Hoover M.A. (2004.) Assessment of post-stroke quality of life in cost-effectiveness studies: The usefulness of the Barthel Index and the EuroQoL-5D. [facility], 13, 702-18 Physical Therapy Evaluation Charge Determination History Examination Presentation Decision-Making HIGH Complexity :3+ comorbidities / personal factors will impact the outcome/ POC  MEDIUM Complexity : 3 Standardized tests and measures addressing body structure, function, activity limitation and / or participation in recreation  LOW Complexity : Stable, uncomplicated  LOW Complexity : FOTO score of  Based on the above components, the patient evaluation is determined to be of the following complexity level: LOW Pain Rating: LLE pain when she is in full extension for prolonged time but not with weight bearing Activity Tolerance:  
Good After treatment patient left in no apparent distress:  
Sitting in chair, Heels elevated for pressure relief, and Call bell within reach COMMUNICATION/EDUCATION:  
The patients plan of care was discussed with: Registered nurse. Fall prevention education was provided and the patient/caregiver indicated understanding., Patient/family have participated as able in goal setting and plan of care. , and Patient/family agree to work toward stated goals and plan of care. Thank you for this referral. 
Armani Aguilar, PT Time Calculation: 19 mins

## 2021-05-07 NOTE — PROGRESS NOTES
Perfect serve to Dr. Fidelina Miguel sent for orthopedic input. Trista Cast MD 
Bariatric and General Surgeon [facility]

## 2021-05-07 NOTE — PROGRESS NOTES
Pharmacist Note - Vancomycin Dosing Consult provided for this 80 y.o. female for indication of SSTI. 
-discharged from rehab facility today; finished Levaquin yesterday Antibiotic regimen(s): Vanc + CTX Recent Labs 21 
1411 WBC 8.3 CREA 0.83 BUN 15 Frequency of BMP: daily x 3 Height: 163 cm Weight: 64 kg Est CrCl: 36 ml/min Temp (24hrs), Av.7 °F (36.5 °C), Min:97.6 °F (36.4 °C), Max:97.7 °F (36.5 °C) Goal trough = 10 - 15 mcg/mL A 1250 mg loading dose was given in the ED @1800; to be followed by a maintenance dose of 1000 mg IV every 24 hours. Pharmacy to follow patient daily and order levels / make dose adjustments as appropriate.

## 2021-05-07 NOTE — CONSULTS
Surgery Consult Subjective:  
  
Surgical consultation was called on Marina Jarquin who is a 80 y.o. female who has a left lower extremity wound. She has a past medical history of aortic stenosis, pulm htn, GERD, glaucoma, remote breast CA, HTN, and recent ovarian cancer with port-a-cath s/p chemo times two who presents with complications of traumatic wound. She had a history of skin care on her left lower extremity and sustained a traumatic wound to the area after she tripped over her walker in March. She was discharged to Rehab 4/12 and was due to be discharge home from rehab yesterday when there was concern about her Left lower extremity wound. Patient was advised to be seen in the ER. She complains of pain. Past Medical History:  
Diagnosis Date  Arthritis  Cancer ([facility] [address] breast    (leg,ear,arm-skin cancer)  GERD (gastroesophageal reflux disease)  Hypertension  Other ill-defined conditions(799.89)   
 glaucoma  Other ill-defined conditions(799.89)   
 cellulitis left arm  Other ill-defined conditions(799.89)   
 carpal tunnel syndrome left hand  Other ill-defined conditions(799.89)   
 vertigo  Ovarian cancer ([facility] [address] 11/2020 Past Surgical History:  
Procedure Laterality Date  CARDIAC CATHETERIZATION  1/10/2013  HX APPENDECTOMY  HX BILATERAL MASTECTOMY Bilateral   
 HX CARPAL TUNNEL RELEASE    
 left hand  HX CATARACT REMOVAL    
 bilateral  
 HX COLONOSCOPY  4/2012  HX KNEE ARTHROSCOPY    
 left  HX OTHER SURGICAL Basal cell skin cancer removed from left leg, left arm and neck  HX OTHER SURGICAL    
 cyst removed from left thigh  HX PARTIAL HYSTERECTOMY  HX OREN AND BSO  HX TONSILLECTOMY  VASCULAR SURGERY PROCEDURE UNLIST    
 vascular blockages removed. Social History Socioeconomic History  Marital status:  Spouse name: Not on file  Number of children: Not on file  Years of education: Not on file  Highest education level: Not on file Tobacco Use  Smoking status: Former Smoker Types: Cigarettes Quit date: 1955 Years since quittin.3  Smokeless tobacco: Never Used Substance and Sexual Activity  Alcohol use: Not Currently Comment: rare  Drug use: Never  Sexual activity: Not Currently Current Facility-Administered Medications Medication Dose Route Frequency  rosuvastatin (CRESTOR) tablet 20 mg  20 mg Oral QHS  arformoteroL (BROVANA) neb solution 15 mcg  15 mcg Nebulization BID RT And  
 budesonide (PULMICORT) 500 mcg/2 ml nebulizer suspension  500 mcg Nebulization BID RT  
 timolol (TIMOPTIC) 0.5 % ophthalmic solution 1 Drop  1 Drop Both Eyes BID  metoprolol succinate (TOPROL-XL) XL tablet 25 mg  25 mg Oral DAILY  latanoprost (XALATAN) 0.005 % ophthalmic solution 1 Drop  1 Drop Both Eyes QPM  
 oxyCODONE IR (ROXICODONE) tablet 5 mg  5 mg Oral Q4H PRN  
 melatonin tablet 3 mg  3 mg Oral QHS  sodium chloride (NS) flush 5-40 mL  5-40 mL IntraVENous Q8H  
 sodium chloride (NS) flush 5-40 mL  5-40 mL IntraVENous PRN  
 acetaminophen (TYLENOL) tablet 650 mg  650 mg Oral Q6H PRN Or  
 acetaminophen (TYLENOL) suppository 650 mg  650 mg Rectal Q6H PRN  polyethylene glycol (MIRALAX) packet 17 g  17 g Oral DAILY PRN  
 heparin (porcine) injection 5,000 Units  5,000 Units SubCUTAneous Q8H  
 cefTRIAXone (ROCEPHIN) 1 g in 0.9% sodium chloride (MBP/ADV) 50 mL MBP  1 g IntraVENous Q24H  Vancomycin- pharmacy to dose   Other Rx Dosing/Monitoring  vancomycin (VANCOCIN) 1,000 mg in 0.9% sodium chloride 250 mL (VIAL-MATE)  1,000 mg IntraVENous Q24H Allergies Allergen Reactions  Augmentin [Amoxicillin-Pot Clavulanate] Hives and Itching  Doxycycline Hives and Itching  Keflex [Cephalexin] Hives and Itching  Monodox [Doxycycline Monohydrate] Other (comments)  Sulfa (Sulfonamide Antibiotics) Rash Review of Systems: 
Constitutional: No fever or chills Neurologic: No headache Eyes: No scleral icterus or irritated eyes Nose: No nasal pain or drainage Mouth: No oral lesions or sore throat Cardiac: No palpations or chest pain Pulmonary: No cough or shortness or breath Gastrointestinal: No nausea, emesis, diarrhea, or constipation Genitourinary: No dysuria Musculoskeletal: Left leg pain Skin: No rashes or lesions Psychiatric: No anxiety or depressed mood Objective:  
 
  
Patient Vitals for the past 8 hrs: 
 BP Temp Pulse Resp SpO2  
21 0816 (!) 131/57 98.6 °F (37 °C) 84 16 97 % Temp (24hrs), Av.9 °F (36.6 °C), Min:97.6 °F (36.4 °C), Max:98.6 °F (37 °C) Physical Exam:  
Visit Vitals BP (!) 131/57 (BP 1 Location: Right arm, BP Patient Position: At rest) Pulse 84 Temp 98.6 °F (37 °C) Resp 16 Ht 5' 4\" (1.626 m) Wt 140 lb (63.5 kg) SpO2 97% BMI 24.03 kg/m² General: No acute distress, conversant Eyes: PERRLA, no scleral icterus HENT: Normocephalic without oral lesions Neck: Trachea midline without LAD Cardiac: Normal pulse rate and rhythm Pulmonary: Symmetric chest rise with normal effort GI: Soft, NT, ND, no hernia, no splenomegaly Skin: Warm without rash Extremities: left lower ext-yellow slough, deep ulcer of left lower shin, tendon visible. Edema noted. Unable to feel pulses in left foot, venous stasis changes Psych: Appropriate mood and affect Labs: CBC:  
Lab Results Component Value Date/Time WBC 7.9 2021 03:22 AM  
 RBC 3.82 2021 03:22 AM  
 HGB 10.0 (L) 2021 03:22 AM  
 HCT 33.6 (L) 2021 03:22 AM  
 PLATELET 106  03:22 AM  
 
BMP:  
Lab Results Component Value Date/Time  Glucose 94 2021 03:22 AM  
 Sodium 134 (L) 2021 03:22 AM  
 Potassium 4.0 2021 03:22 AM  
 Chloride 102 2021 03:22 AM  
 CO2 23 2021 03:22 AM  
 BUN 20 2021 03:22 AM  
 Creatinine 0.64 05/07/2021 03:22 AM  
 Calcium 8.0 (L) 05/07/2021 03:22 AM  
 
CMP: 
Lab Results Component Value Date/Time Glucose 94 05/07/2021 03:22 AM  
 Sodium 134 (L) 05/07/2021 03:22 AM  
 Potassium 4.0 05/07/2021 03:22 AM  
 Chloride 102 05/07/2021 03:22 AM  
 CO2 23 05/07/2021 03:22 AM  
 BUN 20 05/07/2021 03:22 AM  
 Creatinine 0.64 05/07/2021 03:22 AM  
 Calcium 8.0 (L) 05/07/2021 03:22 AM  
 Anion gap 9 05/07/2021 03:22 AM  
 BUN/Creatinine ratio 31 (H) 05/07/2021 03:22 AM  
 Alk. phosphatase 113 05/06/2021 02:11 PM  
 Protein, total 6.0 (L) 05/06/2021 02:11 PM  
 Albumin 2.6 (L) 05/06/2021 02:11 PM  
 Globulin 3.4 05/06/2021 02:11 PM  
 A-G Ratio 0.8 (L) 05/06/2021 02:11 PM  
 
 
Radiology review: Tib/fib x-ray- 
 
IMPRESSION Pretibial soft tissue ulceration without acute osseous abnormality. Assessment:  
 
80 y.o. female who presents with deep left lower extremity ulceration. Plan:  
Continue Vanc NPO Further plan per Dr. Macario Madrigal. Wally Graham NP Total time spent with patient 40 minutes. Signed By: Wally Graham NP May 7, 2021 ADMIT NOTE Attending addendum: 
I was available to supervise the APC. I have reviewed the APC's note We discussed the plan of care. 80-year-old female with left lower extremity traumatic wound with poor wound healing sent here from rehab. In a fair amount of pain. Anxious to have this heal up fast.  She has a history of she reports skin cancer to this left ankle. OBJECTIVE: 
Visit Vitals BP (!) 131/57 (BP 1 Location: Right arm, BP Patient Position: At rest) Pulse 84 Temp 98.6 °F (37 °C) Resp 16 Ht 5' 4\" (1.626 m) Wt 140 lb (63.5 kg) SpO2 97% BMI 24.03 kg/m² General: No acute distress, conversant Eyes: PERRLA, no scleral icterus HENT: Normocephalic without oral lesions Neck: Trachea midline without LAD Cardiac: Normal pulse rate and rhythm Pulmonary: Symmetric chest rise with normal effort GI: Soft, NT, ND, no hernia, no splenomegaly Skin: Warm without rash Extremities: Venous stasis changes bilaterally, 2+ edema left foot, moderate sized ulceration over the left anterior shin with moderate amount of exposed flexor tendon, unable to palpate pulses Psych: Appropriate mood and affect ASSESSMENT / Plan: 
 
I agree with the APC's assessment and plan with the following additions/modifications: 
 
27-year-old female with a 3month old wound to the left lower extremity sent here from rehab for further evaluation. Wound does not appear to be acute. I am unable to palpate pulses in the left foot. CT from 1 week prior showed three-vessel runoff. Ordered ultrasound to evaluate arterial flow. No signs of acute sepsis. White count normal.  No signs of purulence. She does have some boggy fibrous tissue along her flexor compartment of the anterior muscle compartment. Fair amount of exposed flexor tendon which concerns me. Would not be a good candidate for any kind of skin graft in the future given his tendon. I will have Ortho come by and evaluate this and give some input. Given her age I am not sure what kind of candidate she really is for any sort of reconstructive procedure. We will follow up the results of the ultrasound and might have to have her revascularized. Either way, no acute indication for debridement. Likely can all be managed as an outpatient. As needed pain control. Do not see a reason for antibiotics at this time. Recommend wound care evaluation and long-term home health wound care. Okay to eat given no acute indication for surgery. Signed By: Jose G Rod MD 
Bariatric and General Surgeon [address] [place] [address] [facility] May 7, 2021

## 2021-05-07 NOTE — PROGRESS NOTES
SAGAR: 
RUR: 26% Disposition:  Home with gamal Conner [phone]) with Home Health PT/OT. Reason for Admission:  Wound infection RUR Score:   26% PCP: First and Last name:   Bryan Kaufman MD 
 
 Name of Practice: [facility] Are you a current patient: Yes/No: yes Approximate date of last visit: 3/12/21 Can you participate in a virtual visit if needed:  yes Do you (patient/family) have any concerns for transition/discharge? Plan for utilizing home health: To be determined Current Advanced Directive/Advance Care Plan:  DNR Healthcare Decision Maker:  
Click here to complete [address] including selection of the Healthcare Decision Maker Relationship (ie \"Primary\") Transition of Care Plan:      Home with Home health. Chart reviewed. Patient recently d/c'd from  [facility] (5/6/21). Patient seen in the ED om 5/6/21 with ulcer of lower extremity, cellulitis of left lower extremity and elevated lactic acid level. The patient had a previous admission 4/6/21- 4/12/21 at [facility]. The patient is known to Anirudh Hernandez MD and her last PCP office visit was  3/12/21. CM continuing to follow. CM received call earlier today from [facility]. Patient will need [facility] orders for [facility] PT/OT. Valentina Lopez, [address], [address] [facility]

## 2021-05-07 NOTE — PROGRESS NOTES
Hospitalist Progress Note NAME: Glendy Gong :  1927 MRN:  [id number] Subjective:  
Daily Progress Note: 2021 8:27 AM 
 
 
Chief complaint: admitted for LLE wounds Patient seen and examined chart reviewed. She is doing well and claims for surgery today. No cp/ob/n/v/d. She has LLE pain. Current Facility-Administered Medications Medication Dose Route Frequency  rosuvastatin (CRESTOR) tablet 20 mg  20 mg Oral QHS  arformoteroL (BROVANA) neb solution 15 mcg  15 mcg Nebulization BID RT And  
 budesonide (PULMICORT) 500 mcg/2 ml nebulizer suspension  500 mcg Nebulization BID RT  
 timolol (TIMOPTIC) 0.5 % ophthalmic solution 1 Drop  1 Drop Both Eyes BID  metoprolol succinate (TOPROL-XL) XL tablet 25 mg  25 mg Oral DAILY  latanoprost (XALATAN) 0.005 % ophthalmic solution 1 Drop  1 Drop Both Eyes QPM  
 oxyCODONE IR (ROXICODONE) tablet 5 mg  5 mg Oral Q4H PRN  
 melatonin tablet 3 mg  3 mg Oral QHS  sodium chloride (NS) flush 5-40 mL  5-40 mL IntraVENous Q8H  
 sodium chloride (NS) flush 5-40 mL  5-40 mL IntraVENous PRN  
 acetaminophen (TYLENOL) tablet 650 mg  650 mg Oral Q6H PRN Or  
 acetaminophen (TYLENOL) suppository 650 mg  650 mg Rectal Q6H PRN  polyethylene glycol (MIRALAX) packet 17 g  17 g Oral DAILY PRN  
 heparin (porcine) injection 5,000 Units  5,000 Units SubCUTAneous Q8H  
 cefTRIAXone (ROCEPHIN) 1 g in 0.9% sodium chloride (MBP/ADV) 50 mL MBP  1 g IntraVENous Q24H  Vancomycin- pharmacy to dose   Other Rx Dosing/Monitoring  vancomycin (VANCOCIN) 1,000 mg in 0.9% sodium chloride 250 mL (VIAL-MATE)  1,000 mg IntraVENous Q24H Objective:  
 
Visit Vitals BP (!) 131/57 (BP 1 Location: Right arm, BP Patient Position: At rest) Pulse 84 Temp 98.6 °F (37 °C) Resp 16 Ht 5' 4\" (1.626 m) Wt 63.5 kg (140 lb) SpO2 97% BMI 24.03 kg/m² O2 Flow Rate (L/min): 2 l/min O2 Device: Nasal cannula Temp (24hrs), Av.9 °F (36.6 °C), Min:97.6 °F (36.4 °C), Max:98.6 °F (37 °C) PHYSICAL EXAM: 
Gen thin built and nourished CVS normal S1S2 Lungs CTA 
abd soft ND Ext moves all Skin LLE dressing Neuro AAO  X 3 Psych normal affect Data Review Recent Results (from the past 24 hour(s)) SAMPLES BEING HELD Collection Time: 05/06/21  2:11 PM  
Result Value Ref Range SAMPLES BEING HELD 1PST,1LAV,1RED,2BC(LAV,LUL) COMMENT Add-on orders for these samples will be processed based on acceptable specimen integrity and analyte stability, which may vary by analyte. LACTIC ACID Collection Time: 05/06/21  2:11 PM  
Result Value Ref Range Lactic acid 4.0 (HH) 0.4 - 2.0 MMOL/L  
C REACTIVE PROTEIN, QT Collection Time: 05/06/21  2:11 PM  
Result Value Ref Range C-Reactive protein 6.32 (H) 0.00 - 0.60 mg/dL CBC WITH AUTOMATED DIFF Collection Time: 05/06/21  2:11 PM  
Result Value Ref Range WBC 8.3 3.6 - 11.0 K/uL  
 RBC 3.97 3.80 - 5.20 M/uL  
 HGB 10.4 (L) 11.5 - 16.0 g/dL HCT 35.2 35.0 - 47.0 % MCV 88.7 80.0 - 99.0 FL  
 MCH 26.2 26.0 - 34.0 PG  
 MCHC 29.5 (L) 30.0 - 36.5 g/dL  
 RDW 19.6 (H) 11.5 - 14.5 % PLATELET 831 525 - 972 K/uL MPV 10.4 8.9 - 12.9 FL  
 NRBC 0.0 0  WBC ABSOLUTE NRBC 0.00 0.00 - 0.01 K/uL NEUTROPHILS 63 32 - 75 % LYMPHOCYTES 22 12 - 49 % MONOCYTES 11 5 - 13 % EOSINOPHILS 2 0 - 7 % BASOPHILS 1 0 - 1 % IMMATURE GRANULOCYTES 1 (H) 0.0 - 0.5 % ABS. NEUTROPHILS 5.3 1.8 - 8.0 K/UL  
 ABS. LYMPHOCYTES 1.8 0.8 - 3.5 K/UL  
 ABS. MONOCYTES 0.9 0.0 - 1.0 K/UL  
 ABS. EOSINOPHILS 0.2 0.0 - 0.4 K/UL  
 ABS. BASOPHILS 0.1 0.0 - 0.1 K/UL  
 ABS. IMM. GRANS. 0.1 (H) 0.00 - 0.04 K/UL  
 DF AUTOMATED METABOLIC PANEL, COMPREHENSIVE Collection Time: 05/06/21  2:11 PM  
Result Value Ref Range Sodium 135 (L) 136 - 145 mmol/L Potassium 3.7 3.5 - 5.1 mmol/L Chloride 102 97 - 108 mmol/L  
 CO2 25 21 - 32 mmol/L  Anion gap 8 5 - 15 mmol/L Glucose 95 65 - 100 mg/dL BUN 15 6 - 20 MG/DL Creatinine 0.83 0.55 - 1.02 MG/DL  
 BUN/Creatinine ratio 18 12 - 20 GFR est AA >60 >60 ml/min/1.73m2 GFR est non-AA >60 >60 ml/min/1.73m2 Calcium 8.4 (L) 8.5 - 10.1 MG/DL Bilirubin, total 0.5 0.2 - 1.0 MG/DL  
 ALT (SGPT) 13 12 - 78 U/L  
 AST (SGOT) 16 15 - 37 U/L Alk. phosphatase 113 45 - 117 U/L Protein, total 6.0 (L) 6.4 - 8.2 g/dL Albumin 2.6 (L) 3.5 - 5.0 g/dL Globulin 3.4 2.0 - 4.0 g/dL A-G Ratio 0.8 (L) 1.1 - 2.2 SED RATE (ESR) Collection Time: 05/06/21  2:11 PM  
Result Value Ref Range Sed rate, automated 34 (H) 0 - 30 mm/hr CULTURE, BLOOD Collection Time: 05/06/21  2:55 PM  
 Specimen: Blood Result Value Ref Range Special Requests: NO SPECIAL REQUESTS Culture result: NO GROWTH AFTER 8 HOURS    
EKG, 12 LEAD, INITIAL Collection Time: 05/06/21  3:08 PM  
Result Value Ref Range Ventricular Rate 69 BPM  
 Atrial Rate 59 BPM  
 QRS Duration 88 ms Q-T Interval 410 ms QTC Calculation (Bezet) 439 ms Calculated R Axis 1 degrees Calculated T Axis 110 degrees Diagnosis Atrial fibrillation with premature ventricular or aberrantly conducted  
complexes ST & T wave abnormality, consider lateral ischemia When compared with ECG of 06-APR-2021 03:32, 
ST no longer depressed in Inferior leads T wave inversion no longer evident in Inferior leads SAMPLES BEING HELD Collection Time: 05/06/21  6:09 PM  
Result Value Ref Range SAMPLES BEING HELD 1 UA   
 COMMENT Add-on orders for these samples will be processed based on acceptable specimen integrity and analyte stability, which may vary by analyte. URINE CULTURE HOLD SAMPLE Collection Time: 05/06/21  6:09 PM  
 Specimen: Serum Result Value Ref Range Urine culture hold Urine on hold in Microbiology dept for 2 days.   If unpreserved urine is submitted, it cannot be used for addtional testing after 24 hours, recollection will be required. CBC WITH AUTOMATED DIFF Collection Time: 05/07/21  3:22 AM  
Result Value Ref Range WBC 7.9 3.6 - 11.0 K/uL  
 RBC 3.82 3.80 - 5.20 M/uL  
 HGB 10.0 (L) 11.5 - 16.0 g/dL HCT 33.6 (L) 35.0 - 47.0 % MCV 88.0 80.0 - 99.0 FL  
 MCH 26.2 26.0 - 34.0 PG  
 MCHC 29.8 (L) 30.0 - 36.5 g/dL  
 RDW 19.8 (H) 11.5 - 14.5 % PLATELET 782 537 - 549 K/uL MPV 9.8 8.9 - 12.9 FL  
 NRBC 0.0 0  WBC ABSOLUTE NRBC 0.00 0.00 - 0.01 K/uL NEUTROPHILS 62 32 - 75 % LYMPHOCYTES 24 12 - 49 % MONOCYTES 10 5 - 13 % EOSINOPHILS 2 0 - 7 % BASOPHILS 1 0 - 1 % IMMATURE GRANULOCYTES 1 (H) 0.0 - 0.5 % ABS. NEUTROPHILS 5.0 1.8 - 8.0 K/UL  
 ABS. LYMPHOCYTES 1.9 0.8 - 3.5 K/UL  
 ABS. MONOCYTES 0.8 0.0 - 1.0 K/UL  
 ABS. EOSINOPHILS 0.1 0.0 - 0.4 K/UL  
 ABS. BASOPHILS 0.1 0.0 - 0.1 K/UL  
 ABS. IMM. GRANS. 0.1 (H) 0.00 - 0.04 K/UL  
 DF AUTOMATED METABOLIC PANEL, BASIC Collection Time: 05/07/21  3:22 AM  
Result Value Ref Range Sodium 134 (L) 136 - 145 mmol/L Potassium 4.0 3.5 - 5.1 mmol/L Chloride 102 97 - 108 mmol/L  
 CO2 23 21 - 32 mmol/L Anion gap 9 5 - 15 mmol/L Glucose 94 65 - 100 mg/dL BUN 20 6 - 20 MG/DL Creatinine 0.64 0.55 - 1.02 MG/DL  
 BUN/Creatinine ratio 31 (H) 12 - 20 GFR est AA >60 >60 ml/min/1.73m2 GFR est non-AA >60 >60 ml/min/1.73m2 Calcium 8.0 (L) 8.5 - 10.1 MG/DL No results found for this visit on 05/06/21. All Micro Results Procedure Component Value Units Date/Time URINE CULTURE HOLD SAMPLE [[id number]] Collected: 05/06/21 1809 Order Status: Completed Specimen: Serum Updated: 05/06/21 1821 Urine culture hold Urine on hold in Microbiology dept for 2 days. If unpreserved urine is submitted, it cannot be used for addtional testing after 24 hours, recollection will be required. CULTURE, BLOOD, PAIRED [[id number]] Order Status: Sent Specimen: Blood Radiology reports and films for the last 24 hours have been reviewed. Assessment/Plan:  
 
  
LLE wound 
-surgery consulted (Dr. George Leo), appreciate rec's 
-vanc and ceftriaxone 
-wound care 
-tylenol/roxicodone for pain 
-follow blood cultures 
- XRay no bone invovement - possible debriment today ? 
  
Reactive airway disease -brovana and pulmicort per hosp formulary Glaucoma-continue xalatan, and timolol Dyslipidemia/PVD -continue statin,  ASA Permantent Afib-not on AC, rate controlled HTN-continue metoprolol Hx breast and active ovarian CA-port R chest,chemo on hold due to patient with multiple hospitalizations and rehab stays 
  
 
dvt ppx; heparin MPOA: navid wong (daughter) Code: DNR Dispo: TBD Signed By: Hamzah Garcia MD   
 May 7, 2021

## 2021-05-07 NOTE — CONSULTS
ORTHOPEDIC CONSULT NOTE Subjective:  
 
Date of Consultation:  May 7, 2021 Referring Physician:  Edel Adler MD 
 
Aileen Denver is a 80 y.o. female with a significant medical history of aortic stenosis, pulm htn, GERD, glaucoma, remote breast CA, HTN, and recent ovarian cancer with port-a-cath s/p chemo times two who presents with complications of traumatic wound to the left shin that has failed to heal. The patient states she injured her left shin 3/30/2021 when her walker fell on her leg, leaving a laceration that was repaired in the ED with sutures. One week following the sutures being placed, the wound began to open and she had admitted to the hospital for wound infection and was later discharged to [facility] rehab on 4/12/2021. She was discharged from rehab and went to see Dr. Eli Severs on 5/6/2021, who told her to go to the ED as her wound was too serious to be managed in the clinic. She was on oral Levaquin until 5/5/2021. She reports that the left lower leg is painful and weeps fluid on occasion.   
  
 
Patient Active Problem List  
 Diagnosis Date Noted  Leg ulcer, left ([address] 05/06/2021  Hyponatremia 04/06/2021  Permanent atrial fibrillation ([address] 04/06/2021  
 Ovarian cancer ([address] 04/06/2021  Anemia 04/06/2021  Mixed hyperlipidemia 10/15/2020  Nonrheumatic aortic valve stenosis 10/15/2020  Bicuspid aortic valve 02/01/2018  PAF (paroxysmal atrial fibrillation) ([address] 02/01/2018  Basilar artery aneurysm ([address] 09/15/2015  Orthostatic hypotension 06/05/2015  Leg edema 01/30/2014  Hemorrhoids 10/29/2013  BRBPR (bright red blood per rectum) 10/17/2013  PAD (peripheral artery disease) ([address] 06/04/2013  Atherosclerosis of native arteries of the extremities with intermittent claudication 06/04/2013  Paroxysmal ventricular tachycardia ([address] 01/10/2013  Shortness of breath 01/10/2013  PVC's (premature ventricular contractions) 12/18/2012  Colitis 12/18/2012  First degree AV block 2012  
 HTN (hypertension) 2012  Glaucoma 2012  Vitamin D deficiency 2012  Hypercholesteremia 2012  Arthritis 2012  History  of basal cell carcinoma 2012  History of breast cancer 2012  Vertigo 2012  Wears hearing aid 2012 Family History Problem Relation Age of Onset  Heart Disease Mother  Heart Disease Father   
      age 55  
 Heart Disease Brother  Stroke Brother  Cancer Sister Colon Cancer  Diabetes Sister  Heart Disease Sister  Heart Disease Sister  Diabetes Sister  Other Brother MVA  Heart Disease Brother  Heart Disease Son   
      age 52 Social History Tobacco Use  Smoking status: Former Smoker Types: Cigarettes Quit date: 1955 Years since quittin.3  Smokeless tobacco: Never Used Substance Use Topics  Alcohol use: Not Currently Comment: rare Past Medical History:  
Diagnosis Date  Arthritis  Cancer ([facility] breast    (leg,ear,arm-skin cancer)  GERD (gastroesophageal reflux disease)  Hypertension  Other ill-defined conditions(799.89)   
 glaucoma  Other ill-defined conditions(799.89)   
 cellulitis left arm  Other ill-defined conditions(799.89)   
 carpal tunnel syndrome left hand  Other ill-defined conditions(799.89)   
 vertigo  Ovarian cancer ([facility] 2020 Past Surgical History:  
Procedure Laterality Date  CARDIAC CATHETERIZATION  1/10/2013  HX APPENDECTOMY  HX BILATERAL MASTECTOMY Bilateral   
 HX CARPAL TUNNEL RELEASE    
 left hand  HX CATARACT REMOVAL    
 bilateral  
 HX COLONOSCOPY  2012  HX KNEE ARTHROSCOPY    
 left  HX OTHER SURGICAL Basal cell skin cancer removed from left leg, left arm and neck  HX OTHER SURGICAL    
 cyst removed from left thigh  HX PARTIAL HYSTERECTOMY  HX OREN AND BSO  HX TONSILLECTOMY  VASCULAR SURGERY PROCEDURE UNLIST    
 vascular blockages removed. Prior to Admission medications Medication Sig Start Date End Date Taking? Authorizing Provider  
rosuvastatin (CRESTOR) 20 mg tablet TAKE 1 TABLET NIGHTLY 4/19/21   Layla Mendiola MD  
albuterol-ipratropium (DUO-NEB) 2.5 mg-0.5 mg/3 ml nebu 3 mL by Nebulization route every four (4) hours as needed for Wheezing or Shortness of Breath. 4/12/21   Marthann Goldberg, MD  
aspirin delayed-release 81 mg tablet Take 1 Tab by mouth daily. 4/13/21   Marthann Goldberg, MD  
metoprolol succinate (TOPROL-XL) 25 mg XL tablet Take 0.5 Tabs by mouth daily. 4/13/21   Marthann Goldberg, MD  
dexamethasone in 0.9 % sod chl (DEXAMETHASONE-0.9 % SOD. CHLOR IV) 10 mg by IntraVENous route. Every 28 days pre treatment    Provider, Historical  
Breo Ellipta 200-25 mcg/dose inhaler TAKE 1 PUFF DAILY 3/3/21   Provider, Historical  
diclofenac (VOLTAREN) 1 % gel APPLY 1 APPLICATION TOPICALLY 4 (FOUR) TIMES A DAY AS NEEDED (PAIN) 11/25/20   Provider, Historical  
timolol (TIMOPTIC-XE) 0.5 % ophthalmic gel-forming Administer 1 Drop to both eyes daily. Provider, Historical  
CYANOCOBALAMIN, VITAMIN B-12, (VITAMIN B-12 PO) Take 1,000 mcg by mouth daily. Provider, Historical  
latanoprost (XALATAN) 0.005 % ophthalmic solution Administer 1 Drop to both eyes nightly. 6/8/15   Provider, Historical  
cycloSPORINE (RESTASIS) 0.05 % ophthalmic emulsion Administer 1 Drop to both eyes two (2) times a day. Provider, Historical  
esomeprazole (NEXIUM) 40 mg capsule Take 1 Cap by mouth Daily (before breakfast). 8/6/14   Patrizia Shearer MD  
Cholecalciferol, Vitamin D3, (VITAMIN D3) 1,000 unit cap Take 1 Cap by mouth two (2) times a day. Provider, Historical  
omega-3 fatty acids-vitamin e (FISH OIL) 1,000 mg Cap Take 1 Cap by mouth two (2) times a day.       Provider, Historical  
multivitamins-minerals-lutein (CENTRUM SILVER) Tab Take 1 Tab by mouth daily. Provider, Historical  
 
Current Facility-Administered Medications Medication Dose Route Frequency  rosuvastatin (CRESTOR) tablet 20 mg  20 mg Oral QHS  arformoteroL (BROVANA) neb solution 15 mcg  15 mcg Nebulization BID RT And  
 budesonide (PULMICORT) 500 mcg/2 ml nebulizer suspension  500 mcg Nebulization BID RT  
 timolol (TIMOPTIC) 0.5 % ophthalmic solution 1 Drop  1 Drop Both Eyes BID  metoprolol succinate (TOPROL-XL) XL tablet 25 mg  25 mg Oral DAILY  latanoprost (XALATAN) 0.005 % ophthalmic solution 1 Drop  1 Drop Both Eyes QPM  
 oxyCODONE IR (ROXICODONE) tablet 5 mg  5 mg Oral Q4H PRN  
 melatonin tablet 3 mg  3 mg Oral QHS  morphine injection 2 mg  2 mg IntraVENous Q2H PRN  
 [START ON 5/8/2021] collagenase (SANTYL) 250 unit/gram ointment   Topical DAILY  sodium chloride (NS) flush 5-40 mL  5-40 mL IntraVENous Q8H  
 sodium chloride (NS) flush 5-40 mL  5-40 mL IntraVENous PRN  
 acetaminophen (TYLENOL) tablet 650 mg  650 mg Oral Q6H PRN Or  
 acetaminophen (TYLENOL) suppository 650 mg  650 mg Rectal Q6H PRN  polyethylene glycol (MIRALAX) packet 17 g  17 g Oral DAILY PRN  
 heparin (porcine) injection 5,000 Units  5,000 Units SubCUTAneous Q8H  
 cefTRIAXone (ROCEPHIN) 1 g in 0.9% sodium chloride (MBP/ADV) 50 mL MBP  1 g IntraVENous Q24H  Vancomycin- pharmacy to dose   Other Rx Dosing/Monitoring  vancomycin (VANCOCIN) 1,000 mg in 0.9% sodium chloride 250 mL (VIAL-MATE)  1,000 mg IntraVENous Q24H Allergies Allergen Reactions  Augmentin [Amoxicillin-Pot Clavulanate] Hives and Itching  Doxycycline Hives and Itching  Keflex [Cephalexin] Hives and Itching  Monodox [Doxycycline Monohydrate] Other (comments)  Sulfa (Sulfonamide Antibiotics) Rash Review of Systems:  A comprehensive review of systems was negative except for that written in the HPI.  
 
 
Objective:  
 
Patient Vitals for the past 8 hrs: 
 BP Temp Pulse Resp SpO2  
21 1420 (!) 145/65 98.9 °F (37.2 °C) 76 17 96 % Temp (24hrs), Av.3 °F (36.8 °C), Min:97.6 °F (36.4 °C), Max:98.9 °F (37.2 °C) ORTHO EXAM:  alert, cooperative, no distress, appears stated age LLE MSK: Deep ulceration of the LLE to the anterior shin with soft tissue/tendinous exposure. Skin sloughing to the ulcer borders with poorly granulated tissue. Skin surrounding ulcer is moist, boggy, and warm. Significant swelling to the dorsum of the left foot. Faint DP pulse with poor cap refill. Able to wiggle toes and 5/5 PF/DF. IMAGING REVIEW: 
EXAM: XR TIB/FIB LT 
  
INDICATION: Status post fall with left leg laceration and persistent wound. 
  
COMPARISON: 2021. 
  
FINDINGS: AP and lateral  views of the left tibia and fibula demonstrate 
pretibial soft tissue ulceration without acute osseous abnormality. 
  
IMPRESSION Pretibial soft tissue ulceration without acute osseous abnormality. EXAM: CT LOW EXT LT W CONT 
  
INDICATION: Evaluate leg for abscess. Peripheral vascular disease. Patient has 
history of breast cancer and ovarian cancer.  
  
COMPARISON: None 
  
CONTRAST: 100 mL of Isovue-300. 
  
TECHNIQUE: Helical CT of the left leg during uneventful rapid bolus intravenous 
contrast administration. Coronal and Sagittal reformats were generated. Images 
reviewed in soft tissue and bone windows. CT dose reduction was achieved through 
the use of a standardized protocol tailored for this examination and automatic 
exposure control for dose modulation. 
  
FINDINGS: There is cutaneous ulceration of the anteromedial and anterior distal 
third of the leg with subcutaneous gas demonstrated in several locations 
extending to the level of the distal tibial epiphysis. There is diffuse 
enlargement of the subcutaneous compartment with diffuse edema. Confluent 
anteriorly and posteriorly in the distal leg and circumferentially in the ankle.  
Similar findings are shown at the dorsal and lateral foot. Subcutaneous gas 
about the tendon of the tibialis anterior. A defined collection is not shown. Edema extends deep to the superficial fascia in the posterior compartment within 
the superficial component of the gastrocnemius muscles and within the fascial 
plane between the soleus and gastrocnemius muscles. The bones are moderately to 
severely osteopenic. No fracture or CT evidence of osteomyelitis is shown. There 
is mild knee osteoarthrosis. No knee or ankle effusion is demonstrated. There is 
moderate fluid within the plantar tendon sheaths of the flexor digitorum longus 
and flexor hallucis longus. 
  
IMPRESSION Extensive cutaneous disruption of the anterior and anteromedial distal leg with 
subcutaneous gas including locules abutting the tendon of the tibialis anterior. Edema extends into the posterior compartment with involvement of the superficial 
aspect of the gastrocnemius muscles and the fascial plane between the 
gastrocnemius and soleus muscles. No abscess demonstrated. 
  
 
Labs:  
Recent Results (from the past 24 hour(s)) SAMPLES BEING HELD Collection Time: 05/06/21  6:09 PM  
Result Value Ref Range SAMPLES BEING HELD 1 UA   
 COMMENT Add-on orders for these samples will be processed based on acceptable specimen integrity and analyte stability, which may vary by analyte. URINE CULTURE HOLD SAMPLE Collection Time: 05/06/21  6:09 PM  
 Specimen: Serum Result Value Ref Range Urine culture hold Urine on hold in Microbiology dept for 2 days. If unpreserved urine is submitted, it cannot be used for addtional testing after 24 hours, recollection will be required. CBC WITH AUTOMATED DIFF Collection Time: 05/07/21  3:22 AM  
Result Value Ref Range WBC 7.9 3.6 - 11.0 K/uL  
 RBC 3.82 3.80 - 5.20 M/uL  
 HGB 10.0 (L) 11.5 - 16.0 g/dL HCT 33.6 (L) 35.0 - 47.0 %  MCV 88.0 80.0 - 99.0 FL  
 MCH 26.2 26.0 - 34.0 PG  
 MCHC 29.8 (L) 30.0 - 36.5 g/dL  
 RDW 19.8 (H) 11.5 - 14.5 % PLATELET 439 988 - 328 K/uL MPV 9.8 8.9 - 12.9 FL  
 NRBC 0.0 0  WBC ABSOLUTE NRBC 0.00 0.00 - 0.01 K/uL NEUTROPHILS 62 32 - 75 % LYMPHOCYTES 24 12 - 49 % MONOCYTES 10 5 - 13 % EOSINOPHILS 2 0 - 7 % BASOPHILS 1 0 - 1 % IMMATURE GRANULOCYTES 1 (H) 0.0 - 0.5 % ABS. NEUTROPHILS 5.0 1.8 - 8.0 K/UL  
 ABS. LYMPHOCYTES 1.9 0.8 - 3.5 K/UL  
 ABS. MONOCYTES 0.8 0.0 - 1.0 K/UL  
 ABS. EOSINOPHILS 0.1 0.0 - 0.4 K/UL  
 ABS. BASOPHILS 0.1 0.0 - 0.1 K/UL  
 ABS. IMM. GRANS. 0.1 (H) 0.00 - 0.04 K/UL  
 DF AUTOMATED METABOLIC PANEL, BASIC Collection Time: 05/07/21  3:22 AM  
Result Value Ref Range Sodium 134 (L) 136 - 145 mmol/L Potassium 4.0 3.5 - 5.1 mmol/L Chloride 102 97 - 108 mmol/L  
 CO2 23 21 - 32 mmol/L Anion gap 9 5 - 15 mmol/L Glucose 94 65 - 100 mg/dL BUN 20 6 - 20 MG/DL Creatinine 0.64 0.55 - 1.02 MG/DL  
 BUN/Creatinine ratio 31 (H) 12 - 20 GFR est AA >60 >60 ml/min/1.73m2 GFR est non-AA >60 >60 ml/min/1.73m2 Calcium 8.0 (L) 8.5 - 10.1 MG/DL Impression:  
 
Patient Active Problem List  
 Diagnosis Date Noted  Leg ulcer, left ([facility] 05/06/2021  Hyponatremia 04/06/2021  Permanent atrial fibrillation ([facility] 04/06/2021  
 Ovarian cancer ([facility] 04/06/2021  Anemia 04/06/2021  Mixed hyperlipidemia 10/15/2020  Nonrheumatic aortic valve stenosis 10/15/2020  Bicuspid aortic valve 02/01/2018  PAF (paroxysmal atrial fibrillation) ([facility] 02/01/2018  Basilar artery aneurysm ([facility] 09/15/2015  Orthostatic hypotension 06/05/2015  Leg edema 01/30/2014  Hemorrhoids 10/29/2013  BRBPR (bright red blood per rectum) 10/17/2013  PAD (peripheral artery disease) ([facility] 06/04/2013  Atherosclerosis of native arteries of the extremities with intermittent claudication 06/04/2013  Paroxysmal ventricular tachycardia ([facility] 01/10/2013  Shortness of breath 01/10/2013  PVC's (premature ventricular contractions) 12/18/2012  Colitis 12/18/2012  First degree AV block 12/18/2012  
 HTN (hypertension) 12/05/2012  Glaucoma 11/07/2012  Vitamin D deficiency 11/07/2012  Hypercholesteremia 11/07/2012  Arthritis 11/07/2012  History  of basal cell carcinoma 11/07/2012  History of breast cancer 11/07/2012  Vertigo 11/07/2012  Wears hearing aid 11/07/2012 Active Problems: 
  Leg ulcer, left ([address] (5/6/2021) ASSESSMENT:  
Chronic left leg ulceration Plan: ORTHOPEDIC PLAN: 
D/w Dr. Judson Tripp, who also d/w Dr. Hansel Caruso. Ortho plan: No surgical plans at this time. Based off of history and presentation, there is a concern for vascular issues given minor trauma resulting in large, nonhealing wound. Vascular consulted to weigh in and provide their recs. Wound care following. [place] [facility] [place], [place] [facility]

## 2021-05-07 NOTE — PROGRESS NOTES
Problem: Self Care Deficits Care Plan (Adult) Goal: *Acute Goals and Plan of Care (Insert Text) Description:  
FUNCTIONAL STATUS PRIOR TO ADMISSION: Patient recently d/c home from SNF. She reports she was ambulatory in therapy using RW up to 120' with a chair follow. Otherwise functioning at wheelchair level and did not require assistance for transfers or ADLs. HOME SUPPORT: Patient lived at home with her niece staying with her to provide assistance. Occupational Therapy Goals Initiated 5/7/2021 1. Patient will perform lower body dressing with supervision within 7 day(s). 2.  Patient will perform bathing with supervision  within 7 day(s). 3.  Patient will perform toilet transfers with supervision  within 7 day(s). 4.  Patient will perform all aspects of toileting with supervision within 7 day(s). 5.  Patient will participate in upper extremity therapeutic exercise/activities with supervision/set-up for 10 minutes within 7 day(s). 6.  Patient will utilize energy conservation techniques during functional activities with verbal cues within 7 day(s). Outcome: Not Met OCCUPATIONAL THERAPY EVALUATION Patient: Neel Lackey (43 y.o. female) Date: 5/7/2021 Primary Diagnosis: Leg ulcer, left ([facility] [C41.002] Precautions: fall ASSESSMENT Patient recently d/c home from SNF. She reports she was ambulatory in therapy using RW up to 120' with a chair follow. Otherwise functioning at wheelchair level and did not require assistance for transfers or ADLs. Her niece was staying with her at home. She is now admitted to [facility] for LLE wound. Noted patient transferred with [facility] in PT evaluation today and was received seated in chair for OT. Patient has a wheelchair, shower chair, and a RW and her niece intends to purchase a BSC as well. Patient has impaired visual acuity d/t macular degeneration and good BUE AROM/ strength/ coordination.   Patient demonstrated good functional reach to UB and LB and declined additional mobility practice. Pending medical course/ possible LLE surgery, recommend d/c home with HHOT and niece's support for mobility and ADLs. Current Level of Function Impacting Discharge (ADLs/self-care): contact guard assistance Functional Outcome Measure: The patient scored 65/100 on the Barthel Index outcome measure which is indicative of ~35% impairment in functional performance. Patient will benefit from skilled therapy intervention to address the above noted impairments. PLAN : 
Recommendations and Planned Interventions: self care training, functional mobility training, therapeutic exercise, balance training, therapeutic activities, endurance activities, patient education, home safety training, and family training/education Frequency/Duration: Patient will be followed by occupational therapy 3 times a week to address goals. Recommendation for discharge: (in order for the patient to meet his/her long term goals) Occupational therapy at least 2 days/week in the home AND ensure assist and/or supervision for safety with transfers and ADLs This discharge recommendation: 
Has not yet been discussed the attending provider and/or case management SUBJECTIVE:  
Patient stated I can do all of that myself.  (ADLs) OBJECTIVE DATA SUMMARY:  
HISTORY:  
Past Medical History:  
Diagnosis Date Arthritis Cancer ([facility] breast    (leg,ear,arm-skin cancer) GERD (gastroesophageal reflux disease) Hypertension Other ill-defined conditions(799.89)   
 glaucoma Other ill-defined conditions(799.89)   
 cellulitis left arm Other ill-defined conditions(799.89)   
 carpal tunnel syndrome left hand Other ill-defined conditions(799.89)   
 vertigo Ovarian cancer ([facility] 11/2020 Past Surgical History:  
Procedure Laterality Date CARDIAC CATHETERIZATION  1/10/2013 HX APPENDECTOMY  HX BILATERAL MASTECTOMY Bilateral   
 HX CARPAL TUNNEL RELEASE    
 left hand HX CATARACT REMOVAL    
 bilateral  
 HX COLONOSCOPY  4/2012 HX KNEE ARTHROSCOPY    
 left HX OTHER SURGICAL Basal cell skin cancer removed from left leg, left arm and neck HX OTHER SURGICAL    
 cyst removed from left thigh HX PARTIAL HYSTERECTOMY HX OREN AND BSO    
 HX TONSILLECTOMY    
 VASCULAR SURGERY PROCEDURE UNLIST    
 vascular blockages removed. EXAMINATION OF PERFORMANCE DEFICITS: 
Cognitive/Behavioral Status: 
Neurologic State: Alert Cognition: Follows commands Perception: Appears intact Perseveration: No perseveration noted Safety/Judgement: Awareness of environment; Insight into deficits Skin: visible skin appears intact. LLE wound not observed Edema: none noted Hearing: Auditory Auditory Impairment: Hard of hearing, bilateral, Hearing aid(s) Vision/Perceptual:   
    
    
    
  
    
Acuity: (impaired d/t macular degeneration, able to read headline ) Corrective Lenses: Glasses Range of Motion: 
 
AROM: Within functional limits Strength: 
 
Strength: Generally decreased, functional 
  
  
  
  
 
Coordination: 
Coordination: Within functional limits Fine Motor Skills-Upper: Left Intact; Right Intact Gross Motor Skills-Upper: Left Intact; Right Intact Tone & Sensation: 
Tone: Normal 
Sensation: Intact Balance: 
Sitting: Intact; Without support Standing: With support; Intact(needs hands on a support) Functional Mobility and Transfers for ADLs: 
Bed Mobility: 
Supine to Sit: Modified independent Sit to Supine: (up in chair after) Transfers: 
Sit to Stand: Additional time;Contact guard assistance Stand to Sit: Contact guard assistance; Additional time Bed to Chair: Contact guard assistance; Additional time ADL Assessment: 
Feeding: Independent(inferred) Oral Facial Hygiene/Grooming: Setup(inferred, seated) Bathing: Setup(inferred) Upper Body Dressing: Setup(inferred) Lower Body Dressing: Contact guard assistance(inferred) Toileting: Contact guard assistance(inferred) ADL Intervention and task modifications: 
   
 
Cognitive Retraining Safety/Judgement: Awareness of environment; Insight into deficits Functional Measure: 
Barthel Index: 
 
Bathin Bladder: 10 Bowels: 10 
Groomin Dressing: 10 Feeding: 10 Mobility: 5 Stairs: 0 Toilet Use: 5 Transfer (Bed to Chair and Back): 10 Total: 65/100 The Barthel ADL Index: Guidelines 1. The index should be used as a record of what a patient does, not as a record of what a patient could do. 2. The main aim is to establish degree of independence from any help, physical or verbal, however minor and for whatever reason. 3. The need for supervision renders the patient not independent. 4. A patient's performance should be established using the best available evidence. Asking the patient, friends/relatives and nurses are the usual sources, but direct observation and common sense are also important. However direct testing is not needed. 5. Usually the patient's performance over the preceding 24-48 hours is important, but occasionally longer periods will be relevant. 6. Middle categories imply that the patient supplies over 50 per cent of the effort. 7. Use of aids to be independent is allowed. Min Garcia., Barthel, D.W. (2494). Functional evaluation: the Barthel Index. [facility] (14)2. MARIAH Spencer, Olya Baker., Ernie Prince., [facility], [address] [place] [address] (). Measuring the change indisability after inpatient rehabilitation; comparison of the responsiveness of the Barthel Index and Functional [place] Measure. Journal of Neurology, Neurosurgery, and Psychiatry, 66(4), 129-207. Alejandro Acosta, N.J.A, JUSTICE Greene.LESLEY, & Rubén Campos, M.A. (2004.) Assessment of post-stroke quality of life in cost-effectiveness studies:  The usefulness of the Barthel Index and the EuroQoL-5D. [facility], 14, [phone] Occupational Therapy Evaluation Charge Determination History Examination Decision-Making LOW Complexity : Brief history review  MEDIUM Complexity : 3-5 performance deficits relating to physical, cognitive , or psychosocial skils that result in activity limitations and / or participation restrictions MEDIUM Complexity : Patient may present with comorbidities that affect occupational performnce. Miniml to moderate modification of tasks or assistance (eg, physical or verbal ) with assesment(s) is necessary to enable patient to complete evaluation Based on the above components, the patient evaluation is determined to be of the following complexity level: LOW Pain Rating: 
Patient did not report pain Activity Tolerance:  
Good After treatment patient left in no apparent distress:   
Sitting in chair, Call bell within reach, and Caregiver / family present COMMUNICATION/EDUCATION:  
The patients plan of care was discussed with: Registered nurse. Home safety education was provided and the patient/caregiver indicated understanding., Patient/family have participated as able in goal setting and plan of care. , and Patient/family agree to work toward stated goals and plan of care. This patients plan of care is appropriate for delegation to [facility] [place]. Thank you for this referral. 
Srikanth Fong OT Time Calculation: 19 mins

## 2021-05-07 NOTE — ROUTINE PROCESS
TRANSFER - OUT REPORT: 
 
Verbal report given to [facility] (name) on Akhil Lockwood  being transferred to [phone] (unit) for routine progression of care Report consisted of patients Situation, Background, Assessment and  
Recommendations(SBAR). Information from the following report(s) SBAR, ED Summary and MAR was reviewed with the receiving nurse. Lines:  
Peripheral IV 05/06/21 Left Forearm (Active) Opportunity for questions and clarification was provided. Patient transported with: 
 Monitor Registered Nurse

## 2021-05-07 NOTE — WOUND CARE
WOCN Note:  
 
New consult placed for assessment of left low leg. Assessed in room 601. Family at the bedside. Chart reviewed. Admitted DX:  Leg ulcer, left Past Medical History:  
Diagnosis Date  Arthritis  Cancer ([facility] [address] breast    (leg,ear,arm-skin cancer)  GERD (gastroesophageal reflux disease)  Hypertension  Other ill-defined conditions(799.89)   
 glaucoma  Other ill-defined conditions(799.89)   
 cellulitis left arm  Other ill-defined conditions(799.89)   
 carpal tunnel syndrome left hand  Other ill-defined conditions(799.89)   
 vertigo  Ovarian cancer ([facility] [address] 11/2020 Assessment:  
Patient is A&O x 3, communicative and sitting up in chair. Bed: foam  mattress Patient reports tenderness to wound. Heels offloaded with pillows. Heels intact and boggy with blanching erythema. 1. POA Left low leg, full thickness wound with exposed tendon: 15 x 13 x 0.3 cm  100% yellow with tendon exposure. Patient reports that there is skin cancer in the distal region of the wound. Moderate serous exudate; no odor. Periwound without erythema. Margins are open. Cleansed with saline; applied wound gel; applied saline moist Opticel AG; covered with dry dressing (gauze, abd pad and roll gauze). Wound, Pressure Prevention & Skin Care Recommendations: 1. Minimize layers of linen/pads under patient to optimize support surface. 2.  Turn/reposition approximately every 2 hours and offload heels. 3.  Manage moisture/ Keep skin folds clean and dry. 4.  Left low leg wound:  Daily cleanse with saline; apply Santyl; then apply Opticel AG; cover with dry gauze, abd and roll gauze. 5.  Heels:  Venelex TID Discussed above plan with patient, family and Rose Mary Jefferson RN. Transition of Care: Plan to follow as needed while admitted to hospital. 
 
YUMIKO Hayes RN [facility] Inpatient Wound Care Available on Perfect Serve Pager [phone] Office [phone]

## 2021-05-08 NOTE — PROGRESS NOTES
Problem: Falls - Risk of 
Goal: *Absence of Falls Description: Document Soni Gum Fall Risk and appropriate interventions in the flowsheet. Outcome: Progressing Towards Goal 
Note: Fall Risk Interventions: 
Mobility Interventions: Communicate number of staff needed for ambulation/transfer Medication Interventions: Patient to call before getting OOB Elimination Interventions: Patient to call for help with toileting needs History of Falls Interventions: Door open when patient unattended Problem: Patient Education: Go to Patient Education Activity Goal: Patient/Family Education Outcome: Progressing Towards Goal

## 2021-05-08 NOTE — PROGRESS NOTES
Vascular: 
 
Patient with large full thickness wound left anterior lower leg and absent foot pulses. Needs debridement of wound in OR. Vascular studies pending today. Plan OR Monday pending results of studies. Discussed with patient's niece. Continue wound care and antibiotics.

## 2021-05-08 NOTE — CONSULTS
[facility]  Santana [facility] 
MR#:  [id number] :  1927 ACCOUNT #:  [de-identified] DATE OF SERVICE:  2021 REASON FOR CONSULTATION:  Left anterior lower leg wound. HISTORY OF PRESENT ILLNESS:  The patient is a 79-year-old female who was admitted on 2021 with a worsening left anterior leg wound. The wound has been present for several weeks and began with a traumatic injury after a fall. She was initially hospitalized at [facility] and then discharged to rehab and then readmitted to [facility] with worsening of the wound. The wound is somewhat uncomfortable but not terribly painful. The patient lives at home normally. She has a niece that has been living with her for several weeks but normally lives out of town. PAST MEDICAL HISTORY: 
1. Ovarian cancer with recent chemotherapy. 2.  Hypertension. 3.  Moderate aortic stenosis. ADMISSION MEDICATIONS: 
1. Crestor. 2.  DuoNeb nebulizer. 3.  Aspirin. 4.  Metoprolol. 5.  Breo Ellipta inhaler. 6.  Nexium. ALLERGIES:  AUGMENTIN, DOXYCYCLINE, KEFLEX, SULFA DRUGS. SOCIAL HISTORY:  The patient lives at home currently with her niece. FAMILY HISTORY:  Unremarkable. REVIEW OF SYSTEMS:  She has had no recent cardiac, respiratory, GI, , or neurologic complaints. PHYSICAL EXAMINATION: 
GENERAL:  She is an elderly female in no distress. She is awake, alert, and responsive. PERIPHERAL VASCULAR:  She has palpable femoral pulses bilaterally but no pulses distally in either leg. EXTREMITIES:  She has a large full thickness wound on the left anterior lower leg with significant devitalized tissue and exposed tendon. There are no wounds on the foot. NEUROLOGIC:  Grossly normal with intact motor and sensory function. IMPRESSION:  The patient has a significant leg wound that will require debridement in the operating room.   She is currently getting appropriate wound care and intravenous antibiotics. She has evidence of significant peripheral vascular disease based on physical exam, and this will need to be addressed as well. Noninvasive vascular studies are pending at the time of dictation. These issues were discussed with both the patient and her niece. We will plan for debridement in the operating room, and she will likely need an arteriogram as well depending on the results of her vascular studies. We will continue with the current wound care and antibiotics. Corbin Farley MD 
 
 
[id number] 
D:  05/08/2021 15:36 
T:  05/08/2021 16:55 
JOB #:  [id number]

## 2021-05-08 NOTE — PROGRESS NOTES
MD notified of pt's low diastolic BP. Morphine has been held for the moment. Will continue to monitor.

## 2021-05-08 NOTE — PROGRESS NOTES
Hospitalist Progress Note NAME: Artis Wang :  1927 MRN:  [id number] Subjective:  
Daily Progress Note: 2021 8:27 AM 
 
 
Chief complaint: admitted for LLE wounds Patient seen and examined chart reviewed. She is doing well and and according to vascular she would need wound debridement in OR on Monday next week. No cp/ob/n/v/d. Current Facility-Administered Medications Medication Dose Route Frequency  rosuvastatin (CRESTOR) tablet 20 mg  20 mg Oral QHS  arformoteroL (BROVANA) neb solution 15 mcg  15 mcg Nebulization BID RT And  
 budesonide (PULMICORT) 500 mcg/2 ml nebulizer suspension  500 mcg Nebulization BID RT  
 timolol (TIMOPTIC) 0.5 % ophthalmic solution 1 Drop  1 Drop Both Eyes BID  metoprolol succinate (TOPROL-XL) XL tablet 25 mg  25 mg Oral DAILY  latanoprost (XALATAN) 0.005 % ophthalmic solution 1 Drop  1 Drop Both Eyes QPM  
 oxyCODONE IR (ROXICODONE) tablet 5 mg  5 mg Oral Q4H PRN  
 melatonin tablet 3 mg  3 mg Oral QHS  morphine injection 2 mg  2 mg IntraVENous Q2H PRN  
 collagenase (SANTYL) 250 unit/gram ointment   Topical DAILY  sodium chloride (NS) flush 5-40 mL  5-40 mL IntraVENous Q8H  
 sodium chloride (NS) flush 5-40 mL  5-40 mL IntraVENous PRN  
 acetaminophen (TYLENOL) tablet 650 mg  650 mg Oral Q6H PRN Or  
 acetaminophen (TYLENOL) suppository 650 mg  650 mg Rectal Q6H PRN  polyethylene glycol (MIRALAX) packet 17 g  17 g Oral DAILY PRN  
 heparin (porcine) injection 5,000 Units  5,000 Units SubCUTAneous Q8H  
 cefTRIAXone (ROCEPHIN) 1 g in 0.9% sodium chloride (MBP/ADV) 50 mL MBP  1 g IntraVENous Q24H  Vancomycin- pharmacy to dose   Other Rx Dosing/Monitoring  vancomycin (VANCOCIN) 1,000 mg in 0.9% sodium chloride 250 mL (VIAL-MATE)  1,000 mg IntraVENous Q24H Objective:  
 
Visit Vitals /68 (BP 1 Location: Right arm, BP Patient Position: At rest) Pulse 79 Temp 98.2 °F (36.8 °C) Resp 18 Ht 5' 4\" (1.626 m) Wt 63.5 kg (140 lb) SpO2 96% BMI 24.03 kg/m² O2 Flow Rate (L/min): 2 l/min O2 Device: Nasal cannula Temp (24hrs), Av.5 °F (36.9 °C), Min:98.2 °F (36.8 °C), Max:98.9 °F (37.2 °C) PHYSICAL EXAM: 
Gen thin built and nourished CVS normal S1S2 Lungs CTA 
abd soft ND Ext moves all Skin LLE dressing Neuro AAO  X 3 Psych normal affect Data Review Recent Results (from the past 24 hour(s)) METABOLIC PANEL, BASIC Collection Time: 21  1:59 AM  
Result Value Ref Range Sodium 133 (L) 136 - 145 mmol/L Potassium 3.6 3.5 - 5.1 mmol/L Chloride 103 97 - 108 mmol/L  
 CO2 23 21 - 32 mmol/L Anion gap 7 5 - 15 mmol/L Glucose 107 (H) 65 - 100 mg/dL BUN 27 (H) 6 - 20 MG/DL Creatinine 0.56 0.55 - 1.02 MG/DL  
 BUN/Creatinine ratio 48 (H) 12 - 20 GFR est AA >60 >60 ml/min/1.73m2 GFR est non-AA >60 >60 ml/min/1.73m2 Calcium 7.9 (L) 8.5 - 10.1 MG/DL  
CBC WITH AUTOMATED DIFF Collection Time: 21  1:59 AM  
Result Value Ref Range WBC 6.8 3.6 - 11.0 K/uL  
 RBC 2.97 (L) 3.80 - 5.20 M/uL HGB 7.7 (L) 11.5 - 16.0 g/dL HCT 26.1 (L) 35.0 - 47.0 % MCV 87.9 80.0 - 99.0 FL  
 MCH 25.9 (L) 26.0 - 34.0 PG  
 MCHC 29.5 (L) 30.0 - 36.5 g/dL  
 RDW 19.5 (H) 11.5 - 14.5 % PLATELET 860 (L) 211 - 400 K/uL MPV 10.6 8.9 - 12.9 FL  
 NRBC 0.0 0  WBC ABSOLUTE NRBC 0.00 0.00 - 0.01 K/uL NEUTROPHILS 54 32 - 75 % LYMPHOCYTES 29 12 - 49 % MONOCYTES 13 5 - 13 % EOSINOPHILS 2 0 - 7 % BASOPHILS 1 0 - 1 % IMMATURE GRANULOCYTES 1 (H) 0.0 - 0.5 % ABS. NEUTROPHILS 3.7 1.8 - 8.0 K/UL  
 ABS. LYMPHOCYTES 2.0 0.8 - 3.5 K/UL  
 ABS. MONOCYTES 0.9 0.0 - 1.0 K/UL  
 ABS. EOSINOPHILS 0.2 0.0 - 0.4 K/UL  
 ABS. BASOPHILS 0.1 0.0 - 0.1 K/UL  
 ABS. IMM. GRANS. 0.1 (H) 0.00 - 0.04 K/UL  
 DF AUTOMATED DUPLEX LOWER EXT ARTERY LEFT Collection Time: 21  8:47 AM  
Result Value Ref Range  Left CFA prox sys .7 cm/s Left Prox PFA A .9 cm/s Left super femoral dist sys .7 cm/s Left super femoral prox sys PSV 74.1 cm/s Left popliteal dist sys PSV 63.6 cm/s Left Dist PTA PSV 36.0 cm/s Left Dist SUNITA Velocity 37.9 cm/s No results found for this visit on 05/06/21. All Micro Results Procedure Component Value Units Date/Time URINE CULTURE HOLD SAMPLE [[id number]] Collected: 05/06/21 1809 Order Status: Completed Specimen: Serum Updated: 05/06/21 1821 Urine culture hold Urine on hold in Microbiology dept for 2 days. If unpreserved urine is submitted, it cannot be used for addtional testing after 24 hours, recollection will be required. CULTURE, BLOOD, PAIRED [[id number]] Order Status: Sent Specimen: Blood Radiology reports and films for the last 24 hours have been reviewed. Assessment/Plan:  
 
  
LLE wound ? PVD- f/u [facility] 
- per vascular surgery  Cont iv abx/wound care and debriment Monday next week in OR 
-vanc and ceftriaxone 
-tylenol/roxicodone for pain 
-follow  cultures 
- XRay no bone invovement 
 
  
Reactive airway disease -brovana and pulmicort per hosp formulary Glaucoma-continue xalatan, and timolol Dyslipidemia/PVD -continue statin,  ASA Permantent Afib-not on AC, rate controlled HTN-continue metoprolol Hx breast and active ovarian CA-port R chest,chemo on hold due to patient with multiple hospitalizations and rehab stays 
  
 
dvt ppx; heparin MPOA: navid wong (daughter) Code: DNR Dispo: >48hrs Signed By: Wayne Nicholson MD   
 May 8, 2021

## 2021-05-09 NOTE — PROGRESS NOTES
Patient is going to OR with vascular on Monday, 5/10 for arteriogram/revascularization and debridement of the left lower leg and leg wound. Ortho will sign off on this pt. Please call if you have any questions.

## 2021-05-09 NOTE — PROGRESS NOTES
Hospitalist Progress Note NAME: Igor Torres :  1927 MRN:  [id number] Subjective:  
Daily Progress Note: 2021 8:27 AM 
 
 
Chief complaint: admitted for LLE wounds Left leg pain is controlled. No fever. Reports anxiety Current Facility-Administered Medications Medication Dose Route Frequency  Vancomycin trough - due  prior to the 2000 dose. Thanks! Other ONCE  
 ALPRAZolam (XANAX) tablet 0.25 mg  0.25 mg Oral QHS PRN  
 rosuvastatin (CRESTOR) tablet 20 mg  20 mg Oral QHS  arformoteroL (BROVANA) neb solution 15 mcg  15 mcg Nebulization BID RT And  
 budesonide (PULMICORT) 500 mcg/2 ml nebulizer suspension  500 mcg Nebulization BID RT  
 timolol (TIMOPTIC) 0.5 % ophthalmic solution 1 Drop  1 Drop Both Eyes BID  metoprolol succinate (TOPROL-XL) XL tablet 25 mg  25 mg Oral DAILY  latanoprost (XALATAN) 0.005 % ophthalmic solution 1 Drop  1 Drop Both Eyes QPM  
 oxyCODONE IR (ROXICODONE) tablet 5 mg  5 mg Oral Q4H PRN  
 melatonin tablet 3 mg  3 mg Oral QHS  morphine injection 2 mg  2 mg IntraVENous Q2H PRN  
 collagenase (SANTYL) 250 unit/gram ointment   Topical DAILY  sodium chloride (NS) flush 5-40 mL  5-40 mL IntraVENous Q8H  
 sodium chloride (NS) flush 5-40 mL  5-40 mL IntraVENous PRN  
 acetaminophen (TYLENOL) tablet 650 mg  650 mg Oral Q6H PRN Or  
 acetaminophen (TYLENOL) suppository 650 mg  650 mg Rectal Q6H PRN  polyethylene glycol (MIRALAX) packet 17 g  17 g Oral DAILY PRN  
 [Held by provider] heparin (porcine) injection 5,000 Units  5,000 Units SubCUTAneous Q8H  
 cefTRIAXone (ROCEPHIN) 1 g in 0.9% sodium chloride (MBP/ADV) 50 mL MBP  1 g IntraVENous Q24H  Vancomycin- pharmacy to dose   Other Rx Dosing/Monitoring  vancomycin (VANCOCIN) 1,000 mg in 0.9% sodium chloride 250 mL (VIAL-MATE)  1,000 mg IntraVENous Q24H Objective:  
 
Visit Vitals /77 (BP 1 Location: Right arm, BP Patient Position: At rest) Pulse 80 Temp 98.8 °F (37.1 °C) Resp 17 Ht 5' 4\" (1.626 m) Wt 63.5 kg (140 lb) SpO2 96% BMI 24.03 kg/m² O2 Flow Rate (L/min): 2 l/min O2 Device: Nasal cannula Temp (24hrs), Av.2 °F (36.8 °C), Min:97.6 °F (36.4 °C), Max:98.8 °F (37.1 °C) PHYSICAL EXAM: 
Gen thin built and nourished CVS normal S1S2 Lungs CTA, no crackles or wheezing 
abd soft nondistended, nontender, no guarding Ext moves all Skin LLE dressing Neuro AAO  X 3, moving all 4 extremities Psych normal affect Data Review Recent Results (from the past 24 hour(s)) GLUCOSE, POC Collection Time: 21  2:57 PM  
Result Value Ref Range Glucose (POC) 98 65 - 100 mg/dL Performed by Rossy Feliciano No results found for this visit on 21. All Micro Results Procedure Component Value Units Date/Time URINE CULTURE HOLD SAMPLE [[id number]] Collected: 21 180 Order Status: Completed Specimen: Serum Updated: 21 182 Urine culture hold Urine on hold in Microbiology dept for 2 days. If unpreserved urine is submitted, it cannot be used for addtional testing after 24 hours, recollection will be required. CULTURE, BLOOD, PAIRED [[id number]] Collected: 21 1458 Order Status: Canceled Specimen: Blood Radiology reports and films for the last 24 hours have been reviewed. Assessment/Plan:  
 
  
Left leg wound For arteriogram and revascularization in a.m. per vascular surgery Continue vancomycin, ceftriaxone 
-Pain control - per vascular surgery  Cont iv abx/wound care and debriment Monday next week in OR 
-vanc and ceftriaxone 
-No wound culture sent. Blood cultures 1/2 showing gram-positive cocci in clusters. Another set negative. Most likely contaminant Reactive airway disease -brovana and pulmicort per hosp formulary Glaucoma-continue xalatan, and timolol Dyslipidemia/PVD -continue statin,  ASA Permantent Afib-not on AC, rate controlled HTN-continue metoprolol Hx breast and active ovarian CA-port R chest,chemo on hold due to patient with multiple hospitalizations and rehab stays Anxiety: Use Xanax as needed 
 
dvt ppx; heparin MPOA: navid wong (daughter) Code: DNR Dispo: >48hrs Signed By: Dwain Hayes MD   
 May 9, 2021

## 2021-05-09 NOTE — PROGRESS NOTES
Vascular: 
 
Vascular studies confirm significant PVD. She will need an arteriogram and revascularization as feasible. Plan OR tomorrow for leg wound debridement and angio. Discussed with patient and her niece who agree with plan.

## 2021-05-09 NOTE — PROGRESS NOTES
MD notified of patients anxiety over procedure tomorrow. MD ordered xanax . 25 mg prn at bedtime and 1 for now.   Fitz Lake RN

## 2021-05-09 NOTE — PROGRESS NOTES
Pharmacist Note - Vancomycin Dosing Therapy day 4 Indication: LLE wound Current regimen: 1000 mg IV every 24 hours Recent Labs 05/08/21 
0159 05/07/21 
5048 WBC 6.8 7.9 CREA 0.56 0.64  
BUN 27* 20 A Trough Level resulted at 11.4 mcg/mL which was obtained ~22 hrs post-dose. The extrapolated \"true\" trough is approximately ~10.3 mcg/mL based on the patient's known kinetics. Goal trough: 10 - 15 mcg/mL Plan: Continue current regimen. Pharmacy will continue to monitor this patient daily for changes in clinical status and renal function.

## 2021-05-09 NOTE — PROGRESS NOTES
5/9/21 5:59 AM  
[phone] Hospital or Facility: [facility], [facility] From: Sarita Hernandez RE: Fazal Christy RM: [id number] Culture result: Abnormal Preliminary GRAM POSITIVE COCCI IN CLUSTERS GROWING IN 1 OF 2 BOTTLES DRAWN (SITE = [place]) Need Callback: NO CALLBACK Ami Chafe Unread

## 2021-05-10 NOTE — ANESTHESIA POSTPROCEDURE EVALUATION
Post-Anesthesia Evaluation and Assessment Patient: Aileen Denver MRN: [id number]  SSN: [id number] YOB: 1927  Age: 80 y.o. Sex: female I have evaluated the patient and they are stable and ready for discharge from the PACU. Cardiovascular Function/Vital Signs Visit Vitals /68 Pulse 78 Temp 36.7 °C (98 °F) Resp 16 Ht 5' 4\" (1.626 m) Wt 63.5 kg (140 lb) SpO2 90% BMI 24.03 kg/m² Patient is status post General anesthesia for Procedure(s) with comments: DEBRIDEMENT LEFT LOWER LEG WOUND. LEFT LEG ARTERIOGRAM, LEFT FEMORAL AND POPLITEAL ANGIOPLASTY - Right groin utilized as puncture site for left leg arteriogram. 
 
Nausea/Vomiting: None Postoperative hydration reviewed and adequate. Pain: 
Pain Scale 1: Numeric (0 - 10) (05/10/21 1230) Pain Intensity 1: 0 (05/10/21 1230) Managed Neurological Status:  
Neuro (WDL): Within Defined Limits (05/10/21 1208) Neuro Neurologic State: Alert (05/10/21 1208) Cognition: Follows commands (05/09/21 2017) LUE Motor Response: Purposeful (05/10/21 1208) LLE Motor Response: Purposeful (05/10/21 1208) RUE Motor Response: Purposeful (05/10/21 1208) RLE Motor Response: Purposeful (05/10/21 1208) At baseline Mental Status, Level of Consciousness: Alert and  oriented to person, place, and time Pulmonary Status:  
O2 Device: Nasal cannula (05/10/21 1230) Adequate oxygenation and airway patent Complications related to anesthesia: None Post-anesthesia assessment completed. No concerns Signed By: Mi Mckeon MD   
 May 10, 2021 Procedure(s): DEBRIDEMENT LEFT LOWER LEG WOUND. LEFT LEG ARTERIOGRAM, LEFT FEMORAL AND POPLITEAL ANGIOPLASTY. general 
 
<BSHSIANPOST> INITIAL Post-op Vital signs:  
Vitals Value Taken Time /50 05/10/21 1230 Temp 36.9 °C (98.5 °F) 05/10/21 1230 Pulse 83 05/10/21 1244 Resp 14 05/10/21 1244 SpO2 100 % 05/10/21 1228 Vitals shown include unvalidated device data.

## 2021-05-10 NOTE — PROGRESS NOTES
Hospitalist Progress Note NAME: Paulo Cline :  1927 MRN:  [id number] Subjective:  
Daily Progress Note: 5/10/2021 8:27 AM 
 
 
Chief complaint: admitted for LLE wounds Hemoglobin was low this a.m. at 6.1 transfused with 2 units of PRBCs Underwent arteriogram with I&D of left leg today No fever or chills Current Facility-Administered Medications Medication Dose Route Frequency  0.9% sodium chloride infusion 250 mL  250 mL IntraVENous PRN  
 ALPRAZolam (XANAX) tablet 0.25 mg  0.25 mg Oral QHS PRN  
 rosuvastatin (CRESTOR) tablet 20 mg  20 mg Oral QHS  arformoteroL (BROVANA) neb solution 15 mcg  15 mcg Nebulization BID RT And  
 budesonide (PULMICORT) 500 mcg/2 ml nebulizer suspension  500 mcg Nebulization BID RT  
 timolol (TIMOPTIC) 0.5 % ophthalmic solution 1 Drop  1 Drop Both Eyes BID  metoprolol succinate (TOPROL-XL) XL tablet 25 mg  25 mg Oral DAILY  latanoprost (XALATAN) 0.005 % ophthalmic solution 1 Drop  1 Drop Both Eyes QPM  
 oxyCODONE IR (ROXICODONE) tablet 5 mg  5 mg Oral Q4H PRN  
 melatonin tablet 3 mg  3 mg Oral QHS  morphine injection 2 mg  2 mg IntraVENous Q2H PRN  
 collagenase (SANTYL) 250 unit/gram ointment   Topical DAILY  sodium chloride (NS) flush 5-40 mL  5-40 mL IntraVENous Q8H  
 sodium chloride (NS) flush 5-40 mL  5-40 mL IntraVENous PRN  
 acetaminophen (TYLENOL) tablet 650 mg  650 mg Oral Q6H PRN Or  
 acetaminophen (TYLENOL) suppository 650 mg  650 mg Rectal Q6H PRN  polyethylene glycol (MIRALAX) packet 17 g  17 g Oral DAILY PRN  
 [Held by provider] heparin (porcine) injection 5,000 Units  5,000 Units SubCUTAneous Q8H  
 cefTRIAXone (ROCEPHIN) 1 g in 0.9% sodium chloride (MBP/ADV) 50 mL MBP  1 g IntraVENous Q24H  Vancomycin- pharmacy to dose   Other Rx Dosing/Monitoring  vancomycin (VANCOCIN) 1,000 mg in 0.9% sodium chloride 250 mL (VIAL-MATE)  1,000 mg IntraVENous Q24H Objective:  
 
Visit Vitals /68 Pulse 78 Temp 98 °F (36.7 °C) Resp 16 Ht 5' 4\" (1.626 m) Wt 63.5 kg (140 lb) SpO2 90% BMI 24.03 kg/m² O2 Flow Rate (L/min): 2 l/min O2 Device: Nasal cannula Temp (24hrs), Av °F (36.7 °C), Min:97.5 °F (36.4 °C), Max:98.5 °F (36.9 °C) PHYSICAL EXAM: 
Gen thin built and nourished CVS normal S1S2 Lungs CTA, no crackles or wheezing 
abd soft nondistended, nontender, no guarding Ext moves all Skin LLE dressing Neuro alert and awake, moving all 4 extremities Psych normal affect Data Review Recent Results (from the past 24 hour(s)) Kaylah Aberdeen Collection Time: 21  6:10 PM  
Result Value Ref Range Vancomycin,trough 11.4 (H) 5.0 - 10.0 ug/mL Reported dose date NOT PROVIDED Reported dose time: NOT PROVIDED Reported dose: NOT PROVIDED UNITS  
CBC W/O DIFF Collection Time: 05/10/21  2:29 AM  
Result Value Ref Range WBC 7.1 3.6 - 11.0 K/uL  
 RBC 2.33 (L) 3.80 - 5.20 M/uL HGB 6.2 (L) 11.5 - 16.0 g/dL HCT 20.6 (L) 35.0 - 47.0 % MCV 88.4 80.0 - 99.0 FL  
 MCH 26.6 26.0 - 34.0 PG  
 MCHC 30.1 30.0 - 36.5 g/dL  
 RDW 19.7 (H) 11.5 - 14.5 % PLATELET 804 (L) 846 - 400 K/uL MPV 9.8 8.9 - 12.9 FL  
 NRBC 0.0 0  WBC ABSOLUTE NRBC 0.00 0.00 - 0.01 K/uL METABOLIC PANEL, BASIC Collection Time: 05/10/21  2:29 AM  
Result Value Ref Range Sodium 132 (L) 136 - 145 mmol/L Potassium 4.2 3.5 - 5.1 mmol/L Chloride 103 97 - 108 mmol/L  
 CO2 21 21 - 32 mmol/L Anion gap 8 5 - 15 mmol/L Glucose 97 65 - 100 mg/dL BUN 25 (H) 6 - 20 MG/DL Creatinine 0.62 0.55 - 1.02 MG/DL  
 BUN/Creatinine ratio 40 (H) 12 - 20 GFR est AA >60 >60 ml/min/1.73m2 GFR est non-AA >60 >60 ml/min/1.73m2 Calcium 7.5 (L) 8.5 - 10.1 MG/DL  
RBC, ALLOCATE Collection Time: 05/10/21  4:30 AM  
Result Value Ref Range HISTORY CHECKED? Historical check performed TYPE & SCREEN  Collection Time: 05/10/21  4:32 AM  
Result Value Ref Range Crossmatch Expiration 05/13/2021,2357 ABO/Rh(D) A NEGATIVE Antibody screen NEG Unit number [id number] Blood component type RC LRIR Unit division 00 Status of unit ISSUED Crossmatch result Compatible No results found for this visit on 05/06/21. All Micro Results Procedure Component Value Units Date/Time URINE CULTURE HOLD SAMPLE [[id number]] Collected: 05/06/21 1809 Order Status: Completed Specimen: Serum Updated: 05/06/21 1821 Urine culture hold Urine on hold in Microbiology dept for 2 days. If unpreserved urine is submitted, it cannot be used for addtional testing after 24 hours, recollection will be required. CULTURE, BLOOD, PAIRED [[id number]] Collected: 05/06/21 5931 Order Status: Canceled Specimen: Blood Radiology reports and films for the last 24 hours have been reviewed. Assessment/Plan:  
 
  
Left leg wound status post left leg arteriogram with balloon angioplasty and debridement of left leg 
-Postoperative care per vascular surgery, continue vancomycin, ceftriaxone 
-Pain control 
-No wound culture sent. Blood cultures 1/2 showing gram-positive cocci in clusters. Another set negative. Most likely contaminant GPC bacteremia 1/2 likely contaminant 
-Only 1 out of 2 blood cultures is positive and remaining set is negative. Most likely contaminant. Acute on chronic anemia 
-Hemoglobin dropped to 6.2 this a.m. and transfused with 2 units of PRBCs. Recheck CBC in a.m. Recheck hemoglobin after PRBC transfusion this afternoon Reactive airway disease -brovana and pulmicort per hosp formulary Glaucoma-continue xalatan, and timolol Dyslipidemia/PVD -continue statin,  ASA Permantent Afib-not on AC, rate controlled HTN-continue metoprolol Hx breast and active ovarian CA-port R chest,chemo on hold due to patient with multiple hospitalizations and rehab stays Anxiety: Use Xanax as needed 
 
dvt ppx; heparin held due to surgery, resume in a.m. if okay with vascular MPOA: navid wong (daughter) Code: DNR Dispo: >48hrs Signed By: Rosio Oneill MD   
 May 10, 2021

## 2021-05-10 NOTE — PROGRESS NOTES
Noman Shultz 0'\" 5/10/21 4:20 AM  
[phone] Hospital or Facility: [facility], [facility] From: Shy Acosta RE: Bang Muniz RM: [id number] Patient's Hgb is 6.2 this AM. Need Callback: NO CALLBACK Earl Stout Read 4:21 AM  
0'\" 5/10/21 4:28 AM  
Please come and see the patient for the consent form. Thanks! Read 4:52 AM  
0'\" 5/10/21 5:15 AM  
I am involved with a code stroke that was called a minute before you paged. I will stop by as soon as I can. If the patient is alert and oriented, please hang the blood. 0'\" 5/10/21 5:17 AM  
also, if you coulkd get the consent form on his chart with a sticker I would appreciate it. thanks 0'\" 5/10/21 5:40 AM  
Will do. Thanks! Unread

## 2021-05-10 NOTE — PERIOP NOTES
TRANSFER - OUT REPORT: 
 
Verbal report given to Zeeshan Coronado RN (name) on Sophie Donnelly  being transferred to [address] (unit) for routine post - op Report consisted of patients Situation, Background, Assessment and  
Recommendations(SBAR). Time Pre op antibiotic given: N/A (routine antibiotics) Anesthesia Stop time: 7917 Carlton Present on Transfer to floor: No 
Order for Carlton on Chart: N/A Discharge Prescriptions with Chart: N/A Information from the following report(s) OR Summary, Intake/Output, MAR, Recent Results, Med Rec Status and Cardiac Rhythm Afib was reviewed with the receiving nurse. Opportunity for questions and clarification was provided. Is the patient on 02? YES 
     L/Min 2 Is the patient on a monitor? NO Is the nurse transporting with the patient? NO Surgical Waiting Area notified of patient's transfer from PACU? NO The following personal items collected during your admission accompanied patient upon transfer:  
Dental Appliance: Dental Appliances: At bedside Vision: Visual Aid: Glasses, With patient Hearing Aid: Hearing Aid: At bedside Jewelry: Jewelry: None Clothing: Clothing: Pajamas, At bedside Other Valuables: Other Valuables: Wheelchair, With patient, [employer] Valuables sent to safe:

## 2021-05-10 NOTE — PROGRESS NOTES
Called MD Gee's office, had message sent that patient's gauze on left leg is soaked in blood. Notified MD Rupal Rojas as well.

## 2021-05-10 NOTE — PERIOP NOTES
Ignacio Godinez RN, Linda Garnett RN and I are unable to detect pulses on RLE very edematous, pedal/post tibial. Unable to assess femoral pulse due to dressing. Spoke with Dr. Bailey Kerns and he is aware, no new orders at this time, states we will continue to monitor.

## 2021-05-10 NOTE — OP NOTES
[facility] 
OPERATIVE REPORT Name:  Tristin Begum 
MR#:  [id number] :  [id number] ACCOUNT #:  [de-identified] DATE OF SERVICE:  05/10/2021 PREOPERATIVE DIAGNOSIS:  Peripheral vascular disease with large left anterior lower leg wound. POSTOPERATIVE DIAGNOSIS:  Peripheral vascular disease with large left anterior lower leg wound. PROCEDURES PERFORMED: 1. Left leg arteriogram with balloon angioplasty of the superficial femoral and popliteal arteries. 2.  Debridement of left leg wound to include tendon, fascia, and muscle. SURGEON:  Mena Romero MD 
 
ASSISTANT:  Linda Enriquez. ANESTHESIA:  General. 
 
COMPLICATIONS:  None. SPECIMENS REMOVED:  None. IMPLANTS:  None. ESTIMATED BLOOD LOSS:  Minimal. 
 
INDICATIONS:  The patient is a 19-year-old female with a large full-thickness wound involving the anterior lower leg with exposed tendon. She has evidence of significant peripheral vascular disease with an ankle-brachial index of 0.4. She will undergo angiographic evaluation with intervention as appropriate as well as debridement of the leg wound. PROCEDURE:  The patient's groins were prepped and draped. The right common femoral artery was percutaneously cannulated and a guidewire inserted. A 5-Georgian sheath was inserted. A RIM catheter and wire were passed into the abdominal aorta. A distal abdominal aortogram was performed. There was no evidence of iliac artery stenosis. The aortic bifurcation was crossed using the catheter and a guidewire. The catheter was positioned in the left distal external iliac artery and a left leg arteriogram was performed. This showed several areas of stenosis in the superficial femoral artery with a 4-5 cm length occlusion of the popliteal artery. The posterior tibial and peroneal arteries reconstitute and fill the foot and lower leg. The 5-Georgian sheath was exchanged for a long 6-Georgian sheath.   The superficial femoral artery was selectively catheterized. Using a wire and a catheter, the superficial femoral artery stenoses as well as the popliteal occlusion were crossed. The catheter was advanced and a 0.014 wire was inserted. The popliteal artery was dilated with a 3-mm balloon. The superficial femoral artery was dilated with a 4-mm balloon. Following this, there was a good angiographic result. The sheath was pulled back and a right femoral arteriogram was performed. The sheath was removed over a wire and the puncture site closed with an Angio-Seal device. Attention was then turned to the left leg, which was prepped and draped. The patient had a 12 cm long x 6 cm wide area of full-thickness skin loss in the anterolateral distal lower leg. The nonviable skin was excised. The underlying subcutaneous tissue, tendon, and muscle was also excised. In the distal portion, the tibia was exposed in a small area. Visible tendon and fascia were excised. The muscle appeared viable. The wound dimensions were not substantially changed following the debridement. The wound was dressed with Adaptic gauze and then dry gauze and a Kerlix wrap. Dressings were applied to the right groin. The patient was returned to the recovery area in stable condition. FINDINGS: 
1. Aortoiliac arteriogram:  The distal abdominal aorta is patent without narrowing as are the iliac arteries bilaterally. 2.  Left leg runoff arteriogram:  The common femoral and profunda femoris arteries are patent. The superficial femoral artery is patent, but has several areas of 60-70% stenosis, one proximally and one in the midportion. The popliteal artery is patent proximally, but is occluded just above the knee joint. The posterior tibial and peroneal arteries reconstitute in the proximal lower leg and fill the foot.  
3.  Left femoral and popliteal artery balloon angioplasty:  Following balloon angioplasty, there is minimal residual stenosis in the superficial femoral and popliteal arteries with reconstitution of in-line flow through the popliteal artery to both the posterior tibial and peroneal arteries. Her arteries are small diffusely. SUMMARY:  Successful revascularization with recanalization of the occluded left popliteal artery. Significant left leg wound, which will require further wound care for limb salvage. The revascularization should be adequate in terms of the wound. Nacho Montoya MD 
 
 
[id number] 
D:  05/10/2021 11:37 
T:  05/10/2021 13:36 
JOB #:  [id number]

## 2021-05-10 NOTE — PROGRESS NOTES
Occupational Therapy 5/10/2021 Chart reviewed. Pt is currently off the floor for arteriogram/revascularization of L LE. Will follow. Thank you.  Fadi Nagy, OT

## 2021-05-10 NOTE — ROUTINE PROCESS
Patient: Arnold Hui MRN: [id number]  SSN: [id number] YOB: 1927  Age: 80 y.o. Sex: female Patient is status post Procedure(s) with comments: DEBRIDEMENT LEFT LOWER LEG WOUND. LEFT LEG ARTERIOGRAM, LEFT FEMORAL AND POPLITEAL ANGIOPLASTY - Right groin utilized as puncture site for left leg arteriogram. 
 
Surgeon(s) and Role: 
   Iliana Vasquez MD - Primary Local/Dose/Irrigation:   
 
             
Peripheral IV 05/06/21 Left Forearm (Active) Site Assessment Clean, dry, & intact 05/09/21 2017 Phlebitis Assessment 0 05/09/21 2017 Infiltration Assessment 0 05/09/21 2017 Dressing Status Clean, dry, & intact 05/09/21 2017 Dressing Type Transparent;Tape 05/09/21 2017 Hub Color/Line Status Pink;Capped 05/09/21 2017 Action Taken Open ports on tubing capped 05/09/21 2017 Alcohol Cap Used Yes 05/09/21 2017 Airway - Endotracheal Tube 05/10/21 (Active) Dressing/Packing:  Wound Pretibial Left; Anterior from fall-Dressing/Treatment: ABD pad;Gauze dressing/dressing sponge; Hydrating gel;Silver dressing (05/07/21 1506) Incision 05/10/21 Groin Right-Dressing/Treatment: Gauze dressing/dressing sponge(ANGIOSEAL,4X4 AND TAPE) (05/10/21 1100) Incision 05/10/21 Leg Left-Dressing/Treatment: Moisten with saline;Petroleum impregnated gauze;ABD pad;Gauze dressing/dressing sponge (05/10/21 1120) Splint/Cast:  ]

## 2021-05-10 NOTE — BRIEF OP NOTE
Brief Postoperative Note Patient: Vanessa Hunt YOB: 1927 MRN: [id number] Date of Procedure: 5/10/2021 Pre-Op Diagnosis: PVD with ulceration Post-Op Diagnosis: Same as preoperative diagnosis. Procedure(s): DEBRIDEMENT LEFT LOWER LEG WOUND. LEFT LEG ARTERIOGRAM, LEFT FEMORAL AND POPLITEAL ANGIOPLASTY Surgeon(s): 
Patricia Dey MD 
 
Surgical Assistant: Surg Asst-1: Marko Ratliff Anesthesia: General  
 
Estimated Blood Loss (mL): Minimal 
 
Complications: None Specimens: * No specimens in log * Implants: * No implants in log * Drains: * No LDAs found * Findings: left popliteal occlusion reopened, leg wound with area of exposed tibia Electronically Signed by Bruno Matthews MD on 5/10/2021 at 11:26 AM

## 2021-05-10 NOTE — ANESTHESIA PREPROCEDURE EVALUATION
Relevant Problems RESPIRATORY SYSTEM  
(+) Shortness of breath CARDIOVASCULAR  
(+) First degree AV block  
(+) HTN (hypertension)  
(+) Nonrheumatic aortic valve stenosis  
(+) PAD (peripheral artery disease) (HCC)  
(+) PAF (paroxysmal atrial fibrillation) (HCC)  
(+) PVC's (premature ventricular contractions)  
(+) Paroxysmal ventricular tachycardia (HCC)  
(+) Permanent atrial fibrillation ([facility] [address] ENDOCRINE  
(+) Arthritis HEMATOLOGY  
(+) Anemia PERSONAL HX & FAMILY HX OF CANCER  
(+) [facility] Anesthetic History No history of anesthetic complications Review of Systems / Medical History Patient summary reviewed, nursing notes reviewed and pertinent labs reviewed Pulmonary Within defined limits Neuro/Psych Within defined limits Cardiovascular Hypertension Valvular problems/murmurs (mod-severe AS, mod MR): mitral insufficiency and aortic stenosis Dysrhythmias : atrial fibrillation Exercise tolerance: <4 METS Comments: EF 55-60 GI/Hepatic/Renal 
  
GERD Endo/Other Arthritis and anemia Other Findings Physical Exam 
 
Airway Mallampati: III 
TM Distance: 4 - 6 cm Neck ROM: normal range of motion Mouth opening: Normal 
 
 Cardiovascular Rhythm: irregular Rate: normal 
 
Murmur, Aortic area Dental 
 
Dentition: Lower partial plate and Upper partial plate Pulmonary Breath sounds clear to auscultation Abdominal 
GI exam deferred Other Findings Anesthetic Plan ASA: 4 Anesthesia type: general 
 
 
 
 
Induction: Intravenous Anesthetic plan and risks discussed with: Patient

## 2021-05-11 NOTE — WOUND CARE
WOCN Note:  
  
Follow up assessment of left low leg. 5.10.2021 S/P Left leg arteriogram with angioplasty; debridement of left leg wound. Assessed in room 7108. Ang Cronin RN at the bedside.  
Chart reviewed. Admitted DX:  Leg ulcer, left Past Medical History:  
Diagnosis Date  Arthritis    
 Cancer [facility])    
  breast    (leg,ear,arm-skin cancer)  GERD (gastroesophageal reflux disease)    
 Hypertension    
 Other ill-defined conditions(799.89)    
  glaucoma  Other ill-defined conditions(799.89)    
  cellulitis left arm  Other ill-defined conditions(799.89)    
  carpal tunnel syndrome left hand  Other ill-defined conditions(799.89)    
  vertigo  Ovarian cancer ([facility] [address] 11/2020  
  
Assessment:  
Patient is alert, restless and has expressive aphasia. Bed: [facility] 
Patient reports pain to wound. RN medicated. Heels offloaded with pillows. Heels intact and boggy with blanching erythema. Buttocks and sacrum intact with blanching pink erythema. Sacral foam in place. 
  
1. POA Left low leg, full thickness wound with exposed tendon: 20 x 9 x 0.8 cm  100% red with tibia, muscle and tendon exposure. Moderate serosang exudate; no odor. Periwound without erythema. Margins are open. Cleansed with saline; applied non adherent and wound gel; applied saline moist gauze; covered with dry dressing (gauze, abd pad and roll gauze).   
Wound, Pressure Prevention & Skin Care Recommendations: 1. Minimize layers of linen/pads under patient to optimize support surface. 2.  Turn/reposition approximately every 2 hours and offload heels. 3.  Manage moisture/ Keep skin folds clean and dry. 4.  Left low leg wound: Daily irrigate with saline; apply wound gel, mepitel one and saline moist gauze; cover with dry gauze, abd pad and roll gauze.  
5.  Heels:  Venelex TID 
  
Discussed above plan with patient, family and Christian To RN. 
  
Transition of Care: 
Plan to follow Thursday.  
  
Dorcas Nava DANNIELLE MoralesN RN [place] [id number] [facility] Inpatient Wound Care Available on [employer] Pager [phone] Office [phone]

## 2021-05-11 NOTE — ADVANCED PRACTICE NURSE
Neurocritical Care Code Stroke Documentation Symptoms:   Expressive Aphasia Last Known Well: 0600 Medical hx: Active Problems: 
  Leg ulcer, left ([facility] [address] (2021) Anticoagulation: None VAN:  Negative NIHSS:   1a-LOC: 1 
  1b-Month/Age:2 
  1c-Open/Close Hand:0 2-Best Gaze:0 
  3-Visual Fields:0 
  4-Facial Palsy: 0 
  5a-Left Arm: 0 
  5b-Right Arm:0 
  6a-Left Le 
  6b-Right Le 
  7-Limb Ataxia:0 
  8-Sensory:0 
  9-Best Language:2 
  10-Dysarthria:2 
  11-Extinction/Inattention:0 
TOTAL SCORE:7  
Imaging:   CT -   
IMPRESSION No acute abnormality identified.-Result called to the nurse. 
  
  
  CTA- IMPRESSION 
  
No evidence for acute large vessel arterial occlusion. 
  
No significant cerebral perfusion abnormality or mismatch. 
  
Stable atherosclerotic disease. 
  
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 
  
  
  CTP Plan:   TPA Candidate: NO 
  Mechanical thrombectomy Candidate: NO  
 
Discussed with: Dr Zachary Shanks, Dr Elmer Esteves, Dr. Franklin Nixon, bedside RN , ICU intensivist  
 
Recommend permissive hypertension with -180 as patient likely with acute ongoing ischemic stroke. She has mild/moderate narrowing of the left MCA. Recommend discussion about goals of care in this 80year old with active ovarian CA, severe blood loss anemia related to open left lower extremity wounds and popliteal angioplasty yesterday. Patients BP after CT was 90/50's . HOB placed flat. 500 cc NS bolus ordered and transfer orders for ICU Placed. Dr. Franklin Nixon discussed with patients mPOA and they would like escalation of care at this time   ( pressors and blood transfusion)  although patient has a DNR on file. Time spent: 75 minutes. Danisha Yepez NP Neurocritical Care Nurse Practitioner [phone]

## 2021-05-11 NOTE — PROGRESS NOTES
Problem: Dysphagia (Adult) Goal: *Acute Goals and Plan of Care (Insert Text) Description: Speech pathology goals Initiated 5/11/2021 1. Patient will tolerate regular/thin liquid diet with no overt s/s aspiration within 7 days Outcome: Progressing Towards Goal 
  
SPEECH LANGUAGE PATHOLOGY BEDSIDE SWALLOW EVALUATION Patient: Igor Torres (20 y.o. female) Date: 5/11/2021 Primary Diagnosis: Leg ulcer, left ([facility] [address] [A10.188] Procedure(s) (LRB): DEBRIDEMENT LEFT LOWER LEG WOUND. (Left) LEFT LEG ARTERIOGRAM, LEFT FEMORAL AND POPLITEAL ANGIOPLASTY (Left) 1 Day Post-Op Precautions:     
 
ASSESSMENT : 
Based on the objective data described below, the patient presents with suspected WFL oral/pharyngeal swallow. Cough x1 noted after solid, however no additional s/s aspiration observed. Patient is at risk for aspiration secondary to Code S called this morning and suspected acute neuro event. Patient will benefit from skilled intervention to address the above impairments. Patients rehabilitation potential is considered to be Good PLAN : 
Recommendations and Planned Interventions: 
-Regular/thin liquid diet, straws ok 
-SLP to follow for diet tolerance and additional evaluation pending MRI results Frequency/Duration: Patient will be followed by speech-language pathology 2 times a week to address goals. Discharge Recommendations: To Be Determined SUBJECTIVE:  
Patient stated That's so good re: water. Ox4. OBJECTIVE:  
 
Past Medical History:  
Diagnosis Date Arthritis Cancer ([facility] [address] breast    (leg,ear,arm-skin cancer) GERD (gastroesophageal reflux disease) Hypertension Other ill-defined conditions(799.89)   
 glaucoma Other ill-defined conditions(799.89)   
 cellulitis left arm Other ill-defined conditions(799.89)   
 carpal tunnel syndrome left hand Other ill-defined conditions(799.89)   
 vertigo Ovarian cancer ([address] 11/2020 Past Surgical History:  
Procedure Laterality Date CARDIAC CATHETERIZATION  1/10/2013 HX APPENDECTOMY HX BILATERAL MASTECTOMY Bilateral   
 HX CARPAL TUNNEL RELEASE    
 left hand HX CATARACT REMOVAL    
 bilateral  
 HX COLONOSCOPY  4/2012 HX KNEE ARTHROSCOPY    
 left HX OTHER SURGICAL Basal cell skin cancer removed from left leg, left arm and neck HX OTHER SURGICAL    
 cyst removed from left thigh HX PARTIAL HYSTERECTOMY HX OREN AND BSO    
 HX TONSILLECTOMY    
 VASCULAR SURGERY PROCEDURE UNLIST    
 vascular blockages removed. Prior Level of Function/Home Situation:  
  
Diet prior to admission: regular/thin Current Diet:  regular/thin  
Cognitive and Communication Status: 
Neurologic State: Alert Orientation Level: Oriented X4 Cognition: Follows commands Perception: Appears intact Perseveration: No perseveration noted Safety/Judgement: Awareness of environment, Insight into deficits Oral Assessment: 
Oral Assessment Labial: No impairment Dentition: Natural;Limited Oral Hygiene: moist oral mucosa Lingual: Right deviation Velum: Unable to visualize Mandible: No impairment P.O. Trials: 
Patient Position: upright in bed Vocal quality prior to P.O.: No impairment Consistency Presented: Ice chips; Thin liquid;Puree; Solid How Presented: SLP-fed/presented;Straw;Successive swallows;Spoon Bolus Acceptance: No impairment Bolus Formation/Control: No impairment(WFL ) Propulsion: No impairment Oral Residue: None Initiation of Swallow: No impairment Laryngeal Elevation: Functional 
Aspiration Signs/Symptoms: Strong cough; Other (comment)(x1 after solid) Pharyngeal Phase Characteristics: No impairment, issues, or problems Oral Phase Severity: No impairment Pharyngeal Phase Severity : No impairment NOMS:  
The NOMS functional outcome measure was used to quantify this patient's level of swallowing impairment.   Based on the NOMS, the patient was determined to be at level 6 for swallow function NOMS Swallowing Levels: 
Level 1 (CN): NPO Level 2 (CM): NPO but takes consistency in therapy Level 3 (CL): Takes less than 50% of nutrition p.o. and continues with nonoral feedings; and/or safe with mod cues; and/or max diet restriction Level 4 (CK): Safe swallow but needs mod cues; and/or mod diet restriction; and/or still requires some nonoral feeding/supplements Level 5 (CJ): Safe swallow with min diet restriction; and/or needs min cues Level 6 (CI): Independent with p.o.; rare cues; usually self cues; may need to avoid some foods or needs extra time Level 7 (CH): Independent for all p.o. WALKER. (2003). National Outcomes Measurement System (NOMS): Adult Speech-Language Pathology User's Guide. Pain: 
Pain Scale 1: Numeric (0 - 10) Pain Intensity 1: 0 After treatment:  
Patient left in no apparent distress in bed, Call bell within reach, and Nursing notified COMMUNICATION/EDUCATION:  
Patient was educated regarding her deficit(s) of WFL swallow as this relates to her diagnosis. She demonstrated Good understanding as evidenced by verbalizing understanding. The patient's plan of care including recommendations, planned interventions, and recommended diet changes were discussed with: Registered nurse. Patient/family have participated as able in goal setting and plan of care. Patient/family agree to work toward stated goals and plan of care. Thank you for this referral. 
Awais Anna, SLP Time Calculation: 15 mins

## 2021-05-11 NOTE — PROGRESS NOTES
Physician Progress Note Larry Amor 
CSN #:                  [id number] :                       1927 ADMIT DATE:       2021 1:42 PM 
[address] [place] DATE: 
RESPONDING 
PROVIDER #:        Jhony Gould MD 
 
 
 
 
QUERY TEXT: 
 
Pt admitted with LLE Wound and PVD, s/p Angioplasty and Excisional Debridement. Documentation of 'A/C Anemia', with drop in Hgb from 10.4 on admission, down to 6.2. If possible, please document in progress notes and discharge summary further specificity regarding the acuity and type of anemia: 
 
The medical record reflects the following: 
Risk Factors: s/p excisional debridement, hx of Ovarian CA Clinical Indicators: nursing notes that pts leg dressing is soaked with blood. Drop in Hgb from 10.4 on admission, down to 6.2, BP also low at times, 94/56, 66/43. Documentation of A/C Anemia. Treatment: transfusion of 2 units PRBC, IVF, monitoring of labs, Heparin on hold. Thank you, if you have any questions please e-mail me at [employer]. 
[phone] Options provided: 
-- Anemia due to acute on chronic blood loss 
-- Anemia due to postoperative blood loss 
-- Anemia due to iron deficiency -- Dilutional anemia 
-- Other - I will add my own diagnosis -- Disagree - Not applicable / Not valid -- Disagree - Clinically unable to determine / Unknown 
-- Refer to Clinical Documentation Reviewer PROVIDER RESPONSE TEXT: 
 
This patient has acute on chronic blood loss anemia.  
 
Query created by: Odalis Bill on 2021 10:31 AM 
 
 
Electronically signed by:  Jhony Gould MD 2021 10:34 AM

## 2021-05-11 NOTE — PROGRESS NOTES
Transition of Care Plan  RUR-  High Risk  DISPOSITION:pending medical progression  F/U with PCP/Specialist   
 Transport: Will need BLS transport Patient is s/p  Left leg wound status post left leg arteriogram with balloon angioplasty and debridement of left leg. Patient later became aphasic and code stroke was called. Patient transferred to ICU placed on Levophed gtt. Per previous CM note patient lives with her niece Trace Loaiza [phone]. Patient is seen by Ivin Due Dr Thomas Marx. Patient is open to [facility], will need [facility]ANDT orders for [facility] PT/OT. Damaris Francis RN,Care Management

## 2021-05-11 NOTE — PROGRESS NOTES
Problem: Falls - Risk of 
Goal: *Absence of Falls Description: Document Adriana Ruts Fall Risk and appropriate interventions in the flowsheet. Outcome: Progressing Towards Goal 
Note: Fall Risk Interventions: 
Mobility Interventions: Communicate number of staff needed for ambulation/transfer, Patient to call before getting OOB Medication Interventions: Patient to call before getting OOB, Evaluate medications/consider consulting pharmacy Elimination Interventions: Toileting schedule/hourly rounds, Patient to call for help with toileting needs, Call light in reach History of Falls Interventions: Door open when patient unattended, Evaluate medications/consider consulting pharmacy Problem: Patient Education: Go to Patient Education Activity Goal: Patient/Family Education Outcome: Progressing Towards Goal 
  
Problem: Patient Education: Go to Patient Education Activity Goal: Patient/Family Education Outcome: Progressing Towards Goal 
  
Problem: Patient Education: Go to Patient Education Activity Goal: Patient/Family Education Outcome: Progressing Towards Goal 
  
Problem: Pressure Injury - Risk of 
Goal: *Prevention of pressure injury Description: Document Dick Scale and appropriate interventions in the flowsheet. Outcome: Progressing Towards Goal 
Note: Pressure Injury Interventions: 
Sensory Interventions: Keep linens dry and wrinkle-free, Float heels, Assess changes in LOC Moisture Interventions: Absorbent underpads, Apply protective barrier, creams and emollients Activity Interventions: Increase time out of bed, Pressure redistribution bed/mattress(bed type) Mobility Interventions: Float heels, Pressure redistribution bed/mattress (bed type), HOB 30 degrees or less Nutrition Interventions: Offer support with meals,snacks and hydration, Discuss nutritional consult with provider Problem: Patient Education: Go to Patient Education Activity Goal: Patient/Family Education Outcome: Progressing Towards Goal

## 2021-05-11 NOTE — PROGRESS NOTES
Patient had expressive aphasia during shift handoff. Code stroke called and patient taken down for CT. We were unable to keep BP up, so patient transferred to ICU.

## 2021-05-11 NOTE — PROGRESS NOTES
Physical Therapy PT treatment deferred at this time due to pt s/p Code Stroke this morning, plan for transfer to neuro. Will con't to follow.  
 
Roxanne Rios, PT, MPT

## 2021-05-11 NOTE — PROGRESS NOTES
[address] [facility] Adult  Hospitalist Group Hospitalist Progress Note Paige Luna MD 
Answering service: [phone] OR [phone] from in house phone Date of Service:  2021 NAME:  Matt Rader :  1927 MRN:  [id number] Admission Summary:  
Matt Rader is a 80 y.o. female with hx aortic stenosis, pulm htn, GERD, glaucoma, remote breast CA, HTN, and recent ovarian cancer with port-a-cath s/p chemo times two who presents with complications of traumatic wound. She had skin cancer on her LLE and subsequently sustained a traumatic wound after a fall in the same area. The wound had progress, and they were pending and appt with surgeon Dr. Katie Graves today. The nurse from the rehab facility where patient was being discharged sent a picture to the clinic, and patient was advised to come to the hospital instead for management of wound. 
  
In the ED, VSS. Labs are significant for lactate of 4, CRP 6.32, ESR 34, and normocytic anemia with Hgb 10.4. L tib/fib XR with pretibial soft tissue ulceration and no noted osseous abnormality As of 5/10:  
  
Hemoglobin was low this a.m. at 6.1 transfused with 2 units of PRBCs Underwent arteriogram with I&D of left leg today No fever or chills Interval history / Subjective:  
 
2021 :  
 Pt became acute aphasic early today  Code Stroke called  On eval was non vocal conversant, cn in tack  NIS on board, acute CT and discussions with NIS in process Hannah Claudio as below Assessment & Plan:  
 
  
Left leg wound status post left leg arteriogram with balloon angioplasty and debridement of left leg - Postoperative care per vascular surgery, continue vancomycin, ceftriaxone - Pain control - No wound culture sent. Blood cultures 1/2 showing gram-positive cocci in clusters. Another set negative.   Most likely contaminant 
- not toxic am rounds 5/11/2021 Acute TIA 5/11/2021  
- Pt became acute aphasic early today - Code Stroke called - On eval was non vocal conversant, cn in tack  
- high risk for embolic process as chronic afib off anticoag's 2n2 proceedures GPC bacteremia 1/2 likely contaminant 
- Only 1 out of 2 blood cultures is positive and remaining set is negative. Most likely contaminant. Iron deficiency,  
- noted 4/07/2021,  
- for f/u ionr levels in am 5/12,  
- transufse iron iv if needed by team on 5/12 after lab. Acute on chronic anemia 
- Hemoglobin dropped to 6.2 this a.m. and transfused with 2 units of PRBCs. Recheck CBC in a.m. Recheck hemoglobin after PRBC transfusion this afternoon- stable at mid 7 range post transfusion as of am rounds 5/11/2021 ; for iron profile in am  
 
Reactive airway disease -brovana and pulmicort per hosp formulary Glaucoma-continue xalatan, and timolol Dyslipidemia/PVD -continue statin,  ASA Permantent Afib-not on AC, rate controlled HTN-continue metoprolol Hx breast and active ovarian CA-port R chest,chemo on hold due to patient with multiple hospitalizations and rehab stays Anxiety: Use Xanax as needed 
 
dvt ppx; heparin held due to surgery, resume in a.m. if okay with vascular - see above, to be determined as possible CVA  vs TIA  5/11/2021 ,  
 
MPOA: navid wong (daughter) Code: DNR Hospital Problems  Date Reviewed: 3/29/2021 Codes Class Noted POA Leg ulcer, left ([facility] [address] ICD-10-CM: M98.269 ICD-9-CM: 707.10  5/6/2021 Unknown After personally interviewing pt at bedside the following is noted on 5/11/2021 : 
 
Review of Systems Eyes: Negative. Respiratory: Negative. Cardiovascular: Negative. Gastrointestinal: Negative. Neurological: Negative. In flux. As baseline on my exam and discussions w pt a.m. 5/11/2021 post code stroke I had a face to face encounter with patient on 5/11/2021 at bedside for the following physical exam: PHYSICAL EXAM: 
 
Visit Vitals BP (!) 129/50 (BP 1 Location: Right arm, BP Patient Position: At rest) Pulse 73 Temp 97.5 °F (36.4 °C) Resp 18 Ht 5' 4\" (1.626 m) Wt 63.5 kg (140 lb) SpO2 100% BMI 24.03 kg/m² Physical Exam 
Constitutional:   
   Appearance: She is obese. HENT:  
   Right Ear: External ear normal.  
   Left Ear: External ear normal.  
   Nose: Nose normal.  
   Mouth/Throat:  
   Mouth: Mucous membranes are dry. Pharynx: Oropharynx is clear. Eyes:  
   Extraocular Movements: Extraocular movements intact. Conjunctiva/sclera: Conjunctivae normal.  
   Pupils: Pupils are equal, round, and reactive to light. Neck: Musculoskeletal: Normal range of motion and neck supple. Pulmonary:  
   Effort: Pulmonary effort is normal.  
   Breath sounds: Normal breath sounds. Abdominal:  
   General: Abdomen is flat. Palpations: Abdomen is soft. Musculoskeletal: Normal range of motion. Skin: 
   General: Skin is warm and dry. Comments: Stable LE wounds mostly dressed Neurological:  
   General: No focal deficit present. Mental Status: She is alert. Mental status is at baseline. Psychiatric:     
   Mood and Affect: Mood normal.     
   Thought Content: Thought content normal.  
 
  
 
 
  
  
  
Data Review:  
 Review and/or order of clinical lab test 
 
 
Labs:  
 
Recent Labs 05/11/21 
3729 05/10/21 
1631 05/10/21 
9970 WBC 10.9  --  7.1 HGB 7.3* 7.3* 6.2* HCT 24.6*  --  20.6*   --  132* Recent Labs 05/11/21 
0215 05/11/21 
2116 05/10/21 
0156 * 130* 132* K 4.6 5.7* 4.2  101 103 CO2 21 15* 21 BUN 22* 21* 25* CREA 0.74 0.87 0.62  124* 97  
CA 7.8* 7.6* 7.5* No results for input(s): ALT, AP, TBIL, TBILI, TP, ALB, GLOB, GGT, AML, LPSE in the last 72 hours. No lab exists for component: SGOT, GPT, AMYP, HLPSE No results for input(s): INR, PTP, APTT, INREXT in the last 72 hours. No results for input(s): FE, TIBC, PSAT, FERR in the last 72 hours. Lab Results Component Value Date/Time Folate 37.2 (H) 04/08/2021 03:12 AM  
  
No results for input(s): PH, PCO2, PO2 in the last 72 hours. No results for input(s): CPK, CKNDX, TROIQ in the last 72 hours. No lab exists for component: CPKMB Lab Results Component Value Date/Time Cholesterol, total 127 10/07/2020 09:43 AM  
 HDL Cholesterol 44 10/07/2020 09:43 AM  
 LDL,Direct 85 10/30/2014 09:36 AM  
 LDL, calculated 58 10/07/2020 09:43 AM  
 LDL, calculated 55 07/08/2019 09:33 AM  
 Triglyceride 144 10/07/2020 09:43 AM  
 
Lab Results Component Value Date/Time Glucose (POC) 71 05/11/2021 08:01 AM  
 Glucose (POC) 98 05/09/2021 02:57 PM  
 Glucose (POC) 102 (H) 11/23/2020 01:15 PM  
 Glucose  04/16/2013 03:58 PM  
 
Lab Results Component Value Date/Time Color YELLOW/STRAW 04/06/2021 09:37 AM  
 Appearance CLOUDY (A) 04/06/2021 09:37 AM  
 Specific gravity 1.010 04/06/2021 09:37 AM  
 pH (UA) 5.5 04/06/2021 09:37 AM  
 Protein Negative 04/06/2021 09:37 AM  
 Glucose Negative 04/06/2021 09:37 AM  
 Ketone Negative 04/06/2021 09:37 AM  
 Bilirubin Negative 04/06/2021 09:37 AM  
 Urobilinogen 0.2 04/06/2021 09:37 AM  
 Nitrites Positive (A) 04/06/2021 09:37 AM  
 Leukocyte Esterase MODERATE (A) 04/06/2021 09:37 AM  
 Epithelial cells FEW 04/06/2021 09:37 AM  
 Bacteria 1+ (A) 04/06/2021 09:37 AM  
 WBC 20-50 04/06/2021 09:37 AM  
 RBC 5-10 04/06/2021 09:37 AM  
 
 
 
Medications Reviewed:  
 
Current Facility-Administered Medications Medication Dose Route Frequency  morphine injection 2 mg  2 mg IntraVENous Q2H PRN  
 HYDROcodone-acetaminophen (NORCO) 5-325 mg per tablet 1 Tab  1 Tab Oral Q6H PRN  
 iopamidoL (ISOVUE-370) 76 % injection 100 mL  100 mL IntraVENous RAD ONCE  
 iopamidoL (ISOVUE-370) 76 % injection 50 mL  50 mL IntraVENous RAD ONCE  
 cefTRIAXone (ROCEPHIN) 1 g in 0.9% sodium chloride (MBP/ADV) 50 mL MBP  1 g IntraVENous Q24H  
 0.9% sodium chloride infusion 250 mL  250 mL IntraVENous PRN  
 ALPRAZolam (XANAX) tablet 0.25 mg  0.25 mg Oral QHS PRN  
 rosuvastatin (CRESTOR) tablet 20 mg  20 mg Oral QHS  arformoteroL (BROVANA) neb solution 15 mcg  15 mcg Nebulization BID RT And  
 budesonide (PULMICORT) 500 mcg/2 ml nebulizer suspension  500 mcg Nebulization BID RT  
 timolol (TIMOPTIC) 0.5 % ophthalmic solution 1 Drop  1 Drop Both Eyes BID  metoprolol succinate (TOPROL-XL) XL tablet 25 mg  25 mg Oral DAILY  latanoprost (XALATAN) 0.005 % ophthalmic solution 1 Drop  1 Drop Both Eyes QPM  
 oxyCODONE IR (ROXICODONE) tablet 5 mg  5 mg Oral Q4H PRN  
 melatonin tablet 3 mg  3 mg Oral QHS  morphine injection 2 mg  2 mg IntraVENous Q2H PRN  
 sodium chloride (NS) flush 5-40 mL  5-40 mL IntraVENous Q8H  
 sodium chloride (NS) flush 5-40 mL  5-40 mL IntraVENous PRN  
 acetaminophen (TYLENOL) tablet 650 mg  650 mg Oral Q6H PRN Or  
 acetaminophen (TYLENOL) suppository 650 mg  650 mg Rectal Q6H PRN  polyethylene glycol (MIRALAX) packet 17 g  17 g Oral DAILY PRN  
 [Held by provider] heparin (porcine) injection 5,000 Units  5,000 Units SubCUTAneous Q8H  Vancomycin- pharmacy to dose   Other Rx Dosing/Monitoring  vancomycin (VANCOCIN) 1,000 mg in 0.9% sodium chloride 250 mL (VIAL-MATE)  1,000 mg IntraVENous Q24H  
 
______________________________________________________________________ EXPECTED LENGTH OF STAY: 4d 2h 
ACTUAL LENGTH OF STAY:          5 Claudia Vieyra MD

## 2021-05-11 NOTE — PROGRESS NOTES
Occupational Therapy: Chart reviewed. Spoke with nurse who reports patient had a Code Stroke called this AM and plan is for transfer to Neuro. Will defer and follow.  
TERRENCE Jasso/GLENN

## 2021-05-11 NOTE — PROGRESS NOTES
Spiritual Care Assessment/Progress Note [address] 
 
 
NAME: Neel Lackey      MRN: [id number] AGE: 80 y.o. SEX: female Congregational Affiliation: Tenriism  
Language: [place] 5/11/2021 Spiritual Assessment begun in [facility] through conversation with: 
  
    []Patient        [] Family    [] Friend(s) Reason for Consult: Rapid response team  
 
Spiritual beliefs: (Please include comment if needed) 
   [] Identifies with a daniela tradition:     
   [] Supported by a daniela community:        
   [] Claims no spiritual orientation:       
   [] Seeking spiritual identity:            
   [] Adheres to an individual form of spirituality:       
   [x] Not able to assess:                   
 
    
Identified resources for coping:  
   [] Prayer                           
   [] Music                  [] Guided Imagery 
   [] Family/friends                 [] Pet visits [] Devotional reading                         [] Unknown 
   [] Other:                                          
 
 
Interventions offered during this visit: (See comments for more details) Patient Interventions: Crisis Plan of Care: 
 
 [] Support spiritual and/or cultural needs  
 [] Support AMD and/or advance care planning process    
 [] Support grieving process 
 [] Coordinate Rites and/or Rituals  
 [] Coordination with community clergy [] No spiritual needs identified at this time 
 [] Detailed Plan of Care below (See Comments)  [] Make referral to Music Therapy 
[] Make referral to Pet Therapy    
[] Make referral to Addiction services 
[] Make referral to [facility] 
[] Make referral to Spiritual Care Partner 
[] No future visits requested       
[] Follow up upon further referrals Comments: Responded to RRT called for Ms Walter Ceja in room 601; RRT was changed to Code Stroke. No family was present.  Multiple staff members were attending to patient; unable to do spiritual assessment at that time. Please notify  if support desired. : . Phoenix Carpio. Chong Opitz; [facility], to contact [phone] [address] Lu call: [phone]

## 2021-05-11 NOTE — PROGRESS NOTES
Vascular: 
 
Stable post angioplasty and leg debridement. Leg dressing soaked through late yesterday but has been OK since then. Wound nurses to evaluate and make wound care plan today. Hgb OK this AM. Right groin puncture site and right leg OK. Continue to hold heparin until bleeding from leg wound  settles down. She has a large wound into the muscle layer with an area of exposed tibia. It is unclear at present if this will be salvageable or not. Continue current care for now.

## 2021-05-11 NOTE — PROGRESS NOTES
[facility] 
 
ICU TEAM Progress Note Name: Ariana Calderon : 1927 MRN: [id number] Date: 2021 Assessment/Plan: 1. Acute TIA 
a. Acutely aphasic this morning 
b. Code stroke called- no evidence of acute infarct or perfusion deficit 
c. High risk for embolic process given chronic afib and off anticoagulant given surgery and bleeding  
d. Currently with occasional word finding difficulty, but otherwise oriented and moving all extremities equally 
e. Neurology consulted 
f. Will continue to maintain SBP > 160 with peripheral norepinephrine 2. Left leg wound s/p left leg arteriogram with balloon angioplasty and debridement  
a. Postoperative care per vascular surgery, continue vancomycin and ceftriaxone 
b. Fentanyl PRN for pain control 
c. No wound culture sent. Blood cultures with 1/2 bottles with coag negative staph 3. Acute on chronic anemia 
a. Patient with baseline iron deficiency anemia- follow up on iron studies b. Bleeding from leg wound- patient has received 3u PRBCs 
c. Holding anticoagualtion 4. Reactive airway disease 
a. Continue brovana and pulmicort 5. Glaucoma 
a. Continue xalantan and timolol 6. dyslipidemia and PVD 
a. Continue statin and restart aspirin tomorrow 7. Chronic a fib 
a. Continue metoprolol 12.5mg BID for rate control 8. History of breast and active ovarian cancer 
a. Patient with R chest port, chemo on hold due to multiple hospitalizations and rehab stays 9. Anxiety 
a. Xanax 0.25mg nightly as needed F - Feeding:  Yes PO 
A - Analgesia: Fentanyl S - Sedation: None T - DVT Prophylaxis: SCD's or Sequential Compression Device on right leg only H - Head of Bed: > 30 Degrees U - Ulcer Prophylaxis: Not at this time G - Glycemic Control: Insulin S - Spontaneous Breathing Trial: N/A 
B - Bowel Regimen: Docusate (Colace) I - Indwelling Catheter: 
 Tubes: None Lines: Peripheral IV Drains: Carlton Catheter D - De-escalation of Antibiotics: Ceftriaxone Vancomycin Subjective:  
Progress Note: 5/11/2021 Reason for ICU Admission: Hypotension HPI:Lizette Foster is a 80 y. o. female with hx aortic stenosis, pulm htn, GERD, glaucoma, remote breast CA, HTN, and recent ovarian cancer with port-a-cath s/p chemo times two who presents with complications of traumatic wound.  She had skin cancer on her LLE and subsequently sustained a traumatic wound after a fall in the same area.  The wound had progress, and they were pending and appt with surgeon Dr. Joyce Dunn today.  The nurse from the rehab facility where patient was being discharged sent a picture to the clinic, and patient was advised to come to the hospital instead for management of wound. 
  
In the ED, VSS. Labs are significant for lactate of 4, CRP 6.32, ESR 34, and normocytic anemia with Hgb 10.4. L tib/fib XR with pretibial soft tissue ulceration and no noted osseous abnormality On 5/10 patient to OR for LLE arteriogram, L fem and popliteal angioplasty, and debridement of wound. Patient received several units of PRBCs given drop in hgb Overnight Events:  
5/11/2021 
- This morning code stroke was called for acute aphasia 
- Neuro CC NP reviewed scans and felt that BP should be augmented to improve cerebral perfusion. Patient transferred to ICU to push SBP > 160 with vaospressors 
- NIS not following, neurology consulted 
- Final read of CTA without significant stenosis of any major cerebral vessels- unsure that maintain higher BP goal is beneficial in this setting 
- Hgb 6.5- will transfuse with 1u PRBCs; lactic acid 2.8- will give blood and recheck POD: 
1 Day Post-Op S/P:  
Procedure(s): DEBRIDEMENT LEFT LOWER LEG WOUND. LEFT LEG ARTERIOGRAM, LEFT FEMORAL AND POPLITEAL ANGIOPLASTY Objective:  
Vital Signs: 
Visit Vitals /83 (BP 1 Location: Right arm, BP Patient Position: At rest) Pulse 97 Temp 97 °F (36.1 °C) Resp (!) 31 Ht 5' 4\" (1.626 m) Wt 63.5 kg (140 lb) SpO2 92% BMI 24.03 kg/m² O2 Flow Rate (L/min): 2 l/min O2 Device: Nasal cannula Temp (24hrs), Av °F (36.7 °C), Min:97 °F (36.1 °C), Max:98.5 °F (36.9 °C) Intake/Output:  
No intake or output data in the 24 hours ending 21 1118 Physical Exam: 
 
General:  Alert, cooperative, well noursished, well developed, appears stated age Eyes:  Sclera anicteric. Pupils equally round and reactive to light. Mouth/Throat: Mucous membranes normal, oral pharynx clear Neck: Supple Lungs:   Clear to auscultation bilaterally, good effort CV:  Regular rate and rhythm,no murmur, click, rub or gallop Abdomen:   Soft, non-tender. bowel sounds normal. non-distended Extremities: No cyanosis or edema Skin: Skin color, texture, turgor normal. no acute rash or lesions Lymph nodes: Cervical and supraclavicular normal  
Musculoskeletal: No swelling or deformity Lines/Devices:  Intact, no erythema, drainage or tenderness Psych: Alert and oriented, normal mood affect given the setting LABS AND  DATA: Personally reviewed Recent Labs 21 
7385 05/10/21 
1631 05/10/21 
8232 WBC 10.9  --  7.1 HGB 7.3* 7.3* 6.2* HCT 24.6*  --  20.6*   --  132* Recent Labs 21 
0215 21 
2410 * 130*  
K 4.6 5.7*  
 101 CO2 21 15* BUN 22* 21* CREA 0.74 0.87  124* CA 7.8* 7.6* No results for input(s): AP, TBIL, TP, ALB, GLOB, AML, LPSE in the last 72 hours. No lab exists for component: SGOT, GPT, AMYP No results for input(s): INR, PTP, APTT, INREXT in the last 72 hours. No results for input(s): PHI, PCO2I, PO2I, FIO2I in the last 72 hours. No results for input(s): CPK, CKMB, TROIQ, BNPP in the last 72 hours. MEDS: Reviewed Chest X-Ray: CXR Results  (Last 48 hours) None Multidisciplinary Rounds Completed: Yes ABCDEF Bundle/Checklist Completed: 
Yes SPECIAL EQUIPMENT None DISPOSITION Stay in ICU CRITICAL CARE CONSULTANT NOTE I had a face to face encounter with the patient, reviewed and interpreted patient data including clinical events, labs, images, vital signs, I/O's, and examined patient. I have discussed the case and the plan and management of the patient's care with the consulting services, the bedside nurses and the respiratory therapist.   
 
NOTE OF PERSONAL INVOLVEMENT IN CARE This patient has a high probability of imminent, clinically significant deterioration, which requires the highest level of preparedness to intervene urgently. I participated in the decision-making and personally managed or directed the management of the following life and organ supporting interventions that required my frequent assessment to treat or prevent imminent deterioration. I personally spent 50 minutes of critical care time. This is time spent at this critically ill patient's bedside actively involved in patient care as well as the coordination of care and discussions with the patient's family. This does not include any procedural time which has been billed separately. [facility] 5/11/2021

## 2021-05-11 NOTE — PROGRESS NOTES
TRANSFER - IN REPORT:   10:30 Verbal report received from Adrienne Machuca RN(name) on Ada Lambert  being received from [facility](unit) for change in patient condition(hypotension) Report consisted of patients Situation, Background, Assessment and  
Recommendations(SBAR). Information from the following report(s) SBAR, Kardex, ED Summary, OR Summary, Procedure Summary, Intake/Output, MAR, Recent Results, Med Rec Status, Cardiac Rhythm A fib, Alarm Parameters  and Dual Neuro Assessment was reviewed with the receiving nurse. 1118: Levo started. SBP goal >160. 
 
1130: Bladder scan due to unknown when patient last voided. 5mL retaining, Per Sigrid NP to place brooks catheter. 1343: 1 unit PRBC started.

## 2021-05-11 NOTE — PROGRESS NOTES
Problem: Falls - Risk of 
Goal: *Absence of Falls Description: Document [place] Fall Risk and appropriate interventions in the flowsheet. Outcome: Progressing Towards Goal 
Note: Fall Risk Interventions: 
Mobility Interventions: Communicate number of staff needed for ambulation/transfer, Patient to call before getting OOB Medication Interventions: Patient to call before getting OOB Elimination Interventions: Patient to call for help with toileting needs History of Falls Interventions: Door open when patient unattended Problem: Patient Education: Go to Patient Education Activity Goal: Patient/Family Education Outcome: Progressing Towards Goal 
  
Problem: Patient Education: Go to Patient Education Activity Goal: Patient/Family Education Outcome: Progressing Towards Goal 
  
Problem: Patient Education: Go to Patient Education Activity Goal: Patient/Family Education Outcome: Progressing Towards Goal 
  
Problem: Pressure Injury - Risk of 
Goal: *Prevention of pressure injury Description: Document Dick Scale and appropriate interventions in the flowsheet. Outcome: Progressing Towards Goal 
Note: Pressure Injury Interventions: 
Sensory Interventions: Keep linens dry and wrinkle-free Moisture Interventions: Absorbent underpads, Apply protective barrier, creams and emollients Activity Interventions: Increase time out of bed Mobility Interventions: Float heels, Pressure redistribution bed/mattress (bed type) Nutrition Interventions: Document food/fluid/supplement intake, Discuss nutritional consult with provider, Offer support with meals,snacks and hydration Problem: Patient Education: Go to Patient Education Activity Goal: Patient/Family Education Outcome: Progressing Towards Goal

## 2021-05-12 NOTE — PROGRESS NOTES
Problem: Self Care Deficits Care Plan (Adult) Goal: *Acute Goals and Plan of Care (Insert Text) Description:  
FUNCTIONAL STATUS PRIOR TO ADMISSION: Patient recently d/c home from SNF. She reports she was ambulatory in therapy using RW up to 120' with a chair follow. Otherwise functioning at wheelchair level and did not require assistance for transfers or ADLs. HOME SUPPORT: Patient lived at home with her niece staying with her to provide assistance. Occupational Therapy Goals Initiated 5/7/2021 Re-evaluation s/p admission to ICU - all goals downgraded to reflect decline 1. Patient will perform lower body dressing with supervision within 7 day(s). Downgraded min A 2. Patient will perform bathing with supervision within 7 day(s). Downgraded min A 3. Patient will perform toilet transfers with supervision within 7 day(s). Downgraded min A 
4. Patient will perform all aspects of toileting with supervision within 7 day(s). Downgraded min A 
5. Patient will participate in upper extremity therapeutic exercise/activities with supervision/set-up for 10 minutes within 7 day(s). Reduced to 5 minutes 6. Patient will utilize energy conservation techniques during functional activities with verbal cues within 7 day(s). Outcome: Not Met OCCUPATIONAL THERAPY RE-EVALUATION Patient: Farhan Wilder (8 y.o. female) Date: 5/12/2021 Diagnosis: [facility] [L97.929] <principal problem not specified> Procedure(s) (LRB): DEBRIDEMENT LEFT LOWER LEG WOUND. (Left) LEFT LEG ARTERIOGRAM, LEFT FEMORAL AND POPLITEAL ANGIOPLASTY (Left) 2 Days Post-Op Precautions:   
Chart, occupational therapy assessment, plan of care, and goals were reviewed. ASSESSMENT Based on the objective data described below, patient ADLs limited by impaired balance, generalized weakness, decreased functional activity tolerance, wounds, limited lower body access, , decreased cognition (attention to task, command follow, safety awareness, sequencing) and baseline impaired vision. Patient required total A to dylan socks (they were then taken off once up in chair 2* wounds on feet and concern for irritation with socks on). Patient required up to min A to come to sitting EOB and up to mod A x2 to stand and transfer to chair. Patient left in chair with call bell in reach, RN aware. Current Level of Function Impacting Discharge (ADLs): up to total A for ADLs, mod A x2 for mobility Other factors to consider for discharge: was mod I at W/C level PLAN : 
Recommendations and Planned Interventions: self care training, functional mobility training, therapeutic exercise, balance training, therapeutic activities, endurance activities, patient education, home safety training, and family training/education Frequency/Duration: Patient will be followed by occupational therapy 5 times a week to address goals. Recommend with staff: Recommend with nursing, ADLs with supervision/setup, once Egress Test completed then OOB to chair 3x/day via gait belt and toileting via beside commode with 2 assist. Thank you for completing as able in order to maintain patient strength, endurance and independence. Recommend next OT session: BSC transfer Recommendation for discharge: (in order for the patient to meet his/her long term goals) Therapy up to 5 days/week in SNF setting This discharge recommendation: 
Has not yet been discussed the attending provider and/or case management Equipment recommendations for successful discharge (if) home: bedside commode, hospital bed, and transfer bench SUBJECTIVE:  
Patient stated My hip hurts.  OBJECTIVE DATA SUMMARY:  
Hospital course since last seen and reason for reevaluation: s/p transfer to ICU Cognitive/Behavioral Status: 
Neurologic State: Alert Orientation Level: Oriented X4 Cognition: Decreased attention/concentration;Decreased command following;Poor safety awareness Perception: Cues to maintain midline in standing; Tactile;Verbal 
Perseveration: No perseveration noted Safety/Judgement: Awareness of environment;Decreased awareness of need for assistance;Decreased awareness of need for safety;Decreased insight into deficits; Fall prevention Skin: wounds on BLE Edema: mild in LUE Hearing: Auditory Auditory Impairment: Hard of hearing, bilateral, Hearing aid(s) Hearing Aids/Status: At bedside Vision/Perceptual:   
    
    
    
  
    
Acuity: Impaired near vision; Impaired far vision Corrective Lenses: Glasses Range of Motion: In [address] [place] [address] [address] AROM: Within functional limits Strength: In [address] [place] [address] Strength: Generally decreased, functional 
  
  
  
  
 
Coordination: 
Coordination: Within functional limits Fine Motor Skills-Upper: Left Intact; Right Intact Gross Motor Skills-Upper: Left Intact; Right Intact Tone & Sensation: In [address] [place] [address] Tone: Normal 
Sensation: Intact Functional Mobility and Transfers for ADLs: 
Bed Mobility: 
Supine to Sit: Minimum assistance Scooting: Minimum assistance Transfers: 
Sit to Stand: Moderate assistance; Additional time;Assist x2 Bed to Chair: Moderate assistance; Additional time;Assist x2 Balance: 
Sitting: Intact Standing: Impaired; With support Standing - Static: Constant support; Fair 
Standing - Dynamic : Constant support;Fair;Poor ADL Assessment: 
Feeding: Setup Oral Facial Hygiene/Grooming: Setup Bathing: Moderate assistance Upper Body Dressing: Minimum assistance Lower Body Dressing: Moderate assistance Toileting: Moderate assistance ADL Intervention and task modifications: 
 
Lower Body Dressing Assistance Socks: Total assistance (dependent) Leg Crossed Method Used: No 
Position Performed: Supine Cues: Gara Fears 
 
Cognitive Retraining Safety/Judgement: Awareness of environment;Decreased awareness of need for assistance;Decreased awareness of need for safety;Decreased insight into deficits; Fall prevention Functional Measure: 
Barthel Index: 
 
Bathin Bladder: 0(brooks) Bowels: 10 
Groomin Dressin Feedin Mobility: 0 Stairs: 0 Toilet Use: 5 Transfer (Bed to Chair and Back): 5 Total: 30/100 The Barthel ADL Index: Guidelines 1. The index should be used as a record of what a patient does, not as a record of what a patient could do. 2. The main aim is to establish degree of independence from any help, physical or verbal, however minor and for whatever reason. 3. The need for supervision renders the patient not independent. 4. A patient's performance should be established using the best available evidence. Asking the patient, friends/relatives and nurses are the usual sources, but direct observation and common sense are also important. However direct testing is not needed. 5. Usually the patient's performance over the preceding 24-48 hours is important, but occasionally longer periods will be relevant. 6. Middle categories imply that the patient supplies over 50 per cent of the effort. 7. Use of aids to be independent is allowed. Francisco Rodarte., Barthel, D.W. ([id number]). Functional evaluation: the Barthel Index. [facility] (14)2. Lucina Escobedo kevan Jeanine, DREWF, Bella Higgins., Neris Hedrick., Ruma Oneal, [address] [place] [address] (). Measuring the change indisability after inpatient rehabilitation; comparison of the responsiveness of the Barthel Index and Functional [place] Measure. Journal of Neurology, Neurosurgery, and Psychiatry, 66(4), 713-307. Christina Thacker NMARINAGEL.BRIEN, JEAN Greene, & Sridhar Dickerson M.A. (2004.) Assessment of post-stroke quality of life in cost-effectiveness studies: The usefulness of the Barthel Index and the EuroQoL-5D. [facility], [facility], 13, 445-64 Pain: 
Reporting pain in R hip Activity Tolerance:  
Fair, Poor, and requires rest breaks After treatment patient left in no apparent distress:  
Sitting in chair, Call bell within reach, Bed / chair alarm activated, and RN aware COMMUNICATION/COLLABORATION:  
The patients plan of care was discussed with: Physical therapist and Registered nurse. Mohamud Scott OT Time Calculation: 22 mins

## 2021-05-12 NOTE — PROGRESS NOTES
Physical Therapy: Defer Chart reviewed in prep for PT re-evaluation 2/2 surgery on 5/11 and Code S on 5/12. ECHO at bedside, will defer and follow up as able and medically appropriate.  
 
Altamease Chroman, PT, DPT

## 2021-05-12 NOTE — PROGRESS NOTES
Vascular: In ICU post TIA/stroke. Initially aphasic, now able to speak and communicate, no motor deficits Left leg dressing intact. Wound care to continue today. Continue IV antibiotics. OK with me to restart anticoagulation as appropriate.

## 2021-05-12 NOTE — PROGRESS NOTES
Problem: Mobility Impaired (Adult and Pediatric) Goal: *Acute Goals and Plan of Care (Insert Text) Description: FUNCTIONAL STATUS PRIOR TO ADMISSION: The patient was functional at the wheelchair level and was independent for transfers to the chair. She was admitted from SNF due to LLE wound, but was otherwise ready for D/C home. HOME SUPPORT PRIOR TO ADMISSION: The patient lived with niece to provide assistance. Physical Therapy Goals Initiated 5/7/2021 1. Patient will move from supine to sit and sit to supine  and roll side to side in bed with modified independence within 7 day(s). 2.  Patient will transfer from bed to chair and chair to bed with modified independence using the least restrictive device within 7 day(s). 3.  Patient will perform sit to stand with modified independence within 7 day(s). 4.  Patient will ambulate with supervision/set-up for 20 feet with the least restrictive device within 7 day(s). Outcome: Not Met PHYSICAL THERAPY REEVALUATION Patient: Akhil Lockwood (89 y.o. female) Date: 5/12/2021 Primary Diagnosis: Leg ulcer, left ([facility] [U50.679] Procedure(s) (LRB): DEBRIDEMENT LEFT LOWER LEG WOUND. (Left) LEFT LEG ARTERIOGRAM, LEFT FEMORAL AND POPLITEAL ANGIOPLASTY (Left) 2 Days Post-Op Precautions: Fall, -120 ASSESSMENT Patient re-evaluated s/p left leg arteriogram and femoral and popliteal angioplasty on 5/10 as well as code S with possible L MCA called on 5/11 with pt transferring to ICU for higher level of care. Pt currently presents with generalized weakness, impaired balance, decreased activity tolerance, LLE wounds, baseline visual impairment, and impaired cognition (attention to task, command following, safety awareness, sequencing, and delayed processing). Neuro exam appears non focal- strength, sensation, vision, coordination, and speech all appear at/near baseline level. Pt transferred supine>sit with Mitchell and significantly increased time. She required modA x2 for stand pivot transfer bed>chair with verbal and tactile cuing for sequencing. Per RN, pt's niece/caregiver reports she is at/near her baseline function. Recommending [facility] PT upon discharge. If family is unable to provide necessary assistance with mobility and ADLs, pt will require SNF. Current Level of Function Impacting Discharge (mobility/balance): modA x2 for stand pivot transfer bed<>w/c Functional Outcome Measure: The patient scored 30/100 on the Barthel outcome measure which is indicative of 70% impairment and dependence on others for basic mobility and self care tasks. Other factors to consider for discharge: fall risk, LLE wounds, requires assistance with all mobility Patient will benefit from skilled therapy intervention to address the above noted impairments. PLAN : 
Recommendations and Planned Interventions: bed mobility training, transfer training, gait training, therapeutic exercises, neuromuscular re-education, patient and family training/education, and therapeutic activities Frequency/Duration: Patient will be followed by physical therapy:  3 times a week to address goals. Recommendation for discharge: (in order for the patient to meet his/her long term goals) Physical therapy at least 2 days/week in the home AND ensure assist and/or supervision for safety with mobility and ADLs If unable, then SNF This discharge recommendation: 
Has not yet been discussed the attending provider and/or case management Equipment recommendations for successful discharge (if) home: patient owns DME required for discharge SUBJECTIVE:  
Patient stated I burp a lot.  OBJECTIVE DATA SUMMARY:  
HISTORY:   
Past Medical History:  
Diagnosis Date Arthritis Cancer ([facility] breast    (leg,ear,arm-skin cancer) GERD (gastroesophageal reflux disease) Hypertension Other ill-defined conditions(799.89)   
 glaucoma  Other ill-defined conditions(799.89)   
 cellulitis left arm Other ill-defined conditions(799.89)   
 carpal tunnel syndrome left hand Other ill-defined conditions(799.89)   
 vertigo Ovarian cancer ([facility] 11/2020 Past Surgical History:  
Procedure Laterality Date CARDIAC CATHETERIZATION  1/10/2013 HX APPENDECTOMY HX BILATERAL MASTECTOMY Bilateral   
 HX CARPAL TUNNEL RELEASE    
 left hand HX CATARACT REMOVAL    
 bilateral  
 HX COLONOSCOPY  4/2012 HX KNEE ARTHROSCOPY    
 left HX OTHER SURGICAL Basal cell skin cancer removed from left leg, left arm and neck HX OTHER SURGICAL    
 cyst removed from left thigh HX PARTIAL HYSTERECTOMY HX OREN AND BSO    
 HX TONSILLECTOMY    
 VASCULAR SURGERY PROCEDURE UNLIST    
 vascular blockages removed. Hospital course since last seen and reason for reevaluation: Surgery 5/10 and code S on 5/11 with transfer to ICU EXAMINATION/PRESENTATION/DECISION MAKING:  
Critical Behavior: 
Neurologic State: Alert Orientation Level: Oriented X4 Cognition: Decreased attention/concentration, Decreased command following, Poor safety awareness Safety/Judgement: Awareness of environment, Decreased awareness of need for assistance, Decreased awareness of need for safety, Decreased insight into deficits, Fall prevention Hearing: Auditory Auditory Impairment: Hard of hearing, bilateral, Hearing aid(s) Hearing Aids/Status: At bedside Range Of Motion: 
AROM: Within functional limits Strength:   
Strength: Generally decreased, functional 
  
  
  
  
  
  
Tone & Sensation:  
Tone: Normal 
  
  
  
  
Sensation: Intact Coordination: 
Coordination: Within functional limits Vision:  
Acuity: Impaired near vision; Impaired far vision Corrective Lenses: Glasses Functional Mobility: 
Bed Mobility: 
Supine to Sit: Minimum assistance; Additional time Scooting: Minimum assistance Transfers: 
Sit to Stand: Moderate assistance; Additional time;Assist x2 Stand to Sit: Moderate assistance; Additional time;Assist x2 Bed to Chair: Moderate assistance; Additional time;Assist x2 Balance:  
Sitting: Intact Standing: Impaired; With support Standing - Static: Poor;Constant support Standing - Dynamic : Poor;Constant support Functional Measure: 
Barthel Index: 
 
Bathin Bladder: 0(brooks) Bowels: 10 
Groomin Dressin Feedin Mobility: 0 Stairs: 0 Toilet Use: 5 Transfer (Bed to Chair and Back): 5 Total: 30/100 The Barthel ADL Index: Guidelines 1. The index should be used as a record of what a patient does, not as a record of what a patient could do. 2. The main aim is to establish degree of independence from any help, physical or verbal, however minor and for whatever reason. 3. The need for supervision renders the patient not independent. 4. A patient's performance should be established using the best available evidence. Asking the patient, friends/relatives and nurses are the usual sources, but direct observation and common sense are also important. However direct testing is not needed. 5. Usually the patient's performance over the preceding 24-48 hours is important, but occasionally longer periods will be relevant. 6. Middle categories imply that the patient supplies over 50 per cent of the effort. 7. Use of aids to be independent is allowed. Nicolas Mabry., Barthel, D.W. ([id number]). Functional evaluation: the Barthel Index. [address] [facility] (14)2. MARIAH Barrios, Maggy Farley, Dilan BermeoMi[facility] Children's [facility], [address] [place] [address] (). Measuring the change indisability after inpatient rehabilitation; comparison of the responsiveness of the Barthel Index and Functional [place] Measure. Journal of Neurology, Neurosurgery, and Psychiatry, 66(4), 650-712.  
Naz Nix, N.[place].A, JEAN Greene, & Colton Diaz MArmaniA. (2004.) Assessment of post-stroke quality of life in cost-effectiveness studies: The usefulness of the Barthel Index and the EuroQoL-5D. [facility], 13, 317-04 Activity Tolerance:  
Poor After treatment patient left in no apparent distress:  
Sitting in chair, Call bell within reach, and Bed / chair alarm activated COMMUNICATION/EDUCATION:  
The patients plan of care was discussed with: Occupational therapist and Registered nurse. Fall prevention education was provided and the patient/caregiver indicated understanding., Patient/family have participated as able in goal setting and plan of care. , and Patient/family agree to work toward stated goals and plan of care. Thank you for this referral. 
Claudeen Stank, PT, DPT Time Calculation: 28 mins

## 2021-05-12 NOTE — PROGRESS NOTES
Bedside and Verbal shift change report given to [address] (oncoming nurse) by Casey Sanches (offgoing nurse). Report included the following information SBAR, Kardex, Procedure Summary, Intake/Output, MAR, Recent Results, Med Rec Status, Cardiac Rhythm A fib, Alarm Parameters  and Dual Neuro Assessment. 1645: Patient temp sensing brooks is not MRI compatible. MD aware and ordered to remove brooks and see if patient can void. Will bladder scan in 6 hours. This RN with patient to MRI.

## 2021-05-12 NOTE — CONSULTS
Palliative Medicine Consult Juan: [phone] ([phone]) Consult received and chart reviewed. Patient seen at the bedside this morning and again this afternoon with her niece Benito Hammonds present. Both the patient and Benito Hammonds are happy that she has improved from yesterday. They hope that she will continue to improve and that they \"don't need my services\". They want to continue the current care and see how she responds. I gave them my contact information and encouraged them to reach out to me for support. I will continue to follow along and will be available to talk with them about goals of care again. Full consult note to follow. Nato Torres, ROLAN-C

## 2021-05-12 NOTE — PROGRESS NOTES
[facility] 
 
ICU TEAM Progress Note Name: Neel Lackey : 1927 MRN: [id number] Date: 2021 Assessment/Plan: 1. Anion gap metabolic acidosis due to lactic acidosis 
a. Unclear source of lactic acidosis. Up to 4.5 this morning 
b. Elevation in WBCs, but no other evidence of ongoing infection. Patient appears nontoxic. CXR without acute process and UA bland.  
c. Patient continues on vancomycin and ceftriaxone 
d. Concern that lactic acidosis is being driven by vasopressor use- will discuss with neurology 2. Acute TIA 
a. Acutely aphasic this morning 
b. Code stroke called- no evidence of acute infarct or perfusion deficit 
c. High risk for embolic process given chronic afib and off anticoagulant given frequent falls, surgery, and bleeding  
d. Patient appears back at neurologic baseline 
e. Neurology consulted 
f. Discussed SBP goal- now goal of 110-120. Wean norepinephrine as able 
g. Continue midodrine 10mg TID for blood pressure augmentation 
h. Close neurologic monitoring as blood pressure goal lowered 3. Left leg wound s/p left leg arteriogram with balloon angioplasty and debridement  
a. Postoperative care per vascular surgery, continue vancomycin and ceftriaxone 
b. Fentanyl PRN for pain control 
c. No wound culture sent. Blood cultures with 1/2 bottles with coag negative staph 4. Acute on chronic anemia 
a. Patient with baseline iron deficiency anemia- confirmed with iron profile 
b. Bleeding from leg wound- patient has received 3u PRBCs total. Hgb stable this am 
c. Patient started on heparin DVT ppx today- monitor closely for bleeding 5. Reactive airway disease 
a. Continue brovana and pulmicort 6. Glaucoma 
a. Continue xalantan and timolol 7. dyslipidemia and PVD 
a. Continue statin and restart aspirin tomorrow 8. Chronic a fib 
a. Holding metoprolol given vasopressor requirement 9. History of breast and active ovarian cancer 
a.  Patient with R chest port, chemo on hold due to multiple hospitalizations and rehab stays 10. Anxiety 
a. Xanax 0.25mg nightly as needed F - Feeding:  Yes PO 
A - Analgesia: Fentanyl S - Sedation: None T - DVT Prophylaxis: SCD's or Sequential Compression Device on right leg only H - Head of Bed: > 30 Degrees U - Ulcer Prophylaxis: Not at this time G - Glycemic Control: Insulin S - Spontaneous Breathing Trial: N/A 
B - Bowel Regimen: Docusate (Colace) I - Indwelling Catheter: 
 Tubes: None Lines: Peripheral IV Drains: Carlton Catheter D - De-escalation of Antibiotics: Ceftriaxone Vancomycin Subjective:  
Progress Note: 5/12/2021 Reason for ICU Admission: Hypotension HPI:Lizette Foster is a 80 y. o. female with hx aortic stenosis, pulm htn, GERD, glaucoma, remote breast CA, HTN, and recent ovarian cancer with port-a-cath s/p chemo times two who presents with complications of traumatic wound.  She had skin cancer on her LLE and subsequently sustained a traumatic wound after a fall in the same area.  The wound had progress, and they were pending and appt with surgeon Dr. Faisal Muir today.  The nurse from the rehab facility where patient was being discharged sent a picture to the clinic, and patient was advised to come to the hospital instead for management of wound. 
  
In the ED, VSS. Labs are significant for lactate of 4, CRP 6.32, ESR 34, and normocytic anemia with Hgb 10.4. L tib/fib XR with pretibial soft tissue ulceration and no noted osseous abnormality On 5/10 patient to OR for LLE arteriogram, L fem and popliteal angioplasty, and debridement of wound. Patient received several units of PRBCs given drop in hgb Overnight Events:  
5/12/2021 
-  Patient transferred to ICU yesterday following code stroke. Patient transferred for vasopressor augmentation of blood pressure.  
- Overnight, patient with increased vasopressor requirement to maintain SBP goal of 160-180 
- This morning, patient with increased lactic acid- felt to be related to PAD/PVD and vasopressor requirement - Patient appears to be back at neurologic baseline POD: 
2 Day Post-Op S/P:  
Procedure(s): DEBRIDEMENT LEFT LOWER LEG WOUND. LEFT LEG ARTERIOGRAM, LEFT FEMORAL AND POPLITEAL ANGIOPLASTY Objective:  
Vital Signs: 
Visit Vitals BP (!) 146/77 Pulse 94 Temp 99.6 °F (37.6 °C) Resp 16 Ht 5' 4\" (1.626 m) Wt 66 kg (145 lb 8.1 oz) SpO2 100% BMI 24.98 kg/m² O2 Flow Rate (L/min): 1 l/min O2 Device: None (Room air) Temp (24hrs), Av.8 °F (37.1 °C), Min:97 °F (36.1 °C), Max:100.1 °F (37.8 °C) Intake/Output:  
 
Intake/Output Summary (Last 24 hours) at 2021 9833 Last data filed at 2021 0700 Gross per 24 hour Intake 3714.74 ml Output 1646 ml Net 2068.74 ml Physical Exam: 
 
General:  Alert, cooperative, appears stated age, lying in bed Eyes:  Sclera anicteric. Pupils equally round and reactive to light. Mouth/Throat: Mucous membranes dry, oral pharynx clear Neck: Supple Lungs:   Clear to auscultation bilaterally, good effort CV:  Regular rate and rhythm,no murmur, click, rub or gallop Abdomen:   Soft, non-tender. bowel sounds normal. non-distended Extremities: No cyanosis. Trace BLE edema, 2+ edema in LUE Skin: Patient with areas of ecchymosis on bilateral arms, decreased skin turgor. LLE with large opened wound with tendon and bone exposure- wound is red and bloody. Wound dressing C/D/I. No acute rash or lesions Lymph nodes: Cervical and supraclavicular normal  
Musculoskeletal: No swelling or deformity Lines/Devices:  Intact, no erythema, drainage or tenderness Neuro/Psych: Alert and oriented, moves all extremities equally, normal mood affect given the setting LABS AND  DATA: Personally reviewed Recent Labs 21 
9442 21 
2321 WBC 13.5* 10.8 HGB 8.6* 8.5* HCT 27.9* 28.4*  
 140* Recent Labs 21 
4487 21 
2321 NA 128* 128* K 4.8 5.0  
 100 CO2 15* 14*  
BUN 20 21* CREA 0.85 0.89 * 235* CA 7.7* 7.3* No results for input(s): AP, TBIL, TP, ALB, GLOB, AML, LPSE in the last 72 hours. No lab exists for component: SGOT, GPT, AMYP No results for input(s): INR, PTP, APTT, INREXT, INREXT in the last 72 hours. No results for input(s): PHI, PCO2I, PO2I, FIO2I in the last 72 hours. No results for input(s): CPK, CKMB, TROIQ, BNPP in the last 72 hours. MEDS: Reviewed Chest X-Ray: CXR Results  (Last 48 hours) None Multidisciplinary Rounds Completed: Yes ABCDEF Bundle/Checklist Completed: 
Yes SPECIAL EQUIPMENT None DISPOSITION Stay in ICU CRITICAL CARE CONSULTANT NOTE I had a face to face encounter with the patient, reviewed and interpreted patient data including clinical events, labs, images, vital signs, I/O's, and examined patient. I have discussed the case and the plan and management of the patient's care with the consulting services, the bedside nurses and the respiratory therapist.   
 

## 2021-05-12 NOTE — CONSULTS
Neurology Consult Mary Monroe NP Patient: Deep Rachel MRN: [id number]  SSN: [id number] YOB: 1927  Age: 80 y.o. Sex: female Chief Complaint: Aphasia, dysarthria. Subjective:  
  
Deep Rachel is a 80 y.o. female with a past medical history of HTN, GERD, arthritis, moderate pulmonary hypertension, chronic atrial fibrillation, moderate aortic stenosis, hyperlipidemia, glaucoma, remote breast cancer and more recent ovarian cancer. Patient had fall approximately 5 weeks ago over her walker obtaining a scalp and left lower shin wound. Her left lower extremity wound has not been healing properly with signs and symptoms of infection and she was admitted to the hospital on 5/6/21 for treatment. Arterial duplex showed occluded left popliteal artery. She had a left leg arteriogram with balloon angioplasty of the superficial femoral and popliteal arteries and debridement of the left leg wound by Dr. James Hernández on 5/10/21. She had successful revascularization with recanalization of the occluded left popliteal artery. On the morning of 5/11 Code Stroke was called due to patient having new onset expressive aphasia. Patient was taken [facility] which showed no acute abnormality. 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 There was no significant perfusion defect. NIS recommended permissive HTN with -180 as patient was likely having acute ongoing ischemic stroke with mild to moderate narrowing of the left MCA. Patient was sent to the ICU due to hypotension requiring Levophed and acute anemia post procedure. Past Medical History:  
Diagnosis Date  Arthritis  Cancer ([facility] [address] breast    (leg,ear,arm-skin cancer)  GERD (gastroesophageal reflux disease)  Hypertension  Other ill-defined conditions(799.89)   
 glaucoma  Other ill-defined conditions(799.89)   
 cellulitis left arm  Other ill-defined conditions(799.89)   
 carpal tunnel syndrome left hand  Other ill-defined conditions(799.89)   
 vertigo  Ovarian cancer ([facility] [address] 2020 Family History Problem Relation Age of Onset  Heart Disease Mother  Heart Disease Father   
      age 55  
 Heart Disease Brother  Stroke Brother  Cancer Sister Colon Cancer  Diabetes Sister  Heart Disease Sister  Heart Disease Sister  Diabetes Sister  Other Brother MVA  Heart Disease Brother  Heart Disease Son   
      age 52 Social History Tobacco Use  Smoking status: Former Smoker Types: Cigarettes Quit date: 1955 Years since quittin.4  Smokeless tobacco: Never Used Substance Use Topics  Alcohol use: Not Currently Comment: rare Prior to Admission Medications Prescriptions Last Dose Informant Patient Reported? Taking? Breo Ellipta 200-25 mcg/dose inhaler  Family Member Yes No  
Sig: TAKE 1 PUFF DAILY CYANOCOBALAMIN, VITAMIN B-12, (VITAMIN B-12 PO)  Family Member Yes No  
Sig: Take 1,000 mcg by mouth daily. Cholecalciferol, Vitamin D3, (VITAMIN D3) 1,000 unit cap  Family Member Yes No  
Sig: Take 1 Cap by mouth two (2) times a day. albuterol-ipratropium (DUO-NEB) 2.5 mg-0.5 mg/3 ml nebu   No No  
Sig: 3 mL by Nebulization route every four (4) hours as needed for Wheezing or Shortness of Breath. aspirin delayed-release 81 mg tablet   No No  
Sig: Take 1 Tab by mouth daily. cycloSPORINE (RESTASIS) 0.05 % ophthalmic emulsion  Family Member Yes No  
Sig: Administer 1 Drop to both eyes two (2) times a day. dexamethasone in 0.9 % sod chl (DEXAMETHASONE-0.9 % SOD. CHLOR IV)  Other Yes No  
Sig: 10 mg by IntraVENous route. Every 28 days pre treatment  
diclofenac (VOLTAREN) 1 % gel  Family Member Yes No  
Sig: APPLY 1 APPLICATION TOPICALLY 4 (FOUR) TIMES A DAY AS NEEDED (PAIN) esomeprazole (NEXIUM) 40 mg capsule  Family Member No No  
Sig: Take 1 Cap by mouth Daily (before breakfast). latanoprost (XALATAN) 0.005 % ophthalmic solution  Family Member Yes No  
Sig: Administer 1 Drop to both eyes nightly. metoprolol succinate (TOPROL-XL) 25 mg XL tablet   No No  
Sig: Take 0.5 Tabs by mouth daily. multivitamins-minerals-lutein (CENTRUM SILVER) Tab  Family Member Yes No  
Sig: Take 1 Tab by mouth daily. omega-3 fatty acids-vitamin e (FISH OIL) 1,000 mg Cap  Family Member Yes No  
Sig: Take 1 Cap by mouth two (2) times a day. rosuvastatin (CRESTOR) 20 mg tablet   No No  
Sig: TAKE 1 TABLET NIGHTLY  
timolol (TIMOPTIC-XE) 0.5 % ophthalmic gel-forming  Family Member Yes No  
Sig: Administer 1 Drop to both eyes daily. Facility-Administered Medications: None Allergies Allergen Reactions  Augmentin [Amoxicillin-Pot Clavulanate] Hives and Itching  Doxycycline Hives and Itching  Keflex [Cephalexin] Hives and Itching  Monodox [Doxycycline Monohydrate] Other (comments)  Sulfa (Sulfonamide Antibiotics) Rash Review of Systems: 
Denies numbness, tingling, chest pain, leg pain, nausea, vomiting, difficulty swallowing, headache, and dyspnea. Objective:  
 
Vitals:  
 05/11/21 1954 05/11/21 2000 05/11/21 2100 05/11/21 2200 BP:  (!) 142/65 130/66 135/64 Pulse:  90 87 83 Resp:  25 20 18 Temp:  100.1 °F (37.8 °C) SpO2: 100% 100% 100% Weight:      
Height:      
  
Physical Exam: 
GENERAL: Calm, cooperative, NAD SKIN: Warm, dry, color appropriate for ethnicity EXTREMITIES: Left groin soft without hematoma. Left foot with bluish discoloration and swelling. Left lower leg was dressing in place, C/D/I. Capillary refill 5-6 seconds. Both feet cool to touch. Pedal pulses 1+ bilaterally. Left arm swelling as well. No discoloration. Neurologic Exam: 
Mental Status:  Alert and oriented x 4. Appropriate affect, mood and behavior.     
 
Language:    Normal fluency, repetition, mild difficulty with comprehension and naming but may be due to hearing difficulties. Cranial Nerves:   Pupils 3 mm, equal, round and reactive to light. Visual fields full to confrontation. Extraocular movements intact. Facial sensation intact. Full facial strength, no asymmetry. Very hard of hearing without hearing aids in. Mild to moderate dysarthria. Shoulder shrug 5/5 bilaterally. Motor:    No pronator drift. Bulk and tone normal.  
   No focal weakness noted. No involuntary movements. Sensation:    Sensation intact throughout to light touch. Reflexes:     Negative [place] NIHSS:   
  1a-LOC:0 
  1b-Month/Age:0 
  1c-Open/Close Hand:0 2-Best Gaze:0 
  3-Visual Fields:0 
  4-Facial Palsy:0 
  5a-Left Arm:0 
  5b-Right Arm:0 
  6a-Left Le 
  6b-Right Le 
  7-Limb Ataxia:0 
  8-Sensory:0 
  9-Best Language:1 
  10-Dysarthria:1 
  11-Extinction/Inattention:0 
TOTAL SCORE:2 
Labs: 
Lab Results Component Value Date/Time WBC 9.7 2021 05:47 PM  
 HGB 8.5 (L) 2021 05:47 PM  
 HCT 27.2 (L) 2021 05:47 PM  
 PLATELET 839  05:47 PM  
 MCV 88.3 2021 05:47 PM  
  
Lab Results Component Value Date/Time Sodium 132 (L) 2021 02:15 AM  
 Potassium 4.6 2021 02:15 AM  
 Chloride 103 2021 02:15 AM  
 CO2 21 2021 02:15 AM  
 Anion gap 8 2021 02:15 AM  
 Glucose 100 2021 02:15 AM  
 BUN 22 (H) 2021 02:15 AM  
 Creatinine 0.74 2021 02:15 AM  
 BUN/Creatinine ratio 30 (H) 2021 02:15 AM  
 GFR est AA >60 2021 02:15 AM  
 GFR est non-AA >60 2021 02:15 AM  
 Calcium 7.8 (L) 2021 02:15 AM  
 
Lab Results Component Value Date/Time CK 53 2014 12:00 PM  
 CK - MB 1.2 2014 12:00 PM  
 CK-MB Index 2.3 2014 12:00 PM  
 Troponin-I, Qt. 0.05 (H) 2021 03:31 AM  
 
 
Imaging: CT Results (maximum last 3): Results from [facility]  REI Encounter encounter on 21 CT CODE NEURO PERF W CBF  
 Narrative CLINICAL HISTORY: Code stroke EXAMINATION:  CT ANGIOGRAPHY HEAD AND NECK COMPARISON: CT head 5/11/2021, CTA 11/12/2018 TECHNIQUE:  Following the uneventful administration of iodinated contrast 
material, axial CT angiography of the head and neck was performed. Delayed axial 
images through the head were also obtained. Coronal and sagittal reconstructions 
were obtained. Manual postprocessing of images was performed. 3-D  Sagittal 
maximal intensity projection images were obtained. 3-D Coronal maximal 
intensity projections were obtained. CT dose reduction was achieved through use 
of a standardized protocol tailored for this examination and automatic exposure 
control for dose modulation. CT perfusion analysis was performed using CT with contrast administration, 
including postprocessing of parametric maps with the determination of cerebral 
blood flow, cerebral blood volume, and mean transit time. This study was analyzed by the [address] ai algorithm FINDINGS: 
 
Delayed contrast-enhanced head CT: The ventricles are midline without hydrocephalus. There is no acute intra or 
extra-axial hemorrhage. Mild to moderate bilateral subcortical and 
periventricular areas of patchy low attenuation is nonspecific but likely 
related to changes of chronic small vessel ischemic disease. The basal cisterns 
are clear. The paranasal sinuses are clear. CTA NECK: 
 
Great vessels: Patent origins Right subclavian artery: Patent with mild atherosclerosis Left subclavian artery: Patent with mild to moderate atherosclerosis Right common carotid artery: Patent with mild atherosclerosis Left common carotid artery: Patent with mild atherosclerosis Cervical right internal carotid artery: Patent with proximal atherosclerosis 
causing no significant stenosis by NASCET criteria.  
 
Cervical left internal carotid artery: Patent with proximal atherosclerosis 
causing no significant stenosis by NASCET criteria. Right vertebral artery: Patent with mild atherosclerosis Left vertebral artery: Patent with mild atherosclerosis Severe cervical spondylosis Subcentimeter low-density thyroid nodules Right chest port. Moderate emphysema CTA HEAD: 
 
Right cavernous internal carotid artery: Patent with mild to moderate 
atherosclerosis Left cavernous internal carotid artery: Patent with mild to moderate 
atherosclerosis Anterior cerebral arteries: Patent with mild to moderate atherosclerosis. Proximal stenosis in the left A1 segment due to noncalcified atheroma Anterior communicating artery: Patent Right middle cerebral artery: Patent with mild atherosclerosis Left middle cerebral artery: Patent with mild atherosclerosis Posterior communicating arteries: Patent Posterior cerebral arteries: Right is diminutive in caliber with severe 
atherosclerotic changes. Left is patent with fetal origin and mild 
atherosclerosis Basilar artery: Patent and slightly diminutive in caliber with mild to moderate 
atherosclerosis. 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 
atherosclerosis CT perfusion brain: No significant cerebral perfusion abnormality or mismatch Measurements use NASCET criteria. Impression No evidence for acute large vessel arterial occlusion. No significant cerebral perfusion abnormality or mismatch. Stable atherosclerotic disease. 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 CT CODE NEURO HEAD WO CONTRAST Narrative EXAM: CT CODE NEURO HEAD WO CONTRAST INDICATION: code neuro COMPARISON: None. CONTRAST: None. TECHNIQUE: Unenhanced CT of the head was performed using 5 mm images. Brain and 
bone windows were generated. Coronal and sagittal reformats.  CT dose reduction 
was achieved through use of a standardized protocol tailored for this 
examination and automatic exposure control for dose modulation. FINDINGS: 
Ventricles and sulci are stable. Mild changes small vessel disease is unchanged. No hemorrhage mass or acute infarction identified. The bone windows demonstrate 
no abnormalities. The visualized portions of the paranasal sinuses and mastoid 
air cells are clear. Impression No acute abnormality identified.-Result called to the nurse. CTA CODE NEURO HEAD AND NECK W CONT Narrative CLINICAL HISTORY: Code stroke EXAMINATION:  CT ANGIOGRAPHY HEAD AND NECK COMPARISON: CT head 5/11/2021, CTA 11/12/2018 TECHNIQUE:  Following the uneventful administration of iodinated contrast 
material, axial CT angiography of the head and neck was performed. Delayed axial 
images through the head were also obtained. Coronal and sagittal reconstructions 
were obtained. Manual postprocessing of images was performed. 3-D  Sagittal 
maximal intensity projection images were obtained. 3-D Coronal maximal 
intensity projections were obtained. CT dose reduction was achieved through use 
of a standardized protocol tailored for this examination and automatic exposure 
control for dose modulation. CT perfusion analysis was performed using CT with contrast administration, 
including postprocessing of parametric maps with the determination of cerebral 
blood flow, cerebral blood volume, and mean transit time. This study was analyzed by the [address] ai algorithm FINDINGS: 
 
Delayed contrast-enhanced head CT: The ventricles are midline without hydrocephalus. There is no acute intra or 
extra-axial hemorrhage. Mild to moderate bilateral subcortical and 
periventricular areas of patchy low attenuation is nonspecific but likely 
related to changes of chronic small vessel ischemic disease. The basal cisterns 
are clear. The paranasal sinuses are clear. CTA NECK: 
 
Great vessels: Patent origins Right subclavian artery: Patent with mild atherosclerosis Left subclavian artery: Patent with mild to moderate atherosclerosis Right common carotid artery: Patent with mild atherosclerosis Left common carotid artery: Patent with mild atherosclerosis Cervical right internal carotid artery: Patent with proximal atherosclerosis 
causing no significant stenosis by NASCET criteria. Cervical left internal carotid artery: Patent with proximal atherosclerosis 
causing no significant stenosis by NASCET criteria. Right vertebral artery: Patent with mild atherosclerosis Left vertebral artery: Patent with mild atherosclerosis Severe cervical spondylosis Subcentimeter low-density thyroid nodules Right chest port. Moderate emphysema CTA HEAD: 
 
Right cavernous internal carotid artery: Patent with mild to moderate 
atherosclerosis Left cavernous internal carotid artery: Patent with mild to moderate 
atherosclerosis Anterior cerebral arteries: Patent with mild to moderate atherosclerosis. Proximal stenosis in the left A1 segment due to noncalcified atheroma Anterior communicating artery: Patent Right middle cerebral artery: Patent with mild atherosclerosis Left middle cerebral artery: Patent with mild atherosclerosis Posterior communicating arteries: Patent Posterior cerebral arteries: Right is diminutive in caliber with severe 
atherosclerotic changes. Left is patent with fetal origin and mild 
atherosclerosis Basilar artery: Patent and slightly diminutive in caliber with mild to moderate 
atherosclerosis. 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 
atherosclerosis CT perfusion brain: No significant cerebral perfusion abnormality or mismatch Measurements use NASCET criteria. Impression No evidence for acute large vessel arterial occlusion. No significant cerebral perfusion abnormality or mismatch. Stable atherosclerotic disease.  
 
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 Assessment:  
 
Hospital Problems  Date Reviewed: 3/29/2021 Codes Class Noted POA Leg ulcer, left ([address] ICD-10-CM: N11.777 ICD-9-CM: 707.10  5/6/2021 Unknown Plan:  
1.) Mild to moderate narrowing of left MCA with possible left MCA CVA. In patient with multiple medical problems including atrial fibrillation off of anticoagulation, blood loss anemia, reactive airway disease, hx of breast and ovarian cancer, now with hyponatremia, s/p left leg wound s/p balloon angioplasty and debridement.  
 
            - NIHSS of 7 on exam in Code S, now a 2.   
            - q 2 neuro checks - A1C and lipid panel pending, LDL goal <70 
            - MRI brain ordered 
            - ECHO ordered 
            - PT/OT/SLP evals - Stroke education 
            - Having difficulty maintaining SBP goal above 160 even on high dose Levophed. Patient with minimal to no neuro symptoms at  to 140. Okay to change SBP goal 140-180. BP had actually dropped to SBP 60's - 90's during and after Code S 5/11 am. Patient also noted to have blood loss anemia requiring transfusion of 2 units of PRBC at that time. - Home meds per Intensivist. 
 
 
 
I have discussed the diagnosis and the intended plan as seen in the above orders with Dr. Chano Walters team,  the patient and the primary RN. Further recommendations to follow. Thank you for this consult and participating in the care of this patient. Signed By: Wero Ulloa NP May 11, 2021

## 2021-05-12 NOTE — PROGRESS NOTES
1930: Bedside shift change report given to [address] [place] [address] (oncoming nurse) by Naomi Acosta RN (offgoing nurse). Report included the following information SBAR, Kardex, MAR, Recent Results, Cardiac Rhythm A fib and Dual Neuro Assessment. 2100: Levophed infusing via 20g PIC L FA, IV site painful to touch and painful to flush. Entire L arm 3+ pitting edema with pain and redness. R chest port accessed without complication for levophed infusion, ok per Dex NP. L FA PIV removed. 2130Juncharisse Hernandez NP at bedside, verbal orders received for Q2 neuro checks. SBP goal adjusted to 140-180. 
 
2355: Urine output below adequate, 65mL in the last 6 hrs. Orders received. 0700: Pt diuresed 530mL urine after 40mg IV lasix, remains on 20mcg/min levophed, no drop in BP.  
 
1930: Bedside shift change report given to  [address] Cannno (oncoming nurse) by [address] [place] [address] (offgoing nurse). Report included the following information SBAR, Kardex, Intake/Output, MAR, Recent Results, Cardiac Rhythm A fib and Alarm Parameters .

## 2021-05-12 NOTE — ROUTINE PROCESS
Occupational therapy 9067 -  
[phone] Chart reviewed in prep for OT re-evaluation 2/2 Code S on 5/12. ECHO at bedside, will defer and f/u later in AM/PM as able and appropriate. Thank you. Servando King, MS, OTR/L

## 2021-05-12 NOTE — CONSULTS
Palliative Medicine Consult Juan: [phone]) Patient Name: Lizeth Ford YOB: 1927 Date of Initial Consult: 5/12/21 Reason for Consult: Care decisions Requesting Provider: Dr. Jomar Pimentel Primary Care Physician: Bryan Kaufman MD 
 
 SUMMARY:  
Lizeth Ford is a 80 y.o. female with a past history of aortic stenosis, pulmonary hypertension, GERD, glaucoma, breast cancer, ovarian cancer, skin cancer, s/p chemo, hypertension, dyslipidemia, chronic atrial fib, reactive airway disease, and anxiety, who was admitted on 5/6/2021 from home with a diagnosis of left lower extremity wound and sepsis. She presented with an infected left lower leg wound. She underwent an arteriogram, balloon angioplasty, and debridement. Post-operatively, she had some bleeding and became anemic. She then became hypotensive and aphasic. She was moved to ICU and started on pressor support. Her aphasia resolved and she is now back to baseline. Current medical issues leading to Palliative Medicine involvement include: goals of care, ovarian cancer, a-fib, likely new stroke, hypotensive. Social: She lives with her niece Theo Mcintosh, who is her main support person. PALLIATIVE DIAGNOSES:  
1. Goals of care 2. Aphasia, now resolved 3. Hypotension 4. Anemia 5. Left lower leg wound 6. Pain 7. Weakness 8. Advanced age PLAN:  
1. Prior to seeing patient, the medical record was reviewed. 2. Patient seen at the bedside and palliative medicine services introduced. 3. She reports feeling back to her baseline and is alert and oriented. We talked about the events of the day before and the improvement in her condition. She is thankful that she is feeling better. We talked about the plan of care and she is in agreement with continuing restorative care. 4. Later in the day, I met with the patient again along with her niece and Vanessa Sood. Palliative medicine services re-introduced.  We talked about the concern about her condition when she experienced the aphasia and that more information will be obtained with the brain MRI. We briefly talked about the current plan of care and that the palliative team is here for support to talk through care decisions and goals of care. Both the patient and Brittny Anglin report that they want to continue the current care with the goal of the patient returning home to [facility]. They did not want to discuss hospice care at this time and Brittny Anglin stated, \"I hope we don't need your services\". 5. Palliative medicine contact information given to them and I encouraged them to reach out for further support or assistance with care decisions. I let them know I would continue to follow along with her care while she is here in the hospital. 
6. Initial consult note routed to primary continuity provider and/or primary health care team members 7. Communicated plan of care with: Palliative Barbara DE JESUS Team 
 
 GOALS OF CARE / TREATMENT PREFERENCES:  
 
GOALS OF CARE: 
Patient/Health Care Proxy Stated Goals: Prolong life TREATMENT PREFERENCES:  
Code Status: DNR Advance Care Planning: 
[x] [facility] Interdisciplinary Team has updated the ACP Navigator with [employer] and Patient Capacity Primary Decision Maker: Carmen Desai - Other Relative - [phone] Advance Care Planning 5/8/2021 Patient's Healthcare Decision Maker is: - Confirm Advance Directive Yes, on file Medical Interventions: Limited additional interventions Other Instructions: Other: As far as possible, the palliative care team has discussed with patient / health care proxy about goals of care / treatment preferences for patient. HISTORY:  
 
History obtained from: patient, chart CHIEF COMPLAINT: Left leg wound, bleeding, aphasia HPI/SUBJECTIVE: The patient is:  
[x] Verbal and participatory [] Non-participatory due to: She reports feeling okay. She feels back to her normal baseline this morning. She has some pain in her left lower leg. She denies nausea or shortness of breath. Her appetite is okay, she complains that the food is not good here. She is alert and oriented x 4. Clinical Pain Assessment (nonverbal scale for severity on nonverbal patients):  
Clinical Pain Assessment Severity: 3 Location: left lower leg Character: aching Duration: days Frequency: intermittent Duration: for how long has pt been experiencing pain (e.g., 2 days, 1 month, years) Frequency: how often pain is an issue (e.g., several times per day, once every few days, constant) FUNCTIONAL ASSESSMENT:  
 
Palliative Performance Scale (PPS): PPS: 50 PSYCHOSOCIAL/SPIRITUAL SCREENING:  
 
Palliative IDT has assessed this patient for cultural preferences / practices and a referral made as appropriate to needs (Cultural Services, Patient Advocacy, Ethics, etc.) Any spiritual / Baptism concerns: 
[] Yes /  [x] No 
 
Caregiver Burnout: 
[] Yes /  [x] No /  [] No Caregiver Present Anticipatory grief assessment:  
[x] Normal  / [] Maladaptive ESAS Anxiety: ESAS Depression:    
 
 
 REVIEW OF SYSTEMS:  
 
Positive and pertinent negative findings in ROS are noted above in HPI. The following systems were [x] reviewed / [] unable to be reviewed as noted in HPI Other findings are noted below. Systems: constitutional, ears/nose/mouth/throat, respiratory, gastrointestinal, genitourinary, musculoskeletal, integumentary, neurologic, psychiatric, endocrine. Positive findings noted below. Modified ESAS Completed by: provider Fatigue: 5 Drowsiness: 0 Pain: 3 Nausea: 0 Anorexia: 2 Dyspnea: 0 PHYSICAL EXAM:  
 
From RN flowsheet: 
Wt Readings from Last 3 Encounters:  
05/14/21 154 lb 5.2 oz (70 kg) 05/12/21 145 lb 8.1 oz (66 kg) 04/08/21 149 lb (67.6 kg) Blood pressure (!) 98/43, pulse 78, temperature 97.6 °F (36.4 °C), resp. rate 16, height 5' 4\" (1.626 m), weight 154 lb 5.2 oz (70 kg), SpO2 94 %. Pain Scale 1: Numeric (0 - 10) Pain Intensity 1: 7 Pain Onset 1: ongoing Pain Location 1: Leg 
Pain Orientation 1: Left Pain Description 1: Aching Pain Intervention(s) 1: Medication (see MAR) Last bowel movement, if known:  
 
Constitutional: awake, pleasant, sitting up in bed Eyes: pupils equal, anicteric ENMT: no nasal discharge, dry mucous membranes Cardiovascular: regular rhythm Respiratory: breathing not labored, symmetric Musculoskeletal: no deformity, no tenderness to palpation Skin: warm, dry, dressing dry/intact to left lower leg Neurologic: following commands, moving all extremities, oriented x 4 Psychiatric: full affect, no hallucinations HISTORY:  
 
Active Problems: 
  Leg ulcer, left ([address] (5/6/2021) Past Medical History:  
Diagnosis Date  Arthritis  Cancer ([address] breast    (leg,ear,arm-skin cancer)  GERD (gastroesophageal reflux disease)  Hypertension  Other ill-defined conditions(799.89)   
 glaucoma  Other ill-defined conditions(799.89)   
 cellulitis left arm  Other ill-defined conditions(799.89)   
 carpal tunnel syndrome left hand  Other ill-defined conditions(799.89)   
 vertigo  Ovarian cancer ([address] 11/2020 Past Surgical History:  
Procedure Laterality Date  CARDIAC CATHETERIZATION  1/10/2013  HX APPENDECTOMY  HX BILATERAL MASTECTOMY Bilateral   
 HX CARPAL TUNNEL RELEASE    
 left hand  HX CATARACT REMOVAL    
 bilateral  
 HX COLONOSCOPY  4/2012  HX KNEE ARTHROSCOPY    
 left  HX OTHER SURGICAL Basal cell skin cancer removed from left leg, left arm and neck  HX OTHER SURGICAL    
 cyst removed from left thigh  HX PARTIAL HYSTERECTOMY  HX OREN AND BSO  HX TONSILLECTOMY  VASCULAR SURGERY PROCEDURE UNLIST    
 vascular blockages removed. Family History Problem Relation Age of Onset  Heart Disease Mother  Heart Disease Father   
      age 55  
 Heart Disease Brother  Stroke Brother  Cancer Sister Colon Cancer  Diabetes Sister  Heart Disease Sister  Heart Disease Sister  Diabetes Sister  Other Brother MVA  Heart Disease Brother  Heart Disease Son   
      age 52 History reviewed, no pertinent family history. Social History Tobacco Use  Smoking status: Former Smoker Types: Cigarettes Quit date: 1955 Years since quittin.4  Smokeless tobacco: Never Used Substance Use Topics  Alcohol use: Not Currently Comment: rare Allergies Allergen Reactions  Augmentin [Amoxicillin-Pot Clavulanate] Hives and Itching  Doxycycline Hives and Itching  Keflex [Cephalexin] Hives and Itching  Monodox [Doxycycline Monohydrate] Other (comments)  Sulfa (Sulfonamide Antibiotics) Rash Current Facility-Administered Medications Medication Dose Route Frequency  albumin human 5% (BUMINATE) solution 25 g  25 g IntraVENous ONCE  
 magnesium sulfate 1 g/100 ml IVPB (premix or compounded)  1 g IntraVENous ONCE  potassium, sodium phosphates (NEUTRA-PHOS) packet 1 Packet  1 Packet Oral BID  
 oxyCODONE IR (ROXICODONE) tablet 5 mg  5 mg Oral Q4H PRN  
 NOREPINephrine (LEVOPHED) 8 mg in 5% dextrose 250mL (32 mcg/mL) infusion  0.5-16 mcg/min IntraVENous TITRATE  aspirin chewable tablet 81 mg  81 mg Oral DAILY  insulin lispro (HUMALOG) injection   SubCUTAneous AC&HS  
 glucose chewable tablet 16 g  4 Tab Oral PRN  
 dextrose (D50W) injection syrg 12.5-25 g  12.5-25 g IntraVENous PRN  
 glucagon (GLUCAGEN) injection 1 mg  1 mg IntraMUSCular PRN  
 midodrine (PROAMATINE) tablet 10 mg  10 mg Oral TID  pantoprazole (PROTONIX) tablet 40 mg  40 mg Oral ACB  
 HYDROcodone-acetaminophen (NORCO) 5-325 mg per tablet 1 Tab  1 Tab Oral Q6H PRN  
 cefTRIAXone (ROCEPHIN) 1 g in 0.9% sodium chloride (MBP/ADV) 50 mL MBP  1 g IntraVENous Q24H  
 0.9% sodium chloride infusion 250 mL  250 mL IntraVENous PRN  
 fentaNYL citrate (PF) injection 12.5 mcg  12.5 mcg IntraVENous Q4H PRN  
 [Held by provider] metoprolol tartrate (LOPRESSOR) tablet 12.5 mg  12.5 mg Oral BID  
 0.9% sodium chloride infusion 250 mL  250 mL IntraVENous PRN  
 ALPRAZolam (XANAX) tablet 0.25 mg  0.25 mg Oral QHS PRN  
 rosuvastatin (CRESTOR) tablet 20 mg  20 mg Oral QHS  arformoteroL (BROVANA) neb solution 15 mcg  15 mcg Nebulization BID RT And  
 budesonide (PULMICORT) 500 mcg/2 ml nebulizer suspension  500 mcg Nebulization BID RT  
 timolol (TIMOPTIC) 0.5 % ophthalmic solution 1 Drop  1 Drop Both Eyes BID  latanoprost (XALATAN) 0.005 % ophthalmic solution 1 Drop  1 Drop Both Eyes QPM  
 melatonin tablet 3 mg  3 mg Oral QHS  sodium chloride (NS) flush 5-40 mL  5-40 mL IntraVENous Q8H  
 sodium chloride (NS) flush 5-40 mL  5-40 mL IntraVENous PRN  
 acetaminophen (TYLENOL) tablet 650 mg  650 mg Oral Q6H PRN Or  
 acetaminophen (TYLENOL) suppository 650 mg  650 mg Rectal Q6H PRN  polyethylene glycol (MIRALAX) packet 17 g  17 g Oral DAILY PRN  
 heparin (porcine) injection 5,000 Units  5,000 Units SubCUTAneous Q8H  Vancomycin- pharmacy to dose   Other Rx Dosing/Monitoring  vancomycin (VANCOCIN) 1,000 mg in 0.9% sodium chloride 250 mL (VIAL-MATE)  1,000 mg IntraVENous Q24H  
 
 
 
 LAB AND IMAGING FINDINGS:  
 
Lab Results Component Value Date/Time WBC 8.3 05/14/2021 01:40 AM  
 HGB 7.6 (L) 05/14/2021 01:40 AM  
 PLATELET 157 73/78/3171 01:40 AM  
 
Lab Results Component Value Date/Time  Sodium 132 (L) 05/14/2021 01:40 AM  
 Potassium 3.9 05/14/2021 01:40 AM  
 Chloride 99 05/14/2021 01:40 AM  
 CO2 24 05/14/2021 01:40 AM  
 BUN 17 05/14/2021 01:40 AM  
 Creatinine 0.78 05/14/2021 01:40 AM  
 Calcium 7.5 (L) 05/14/2021 01:40 AM  
 Magnesium 1.8 05/14/2021 01:40 AM Phosphorus 1.9 (L) 05/14/2021 01:40 AM  
  
Lab Results Component Value Date/Time Alk. phosphatase 113 05/06/2021 02:11 PM  
 Protein, total 6.0 (L) 05/06/2021 02:11 PM  
 Albumin 2.6 (L) 05/06/2021 02:11 PM  
 Globulin 3.4 05/06/2021 02:11 PM  
 
Lab Results Component Value Date/Time INR 1.5 (H) 04/06/2021 03:31 AM  
 Prothrombin time 15.8 (H) 04/06/2021 03:31 AM  
 aPTT 37.6 (H) 04/06/2021 03:31 AM  
  
Lab Results Component Value Date/Time Iron 25 (L) 05/12/2021 03:39 AM  
 TIBC 251 05/12/2021 03:39 AM  
 Iron % saturation 10 (L) 05/12/2021 03:39 AM  
 Ferritin 65 04/07/2021 10:23 AM  
  
No results found for: PH, PCO2, PO2 No components found for: [place] Lab Results Component Value Date/Time CK 53 11/25/2014 12:00 PM  
 CK - MB 1.2 11/25/2014 12:00 PM  
  
 
 
   
 
Total time: 50 min Counseling / coordination time, spent as noted above: 30 min 
> 50% counseling / coordination?: yes Prolonged service was provided for  []30 min   []75 min in face to face time in the presence of the patient, spent as noted above. Time Start:  
Time End:  
Note: this can only be billed with 28319 (initial) or 03113 (follow up). If multiple start / stop times, list each separately.

## 2021-05-13 NOTE — PROGRESS NOTES
Vascular: 
 
Left leg wound clean with healthy muscle throughout. There is a 1 cm area of exposed tibia that will hopefully granulate over. Continue wound care

## 2021-05-13 NOTE — PROGRESS NOTES
1930: Bedside shift change report given to [address] [place] [address] (oncoming nurse) by Al Connelly RN (offgoing nurse). Report included the following information SBAR, Kardex, Intake/Output, MAR, Recent Results, Cardiac Rhythm A fib and Alarm Parameters . Summary: Weaning norepinephrine gtt for systolic BP goal [phone]. Wound care completed at 2100. Neuro exam intact. 0730: Bedside shift change report given to Ebony RAY (oncoming nurse) by [address] [place] [address] (offgoing nurse). Report included the following information SBAR, Kardex, MAR, Recent Results, Cardiac Rhythm A fib and Alarm Parameters .

## 2021-05-13 NOTE — PROGRESS NOTES
SLP Contact note SLP treatment complete. Pt tolerating diet without difficulty. Full note to follow. Thank you, Nataly Wilson M.Ed, CCC-SLP Speech-Language Pathologist

## 2021-05-13 NOTE — PROGRESS NOTES
1930: Bedside shift change report given to [address] [place] [address] (oncoming nurse) by Jennifer Knox RN (offgoing nurse). Report included the following information SBAR, Kardex, Intake/Output, MAR, Recent Results, Cardiac Rhythm A fib and Alarm Parameters . 0730: Bedside shift change report given to RN (oncoming nurse) by [address] [place] [address] (offgoing nurse). Report included the following information SBAR, Kardex, Intake/Output, MAR, Recent Results, Cardiac Rhythm A fib and Alarm Parameters .

## 2021-05-13 NOTE — PROGRESS NOTES
1930: Bedside shift change report given to [address] [place] [address] (oncoming nurse) by Rodney Peter RN (offgoing nurse). Report included the following information SBAR, Kardex, Intake/Output, MAR, Recent Results, Cardiac Rhythm A fib and Alarm Parameters .

## 2021-05-13 NOTE — PROGRESS NOTES
[facility] 
 
ICU TEAM Progress Note Name: Yemi Camilo : 1927 MRN: [id number] Date: 2021 Assessment/Plan: 1. Anion gap metabolic acidosis due to lactic acidosis, resolved 
a. Unclear source of lactic acidosis. Likely due to pressor use in the setting of PAD. Lactic improved with decreased vasopressors 
b. Elevation in WBCs, but no other evidence of ongoing infection. Patient appears nontoxic. CXR without acute process and UA bland.  
c. Patient continues on vancomycin and ceftriaxone 2. Acute TIA 
a. Acutely aphasic on  
b. Code stroke called- no evidence of acute infarct or perfusion deficit 
c. High risk for embolic process given chronic afib and off anticoagulant given frequent falls, surgery, and bleeding  
d. Patient appears back at neurologic baseline 
e. Neurology consulted 
f. Discussed SBP goal- now goal of 110-120. Wean norepinephrine as able 
g. Continue midodrine 10mg TID for blood pressure augmentation 3. HFrEF without exacerbation and severe pulmonary hypertension 
a. LVEF 25-30% with severe hypokinesis and PASP 111. This is very different from her   
b. Evidence of volume overload on TTE, will gently diurese. Patient still requiring vasopressors so will be conservative. c. Continue norepinephrine- may be difficult to wean given this new finding 
d. Holding metoprolol given hypotension 4. Left leg wound s/p left leg arteriogram with balloon angioplasty and debridement  
a. Postoperative care per vascular surgery, continue vancomycin and ceftriaxone 
b. Fentanyl PRN for pain control 
c. No wound culture sent. Blood cultures with 1/2 bottles with coag negative staph 5. Acute on chronic anemia 
a. Patient with baseline iron deficiency anemia- confirmed with iron profile 
b. Bleeding from leg wound- patient has received 3u PRBCs total. Hgb stable this am 
c. Patient started on heparin DVT ppx yesterday. Drop in hgb this morning.  Will recheck this afternoon 6. Reactive airway disease 
a. Continue brovana and pulmicort 7. Glaucoma 
a. Continue xalantan and timolol 8. dyslipidemia and PVD 
a. Continue statin and restart aspirin tomorrow 9. Chronic a fib 
a. Holding metoprolol given vasopressor requirement 10. History of breast and active ovarian cancer 
a. Patient with R chest port, chemo on hold due to multiple hospitalizations and rehab stays 11. Anxiety 
a. Xanax 0.25mg nightly as needed F - Feeding:  Yes PO 
A - Analgesia: Fentanyl S - Sedation: None T - DVT Prophylaxis: SCD's or Sequential Compression Device on right leg only H - Head of Bed: > 30 Degrees U - Ulcer Prophylaxis: Not at this time G - Glycemic Control: Insulin S - Spontaneous Breathing Trial: N/A 
B - Bowel Regimen: Docusate (Colace) I - Indwelling Catheter: 
 Tubes: None Lines: Peripheral IV Drains: Carlton Catheter D - De-escalation of Antibiotics: Ceftriaxone Vancomycin Subjective:  
Progress Note: 5/13/2021 Reason for ICU Admission: Hypotension HPI:Lizette Foster is a 80 y. o. female with hx aortic stenosis, pulm htn, GERD, glaucoma, remote breast CA, HTN, and recent ovarian cancer with port-a-cath s/p chemo times two who presents with complications of traumatic wound.  She had skin cancer on her LLE and subsequently sustained a traumatic wound after a fall in the same area.  The wound had progress, and they were pending and appt with surgeon Dr. Keyonna Jonas today.  The nurse from the rehab facility where patient was being discharged sent a picture to the clinic, and patient was advised to come to the hospital instead for management of wound. 
  
In the ED, VSS. Labs are significant for lactate of 4, CRP 6.32, ESR 34, and normocytic anemia with Hgb 10.4. L tib/fib XR with pretibial soft tissue ulceration and no noted osseous abnormality On 5/10 patient to OR for LLE arteriogram, L fem and popliteal angioplasty, and debridement of wound.  Patient received several units of PRBCs given drop in hgb Overnight Events:  
2021 
- Patient continues on norepinephrine. No further neurological changes POD: 
2 Day Post-Op S/P:  
Procedure(s): DEBRIDEMENT LEFT LOWER LEG WOUND. LEFT LEG ARTERIOGRAM, LEFT FEMORAL AND POPLITEAL ANGIOPLASTY Objective:  
Vital Signs: 
Visit Vitals /80 Pulse 86 Temp 97.6 °F (36.4 °C) Resp 27 Ht 5' 4\" (1.626 m) Wt 66 kg (145 lb 8.1 oz) SpO2 (!) 85% BMI 24.98 kg/m² O2 Flow Rate (L/min): 1 l/min O2 Device: None (Room air) Temp (24hrs), Av.5 °F (36.4 °C), Min:96.9 °F (36.1 °C), Max:98.1 °F (36.7 °C) Intake/Output:  
 
Intake/Output Summary (Last 24 hours) at 2021 1525 Last data filed at 2021 1500 Gross per 24 hour Intake 1415.85 ml Output 1475 ml Net -59.15 ml Physical Exam: 
 
General:  Alert, cooperative, appears stated age, lying in bed Eyes:  Sclera anicteric. Pupils equally round and reactive to light. Mouth/Throat: Mucous membranes dry, oral pharynx clear Neck: Supple Lungs:   Clear to auscultation bilaterally, good effort CV:  Regular rate and rhythm,no murmur, click, rub or gallop Abdomen:   Soft, non-tender. bowel sounds normal. non-distended Extremities: No cyanosis. Trace BLE edema L>R, 2+ edema in LUE Skin: Patient with areas of ecchymosis on bilateral arms, decreased skin turgor. LLE with large opened wound with tendon and bone exposure- wound is red and bloody. Wound dressing C/D/I. No acute rash or lesions Lymph nodes: Cervical and supraclavicular normal  
Musculoskeletal: No swelling or deformity Lines/Devices:  Intact, no erythema, drainage or tenderness Neuro/Psych: Alert and oriented, moves all extremities equally, normal mood affect given the setting LABS AND  DATA: Personally reviewed Recent Labs 21 
2879 21 
7591 WBC 9.4 13.5* HGB 7.5* 8.6* HCT 24.5* 27.9*  
* 176 Recent Labs   21 
035 05/12/21 
4182 * 130*  
K 3.4* 4.1  99 CO2 22 21 BUN 16 18 CREA 0.66 0.76 * 165* CA 7.3* 7.3* No results for input(s): AP, TBIL, TP, ALB, GLOB, AML, LPSE in the last 72 hours. No lab exists for component: SGOT, GPT, AMYP No results for input(s): INR, PTP, APTT, INREXT, INREXT in the last 72 hours. No results for input(s): PHI, PCO2I, PO2I, FIO2I in the last 72 hours. No results for input(s): CPK, CKMB, TROIQ, BNPP in the last 72 hours. MEDS: Reviewed Chest X-Ray: CXR Results  (Last 48 hours) 05/12/21 0914  XR CHEST PORT Final result Impression:  No acute findings. Narrative:  EXAM: XR CHEST PORT INDICATION: fu  
   
COMPARISON: May 6 FINDINGS: A portable AP radiograph of the chest was obtained at 0910 hours. The  
brii catheter seen in place. There are bilateral axillary clips. The patient is  
on a cardiac monitor. The lungs are clear. There are vascular calcifications. The bones and soft tissues are grossly within normal limits. Multidisciplinary Rounds Completed: Yes ABCDEF Bundle/Checklist Completed: 
Yes SPECIAL EQUIPMENT None DISPOSITION Stay in ICU CRITICAL CARE CONSULTANT NOTE I had a face to face encounter with the patient, reviewed and interpreted patient data including clinical events, labs, images, vital signs, I/O's, and examined patient. I have discussed the case and the plan and management of the patient's care with the consulting services, the bedside nurses and the respiratory therapist.   
 
NOTE OF PERSONAL INVOLVEMENT IN CARE This patient has a high probability of imminent, clinically significant deterioration, which requires the highest level of preparedness to intervene urgently.  I participated in the decision-making and personally managed or directed the management of the following life and organ supporting interventions that required my frequent assessment to treat or prevent imminent deterioration. I personally spent 40 minutes of critical care time. This is time spent at this critically ill patient's bedside actively involved in patient care as well as the coordination of care and discussions with the patient's family. This does not include any procedural time which has been billed separately. NICOLE Jasso [facility] Critical Care 5/13/2021

## 2021-05-13 NOTE — WOUND CARE
WOCN Note:  
  
Follow up assessment of left low leg. 5.10.2021 S/P Left leg arteriogram with angioplasty; debridement of left leg wound. 
  
Assessed in room 7108. Dr Gali Zamora and Maria Isabel Faith RN at the bedside.  
Chart reviewed. Admitted DX:  Leg ulcer, left 
       
Past Medical History:  
Diagnosis Date  Arthritis    
 Cancer [facility])    
  breast    (leg,ear,arm-skin cancer)  GERD (gastroesophageal reflux disease)    
 Hypertension    
 Other ill-defined conditions(799.89)    
  glaucoma  Other ill-defined conditions(799.89)    
  cellulitis left arm  Other ill-defined conditions(799.89)    
  carpal tunnel syndrome left hand  Other ill-defined conditions(799.89)    
  vertigo  Ovarian cancer ([facility] [address] 11/2020  
  
Assessment:  
Patient is alert, restless and requires assist of 2 with repositioning.   
Bed: [facility] 
Patient reports pain to wound. RN premedicated. Heels offloaded with pillows. Heels intact and boggy with blanching erythema. 
  
1. POA Left low leg, full thickness wound with exposed tendon: 20 x 9 x 0.8 cm  100% red with tibia, muscle and tendon exposure.  Moderate serosang exudate; no odor. Jenae Jock are open.  Cleansed with saline; applied Puracol Plus (collagen with silver); non adherent and wound gel; applied saline moist gauze; covered with dry dressing (abd pad and roll gauze).   
Wound, Pressure Prevention & Skin Care Recommendations:   
1. Minimize layers of linen/pads under patient to optimize support surface.   
2.  Turn/reposition approximately every 2 hours and offload heels. 3.  Manage moisture/ Keep skin folds clean and dry.  
4.  Left low leg wound: Daily irrigate with saline; apply wound gel, mepitel one and saline moist gauze; cover with dry gauze, abd pad and roll gauze. 5.  Heels:  Venelex TID 
  
Transition of Care: 
Plan to follow Friday and eval for NPWT. 
  
DANNIELLE CalderonN CORY [facility] Inpatient Wound Care Available on Perfect Serve Pager [phone] Office [phone]

## 2021-05-13 NOTE — PROGRESS NOTES
.Transition of Care Plan  RUR- High Risk  DISPOSITION:pending medical progression  F/U with PCP/Specialist   
 Transport: TBD, anticipate BLS transport Patient is  s/p  left leg arteriogram with balloon angioplasty and debridement of left leg. Patient later became aphasic and code stroke was called patient transferred to ICU. Vascular surgery and wound care team following for leg wound. Patient is on a Levophed gtt and IV abx. Patient is A&O x 4. Will await therapy recommendations to assist with transitions of care. Patient lives with her niece Flores Robertson [phone] and was receiving PT/OT through [facility]. Care management will continue to follow. Dionicia Harada RN,Care Management

## 2021-05-13 NOTE — PROGRESS NOTES
Neurology Progress Note Conner Farrell NP Patient: Sophie Donnelly MRN: [id number]  SSN: [id number] YOB: 1927  Age: 80 y.o. Sex: female Chief Complaint: Aphasia, dysarthria. Subjective:  
  
Sophie Donnelly is a 80 y.o. female with a past medical history of HTN, GERD, arthritis, moderate pulmonary hypertension, chronic atrial fibrillation, moderate aortic stenosis, hyperlipidemia, glaucoma, remote breast cancer and more recent ovarian cancer. Patient had fall approximately 5 weeks ago over her walker obtaining a scalp and left lower shin wound. Her left lower extremity wound has not been healing properly with signs and symptoms of infection and she was admitted to the hospital on 5/6/21 for treatment. Arterial duplex showed occluded left popliteal artery. She had a left leg arteriogram with balloon angioplasty of the superficial femoral and popliteal arteries and debridement of the left leg wound by Dr. Prema Awad on 5/10/21. She had successful revascularization with recanalization of the occluded left popliteal artery. On the morning of 5/11 Code Stroke was called due to patient having new onset expressive aphasia. Patient was taken for [facility] which showed no acute abnormality. 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 There was no significant perfusion defect. NIS recommended permissive HTN with -180 as patient was likely having acute ongoing ischemic stroke with mild to moderate narrowing of the left MCA. Patient was sent to the ICU due to hypotension requiring Levophed and acute anemia post procedure. Past Medical History:  
Diagnosis Date  Arthritis  Cancer ([facility] [address] breast    (leg,ear,arm-skin cancer)  GERD (gastroesophageal reflux disease)  Hypertension  Other ill-defined conditions(799.89)   
 glaucoma  Other ill-defined conditions(799.89)   
 cellulitis left arm  Other ill-defined conditions(799.89)   
 carpal tunnel syndrome left hand  Other ill-defined conditions(799.89)   
 vertigo  Ovarian cancer ([facility] [address] 2020 Family History Problem Relation Age of Onset  Heart Disease Mother  Heart Disease Father   
      age 55  
 Heart Disease Brother  Stroke Brother  Cancer Sister Colon Cancer  Diabetes Sister  Heart Disease Sister  Heart Disease Sister  Diabetes Sister  Other Brother MVA  Heart Disease Brother  Heart Disease Son   
      age 52 Social History Tobacco Use  Smoking status: Former Smoker Types: Cigarettes Quit date: 1955 Years since quittin.4  Smokeless tobacco: Never Used Substance Use Topics  Alcohol use: Not Currently Comment: rare Prior to Admission Medications Prescriptions Last Dose Informant Patient Reported? Taking? Breo Ellipta 200-25 mcg/dose inhaler  Family Member Yes No  
Sig: TAKE 1 PUFF DAILY CYANOCOBALAMIN, VITAMIN B-12, (VITAMIN B-12 PO)  Family Member Yes No  
Sig: Take 1,000 mcg by mouth daily. Cholecalciferol, Vitamin D3, (VITAMIN D3) 1,000 unit cap  Family Member Yes No  
Sig: Take 1 Cap by mouth two (2) times a day. albuterol-ipratropium (DUO-NEB) 2.5 mg-0.5 mg/3 ml nebu   No No  
Sig: 3 mL by Nebulization route every four (4) hours as needed for Wheezing or Shortness of Breath. aspirin delayed-release 81 mg tablet   No No  
Sig: Take 1 Tab by mouth daily. cycloSPORINE (RESTASIS) 0.05 % ophthalmic emulsion  Family Member Yes No  
Sig: Administer 1 Drop to both eyes two (2) times a day. dexamethasone in 0.9 % sod chl (DEXAMETHASONE-0.9 % SOD. CHLOR IV)  Other Yes No  
Sig: 10 mg by IntraVENous route. Every 28 days pre treatment  
diclofenac (VOLTAREN) 1 % gel  Family Member Yes No  
Sig: APPLY 1 APPLICATION TOPICALLY 4 (FOUR) TIMES A DAY AS NEEDED (PAIN) esomeprazole (NEXIUM) 40 mg capsule  Family Member No No  
Sig: Take 1 Cap by mouth Daily (before breakfast). latanoprost (XALATAN) 0.005 % ophthalmic solution  Family Member Yes No  
Sig: Administer 1 Drop to both eyes nightly. metoprolol succinate (TOPROL-XL) 25 mg XL tablet   No No  
Sig: Take 0.5 Tabs by mouth daily. multivitamins-minerals-lutein (CENTRUM SILVER) Tab  Family Member Yes No  
Sig: Take 1 Tab by mouth daily. omega-3 fatty acids-vitamin e (FISH OIL) 1,000 mg Cap  Family Member Yes No  
Sig: Take 1 Cap by mouth two (2) times a day. rosuvastatin (CRESTOR) 20 mg tablet   No No  
Sig: TAKE 1 TABLET NIGHTLY  
timolol (TIMOPTIC-XE) 0.5 % ophthalmic gel-forming  Family Member Yes No  
Sig: Administer 1 Drop to both eyes daily. Facility-Administered Medications: None Allergies Allergen Reactions  Augmentin [Amoxicillin-Pot Clavulanate] Hives and Itching  Doxycycline Hives and Itching  Keflex [Cephalexin] Hives and Itching  Monodox [Doxycycline Monohydrate] Other (comments)  Sulfa (Sulfonamide Antibiotics) Rash Review of Systems: 
Denies numbness, tingling, chest pain, leg pain, nausea, vomiting, difficulty swallowing, headache, and dyspnea. Objective:  
 
Vitals:  
 05/13/21 0000 05/13/21 0100 05/13/21 0200 05/13/21 0300 BP: (!) 116/55 (!) 106/43 (!) 105/54 (!) 107/53 Pulse: 80 80 88 84 Resp: 15 15 13 13 Temp: 96.9 °F (36.1 °C) SpO2: 98% 100% 100% 100% Weight:      
Height:      
  
Physical Exam: 
GENERAL: Calm, cooperative, NAD SKIN: Warm, dry, color appropriate for ethnicity EXTREMITIES: Left groin soft without hematoma. Left foot with bluish discoloration and swelling. Left lower leg was dressing in place, C/D/I. Capillary refill 5-6 seconds. Both feet cool to touch. Pedal pulses 1+ bilaterally. Left arm swelling improved. Neurologic Exam: 
Mental Status:  Alert and oriented x 4. Appropriate affect, mood and behavior. Language:    Normal fluency, repetition Cranial Nerves:   Pupils 3 mm, equal, round and reactive to light. Visual fields full to confrontation. Extraocular movements intact. Facial sensation intact. Full facial strength, no asymmetry. Very hard of hearing without hearing aids in. Dysarthria improved. Shoulder shrug 5/5 bilaterally. Motor:    No drift in bilateral arms. Bilateral lower extremities with 4/5 power. Mild drift bilaterally. Bulk and tone normal.  
   No involuntary movements. Sensation:    Sensation intact throughout to light touch. Reflexes:     Negative Bab[place]is Labs: 
Lab Results Component Value Date/Time WBC 13.5 (H) 05/12/2021 03:39 AM  
 HGB 8.6 (L) 05/12/2021 03:39 AM  
 HCT 27.9 (L) 05/12/2021 03:39 AM  
 PLATELET 739 71/69/8423 03:39 AM  
 MCV 89.1 05/12/2021 03:39 AM  
  
Lab Results Component Value Date/Time Sodium 130 (L) 05/12/2021 09:29 AM  
 Potassium 4.1 05/12/2021 09:29 AM  
 Chloride 99 05/12/2021 09:29 AM  
 CO2 21 05/12/2021 09:29 AM  
 Anion gap 10 05/12/2021 09:29 AM  
 Glucose 165 (H) 05/12/2021 09:29 AM  
 BUN 18 05/12/2021 09:29 AM  
 Creatinine 0.76 05/12/2021 09:29 AM  
 BUN/Creatinine ratio 24 (H) 05/12/2021 09:29 AM  
 GFR est AA >60 05/12/2021 09:29 AM  
 GFR est non-AA >60 05/12/2021 09:29 AM  
 Calcium 7.3 (L) 05/12/2021 09:29 AM  
 
Lab Results Component Value Date/Time CK 53 11/25/2014 12:00 PM  
 CK - MB 1.2 11/25/2014 12:00 PM  
 CK-MB Index 2.3 11/25/2014 12:00 PM  
 Troponin-I, Qt. 0.05 (H) 04/06/2021 03:31 AM  
 
 
Imaging: CT Results (maximum last 3): Results from [facility] Encounter encounter on 05/06/21 CT CODE NEURO PERF W CBF Narrative CLINICAL HISTORY: Code stroke EXAMINATION:  CT ANGIOGRAPHY HEAD AND NECK COMPARISON: CT head 5/11/2021, CTA 11/12/2018 TECHNIQUE:  Following the uneventful administration of iodinated contrast 
material, axial CT angiography of the head and neck was performed.  Delayed axial 
images through the head were also obtained. Coronal and sagittal reconstructions 
were obtained. Manual postprocessing of images was performed. 3-D  Sagittal 
maximal intensity projection images were obtained. 3-D Coronal maximal 
intensity projections were obtained. CT dose reduction was achieved through use 
of a standardized protocol tailored for this examination and automatic exposure 
control for dose modulation. CT perfusion analysis was performed using CT with contrast administration, 
including postprocessing of parametric maps with the determination of cerebral 
blood flow, cerebral blood volume, and mean transit time. This study was analyzed by the [address] [place], [place] [address]y [address]. ai algorithm FINDINGS: 
 
Delayed contrast-enhanced head CT: The ventricles are midline without hydrocephalus. There is no acute intra or 
extra-axial hemorrhage. Mild to moderate bilateral subcortical and 
periventricular areas of patchy low attenuation is nonspecific but likely 
related to changes of chronic small vessel ischemic disease. The basal cisterns 
are clear. The paranasal sinuses are clear. CTA NECK: 
 
Great vessels: Patent origins Right subclavian artery: Patent with mild atherosclerosis Left subclavian artery: Patent with mild to moderate atherosclerosis Right common carotid artery: Patent with mild atherosclerosis Left common carotid artery: Patent with mild atherosclerosis Cervical right internal carotid artery: Patent with proximal atherosclerosis 
causing no significant stenosis by NASCET criteria. Cervical left internal carotid artery: Patent with proximal atherosclerosis 
causing no significant stenosis by NASCET criteria. Right vertebral artery: Patent with mild atherosclerosis Left vertebral artery: Patent with mild atherosclerosis Severe cervical spondylosis Subcentimeter low-density thyroid nodules Right chest port. Moderate emphysema CTA HEAD: 
 
Right cavernous internal carotid artery: Patent with mild to moderate 
atherosclerosis Left cavernous internal carotid artery: Patent with mild to moderate 
atherosclerosis Anterior cerebral arteries: Patent with mild to moderate atherosclerosis. Proximal stenosis in the left A1 segment due to noncalcified atheroma Anterior communicating artery: Patent Right middle cerebral artery: Patent with mild atherosclerosis Left middle cerebral artery: Patent with mild atherosclerosis Posterior communicating arteries: Patent Posterior cerebral arteries: Right is diminutive in caliber with severe 
atherosclerotic changes. Left is patent with fetal origin and mild 
atherosclerosis Basilar artery: Patent and slightly diminutive in caliber with mild to moderate 
atherosclerosis. 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 
atherosclerosis CT perfusion brain: No significant cerebral perfusion abnormality or mismatch Measurements use NASCET criteria. Impression No evidence for acute large vessel arterial occlusion. No significant cerebral perfusion abnormality or mismatch. Stable atherosclerotic disease. 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 CT CODE NEURO HEAD WO CONTRAST Narrative EXAM: CT CODE NEURO HEAD WO CONTRAST INDICATION: code neuro COMPARISON: None. CONTRAST: None. TECHNIQUE: Unenhanced CT of the head was performed using 5 mm images. Brain and 
bone windows were generated. Coronal and sagittal reformats. CT dose reduction 
was achieved through use of a standardized protocol tailored for this 
examination and automatic exposure control for dose modulation. FINDINGS: 
Ventricles and sulci are stable. Mild changes small vessel disease is unchanged. No hemorrhage mass or acute infarction identified. The bone windows demonstrate 
no abnormalities. The visualized portions of the paranasal sinuses and mastoid 
air cells are clear.  
  
 Impression No acute abnormality identified.-Result called to the nurse. CTA CODE NEURO HEAD AND NECK W CONT Narrative CLINICAL HISTORY: Code stroke EXAMINATION:  CT ANGIOGRAPHY HEAD AND NECK COMPARISON: CT head 5/11/2021, CTA 11/12/2018 TECHNIQUE:  Following the uneventful administration of iodinated contrast 
material, axial CT angiography of the head and neck was performed. Delayed axial 
images through the head were also obtained. Coronal and sagittal reconstructions 
were obtained. Manual postprocessing of images was performed. 3-D  Sagittal 
maximal intensity projection images were obtained. 3-D Coronal maximal 
intensity projections were obtained. CT dose reduction was achieved through use 
of a standardized protocol tailored for this examination and automatic exposure 
control for dose modulation. CT perfusion analysis was performed using CT with contrast administration, 
including postprocessing of parametric maps with the determination of cerebral 
blood flow, cerebral blood volume, and mean transit time. This study was analyzed by the [address] ai algorithm FINDINGS: 
 
Delayed contrast-enhanced head CT: The ventricles are midline without hydrocephalus. There is no acute intra or 
extra-axial hemorrhage. Mild to moderate bilateral subcortical and 
periventricular areas of patchy low attenuation is nonspecific but likely 
related to changes of chronic small vessel ischemic disease. The basal cisterns 
are clear. The paranasal sinuses are clear. CTA NECK: 
 
Great vessels: Patent origins Right subclavian artery: Patent with mild atherosclerosis Left subclavian artery: Patent with mild to moderate atherosclerosis Right common carotid artery: Patent with mild atherosclerosis Left common carotid artery: Patent with mild atherosclerosis Cervical right internal carotid artery: Patent with proximal atherosclerosis 
causing no significant stenosis by NASCET criteria.  
 
Cervical left internal carotid artery: Patent with proximal atherosclerosis 
causing no significant stenosis by NASCET criteria. Right vertebral artery: Patent with mild atherosclerosis Left vertebral artery: Patent with mild atherosclerosis Severe cervical spondylosis Subcentimeter low-density thyroid nodules Right chest port. Moderate emphysema CTA HEAD: 
 
Right cavernous internal carotid artery: Patent with mild to moderate 
atherosclerosis Left cavernous internal carotid artery: Patent with mild to moderate 
atherosclerosis Anterior cerebral arteries: Patent with mild to moderate atherosclerosis. Proximal stenosis in the left A1 segment due to noncalcified atheroma Anterior communicating artery: Patent Right middle cerebral artery: Patent with mild atherosclerosis Left middle cerebral artery: Patent with mild atherosclerosis Posterior communicating arteries: Patent Posterior cerebral arteries: Right is diminutive in caliber with severe 
atherosclerotic changes. Left is patent with fetal origin and mild 
atherosclerosis Basilar artery: Patent and slightly diminutive in caliber with mild to moderate 
atherosclerosis. 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 
atherosclerosis CT perfusion brain: No significant cerebral perfusion abnormality or mismatch Measurements use NASCET criteria. Impression No evidence for acute large vessel arterial occlusion. No significant cerebral perfusion abnormality or mismatch. Stable atherosclerotic disease. 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 Assessment:  
 
Hospital Problems  Date Reviewed: 3/29/2021 Codes Class Noted POA Leg ulcer, left ([facility] [address] ICD-10-CM: R04.098 ICD-9-CM: 707.10  5/6/2021 Unknown Plan:  
1.) Mild to moderate narrowing of left MCA with possible left MCA CVA.  In patient with multiple medical problems including atrial fibrillation off of anticoagulation, blood loss anemia, reactive airway disease, hx of breast and ovarian cancer, now with hyponatremia, s/p left leg wound s/p balloon angioplasty and debridement.  
 
            - NIHSS of 7 on exam in Code S, symptoms resolved. .   
            - q 4 neuro checks - A1C and lipid panel pending, LDL goal <70 
            - MRI brain showed possible punctate left cerebral acute infarct. No sizeable acute infarct. Chronic  microangiopathy. No bleed or shift.  
            - ECHO with LVEF of 25-35%. Severely reduced systolic function. Left ventricular diastolic dysfunction. Severe pulmonary HTN. Valve dx throughout. No atrial septal defect.  
            - PT/OT/SLP evals - Stroke education 
            - Home meds per Intensivist. 
 -SBP goal relaxed to 110-120's. -Restart aspirin when able. -Call Code S with any acute neuro changes. I have discussed the diagnosis and the intended plan as seen in the above orders with Dr. Chriss Naqvi ,the [address] [place] [address] team, the patient and the primary RN. Further recommendations to follow. Thank you for this consult and participating in the care of this patient. Signed By: Ruby Doll NP May 13, 2021

## 2021-05-13 NOTE — PROGRESS NOTES
Problem: Dysphagia (Adult) Goal: *Acute Goals and Plan of Care (Insert Text) Description: Speech pathology goals Initiated 5/11/2021 1. Patient will tolerate regular/thin liquid diet with no overt s/s aspiration within 7 days Outcome: Resolved/Met SPEECH LANGUAGE PATHOLOGY DYSPHAGIA TREATMENT/DISCHARGE Patient: Igor Torres (89 y.o. female) Date: 5/13/2021 Diagnosis: [facility] [L97.199] <principal problem not specified> Procedure(s) (LRB): DEBRIDEMENT LEFT LOWER LEG WOUND. (Left) LEFT LEG ARTERIOGRAM, LEFT FEMORAL AND POPLITEAL ANGIOPLASTY (Left) 3 Days Post-Op Precautions:    
 
ASSESSMENT: 
Pt with good tolerance of diet with no oropharyngeal deficits appreciated on this date. Recommend continuation of baseline diet. No further SLP needs. PLAN: 
--regular diet/thin liquids 
--general aspiration precautions Patient will be discharged from acute skilled speech therapy at this time. Rationale for discharge: 
Goals achieved Discharge Recommendations:  None SUBJECTIVE:  
Patient stated, \"I would like exactly five sugars in my oatmeal. OBJECTIVE:  
Cognitive and Communication Status: 
Neurologic State: Alert Orientation Level: Oriented X4 Cognition: Appropriate decision making, Appropriate for age attention/concentration, Appropriate safety awareness Perception: Appears intact Perseveration: No perseveration noted Safety/Judgement: Awareness of environment Dysphagia Treatment: 
Oral Assessment: 
Oral Assessment Labial: No impairment Dentition: Natural;Limited Oral Hygiene: oral mucosa moist and clear of secretions Lingual: No impairment Velum: No impairment Mandible: No impairment P.O. Trials: 
Patient Position: upright in bed Vocal quality prior to P.O.: No impairment Consistency Presented: Thin liquid; Solid How Presented: Self-fed/presented;Cup/sip;Spoon;Straw;Successive swallows Bolus Acceptance: No impairment Bolus Formation/Control: No impairment Propulsion: No impairment Oral Residue: None Initiation of Swallow: No impairment Laryngeal Elevation: Functional 
Aspiration Signs/Symptoms: None Pharyngeal Phase Characteristics: No impairment, issues, or problems Oral Phase Severity: No impairment Pharyngeal Phase Severity : No impairment NOMS:  
The NOMS functional outcome measure was used to quantify this patient's level of swallowing impairment. Based on the NOMS, the patient was determined to be at level 7 for swallow function NOMS Swallowing Levels: 
Level 1 (CN): NPO Level 2 (CM): NPO but takes consistency in therapy Level 3 (CL): Takes less than 50% of nutrition p.o. and continues with nonoral feedings; and/or safe with mod cues; and/or max diet restriction Level 4 (CK): Safe swallow but needs mod cues; and/or mod diet restriction; and/or still requires some nonoral feeding/supplements Level 5 (CJ): Safe swallow with min diet restriction; and/or needs min cues Level 6 (CI): Independent with p.o.; rare cues; usually self cues; may need to avoid some foods or needs extra time Level 7 (CH): Independent for all p.o. WALKER. (2003). National Outcomes Measurement System (NOMS): Adult Speech-Language Pathology User's Guide. Pain: 
Pain Scale 1: Numeric (0 - 10) Pain Intensity 1: 0 Pain Location 1: Foot After treatment:  
Call bell within reach and Nursing notified COMMUNICATION/EDUCATION:  
 
The patient's plan of care including recommendations, planned interventions, and recommended diet changes were discussed with: Registered nurse. Beth Patel SLP Time Calculation: 10 mins

## 2021-05-14 NOTE — PROGRESS NOTES
PT assisted pt to chair. BP stable. Pt eating lunch without difficulty. Call bell in reach and pt's niece at bedside.

## 2021-05-14 NOTE — PROGRESS NOTES
Problem: Mobility Impaired (Adult and Pediatric) Goal: *Acute Goals and Plan of Care (Insert Text) Description: FUNCTIONAL STATUS PRIOR TO ADMISSION: The patient was functional at the wheelchair level and was independent for transfers to the chair. She was admitted from SNF due to LLE wound, but was otherwise ready for D/C home. HOME SUPPORT PRIOR TO ADMISSION: The patient lived with niece to provide assistance. Physical Therapy Goals Initiated 5/7/2021 1. Patient will move from supine to sit and sit to supine  and roll side to side in bed with modified independence within 7 day(s). 2.  Patient will transfer from bed to chair and chair to bed with modified independence using the least restrictive device within 7 day(s). 3.  Patient will perform sit to stand with modified independence within 7 day(s). 4.  Patient will ambulate with supervision/set-up for 20 feet with the least restrictive device within 7 day(s). Outcome: Progressing Towards Goal 
 PHYSICAL THERAPY TREATMENT Patient: Jarod Santana (69 y.o. female) Date: 5/14/2021 Diagnosis: [facility] [L97.929] <principal problem not specified> Procedure(s) (LRB): DEBRIDEMENT LEFT LOWER LEG WOUND. (Left) LEFT LEG ARTERIOGRAM, LEFT FEMORAL AND POPLITEAL ANGIOPLASTY (Left) 4 Days Post-Op Precautions:   
Chart, physical therapy assessment, plan of care and goals were reviewed. ASSESSMENT Patient continues with skilled PT services and is slowly progressing towards goals. Barriers to function with mobility include new wound VAC L LE, general weakness, impaired balance, decreased activity tolerance, increased risk for fall. Pt received with wound VAC to L lower LE, niece present. RN in to increase levophed at beginning of session due to SBP 80s. Pt tolerated EOB and transfer to chair with 2 person HHA, increased time, rest breaks due to fatigue. BP stable with -108 during session.   
 
Pt remains well below functional baseline. Will benefit from mobility progression as tolerated with acute PT. Anticipate need for follow up SNF rehab at d/c to facilitate return to max level of functional indep. Current Level of Function Impacting Discharge (mobility/balance): supine to sit min/ mod A and increased time, bed to chair SPT mod A x 2 Other factors to consider for discharge: new wound VAC L LE, ongoing need for pressor support, baseline of indep transfers to w/c 
    
 
PLAN : 
Patient continues to benefit from skilled intervention to address the above impairments. Continue treatment per established plan of care. to address goals. Recommendation for discharge: (in order for the patient to meet his/her long term goals) Therapy up to 5 days/week in SNF setting This discharge recommendation: 
Has not yet been discussed the attending provider and/or case management IF patient discharges home will need the following DME: to be determined (TBD) SUBJECTIVE:  
Patient reports poor appetite OBJECTIVE DATA SUMMARY:  
Critical Behavior: 
Neurologic State: Alert, Eyes open spontaneously Orientation Level: Oriented X4 Cognition: Appropriate decision making, Appropriate for age attention/concentration, Appropriate safety awareness, Follows commands, Recognition of people/places Safety/Judgement: Awareness of environment Functional Mobility Training: 
Bed Mobility: 
  
Supine to Sit: Minimum assistance; Moderate assistance; Additional time;Bed Modified(assist at trunk) Scooting: Assist x2; Moderate assistance(for scoot to EOB) Transfers: 
Sit to Stand: Moderate assistance;Assist x2(assist for lift/ balance) Stand to Sit: Minimum assistance;Assist x2 Bed to Chair: Moderate assistance;Assist x2(SPT with bilat HHA, assist for balance) Balance: 
Sitting: Intact Standing: Impaired; With support Standing - Static: Constant support;Fair;Poor(2 person assist) Standing - Dynamic : Poor;Constant support(2 person assist) Pain Rating: 
Pt reports bilat lower LEs tender to touch Activity Tolerance:  
Fair, requires rest breaks, and BP stable after levophed titrated up at beginning of session After treatment patient left in no apparent distress:  
Sitting in chair, Call bell within reach, and Bed / chair alarm activated COMMUNICATION/COLLABORATION:  
The patients plan of care was discussed with: Occupational therapist and Registered nurse. Leobardo France, PT Time Calculation: 37 mins

## 2021-05-14 NOTE — WOUND CARE
WOCN Note:  
  
Follow up assessment of left low leg. 5.10.2021 S/P Left leg arteriogram with angioplasty; debridement of left leg wound. 
  
Assessed in room 7108.  Chart reviewed. Admitted DX:  Leg ulcer, left 
       
Past Medical History:  
Diagnosis Date  Arthritis    
 Cancer [facility])    
  breast    (leg,ear,arm-skin cancer)  GERD (gastroesophageal reflux disease)    
 Hypertension    
 Other ill-defined conditions(799.89)    
  glaucoma  Other ill-defined conditions(799.89)    
  cellulitis left arm  Other ill-defined conditions(799.89)    
  carpal tunnel syndrome left hand  Other ill-defined conditions(799.89)    
  vertigo  Ovarian cancer ([facility] 11/2020  
  
Assessment:  
Patient is alert, restless and requires assist of 2 with repositioning.   
Bed: [facility] 
Patient reports pain to wound.  RN premedicated. Heels offloaded with pillows. Heels intact and boggy with blanching erythema. 
  
1. POA Left low leg, full thickness wound with exposed tendon: 20 x 9 x 0.8 cm  100% red with tibia, muscle and tendon exposure.  Moderate serosang exudate; no odor. Sherrilyn Jews are open.  Cleansed with saline; skin prep the erick wound; applied Puracol Plus (collagen with silver); non adherent; initiated NPWT at 100 mmHg using white foam. 
  
Wound, Pressure Prevention & Skin Care Recommendations:   
1. Minimize layers of linen/pads under patient to optimize support surface.   
2.  Turn/reposition approximately every 2 hours and offload heels. 3.  Manage moisture/ Keep skin folds clean and dry.  
4.  Left low leg wound: Continue NPWT at 100 mmHg; change twice weekly. 5.  Heels:  Venelex TID 
  
Transition of Care: 
Plan to follow Tuesday for VAC change. 
  
YUMIKO Novak RN [facility] Inpatient Wound Care Available on Perfect Serve Pager [phone] Office [phone]

## 2021-05-14 NOTE — PROGRESS NOTES
Problem: Self Care Deficits Care Plan (Adult) Goal: *Acute Goals and Plan of Care (Insert Text) Description:  
FUNCTIONAL STATUS PRIOR TO ADMISSION: Patient recently d/c home from SNF. She reports she was ambulatory in therapy using RW up to 120' with a chair follow. Otherwise functioning at wheelchair level and did not require assistance for transfers or ADLs. HOME SUPPORT: Patient lived at home with her niece staying with her to provide assistance. Occupational Therapy Goals Initiated 5/7/2021 Re-evaluation s/p admission to ICU - all goals downgraded to reflect decline 1. Patient will perform lower body dressing with supervision within 7 day(s). Downgraded min A 2. Patient will perform bathing with supervision within 7 day(s). Downgraded min A 3. Patient will perform toilet transfers with supervision within 7 day(s). Downgraded min A 
4. Patient will perform all aspects of toileting with supervision within 7 day(s). Downgraded min A 
5. Patient will participate in upper extremity therapeutic exercise/activities with supervision/set-up for 10 minutes within 7 day(s). Reduced to 5 minutes 6. Patient will utilize energy conservation techniques during functional activities with verbal cues within 7 day(s). Outcome: Progressing Towards Goal 
 
OCCUPATIONAL THERAPY TREATMENT Patient: Jarod Santana (38 y.o. female) Date: 5/14/2021 Diagnosis[facility]L97.929[facility] <principal problem not specified> Procedure(s) (LRB): DEBRIDEMENT LEFT LOWER LEG WOUND. (Left) LEFT LEG ARTERIOGRAM, LEFT FEMORAL AND POPLITEAL ANGIOPLASTY (Left) 4 Days Post-Op Precautions:   
Chart, occupational therapy assessment, plan of care, and goals were reviewed. ASSESSMENT Patient continues with skilled OT services and is progressing towards goals.  Patient ADLs continue to be limited by new wound VAC L LE, general weakness, impaired balance, decreased activity tolerance, increased risk for fall and limited lower body access. Patient received with wound VAC to L lower LE, niece present. RN in to increase levophed at beginning of session due to SBP 80s. Patient required total to dylan socks in supine. patient tolerated EOB and transfer to chair with 2 person HHA, increased time, rest breaks due to fatigue. Patient declined attempting transfer to [facility] despite reports of needing to void bladder. Once in chair, patient's niece assisted with setting up lunch (cutting food, opening containers, etc). BP stable with -108 during session. Patient left in chair with call bell in reach, niece present and RN aware. Patient remains well below functional baseline. Anticipate need for follow up SNF rehab at d/c to facilitate return to max level of functional indep. Current Level of Function Impacting Discharge (ADLs): up to total A for ADLs Other factors to consider for discharge: per gamal, mod I as baseline until recently PLAN : 
Patient continues to benefit from skilled intervention to address the above impairments. Continue treatment per established plan of care to address goals. Recommend with staff: Recommend with nursing, ADLs with supervision/setup, once Egress Test completed then OOB to chair 3x/day via gait belt and HHA x2 and toileting via beside commode. Thank you for completing as able in order to maintain patient strength, endurance and independence. Recommend next OT session: dressing routine Recommendation for discharge: (in order for the patient to meet his/her long term goals) Therapy up to 5 days/week in SNF setting This discharge recommendation: 
Has been made in collaboration with the attending provider and/or case management IF patient discharges home will need the following DME: bedside commode, hospital bed, mechanical lift, transfer bench, and wheelchair SUBJECTIVE:  
Patient stated I am cold but I'll try.  OBJECTIVE DATA SUMMARY:  
Cognitive/Behavioral Status: 
Neurologic State: Alert Orientation Level: Oriented X4 Cognition: Appropriate for age attention/concentration; Follows commands Perception: Cues to maintain midline in standing; Tactile;Verbal 
Perseveration: No perseveration noted Safety/Judgement: Awareness of environment;Decreased insight into deficits; Fall prevention Functional Mobility and Transfers for ADLs: 
Bed Mobility: 
Supine to Sit: Minimum assistance; Moderate assistance; Additional time;Bed Modified(assist at trunk) Scooting: Assist x2; Moderate assistance(for scoot to EOB) Transfers: 
Sit to Stand: Moderate assistance;Assist x2(assist for lift/ balance) Bed to Chair: Moderate assistance;Assist x2(SPT with bilat HHA, assist for balance) Balance: 
Sitting: Intact Standing: Impaired Standing - Static: Constant support;Fair;Poor Standing - Dynamic : Constant support;Poor ADL Intervention: 
Feeding Container Management: Total assistance (dependent) Cutting Food: Total assistance (dependent) Food to Mouth: Set-up Cues: Physical assistance;Verbal cues provided Lower Body Dressing Assistance Socks: Total assistance (dependent) Leg Crossed Method Used: No 
Position Performed: Supine Cues: Emaline Battles 
 
Cognitive Retraining Safety/Judgement: Awareness of environment;Decreased insight into deficits; Fall prevention Pain: 
Reporting pain in LLE, did not quantify Activity Tolerance:  
Fair and requires rest breaks After treatment patient left in no apparent distress:  
Sitting in chair, Call bell within reach, Bed / chair alarm activated, Caregiver / family present, and RN aware COMMUNICATION/COLLABORATION:  
The patients plan of care was discussed with: Physical therapist, Registered nurse, and Case management. Blaze Oneill OT Time Calculation: 35 mins

## 2021-05-14 NOTE — PROGRESS NOTES
Transition of Care Plan  RUR- High  DISPOSITION: Anticipate SNF for rehab  F/U with PCP/Specialist   
 Transport: TBD, likely BLS ambulance Care manager spoke with patient and her niece to discuss transitions of care. Patient has had a wound vac applied to her leg. Per niece if patient is non weight bearing she will not be able to take her home. If patient needs short term rehab patient would like a referral made to [facility]. Care manager will follow up on Monday. Patient continues to be on Levophed for B/P. Grady Buenrostro RN,Madai Garcia

## 2021-05-14 NOTE — PROGRESS NOTES
Bedside and Verbal shift change report given to Ebony (oncoming nurse) by \[facility]\"" (offgoing nurse). Report included the following information SBAR, Kardex, Intake/Output, MAR, Accordion and Recent Results. Pt is neurologically intact. She is purposeful in all extremities with generalized weakness. BP dropped several times today; it did not affect her neurological status. Discussed with neurology; okay to maintain systolic BP  as long as pt does not become symptomatic.  
 
1600 Pt has not voided. Pt assisted (x2 staff) to bed. Once in bed, pt complained of sob. NC placed on pt. It took several minutes for pt to catch her breath. Bladder scan performed; 250 cc urine being retained. Pt is also edematous with pitting edema in all extremities. Discussed with NP. Orders placed for lasix and straight cath. Straight cath performed, 300 cc urine drained. Bedside and Verbal shift change report given to [facility] (oncoming nurse) by Mehnaz Gottlieb (offgoing nurse). Report included the following information SBAR, Kardex, Procedure Summary, Intake/Output, MAR and Accordion.

## 2021-05-14 NOTE — PROGRESS NOTES
Spiritual Care Assessment/Progress Note [address] 
 
 
NAME: Paulo Cline      MRN: [id number] AGE: 80 y.o. SEX: female Yazidism Affiliation: Nondenominational  
Language: [place] 5/14/2021     Total Time (in minutes): 20 Spiritual Assessment begun in [facility] 7S1 INTENSIVE CARE through conversation with: 
  
    [x]Patient        [] Family    [] Friend(s) Reason for Consult: Palliative Care, Initial/Spiritual Assessment Spiritual beliefs: (Please include comment if needed) [x] Identifies with a daniela tradition:     
   [] Supported by a daniela community:        
   [] Claims no spiritual orientation:       
   [] Seeking spiritual identity:            
   [] Adheres to an individual form of spirituality:       
   [] Not able to assess:                   
 
    
Identified resources for coping:  
   [] Prayer                           
   [] Music                  [] Guided Imagery [x] Family/friends                 [] Pet visits [] Devotional reading                         [] Unknown 
   [] Other:                                        
 
 
Interventions offered during this visit: (See comments for more details) Patient Interventions: Affirmation of emotions/emotional suffering, Affirmation of daniela, Coping skills reviewed/reinforced, Iconic (affirming the presence of God/Higher Power), Normalization of emotional/spiritual concerns Plan of Care: 
 
 [] Support spiritual and/or cultural needs  
 [] Support AMD and/or advance care planning process    
 [] Support grieving process 
 [] Coordinate Rites and/or Rituals  
 [] Coordination with community clergy  [] No spiritual needs identified at this time 
 [] Detailed Plan of Care below (See Comments)  [] Make referral to Music Therapy 
[] Make referral to Pet Therapy    
[] Make referral to Addiction services 
[] Make referral to [facility] 
[] Make referral to Spiritual Care Partner 
[] No future visits requested [x] Follow up visits as needed Visited pt who was just completing breakfast. She spoke of her fall around the end of March and the continued issues that causes for her. She lives alone but her niece Willie Meade is staying with her to serve as a care giver. She expressed no spiritual care needs at the time of the visit. Chaplain Arthur, MDiv, MS, [facility] 
[phone])

## 2021-05-14 NOTE — PROGRESS NOTES
Physician Progress Note Jimmy Win 
CSN #:                  [id number] :                       [id number] ADMIT DATE:       2021 1:42 PM 
[address] DATE: 
RESPONDING 
PROVIDER #:        Yudelka Oseguera MD 
 
 
 
 
QUERY TEXT: 
 
Pt admitted with LLE Wound s/p Surgical Debridement. Pt noted to have drop in Hgb from 10.4 down to 6.4, and became Hypotensive with BP down to 88/47, MAP 56. Pt now in ICU on Pressor support. If possible, please document in the progress notes and discharge summary if you are evaluating and/or treating any of the following: The medical record reflects the following: 
Risk Factors: s/p leg debridement and having large amounts bloody drainage with drop in Hgb and BP Clinical Indicators: pt with drop in Hgb from 10.4 down to 6.4, became hypotensive will BP down to 88/47, MAP down to 56. Pt now in ICU requiring Pressor support. Treatment: Levophed Drip, q2 hr neuro checks, ICU monitoring Thank you, if you have any questions please e-mail me at [email or web address]. 
[phone] Options provided: 
-- Cardiogenic Shock -- Hemorrhagic Shock -- Hypovolemic Shock -- Hypovolemia without Shock -- Hypotension without Shock 
-- Other - I will add my own diagnosis -- Disagree - Not applicable / Not valid -- Disagree - Clinically unable to determine / Unknown 
-- Refer to Clinical Documentation Reviewer PROVIDER RESPONSE TEXT: 
 
Provider disagreed with this query. Not personallly treating this patient. Please forward to Joey Preston NP Query created by: Ita Madrigal on 2021 8:39 AM 
 
 
Electronically signed by:  Yudelka Oseguera MD 2021 6:09 PM

## 2021-05-14 NOTE — PROGRESS NOTES
Comprehensive Nutrition Assessment Type and Reason for Visit: Initial, RD nutrition re-screen/LOS Nutrition Recommendations/Plan:   
 
Bowel regimen: Colace bid, Miralax nightly MVI Encourage oral intake of meals and supplements Nutrition Assessment:   
Pt admitted with left leg ulcer. PMHx: AS, Breast Cancer, Recent Ovarian Cancer-chemotherapy on hold, Skin Cancer LLE, Pulmonary HTN, GERD. Full thickness wound on left lower leg. PVD-s/p debridement, arteriogram, left femoral and popliteal angioplasty. Now with exposed tibia, muscle and tendon. WOCN following-wound vac placed today. Acute TIA 5/11. HFrEF, EF 25-30%. Ms Dangelo Gabriel is eating very poorly-bites of food but is accepting Ensure supplements well. Niece at bedside also. Patient has always been a picky eater but has gotten worse since being hospitalized 4/6. Complains of food not tasting the same. Will discontinue RADHIKA restriction in hopes pt starts to eat a little more. Will continue with Ensure Clear once daily and Ensure Enlive bid (disliked the Magic cup). If all 3 Ensure supplements consumed this will provide a total of 900 calories and 47 gm protein per day (meeting 57% estimated energy and protein needs). Provided weekly menu and always available menu items as well as number to call Nutrition Services with preferences. Niece very supportive-encouraged her to bring pt food from home as able. Will be difficult to heal wound without adequate nutrition. Suggest adding MVI to meet DRI's. Patient's last BM was PTA. Normally is on a stool softener but has manually disimpacted herself in the past. Suggest adding bowel regimen (see above). This may help improve pt's appetite. Phosphorus and magnesium replacement ordered. Malnutrition Assessment: 
Malnutrition Status: Moderate malnutrition Context:  Acute illness Findings of the 6 clinical characteristics of malnutrition:  
Energy Intake:  7 - 50% or less of est energy requirements for 5 or more days Weight Loss:  Unable to assess Body Fat Loss:  No significant body fat loss, Muscle Mass Loss:  1 - Mild muscle mass loss, Hand (interosseous), Clavicles (pectoralis & deltoids), Temples (temporalis) Fluid Accumulation:  1 - Mild, Extremities, Generalized  Strength:  Not performed Nutritionally Significant Medications:  
Humalog, magnesium sulfate, Protonix, Neutra Phos, Crestor, Levophed, Albumin x 1 Estimated Daily Nutrient Needs: 
Energy (kcal): 1600 (MSJ x 1.2 x 1.3); Weight Used for Energy Requirements: Admission(63,5 kg) Protein (g): 81 (1.5g/kg); Weight Used for Protein Requirements: Admission(64 kg) Fluid (ml/day): 1600; Method Used for Fluid Requirements: 1 ml/kcal 
 
Nutrition Related Findings:      
BM: PTA Edema:  1+ generalized, 2+ pitting BUE, BLE Wounds:  Full thickness, Wound vac Current Nutrition Therapies:  
Diet: Regular 3-4 GM RADHIKA Supplements: Ensure Clear, Ensure Enlive, Magic cup Additional Caloric Sources:  None Meal intake: No data found. Anthropometric Measures: 
· Height:  5' 4\" (162.6 cm) · Current Body Wt:  70 kg (154 lb 5.2 oz) · Admission Body Wt:  139 lb 15.9 oz   
· Usual Body Wt:   154# (prior to illness) · Ideal Body Wt:  120#:  128.6 % :      
· BMI Categories:  Normal weight (BMI 18.5-24.9)-based on admission weight Wt Readings from Last 10 Encounters:  
05/14/21 70 kg (154 lb 5.2 oz) 05/12/21 66 kg (145 lb 8.1 oz) 04/08/21 67.6 kg (149 lb)  
03/30/21 67.1 kg (148 lb)  
03/22/21 67.3 kg (148 lb 6.4 oz) 02/08/21 74.5 kg (164 lb 3.2 oz)  
12/22/20 69.4 kg (153 lb)  
11/20/20 70.8 kg (156 lb)  
11/20/20 70.3 kg (155 lb) 10/15/20 70.5 kg (155 lb 8 oz) Nutrition Diagnosis:  
· Inadequate protein-energy intake related to increased demand for energy/nutrients as evidenced by wounds, intake 26-50% ·   related to   as evidenced by   
 
 
Nutrition Interventions:  
Food and/or Nutrient Delivery: Continue current diet, Modify oral nutrition supplement Nutrition Education and Counseling: No recommendations at this time Coordination of Nutrition Care: Continue to monitor while inpatient Goals: 
Consume 75% of meals and 2-3 supplements per day x 5-7 days. Nutrition Monitoring and Evaluation:  
Behavioral-Environmental Outcomes: None identified Food/Nutrient Intake Outcomes: Supplement intake, Food and nutrient intake Physical Signs/Symptoms Outcomes: Biochemical data, Fluid status or edema, Weight Discharge Planning:   
Continue oral nutrition supplement, Continue current diet Laz Grijalva RD CNSC Contact: Chilango Phipps

## 2021-05-14 NOTE — PROGRESS NOTES
[facility] 
 
ICU TEAM Progress Note Name: Scott Partida : 1927 MRN: [id number] Date: 2021 Assessment/Plan: 1. Anion gap metabolic acidosis due to lactic acidosis, resolved 
a. Unclear source of lactic acidosis. Likely due to pressor use in the setting of PAD. Lactic improved with decreased vasopressors 
b. Elevation in WBCs, but no other evidence of ongoing infection. Patient appears nontoxic. CXR without acute process and UA bland.  
c. Patient continues on vancomycin and ceftriaxone 2. Acute TIA 
a. Acutely aphasic on  
b. Code stroke called- no evidence of acute infarct or perfusion deficit 
c. High risk for embolic process given chronic afib and off anticoagulant given frequent falls, surgery, and bleeding  
d. Patient appears back at neurologic baseline 
e. Neurology following  
f. Discussed SBP goal- now goal of 110-120. Wean norepinephrine as able 
g. Continue midodrine 10mg TID for blood pressure augmentation 
h. Give a dose of Albumin 5 % 25g x1 to support B/P 
3. HFrEF without exacerbation and severe pulmonary hypertension 
a. LVEF 25-30% with severe hypokinesis and PASP 111. This is very different from her   
b. Evidence of volume overload on TTE, will gently diurese PRN. Patient still requiring vasopressors so will be conservative. c. Continue norepinephrine- may be difficult to wean given this new finding 
d. Holding metoprolol given hypotension 4. Left leg wound s/p left leg arteriogram with balloon angioplasty and debridement  
a. Postoperative care per vascular surgery. b. Day 8 vancomycin, will dc today 
c. Continue ceftriaxone for 7 days (on day 5 of 7) 
d. Fentanyl PRN for pain control 
e. No wound culture sent. Blood cultures with 1/2 bottles with coag negative staph 
f. Wound care following - wound vac placed on 21 5. Acute on chronic anemia 
a. Patient with baseline iron deficiency anemia- confirmed with iron profile 
b.  Bleeding from leg wound- patient has received 3u PRBCs total. Hgb stable this am 
c. Patient started on heparin DVT ppx yesterday. Monitor H/H. 
6. Reactive airway disease 
a. Continue brovana and pulmicort 7. Glaucoma 
a. Continue xalantan and timolol 8. dyslipidemia and PVD  
a. Continue statin and restart aspirin today. 9. Chronic a fib 
a. Holding metoprolol given vasopressor requirement 10. History of breast and active ovarian cancer 
a. Patient with R chest port, chemo on hold due to multiple hospitalizations and rehab stays 11. Anxiety 
a. Xanax 0.25mg nightly as needed F - Feeding:  Yes PO 
A - Analgesia: Fentanyl S - Sedation: None T - DVT Prophylaxis: SCD's or Sequential Compression Device on right leg only H - Head of Bed: > 30 Degrees U - Ulcer Prophylaxis: Not at this time G - Glycemic Control: Insulin SSI ACHS S - Spontaneous Breathing Trial: N/A 
B - Bowel Regimen: Docusate (Colace) I - Indwelling Catheter: 
 Tubes: None Lines: Peripheral IV Drains: Carlton Catheter D - De-escalation of Antibiotics: Ceftriaxone Vancomycin Subjective:  
Progress Note: 5/14/2021 Reason for ICU Admission: Hypotension HPI:Lizette Foster is a 80 y. o. female with hx aortic stenosis, pulm htn, GERD, glaucoma, remote breast CA, HTN, and recent ovarian cancer with port-a-cath s/p chemo times two who presents with complications of traumatic wound.  She had skin cancer on her LLE and subsequently sustained a traumatic wound after a fall in the same area.  The wound had progress, and they were pending and appt with surgeon Dr. Nikolas Ac today.  The nurse from the rehab facility where patient was being discharged sent a picture to the clinic, and patient was advised to come to the hospital instead for management of wound. 
  
In the ED, VSS. Labs are significant for lactate of 4, CRP 6.32, ESR 34, and normocytic anemia with Hgb 10.4.  L tib/fib XR with pretibial soft tissue ulceration and no noted osseous abnormality On 5/10 patient to OR for LLE arteriogram, L fem and popliteal angioplasty, and debridement of wound. Patient received several units of PRBCs given drop in hgb Overnight Events:  
2021 
- Patient continues on norepinephrine. No further neurological changes - Repeat blood cultures as follow up from prev positive today, aleukocytosis and afebrile. POD: 
2 Day Post-Op S/P:  
Procedure(s): DEBRIDEMENT LEFT LOWER LEG WOUND. LEFT LEG ARTERIOGRAM, LEFT FEMORAL AND POPLITEAL ANGIOPLASTY Objective:  
Vital Signs: 
Visit Vitals BP (!) 98/43 (BP 1 Location: Left arm, BP Patient Position: At rest) Pulse 78 Temp 97.6 °F (36.4 °C) Resp 16 Ht 5' 4\" (1.626 m) Wt 70 kg (154 lb 5.2 oz) SpO2 94% BMI 26.49 kg/m² O2 Flow Rate (L/min): 1 l/min O2 Device: None (Room air) Temp (24hrs), Av °F (36.1 °C), Min:96.2 °F (35.7 °C), Max:97.6 °F (36.4 °C) Intake/Output:  
 
Intake/Output Summary (Last 24 hours) at 2021 2456 Last data filed at 2021 0800 Gross per 24 hour Intake 925.97 ml Output 1000 ml Net -74.03 ml Physical Exam: 
 
General:  Alert, cooperative, appears stated age, lying in bed Eyes:  Sclera anicteric. Pupils equally round and reactive to light. Mouth/Throat: Mucous membranes dry, oral pharynx clear Neck: Supple Lungs:   Clear to auscultation bilaterally, good effort CV:  Regular rate and rhythm,no murmur, click, rub or gallop Abdomen:   Soft, non-tender. bowel sounds normal. non-distended Extremities: No cyanosis. Trace BLE edema L>R, 2+ edema in LUE Skin: Patient with areas of ecchymosis on bilateral arms, decreased skin turgor. LLE with large opened wound with tendon and bone exposure- wound is red and bloody. Wound dressing C/D/I. No acute rash or lesions Lymph nodes: Cervical and supraclavicular normal  
Musculoskeletal: No swelling or deformity Lines/Devices:  Intact, no erythema, drainage or tenderness Neuro/Psych: Alert and oriented, moves all extremities equally, normal mood affect given the setting LABS AND  DATA: Personally reviewed Recent Labs 05/14/21 
0140 05/13/21 
1611 WBC 8.3 8.9 HGB 7.6* 7.9*  
HCT 25.2* 25.5*  
 144* Recent Labs 05/14/21 
0140 05/13/21 
1008 * 131*  
K 3.9 3.4*  
CL 99 100 CO2 24 22 BUN 17 16 CREA 0.78 0.66 * 136* CA 7.5* 7.3* No results for input(s): AP, TBIL, TP, ALB, GLOB, AML, LPSE in the last 72 hours. No lab exists for component: SGOT, GPT, AMYP No results for input(s): INR, PTP, APTT, INREXT, INREXT in the last 72 hours. No results for input(s): PHI, PCO2I, PO2I, FIO2I in the last 72 hours. No results for input(s): CPK, CKMB, TROIQ, BNPP in the last 72 hours. MEDS: Reviewed Chest X-Ray: CXR Results  (Last 48 hours) 05/12/21 0914  XR CHEST PORT Final result Impression:  No acute findings. Narrative:  EXAM: XR CHEST PORT INDICATION: fu  
   
COMPARISON: May 6 FINDINGS: A portable AP radiograph of the chest was obtained at 0910 hours. The  
brii catheter seen in place. There are bilateral axillary clips. The patient is  
on a cardiac monitor. The lungs are clear. There are vascular calcifications. The bones and soft tissues are grossly within normal limits. Multidisciplinary Rounds Completed: Yes ABCDEF Bundle/Checklist Completed: 
Yes SPECIAL EQUIPMENT None DISPOSITION Stay in ICU CRITICAL CARE CONSULTANT NOTE I had a face to face encounter with the patient, reviewed and interpreted patient data including clinical events, labs, images, vital signs, I/O's, and examined patient. I have discussed the case and the plan and management of the patient's care with the consulting services, the bedside nurses and the respiratory therapist.   
 
NOTE OF PERSONAL INVOLVEMENT IN CARE This patient has a high probability of imminent, clinically significant deterioration, which requires the highest level of preparedness to intervene urgently. I participated in the decision-making and personally managed or directed the management of the following life and organ supporting interventions that required my frequent assessment to treat or prevent imminent deterioration. I personally spent 40 minutes of critical care time. This is time spent at this critically ill patient's bedside actively involved in patient care as well as the coordination of care and discussions with the patient's family. This does not include any procedural time which has been billed separately. Ethan Krishnan [facility] Critical Care Medicine [facility] Physicians

## 2021-05-14 NOTE — PROGRESS NOTES
Vascular: 
 
Comfortable at present. Leg dressed. Wound nurses to consider VAC placement today. Will follow up Monday.

## 2021-05-14 NOTE — PROGRESS NOTES
Neurology Progress Note Bethany Blunt NP Admit Date: 5/6/2021 LOS: 8 days Daily Progress Note: 5/14/2021 S/P: Procedure(s):DEBRIDEMENT LEFT LOWER LEG WOUND. LEFT LEG ARTERIOGRAM, LEFT FEMORAL AND POPLITEAL ANGIOPLASTY Angela Brower is a 80 y.o. female with a past medical history of HTN, moderate pulmonary hypertension, chronic atrial fibrillation, moderate aortic stenosis, hyperlipidemia, glaucoma, remote breast cancer and more recent ovarian cancer. Patient had fall approximately 5 weeks ago over her walker obtaining a left lower shin wound, which had  not been healing properly with signs and symptoms of infection, so she was admitted to the hospital on 5/6/21 for treatment. She underwent a left leg arteriogram with balloon angioplasty of the superficial femoral and popliteal arteries and debridement of the left leg wound by Dr. Luciana Prince on 5/10/21. She had successful revascularization with recanalization of the occluded left popliteal artery. On the morning of 5/11/21, a Code Stroke was called due to new onset expressive aphasia. 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 CTP was negative for significant perfusion defect. NIS was consulted and recommended permissive HTN with -180 as patient was likely having acute ongoing ischemic stroke with mild to moderate narrowing of the left MCA. Patient was sent to the ICU due to hypotension requiring Levophed and acute anemia post procedure Subjective: No neuro events overnight. Levo was being slowly weaned overnight, when she was down to 1 mcg/min, her SBP dropped to 80s and there were no neuro changes associated with this BP drop. Allergies Allergen Reactions  Augmentin [Amoxicillin-Pot Clavulanate] Hives and Itching  Doxycycline Hives and Itching  Keflex [Cephalexin] Hives and Itching  Monodox [Doxycycline Monohydrate] Other (comments)  Sulfa (Sulfonamide Antibiotics) Rash Review of Systems: 
Pertinent items are noted in the History of Present Illness. Objective:  
Vital signs Temp (24hrs), Av.3 °F (36.3 °C), Min:96.4 °F (35.8 °C), Max:97.9 °F (36.6 °C) 
 1901 -  0700 In: 759.4 [P.O.:400; I.V.:359.4] Out: 250 [Urine:250]  701 - 1900 In: 2229.2 [P.O.:300; I.V.:1929.2] Out: 2405 [LYMIF:6277] Visit Vitals BP (!) 88/47 Pulse 72 Temp (!) 96.6 °F (35.9 °C) Resp 19 Ht 5' 4\" (1.626 m) Wt 145 lb 8.1 oz (66 kg) SpO2 95% BMI 24.98 kg/m² O2 Flow Rate (L/min): 1 l/min O2 Device: None (Room air) Vitals:  
 21 0200 21 0245 21 0300 21 0330 BP: (!) 114/48 (!) 104/42 (!) 99/41 (!) 88/47 Pulse: 70 71 66 72 Resp: 15 11 11 19 Temp:      
SpO2: 95% Weight:      
Height:      
  
Physical Exam: 
GENERAL: Calm, cooperative, NAD SKIN: Warm, dry, color appropriate for ethnicity. LLE wound present. Neurologic Exam: 
Mental Status:  Alert and oriented x 4. Appropriate affect, mood and behavior. Language:    Normal fluency, repetition, comprehension and naming. Cranial Nerves:   Pupils 3 mm, equal, round and reactive to light. Visual fields full to confrontation. Extraocular movements intact. Facial sensation intact. Full facial strength, no asymmetry. [place] (baseline). No dysarthria. Tongue protrudes to midline, palate elevates symmetrically. Shoulder shrug 5/5 bilaterally. Motor:    No pronator drift. Bulk and tone normal.  
   5/5 power in all extremities proximally and distally. No involuntary movements. Sensation:    Sensation intact throughout to light touch. Coordination & Gait: Gait deferred. Labs: 
Lab Results Component Value Date/Time  WBC 8.9 2021 04:11 PM  
 HGB 7.9 (L) 2021 04:11 PM  
 HCT 25.5 (L) 2021 04:11 PM  
 PLATELET 541 (L)  04:11 PM  
 MCV 89.2 2021 04:11 PM  
 
Lab Results Component Value Date/Time Sodium 131 (L) 05/13/2021 03:56 AM  
 Potassium 3.4 (L) 05/13/2021 03:56 AM  
 Chloride 100 05/13/2021 03:56 AM  
 CO2 22 05/13/2021 03:56 AM  
 Anion gap 9 05/13/2021 03:56 AM  
 Glucose 136 (H) 05/13/2021 03:56 AM  
 BUN 16 05/13/2021 03:56 AM  
 Creatinine 0.66 05/13/2021 03:56 AM  
 BUN/Creatinine ratio 24 (H) 05/13/2021 03:56 AM  
 GFR est AA >60 05/13/2021 03:56 AM  
 GFR est non-AA >60 05/13/2021 03:56 AM  
 Calcium 7.3 (L) 05/13/2021 03:56 AM  
 
Imaging: CT Results (maximum last 3): Results from [facility] Encounter encounter on 05/06/21 CT CODE NEURO PERF W CBF Narrative CLINICAL HISTORY: Code stroke EXAMINATION:  CT ANGIOGRAPHY HEAD AND NECK COMPARISON: CT head 5/11/2021, CTA 11/12/2018 TECHNIQUE:  Following the uneventful administration of iodinated contrast 
material, axial CT angiography of the head and neck was performed. Delayed axial 
images through the head were also obtained. Coronal and sagittal reconstructions 
were obtained. Manual postprocessing of images was performed. 3-D  Sagittal 
maximal intensity projection images were obtained. 3-D Coronal maximal 
intensity projections were obtained. CT dose reduction was achieved through use 
of a standardized protocol tailored for this examination and automatic exposure 
control for dose modulation. CT perfusion analysis was performed using CT with contrast administration, 
including postprocessing of parametric maps with the determination of cerebral 
blood flow, cerebral blood volume, and mean transit time. This study was analyzed by the [address] ai algorithm FINDINGS: 
 
Delayed contrast-enhanced head CT: The ventricles are midline without hydrocephalus. There is no acute intra or 
extra-axial hemorrhage.  Mild to moderate bilateral subcortical and 
periventricular areas of patchy low attenuation is nonspecific but likely 
related to changes of chronic small vessel ischemic disease. The basal cisterns 
are clear. The paranasal sinuses are clear. CTA NECK: 
 
Great vessels: Patent origins Right subclavian artery: Patent with mild atherosclerosis Left subclavian artery: Patent with mild to moderate atherosclerosis Right common carotid artery: Patent with mild atherosclerosis Left common carotid artery: Patent with mild atherosclerosis Cervical right internal carotid artery: Patent with proximal atherosclerosis 
causing no significant stenosis by NASCET criteria. Cervical left internal carotid artery: Patent with proximal atherosclerosis 
causing no significant stenosis by NASCET criteria. Right vertebral artery: Patent with mild atherosclerosis Left vertebral artery: Patent with mild atherosclerosis Severe cervical spondylosis Subcentimeter low-density thyroid nodules Right chest port. Moderate emphysema CTA HEAD: 
 
Right cavernous internal carotid artery: Patent with mild to moderate 
atherosclerosis Left cavernous internal carotid artery: Patent with mild to moderate 
atherosclerosis Anterior cerebral arteries: Patent with mild to moderate atherosclerosis. Proximal stenosis in the left A1 segment due to noncalcified atheroma Anterior communicating artery: Patent Right middle cerebral artery: Patent with mild atherosclerosis Left middle cerebral artery: Patent with mild atherosclerosis Posterior communicating arteries: Patent Posterior cerebral arteries: Right is diminutive in caliber with severe 
atherosclerotic changes. Left is patent with fetal origin and mild 
atherosclerosis Basilar artery: Patent and slightly diminutive in caliber with mild to moderate 
atherosclerosis. 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 
atherosclerosis CT perfusion brain: No significant cerebral perfusion abnormality or mismatch Measurements use NASCET criteria. Impression No evidence for acute large vessel arterial occlusion. No significant cerebral perfusion abnormality or mismatch. Stable atherosclerotic disease. 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 CT CODE NEURO HEAD WO CONTRAST Narrative EXAM: CT CODE NEURO HEAD WO CONTRAST INDICATION: code neuro COMPARISON: None. CONTRAST: None. TECHNIQUE: Unenhanced CT of the head was performed using 5 mm images. Brain and 
bone windows were generated. Coronal and sagittal reformats. CT dose reduction 
was achieved through use of a standardized protocol tailored for this 
examination and automatic exposure control for dose modulation. FINDINGS: 
Ventricles and sulci are stable. Mild changes small vessel disease is unchanged. No hemorrhage mass or acute infarction identified. The bone windows demonstrate 
no abnormalities. The visualized portions of the paranasal sinuses and mastoid 
air cells are clear. Impression No acute abnormality identified.-Result called to the nurse. CTA CODE NEURO HEAD AND NECK W CONT Narrative CLINICAL HISTORY: Code stroke EXAMINATION:  CT ANGIOGRAPHY HEAD AND NECK COMPARISON: CT head 5/11/2021, CTA 11/12/2018 TECHNIQUE:  Following the uneventful administration of iodinated contrast 
material, axial CT angiography of the head and neck was performed. Delayed axial 
images through the head were also obtained. Coronal and sagittal reconstructions 
were obtained. Manual postprocessing of images was performed. 3-D  Sagittal 
maximal intensity projection images were obtained. 3-D Coronal maximal 
intensity projections were obtained. CT dose reduction was achieved through use 
of a standardized protocol tailored for this examination and automatic exposure 
control for dose modulation.   
 
CT perfusion analysis was performed using CT with contrast administration, 
including postprocessing of parametric maps with the determination of cerebral 
blood flow, cerebral blood volume, and mean transit time. This study was analyzed by the [address] ai algorithm FINDINGS: 
 
Delayed contrast-enhanced head CT: The ventricles are midline without hydrocephalus. There is no acute intra or 
extra-axial hemorrhage. Mild to moderate bilateral subcortical and 
periventricular areas of patchy low attenuation is nonspecific but likely 
related to changes of chronic small vessel ischemic disease. The basal cisterns 
are clear. The paranasal sinuses are clear. CTA NECK: 
 
Great vessels: Patent origins Right subclavian artery: Patent with mild atherosclerosis Left subclavian artery: Patent with mild to moderate atherosclerosis Right common carotid artery: Patent with mild atherosclerosis Left common carotid artery: Patent with mild atherosclerosis Cervical right internal carotid artery: Patent with proximal atherosclerosis 
causing no significant stenosis by NASCET criteria. Cervical left internal carotid artery: Patent with proximal atherosclerosis 
causing no significant stenosis by NASCET criteria. Right vertebral artery: Patent with mild atherosclerosis Left vertebral artery: Patent with mild atherosclerosis Severe cervical spondylosis Subcentimeter low-density thyroid nodules Right chest port. Moderate emphysema CTA HEAD: 
 
Right cavernous internal carotid artery: Patent with mild to moderate 
atherosclerosis Left cavernous internal carotid artery: Patent with mild to moderate 
atherosclerosis Anterior cerebral arteries: Patent with mild to moderate atherosclerosis. Proximal stenosis in the left A1 segment due to noncalcified atheroma Anterior communicating artery: Patent Right middle cerebral artery: Patent with mild atherosclerosis Left middle cerebral artery: Patent with mild atherosclerosis Posterior communicating arteries: Patent Posterior cerebral arteries: Right is diminutive in caliber with severe 
atherosclerotic changes. Left is patent with fetal origin and mild 
atherosclerosis Basilar artery: Patent and slightly diminutive in caliber with mild to moderate 
atherosclerosis. 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 
atherosclerosis CT perfusion brain: No significant cerebral perfusion abnormality or mismatch Measurements use NASCET criteria. Impression No evidence for acute large vessel arterial occlusion. No significant cerebral perfusion abnormality or mismatch. Stable atherosclerotic disease. 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 Assessment:  
Active Problems: 
  Leg ulcer, left ([address] (5/6/2021) Plan:  
 
1.) Acute ischemic CVA 
 - In patient with multiple medical problems including afib off of anticoagulation, blood loss anemia, active ovarian cancer, s/p left leg wound s/p balloon angioplasty and debridement - MRI brain 5/12/21 showed possible punctate left cerebral acute infarct - A1C ok at 5.2 
 - Lipid panel showing LDL of 11, at goal <70, Cont Crestor 20 mg 
 - ECHO with LVEF of 25-35%, severely reduced systolic function. Left ventricular diastolic dysfunction. Severe pulmonary HTN. Valve dx throughout. 
            - PT/OT/SLP evals - Stroke education 
 - SBP goals 110-120 
 - Start ASA when ok from surgery standpoint - Call Code S with any acute neuro changes 2.) Basilar artery aneurysm 
 - NIS consulted, no intervention recommended Further recommendations to follow from Dr. Brynn Garrido. Jarod Zaragoza NP Neurocritical Care Nurse Practitioner

## 2021-05-15 NOTE — PROGRESS NOTES
1930: Bedside and Verbal shift change report given to Marty Delvalle RN (oncoming nurse) by Dm Montejo. Asia Raman (offgoing nurse). Report included the following information SBAR, Kardex, ED Summary, Procedure Summary, Intake/Output, MAR, Recent Results, Cardiac Rhythm A-Fib, Alarm Parameters , Quality Measures and Dual Neuro Assessment. 2000: Shift assessment completed; details documented in flowsheet. Pt resting in bed; neurologically intact. Generalized weakness in all extremities; able to follow commands. Temp- 96.4; pt refusing extra covers but agreed to pull covers over her shoulders. A-Fib; VSS on Levo at 4 mcg and 3 L NC. Pure wick in place. No signs of distress at this time. Will continue to monitor. [id number] Epley, NP at bedside; RN gave an update on patient and fluid status. Np ordered RN to give 25% Albumin and 20 mg of Lasix at this time; titrate Levo as needed. 0015: Bladder scan revealed >515 of urine in bladder. Pt unable to void. Lizzy Hickman NP notified; ordered RN to place brooks. 0030: Brooks not placed due to pt voiding on her own; Lizzy Hickman NP aware. Will continue to monitor. [id number] Epley, NP notified of labs; NP ordered RN to get another BMP at 1100 to monitor sodium level. Kasey Dee NP ordered RN to change neuro assessments to Q4hrs.  
 
0976: Bladder scan revealed 293 cc retained. Will continue to monitor. 0730: Bedside and Verbal shift change report given to CORY Hernández (oncoming nurse) by Marty Delvalle RN (offgoing nurse). Report included the following information SBAR, Kardex, ED Summary, Procedure Summary, Intake/Output, MAR, Recent Results, Cardiac Rhythm A-Fib, Alarm Parameters , Quality Measures and Dual Neuro Assessment.

## 2021-05-15 NOTE — PROGRESS NOTES
0730: Bedside and Verbal shift change report given to CORY Hernández (oncoming nurse) by Alejandra Lua RN (offgoing nurse). Report included the following information SBAR, Kardex, Intake/Output, MAR, Recent Results, Cardiac Rhythm A-Fib and Alarm Parameters . 1510Philmore Main, NP notified of patient's hypotension, UOP,  and 405 ml on bladder scan, new orders received. 1740: Carlton placed per order for urinary retention, 400 ml out once insertion.

## 2021-05-15 NOTE — PROGRESS NOTES
[facility] 
 
ICU TEAM Progress Note Name: Scott Partida : 1927 MRN: [id number] Date: 5/15/2021 Subjective:  
Progress Note: 5/15/2021 Jane Jim a 80 y. o. female with hx aortic stenosis, pulm htn, GERD, glaucoma, remote breast CA, HTN, and recent ovarian cancer with port-a-cath s/p chemo times two who presented to the ED on 3/80 with complications of a traumatic wound.  She had skin cancer on her LLE and subsequently sustained a traumatic wound after a fall in the same area.  The wound had progressed, and she was  pending and appt with surgeon Dr. Phyllis Chaparro today.  The nurse from the rehab facility where patient was being discharged sent a picture to the clinic, and patient was advised to come to the hospital instead for management of wound. 
  
In the ED, VSS. Labs are significant for lactate of 4, CRP 6.32, ESR 34, and normocytic anemia with Hgb 10.4. L tib/fib XR with pretibial soft tissue ulceration and no noted osseous abnormality. She was started on vancomycin and ceftriaxone.   
  
On 5/10 patient to OR for LLE arteriogram, L fem and popliteal angioplasty, and debridement of wound. Patient received 2 units of PRBCs given drop in hgb to 6.1. On , she developed acute aphasia and a code stroke was called. NIHSS was 7. CT head without acute infarction or perfusion deficit. Likely acute TIA. She was considered high risk for thromboembolic stroke due to history of atrial fibrillation. She was transferred to the ICU later on  for BP management to keep SBP >160 with vasopressors. She was treated with norepinephrine. She is now being transitioned from norepinephrine to midodrine. Active Problem List:  
 
Problem List  Date Reviewed: 3/29/2021 Codes Class Goals of care, counseling/discussion ICD-10-CM: Z71.89 ICD-9-CM: V65.49 Pain ICD-10-CM: R52 ICD-9-CM: 780.96 Weakness ICD-10-CM: R53.1 ICD-9-CM: 780.79  Advanced age ICD-10-CM: R54 
ICD-9-CM: 710 Leg ulcer, left ([facility] ICD-10-CM: H33.429 ICD-9-CM: 707.10 Hyponatremia ICD-10-CM: E87.1 ICD-9-CM: 276.1 Permanent atrial fibrillation (HCC) ICD-10-CM: C74.18 ICD-9-CM: 427.31 Ovarian cancer ([facility] ICD-10-CM: C56.9 ICD-9-CM: 183.0 Anemia ICD-10-CM: D64.9 ICD-9-CM: 285.9 Mixed hyperlipidemia ICD-10-CM: E78.2 ICD-9-CM: 272.2 Nonrheumatic aortic valve stenosis ICD-10-CM: I35.0 ICD-9-CM: 424.1 Bicuspid aortic valve ICD-10-CM: Q23.1 ICD-9-CM: 746.4 PAF (paroxysmal atrial fibrillation) (HCC) ICD-10-CM: I48.0 ICD-9-CM: 427.31 Basilar artery aneurysm ([facility] ICD-10-CM: I72.5 ICD-9-CM: 437.3 Orthostatic hypotension ICD-10-CM: I95.1 ICD-9-CM: 458.0 Leg edema ICD-10-CM: R60.0 ICD-9-CM: 782.3 Hemorrhoids ICD-10-CM: K64.9 ICD-9-CM: 455.6 BRBPR (bright red blood per rectum) ICD-10-CM: K62.5 ICD-9-CM: 569.3 PAD (peripheral artery disease) (HCC) ICD-10-CM: I73.9 ICD-9-CM: 443.9 Overview Signed 8/1/2013  8:11 AM by Ina Ray NP  
  7/31/13 Left anterior tibial artery laser atherectomy and balloon angioplasty. Atherosclerosis of native arteries of the extremities with intermittent claudication ICD-10-CM: I70.219 ICD-9-CM: 440.21 Paroxysmal ventricular tachycardia (HCC) ICD-10-CM: I47.2 ICD-9-CM: 427.1 Shortness of breath ICD-10-CM: R06.02 
ICD-9-CM: 786.05   
   
 PVC's (premature ventricular contractions) ICD-10-CM: I49.3 ICD-9-CM: 427.69 Colitis ICD-10-CM: K52.9 ICD-9-CM: 558.9 Overview Signed 12/18/2012  3:51 PM by Tony Arcos MD  
  Collagenous- Dr. Joaquin Larsen First degree AV block ICD-10-CM: I44.0 ICD-9-CM: 426.11   
   
 HTN (hypertension) ICD-10-CM: I10 
ICD-9-CM: 401.9 Glaucoma ICD-10-CM: H40.9 ICD-9-CM: 365.9 Vitamin D deficiency ICD-10-CM: E55.9 ICD-9-CM: 268.9 Hypercholesteremia ICD-10-CM: E78.00 ICD-9-CM: 272.0 Arthritis ICD-10-CM: M19.90 ICD-9-CM: 716.90 History  of basal cell carcinoma ICD-9-CM: V10.83 History of breast cancer ICD-10-CM: Z85.3 ICD-9-CM: V10.3 Vertigo ICD-10-CM: V48 ICD-9-CM: 780.4 Wears hearing aid ICD-10-CM: Z97.4 ICD-9-CM: V45.89 Past Medical History:  
 
 has a past medical history of Arthritis, Cancer ([facility] GERD (gastroesophageal reflux disease), Hypertension, Other ill-defined conditions(799.89), Other ill-defined conditions(799.89), Other ill-defined conditions(799.89), Other ill-defined conditions(799.89), and Ovarian cancer ([facility] (11/2020). Past Surgical History:  
 
 has a past surgical history that includes hx efren and bso; hx cataract removal; hx tonsillectomy; hx knee arthroscopy; hx appendectomy; hx other surgical; hx colonoscopy (4/2012); hx other surgical; hx carpal tunnel release; cardiac catheterization (1/10/2013); vascular surgery procedure unlist; hx partial hysterectomy; and hx bilateral mastectomy (Bilateral). Home Medications:  
 
Prior to Admission medications Medication Sig Start Date End Date Taking? Authorizing Provider  
rosuvastatin (CRESTOR) 20 mg tablet TAKE 1 TABLET NIGHTLY 4/19/21   Bryan Kaufman MD  
albuterol-ipratropium (DUO-NEB) 2.5 mg-0.5 mg/3 ml nebu 3 mL by Nebulization route every four (4) hours as needed for Wheezing or Shortness of Breath. 4/12/21   Supriya Han MD  
aspirin delayed-release 81 mg tablet Take 1 Tab by mouth daily. 4/13/21   Supriya Han MD  
metoprolol succinate (TOPROL-XL) 25 mg XL tablet Take 0.5 Tabs by mouth daily. 4/13/21   Supriya Han MD  
dexamethasone in 0.9 % sod chl (DEXAMETHASONE-0.9 % SOD. CHLOR IV) 10 mg by IntraVENous route.  Every 28 days pre treatment    Provider, Historical  
Garry Ellipta 200-25 mcg/dose inhaler TAKE 1 PUFF DAILY 3/3/21   Provider, Historical  
diclofenac (VOLTAREN) 1 % gel APPLY 1 APPLICATION TOPICALLY 4 (FOUR) TIMES A DAY AS NEEDED (PAIN) 20   Provider, Historical  
timolol (TIMOPTIC-XE) 0.5 % ophthalmic gel-forming Administer 1 Drop to both eyes daily. Provider, Historical  
CYANOCOBALAMIN, VITAMIN B-12, (VITAMIN B-12 PO) Take 1,000 mcg by mouth daily. Provider, Historical  
latanoprost (XALATAN) 0.005 % ophthalmic solution Administer 1 Drop to both eyes nightly. 6/8/15   Provider, Historical  
cycloSPORINE (RESTASIS) 0.05 % ophthalmic emulsion Administer 1 Drop to both eyes two (2) times a day. Provider, Historical  
esomeprazole (NEXIUM) 40 mg capsule Take 1 Cap by mouth Daily (before breakfast). 14   Aarti Redd MD  
Cholecalciferol, Vitamin D3, (VITAMIN D3) 1,000 unit cap Take 1 Cap by mouth two (2) times a day. Provider, Historical  
omega-3 fatty acids-vitamin e (FISH OIL) 1,000 mg Cap Take 1 Cap by mouth two (2) times a day. Provider, Historical  
multivitamins-minerals-lutein (CENTRUM SILVER) Tab Take 1 Tab by mouth daily. Provider, Historical  
 
 
Allergies/Social/Family History: Allergies Allergen Reactions  Augmentin [Amoxicillin-Pot Clavulanate] Hives and Itching  Doxycycline Hives and Itching  Keflex [Cephalexin] Hives and Itching  Monodox [Doxycycline Monohydrate] Other (comments)  Sulfa (Sulfonamide Antibiotics) Rash Social History Tobacco Use  Smoking status: Former Smoker Types: Cigarettes Quit date: 1955 Years since quittin.4  Smokeless tobacco: Never Used Substance Use Topics  Alcohol use: Not Currently Comment: rare Family History Problem Relation Age of Onset  Heart Disease Mother  Heart Disease Father   
      age 55  
 Heart Disease Brother  Stroke Brother  Cancer Sister Colon Cancer  Diabetes Sister  Heart Disease Sister  Heart Disease Sister  Diabetes Sister  Other Brother MVA  Heart Disease Brother  Heart Disease Son   
      age 52 Review of Systems:  
 
Per HPI Objective:  
Vital Signs: 
Visit Vitals BP (!) 103/53 Pulse 82 Temp 98.3 °F (36.8 °C) Resp 18 Ht 5' 4\" (1.626 m) Wt 70 kg (154 lb 5.2 oz) SpO2 91% BMI 26.49 kg/m² O2 Flow Rate (L/min): 3 l/min O2 Device: Nasal cannula Temp (24hrs), Av.3 °F (36.3 °C), Min:96.4 °F (35.8 °C), Max:98.3 °F (36.8 °C) Intake/Output:  
 
Intake/Output Summary (Last 24 hours) at 5/15/2021 1411 Last data filed at 5/15/2021 1100 Gross per 24 hour Intake 250.32 ml Output 600 ml Net -349.68 ml Physical Exam: 
 
General:  alert, cooperative, no distress, appears stated age Eye: PERRLA Neurologic: Alert and oriented x3 Neck:Supple, no JVD Lungs:CTAB Heart: Irregular rate and rhythm, 2+ pulses, no JVD Abdomen: Soft, nontender, nondistended Skin: c/d/i 
 
LABS AND  DATA: Personally reviewed Recent Labs 05/15/21 
0419 21 
0140 WBC 7.8 8.3 HGB 7.3* 7.6* HCT 24.4* 25.2*  
 159 Recent Labs 05/15/21 
0419 21 
0140 * 132* K 4.0 3.9 CL 99 99 CO2 23 24 BUN 15 17 CREA 0.91 0.78 * 137* CA 7.8* 7.5* MG 1.9 1.8 PHOS 2.3* 1.9* No results for input(s): AP, TBIL, TP, ALB, GLOB, AML, LPSE in the last 72 hours. No lab exists for component: SGOT, GPT, AMYP No results for input(s): INR, PTP, APTT, INREXT in the last 72 hours. No results for input(s): PHI, PCO2I, PO2I, FIO2I in the last 72 hours. No results for input(s): CPK, CKMB, TROIQ, BNPP in the last 72 hours. Hemodynamics:  
PAP:   CO:    
Wedge:   CI:    
CVP:    SVR:    
  PVR:    
 
Ventilator Settings: 
Mode Rate Tidal Volume Pressure FiO2 PEEP Peak airway pressure:     
Minute ventilation:     
 
 
MEDS: Reviewed Chest X-Ray: personally reviewed and report checked · LV: Estimated LVEF is 25 - 30%. Visually measured ejection fraction.  Normal cavity size. Upper normal wall thickness. Severely reduced systolic function. Severe hypokinesis of the mid anterior, mid inferior, apical anterior, apical septal and apical inferior wall(s). Left ventricular diastolic dysfunction. Assessment and Plan:  
  
Acute TIA: Developed acute aphasia on 5/11. Code stroke called. Imaging without acute abnormality. Transferred to ICU for vasopressors with goal . Now goal SBP 80 or above - Treating with midodrine; wean off levo - Continue neurochecks per neurology L leg wound s/p L leg arteriogram w balloon angioplasty and debridement: 
- Care per vascular surgery - Continue ceftriaxone - Continue wound van (placed on 5/14) HFrEF c/b severe pulmonary HTN w/o exacerbation: - Holding BB due to hypotension 
- Holding diuresis due to hypotension Acute on chronic anemia: Baseline NIKKI, superimosed anemia of critical illness  
- Daily CBC Reactive airway disease: Continue brovana and pulmicort Chronic atrial fibrillation: Holding BB in setting of hypotension Anxiety: Xanax 0.25mg HS Glaucoma: Xalantan and timolol Deconditioning: PT/OT Hx Breast cancer and active ovarian cancer: No acute inpatient management Multidisciplinary Rounds Completed: Rounded w bedside RN 
 
ABCDEF Bundle/Checklist 
Pain Medications:PRN fentanyl Target RASS: 0 Sedation Medications:N 
CAM-ICU:  Neg 
Mobility:Poor PT/OT: Roselyn Powers Restraints:N 
Discussed Plan of Care (goals of care): Roselyn Powers Addressed Code Status: DNR 
 
CARDIOVASCULAR Cardiac Gtts:Levo SBP Goal of: >80 
MAP Goal of: >60 Transfusion Trigger (Hgb): <7 g/dL RESPIRATORY Vent Goals:  
Chlorhexidine DVT Prophylaxis (if no, list reason): Heparin SPO2 Goal: > 92% Pulmonary toilet: Albuterol GI/ [place] Catheter Present: No 
GI Prophylaxis: Protonix (pantoprazole) Nutrition: Yes Diet IVFs: N Bowel Movement: Y Bowel Regimen:Y Insulin: Y 
 
ANTIBIOTICS Antibiotics: 
Ceftriaxone T/L/D Tubes: N Lines: [employer] Drains: N 
 
SPECIAL EQUIPMENT None DISPOSITION 
ICU 
 
CRITICAL CARE CONSULTANT NOTE I had a face to face encounter with the patient, reviewed and interpreted patient data including clinical events, labs, images, vital signs, I/O's, and examined patient. I have discussed the case and the plan and management of the patient's care with the consulting services, the bedside nurses and the respiratory therapist.   
 
NOTE OF PERSONAL INVOLVEMENT IN CARE This patient has a high probability of imminent, clinically significant deterioration, which requires the highest level of preparedness to intervene urgently. I participated in the decision-making and personally managed or directed the management of the following life and organ supporting interventions that required my frequent assessment to treat or prevent imminent deterioration. I personally spent 45 minutes of critical care time. This is time spent at this critically ill patient's bedside actively involved in patient care as well as the coordination of care and discussions with the patient's family. This does not include any procedural time which has been billed separately. Nadir Lindsey NP [facility] 5/15/2021

## 2021-05-16 NOTE — PROGRESS NOTES
1930: Bedside and Verbal shift change report given to David Cuello RN (oncoming nurse) by Natan Sesay RN (offgoing nurse). Report included the following information SBAR, Kardex, ED Summary, Procedure Summary, Intake/Output, MAR, Recent Results, Cardiac Rhythm A-Fib, Procedure Verification, Quality Measures and Dual Neuro Assessment. 2000: Shift assessment completed; details documented in flowsheet. Pt resting in bed; neurologically intact. Generalized weakness in all extremities; able to follow commands and moves all extremities purposefully. VSS; SBP and MAP within desired range. Afebrile. Edematous. No signs of distress at this time. Will continue to monitor. 2102: RN gave Alvin Chua NP an update on patient's condition and minimum UOP. NP ordered RN to give x2 5% Albumins, 2mg of bumex and diuril. 0730: Bedside and Verbal shift change report given to CORY Hernández (oncoming nurse) by David Cuello RN (offgoing nurse). Report included the following information SBAR, Kardex, ED Summary, Procedure Summary, Intake/Output, MAR, Recent Results, Cardiac Rhythm A-Fib, Alarm Parameters , Quality Measures and Dual Neuro Assessment.

## 2021-05-16 NOTE — PROGRESS NOTES
[facility] 
 
ICU TEAM Progress Note Name: Leyla Davila : 1927 MRN: [id number] Date: 2021 Subjective:  
Progress Note: 2021 Mihai Day a 80 y. o. female with hx aortic stenosis, pulm htn, GERD, glaucoma, remote breast CA, HTN, and recent ovarian cancer with port-a-cath s/p chemo times two who presented to the ED on  with complications of a traumatic wound.  She had skin cancer on her LLE and subsequently sustained a traumatic wound after a fall in the same area.  The wound had progressed, and she was  pending and appt with surgeon Dr. Ivis Perez today.  The nurse from the rehab facility where patient was being discharged sent a picture to the clinic, and patient was advised to come to the hospital instead for management of wound. 
  
In the ED, VSS. Labs are significant for lactate of 4, CRP 6.32, ESR 34, and normocytic anemia with Hgb 10.4. L tib/fib XR with pretibial soft tissue ulceration and no noted osseous abnormality. She was started on vancomycin and ceftriaxone.   
  
On 5/10 patient to OR for LLE arteriogram, L fem and popliteal angioplasty, and debridement of wound. Patient received 2 units of PRBCs given drop in hgb to 6.1. On , she developed acute aphasia and a code stroke was called. NIHSS was 7. CT head without acute infarction or perfusion deficit. Likely acute TIA. She was considered high risk for thromboembolic stroke due to history of atrial fibrillation. She was transferred to the ICU later on  for BP management to keep SBP >160 with vasopressors. She has been off levophed since the am of 5/15. Active Problem List:  
 
Problem List  Date Reviewed: 3/29/2021 Codes Class Goals of care, counseling/discussion ICD-10-CM: Z71.89 ICD-9-CM: V65.49 Pain ICD-10-CM: R52 ICD-9-CM: 780.96 Weakness ICD-10-CM: R53.1 ICD-9-CM: 780.79 Advanced age ICD-10-CM: R48 
ICD-9-CM: 481  Leg ulcer, left [facility]) ICD-10-CM: N63.317 ICD-9-CM: 707.10 Hyponatremia ICD-10-CM: E87.1 ICD-9-CM: 276.1 Permanent atrial fibrillation (HCC) ICD-10-CM: H66.48 ICD-9-CM: 427.31 Ovarian cancer ([facility] ICD-10-CM: C56.9 ICD-9-CM: 183.0 Anemia ICD-10-CM: D64.9 ICD-9-CM: 285.9 Mixed hyperlipidemia ICD-10-CM: E78.2 ICD-9-CM: 272.2 Nonrheumatic aortic valve stenosis ICD-10-CM: I35.0 ICD-9-CM: 424.1 Bicuspid aortic valve ICD-10-CM: Q23.1 ICD-9-CM: 746.4 PAF (paroxysmal atrial fibrillation) (HCC) ICD-10-CM: I48.0 ICD-9-CM: 427.31 Basilar artery aneurysm ([facility] ICD-10-CM: I72.5 ICD-9-CM: 437.3 Orthostatic hypotension ICD-10-CM: I95.1 ICD-9-CM: 458.0 Leg edema ICD-10-CM: R60.0 ICD-9-CM: 782.3 Hemorrhoids ICD-10-CM: K64.9 ICD-9-CM: 455.6 BRBPR (bright red blood per rectum) ICD-10-CM: K62.5 ICD-9-CM: 569.3 PAD (peripheral artery disease) (HCC) ICD-10-CM: I73.9 ICD-9-CM: 443.9 Overview Signed 8/1/2013  8:11 AM by Román Mcarthur NP  
  7/31/13 Left anterior tibial artery laser atherectomy and balloon angioplasty. Atherosclerosis of native arteries of the extremities with intermittent claudication ICD-10-CM: I70.219 ICD-9-CM: 440.21 Paroxysmal ventricular tachycardia (HCC) ICD-10-CM: I47.2 ICD-9-CM: 427.1 Shortness of breath ICD-10-CM: R06.02 
ICD-9-CM: 786.05   
   
 PVC's (premature ventricular contractions) ICD-10-CM: I49.3 ICD-9-CM: 427.69 Colitis ICD-10-CM: K52.9 ICD-9-CM: 558.9 Overview Signed 12/18/2012  3:51 PM by Camacho Marks MD  
  Collagenous- Dr. Ollie Calloway First degree AV block ICD-10-CM: I44.0 ICD-9-CM: 426.11   
   
 HTN (hypertension) ICD-10-CM: I10 
ICD-9-CM: 401.9 Glaucoma ICD-10-CM: H40.9 ICD-9-CM: 365.9 Vitamin D deficiency ICD-10-CM: E55.9 ICD-9-CM: 268.9 Hypercholesteremia ICD-10-CM: E78.00 ICD-9-CM: 272.0 Arthritis ICD-10-CM: M19.90 ICD-9-CM: 716.90 History  of basal cell carcinoma ICD-9-CM: V10.83 History of breast cancer ICD-10-CM: Z85.3 ICD-9-CM: V10.3 Vertigo ICD-10-CM: G41 ICD-9-CM: 780.4 Wears hearing aid ICD-10-CM: Z97.4 ICD-9-CM: V45.89 Past Medical History:  
 
 has a past medical history of Arthritis, Cancer ([facility] [facility] GERD (gastroesophageal reflux disease), Hypertension, Other ill-defined conditions(799.89), Other ill-defined conditions(799.89), Other ill-defined conditions(799.89), Other ill-defined conditions(799.89), and Ovar[facility] cancer ([facility] (11/2020). Past Surgical History:  
 
 has a past surgical history that includes hx efren and bso; hx cataract removal; hx tonsillectomy; hx knee arthroscopy; hx appendectomy; hx other surgical; hx colonoscopy (4/2012); hx other surgical; hx carpal tunnel release; cardiac catheterization (1/10/2013); vascular surgery procedure unlist; hx partial hysterectomy; and hx bilateral mastectomy (Bilateral). Home Medications:  
 
Prior to Admission medications Medication Sig Start Date End Date Taking? Authorizing Provider  
rosuvastatin (CRESTOR) 20 mg tablet TAKE 1 TABLET NIGHTLY 4/19/21   Guru Garcia MD  
albuterol-ipratropium (DUO-NEB) 2.5 mg-0.5 mg/3 ml nebu 3 mL by Nebulization route every four (4) hours as needed for Wheezing or Shortness of Breath. 4/12/21   Kale Wang MD  
aspirin delayed-release 81 mg tablet Take 1 Tab by mouth daily. 4/13/21   Kale Wang MD  
metoprolol succinate (TOPROL-XL) 25 mg XL tablet Take 0.5 Tabs by mouth daily. 4/13/21   Kale Wang MD  
dexamethasone in 0.9 % sod chl (DEXAMETHASONE-0.9 % SOD. CHLOR IV) 10 mg by IntraVENous route.  Every 28 days pre treatment    Provider, Historical  
Breo Ellipta 200-25 mcg/dose inhaler TAKE 1 PUFF DAILY 3/3/21   Provider, Historical  
diclofenac (VOLTAREN) 1 % gel APPLY 1 APPLICATION TOPICALLY 4 (FOUR) TIMES A DAY AS NEEDED (PAIN) 20   Provider, Historical  
timolol (TIMOPTIC-XE) 0.5 % ophthalmic gel-forming Administer 1 Drop to both eyes daily. Provider, Historical  
CYANOCOBALAMIN, VITAMIN B-12, (VITAMIN B-12 PO) Take 1,000 mcg by mouth daily. Provider, Historical  
latanoprost (XALATAN) 0.005 % ophthalmic solution Administer 1 Drop to both eyes nightly. 6/8/15   Provider, Historical  
cycloSPORINE (RESTASIS) 0.05 % ophthalmic emulsion Administer 1 Drop to both eyes two (2) times a day. Provider, Historical  
esomeprazole (NEXIUM) 40 mg capsule Take 1 Cap by mouth Daily (before breakfast). 14   Kelsey Stokes MD  
Cholecalciferol, Vitamin D3, (VITAMIN D3) 1,000 unit cap Take 1 Cap by mouth two (2) times a day. Provider, Historical  
omega-3 fatty acids-vitamin e (FISH OIL) 1,000 mg Cap Take 1 Cap by mouth two (2) times a day. Provider, Historical  
multivitamins-minerals-lutein (CENTRUM SILVER) Tab Take 1 Tab by mouth daily. Provider, Historical  
 
 
Allergies/Social/Family History: Allergies Allergen Reactions  Augmentin [Amoxicillin-Pot Clavulanate] Hives and Itching  Doxycycline Hives and Itching  Keflex [Cephalexin] Hives and Itching  Monodox [Doxycycline Monohydrate] Other (comments)  Sulfa (Sulfonamide Antibiotics) Rash Social History Tobacco Use  Smoking status: Former Smoker Types: Cigarettes Quit date: 1955 Years since quittin.4  Smokeless tobacco: Never Used Substance Use Topics  Alcohol use: Not Currently Comment: rare Family History Problem Relation Age of Onset  Heart Disease Mother  Heart Disease Father   
      age 55  
 Heart Disease Brother  Stroke Brother  Cancer Sister Colon Cancer  Diabetes Sister  Heart Disease Sister  Heart Disease Sister  Diabetes Sister  Other Brother MVA  Heart Disease Brother  Heart Disease Son   
      age 49  
 
 
Review of Systems:  
 
Per HPI Objective:  
Vital Signs: 
Visit Vitals BP (!) 102/53 Pulse 82 Temp 98.2 °F (36.8 °C) Resp 16 Ht 5' 4\" (1.626 m) Wt 70 kg (154 lb 5.2 oz) SpO2 (!) 84% BMI 26.49 kg/m² O2 Flow Rate (L/min): 2 l/min O2 Device: Nasal cannula Temp (24hrs), Av °F (36.7 °C), Min:97.1 °F (36.2 °C), Max:98.4 °F (36.9 °C) Intake/Output:  
 
Intake/Output Summary (Last 24 hours) at 2021 5843 Last data filed at 2021 2150 Gross per 24 hour Intake 1872.18 ml Output 2944 ml Net -1071.82 ml Physical Exam: 
 
General:  alert, cooperative, no distress, appears stated age Eye: PERRLA Neurologic: Alert and oriented x3 Neck:Supple, no JVD Lungs:CTAB Heart: Irregular rate and rhythm, 2+ pulses, no JVD Abdomen: Soft, nontender, nondistended Skin: c/d/i Verified  LABS AND  DATA: Personally reviewed Recent Labs 21 
0423 05/15/21 
2879 WBC 8.3 7.8 HGB 7.2* 7.3* HCT 23.7* 24.4*  
* 154 Recent Labs 21 
0423 05/15/21 [id number] 05/15/21 
0419 * 130* 131*  
K 3.4* 3.9 4.0  
CL 98 98 99 CO2 25 23 23 BUN 17 17 15 CREA 1.04* 1.04* 0.91  
GLU 84 130* 110* CA 7.9* 7.3* 7.8*  
MG 1.9  --  1.9 PHOS 2.6  --  2.3* Recent Labs 05/15/21 [id number]   
TP 5.2* ALB 2.9*  
GLOB 2.3 No results for input(s): INR, PTP, APTT, INREXT, INREXT in the last 72 hours. No results for input(s): PHI, PCO2I, PO2I, FIO2I in the last 72 hours. No results for input(s): CPK, CKMB, TROIQ, BNPP in the last 72 hours. Airway: 2l/min NC 
 
MEDS: Reviewed Chest X-Ray: personally reviewed and report checked · LV: Estimated LVEF is 25 - 30%. Visually measured ejection fraction. Normal cavity size. Upper normal wall thickness. Severely reduced systolic function. Severe hypokinesis of the mid anterior, mid inferior, apical anterior, apical septal and apical inferior wall(s).  Left ventricular diastolic dysfunction. Assessment and Plan:  
  
Acute TIA: Developed acute aphasia on 5/11. Code stroke called. Imaging without acute abnormality. Transferred to ICU for vasopressors with goal . Now goal SBP 80 or above - Continue midodrine - Continue neurochecks per neurology L leg wound s/p L leg arteriogram w balloon angioplasty and debridement: 
- Care per vascular surgery - Continue ceftriaxone - Continue wound vac (placed on 5/14) HFrEF c/b severe pulmonary HTN w/o exacerbation: Underwent diuresis overnight with Bumex + diuril 
- Holding BB due to hypotension Acute on chronic anemia: Baseline NIKKI, superimosed anemia of critical illness  
- Daily CBC Reactive airway disease: Continue brovana and pulmicort Chronic atrial fibrillation: Holding BB in setting of hypotension Anxiety: Xanax 0.25mg HS Glaucoma: Xalantan and timolol Deconditioning: PT/OT Hx Breast cancer and active ovarian cancer: No acute inpatient management Multidisciplinary Rounds Completed: Rounded w bedside RN 
 
ABCDEF Bundle/Checklist 
Pain Medications:PRN fentanyl Target RASS: 0 Sedation Medications:N 
CAM-ICU:  Neg 
Mobility:Poor PT/OT: Giacomo Germain Restraints:N 
Discussed Plan of Care (goals of care): Giacomo Germain Addressed Code Status: DNR 
 
CARDIOVASCULAR Cardiac Gtts:Levo SBP Goal of: >80 
MAP Goal of: >60 Transfusion Trigger (Hgb): <7 g/dL RESPIRATORY Vent Goals:  
Chlorhexidine DVT Prophylaxis (if no, list reason): Heparin SPO2 Goal: > 92% Pulmonary toilet: Albuterol GI/ [place] Catheter Present: No 
GI Prophylaxis: Protonix (pantoprazole) Nutrition: Yes Diet IVFs: N Bowel Movement: Y Bowel Regimen:Y Insulin: Y 
 
ANTIBIOTICS Antibiotics: 
Ceftriaxone T/L/D Tubes: N Lines: [place] Drains: N 
 
SPECIAL EQUIPMENT None DISPOSITION Transfer to floor later today CRITICAL CARE CONSULTANT NOTE I had a face to face encounter with the patient, reviewed and interpreted patient data including clinical events, labs, images, vital signs, I/O's, and examined patient. I have discussed the case and the plan and management of the patient's care with the consulting services, the bedside nurses and the respiratory therapist.   
 
Level 3 Linda Dent NP [facility] 5/16/2021

## 2021-05-16 NOTE — PROGRESS NOTES
0730: Bedside and Verbal shift change report given to CORY Hernández (oncoming nurse) by Yudelka Monroy RN (offgoing nurse). Report included the following information SBAR, Kardex, Intake/Output, MAR, Recent Results, Cardiac Rhythm A-Fib, Alarm Parameters  and Dual Neuro Assessment. 1930: Shift report given to Gui Amanda RN using SBAR.

## 2021-05-16 NOTE — PROGRESS NOTES
Day #1 of cefepime Indication:  Pneumonia (HAP) Current regimen:  1 g IV q8h Abx regimen: cefepime Recent Labs 21 
1152 21 
0423 05/15/21 [id number] 05/15/21 
0419 21 
0140 WBC  --  8.3  --  7.8 8.3 CREA 1.39* 1.04* 1.04* 0.91 0.78 BUN 18 17 17 15 17 Est CrCl: 23.8 ml/min; UO: -- ml/kg/hr Temp (24hrs), Av.6 °F (36.4 °C), Min:96.2 °F (35.7 °C), Max:98.4 °F (36.9 °C) Cultures:  
 blood: coag neg staph, gpc in clusters- Final 
 blood: NGTD x 2 days - prelim Plan: Change to cefepime 2 g IV q24h per indication and renal dosing protocol. Thank you, Giselle Chung, IjeomaD

## 2021-05-17 NOTE — PROGRESS NOTES
SLP Contact Note Pt has been by SLP this admission and cleared for regular diet/thin liquids. Concern for aspiration, however, chest imaging shows that bilateral airspace disease most likely due to pulmonary edema. Furthermore, pt does have a hx of reflux and, if chest imaging consistent with pneumonia, would suspect pt more likely to have post-prandial aspiration given bilateral airspace disease (bilateral tends to be consistent with post-prandial over prandial aspiration). Given this, given that SLP has previous cleared pt for diet, and given that pt had been tolerating diet from an oropharyngeal standpoint nothing further for SLP to offer. Thank you, Jaspal White M.Ed, CCC-SLP Speech-Language Pathologist

## 2021-05-17 NOTE — PROGRESS NOTES
[facility]  Hospitalist Group ICU Transfer/Accept Summary [place] [address] [place] 
DATE OF TRANSFER: 5/17/2021 PATIENT ID: Paulo Cline MRN: [id number] YOB: 1927 PRIMARY CARE PROVIDER: Jean-Claude Acharya MD  
DATE OF ADMISSION: 5/6/2021  1:42 PM   
ATTENDING PHYSICIAN: Meño Schumacher NP 
CONSULTATIONS:  
IP CONSULT TO GENERAL SURGERY 
IP CONSULT TO ORTHOPEDIC SURGERY 
IP CONSULT TO PALLIATIVE CARE - PROVIDER 
IP CONSULT TO NEUROLOGY 
IP CONSULT TO HOSPITALIST 
 
PROCEDURES/SURGERIES:  
Procedure(s) with comments: DEBRIDEMENT LEFT LOWER LEG WOUND. LEFT LEG ARTERIOGRAM, LEFT FEMORAL AND POPLITEAL ANGIOPLASTY - Right groin utilized as puncture site for left leg arteriogram 
 
REASON FOR ADMISSION: <principal problem not specified> HOSPITAL PROBLEM LIST: 
Patient Active Problem List  
Diagnosis Code  Glaucoma H40.9  Vitamin D deficiency E55.9  Hypercholesteremia E78.00  Arthritis M19.90  
 History  of basal cell carcinoma  History of breast cancer Z85.3  Vertigo R42  Wears hearing aid Z97.4  
 HTN (hypertension) I10  
 PVC's (premature ventricular contractions) I49.3  Colitis K52.9  First degree AV block I44.0  Paroxysmal ventricular tachycardia (HCC) I47.2  Shortness of breath R06.02  
 PAD (peripheral artery disease) (HCC) I73.9  Atherosclerosis of native arteries of the extremities with intermittent claudication I70.219  
 BRBPR (bright red blood per rectum) K62.5  Hemorrhoids K64.9  Leg edema R60.0  Orthostatic hypotension I95.1  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  Bicuspid aortic valve Q23.1  PAF (paroxysmal atrial fibrillation) (HCC) I48.0  Mixed hyperlipidemia E78.2  Nonrheumatic aortic valve stenosis I35.0  Hyponatremia E87.1  Permanent atrial fibrillation (HCC) I48.21  
 Ovarian cancer (HCC) C56.9  Anemia D64.9  Leg ulcer, left ([address] L97.929  
 Goals of care, counseling/discussion Z71.89  
 Pain R52  Weakness R53.1 [facility] Advanced age R48 Brief HPI and Hospital Course:   
Per ICU Note: Progress Note: 5/17/2021   
  
\" Christin Munoz a 80 y. o. female with hx aortic stenosis, pulm htn, GERD, glaucoma, remote breast CA, HTN, and recent ovarian cancer with port-a-cath s/p chemo times two who presented to the ED on 5/90 with complications of a traumatic wound.  She had skin cancer on her LLE and subsequently sustained a traumatic wound after a fall in the same area.  The wound had progressed, and she was  pending and appt with surgeon Dr. Chris Rome today.  The nurse from the rehab facility where patient was being discharged sent a picture to the clinic, and patient was advised to come to the hospital instead for management of wound. 
  
In the ED, VSS. Labs are significant for lactate of 4, CRP 6.32, ESR 34, and normocytic anemia with Hgb 10.4. L tib/fib XR with pretibial soft tissue ulceration and no noted osseous abnormality. She was started on vancomycin and ceftriaxone.   
  
On 5/10 patient to OR for LLE arteriogram, L fem and popliteal angioplasty, and debridement of wound. Patient received 2 units of PRBCs given drop in hgb to 6. 1.   
  
On 5/11, she developed acute aphasia and a code stroke was called. NIHSS was 7. CT head without acute infarction or perfusion deficit. She was transferred to the ICU later on 5/11 for BP management to keep SBP >160 with vasopressors. MRI on 5/12 with possible punctate L cerebral acute infarct. She has been off levophed since the am of 5/15. Diuresed overnight 5/15-5/16. 
  
5/16 patient developed acute onset of dyspnea with new R sided infiltrate. Antibiotics broadened to cefepime empirically. Diuresed with bumex and diuril. \" 
 
Hospitalist:   
05/17: Seen and examined patient sitting up in bed. States that she is doing fine today but has not had a bowel movement in more than a month.   
Left lower extremity dressing noted with wound VAC that is intact. She states that she is in the hospital because she fell out of the bed and cut her leg. SBP >  90 on monitor (goal of SBP greater than 80). Patient to be transferred out of the ICU today. 
  
Assessment and Plan: 
 
  
Concern for pneumonia: New development of airspace opacities, R >L, concern for aspiration pneumonia. Broadened abx to cefepime. Had been diuresing prior to this, no episode of HTN prior to this, so less likely flash pulmonary edema.  
- Holding on further diuresis due to AVNI 
- Continue cefepime - Monitor O2 sats and titrate O2 to keep sats >90%  
  
L leg wound s/p L leg arteriogram w balloon angioplasty and debridement: 
- Care per vascular surgery - Continue cefepime  
- Continue wound vac (placed on 5/14) 
- consider ID consult for antibiotic management. Hypotension 
- Required levo which has been stopped since 05/15 
- Hold BB and further diuresis - Continue midodrine - Monitor, goal SBP >80 
  
HFrEF c/b severe pulmonary HTN w/o exacerbation: Underwent diuresis overnight with Bumex + diuril 
- Holding BB due to hypotension 
- Holding on further diuresis due to AVNI 
  
AVNI: Baseline normal BUN/Cr, currently 23/1.42 
- Holding on further diuresis 
  
Acute ischemic CVA: MRI with possible punctate L cerebral acute infarct on 5/12 
- Neurology following - BP support with midodrine 
- PT/OT 
- Stroke eduction 
- NIS consulted for basilar artery aneurysm, no intervention recommended 
  
Acute on chronic anemia: Baseline NIKKI, superimosed anemia of critical illness - 2 units pRBCs transfused this admission - Monitor CBC. Transfuse for hgb <7.0  
  
Reactive airway disease: Continue brovana and pulmicort 
  
Chronic atrial fibrillation: Holding BB in setting of hypotension Anxiety: Xanax 0.25mg HS 
  
Glaucoma: Xalantan and timolol 
  
Deconditioning: PT/OT 
- Consult case management for discharge planning.  
  
Hx Breast cancer and active ovarian cancer: No acute inpatient management PHYSICAL EXAMINATION: 
Visit Vitals /61 Pulse 83 Temp 97.3 °F (36.3 °C) Resp 25 Ht 5' 4\" (1.626 m) Wt 70 kg (154 lb 5.2 oz) SpO2 100% BMI 26.49 kg/m² General:          Alert, cooperative, no distress, frail appearing HEENT:           Atraumatic, MMM Neck:               Supple, symmetrical,  thyroid: non tender Lungs:             Clear to auscultation bilaterally. No Wheezing or Rhonchi. No rales. Heart:              Regular  rhythm,  No  murmur   No edema Abdomen:       Soft, non-tender. Not distended. Bowel sounds normal 
Extremities:     No cyanosis. No clubbing,  +2 distal pulses Skin:                Not pale. Not Jaundiced  No rashes Psych:             Not anxious or agitated. Neurologic:      Alert, moves all extremities, oriented X 3.  
 
CODE STATUS: 
 Full Code X DNR Partial  
 Comfort Care Signed:  
Freya Goode NP Date of Service:  5/17/2021 
11:37 AM

## 2021-05-17 NOTE — PROGRESS NOTES
[facility] [address] [place] [address], [address] Keith Laguerre 
 [phone] Debbie Sanchez 5/17/2021 Assessment/Plan: 1. Afib 
 - chronic - BB held for hypotension 
 - [facility] [place] since last encounter with Dr. Olaf hCristie on 4/7/21 \"patient remains unsteady on her feet and severely anemic. High fall risk at high risk of progressive anemia. Continue to hold Eliquis. Follow-up in 3 to 4 weeks as an outpatient and will reassess whether she has 
   recovered enough to reconsider Eliquis. \" 
 - Hgb 7.2, continues to be low and no occult studies completed. She has not had a BM for weeks. Soap suds enema ordered. Will need aggressive treatment for obstipation. Occult ordered. - received 2 units on this admission 
 - Not inclined to start [facility] as the reason for her anemia has not been found. As well, she is not a good candidate for    Long term [facility]. Advanced age, falls, active ovarian Ca. Risks still out weigh the benefits. 2. L leg wound s/p L leg arteriogram w balloon angioplasty and debridement: 
 - Care per vascular surgery - Continue cefepime  
 - Continue wound vac (placed on 5/14) 3. Acute ischemic CVA 
 - MRI with possible punctate L cerebral acute infarct on 5/12 
 - Neurology following - BP support with midodrine 
 - PT/OT 
 - Stroke eduction 
 - NIS consulted for basilar artery aneurysm, no intervention recommended 4. HFrEF  
05/06/21 ECHO ADULT COMPLETE 05/12/2021 5/12/2021 Addendum · Saline contrast was given to evaluate for intracardiac shunt. · LV: Estimated LVEF is 25 - 30%. Visually measured ejection fraction. Normal cavity size. Upper normal wall thickness. Severely reduced systolic  function. Severe hypokinesis of the mid anterior, mid inferior, apical  anterior, apical septal and apical inferior wall(s). Left ventricular  diastolic dysfunction. · LA: Severely dilated left atrium. · RV: Moderately dilated right ventricle.  Mildly reduced systolic  function. · RA: Severely dilated right atrium. · AV: Aortic valve leaflet calcification present with reduced excursion. Aortic valve mean gradient is 17 mmHg. Aortic valve area is 1.6 cm2. Moderate aortic valve stenosis is present. · MV: Mitral valve leaflet calcification. Moderate mitral annular  calcification. Mitral valve mean gradient is 5 mmHg. Mild mitral valve  stenosis is present. Mild to moderate mitral valve regurgitation is  present. · TV: Non-specific thickening of the tricuspid valve. Mild to moderate  tricuspid valve regurgitation is present. · PV: Mild to moderate pulmonic valve regurgitation is present. · PA: Severe pulmonary hypertension. Pulmonary arterial systolic pressure  is 847 mmHg. · IAS: No atrial septal defect present. Agitated saline contrast study was  performed. Signed by: Karyle Engel, MD  
 - [facility] as above 
 - moderately dilated RV 
 - Moderate AS 
 - Mild to mod MR 
 - severe PHTN -  mmHg 5. Hypotension 
 - off pressors since 5/15 - Midodrine - BB held 6. Possible PNA 
 - IV Abx per Primary team 
 - New development of airspace opacities, R >L, concern for aspiration pneumonia - Diuresed to AVNI  
 
 
HPI: 
\"presented to the ED on 2/43 with complications of a traumatic wound.  She had skin cancer on her LLE and subsequently sustained a traumatic wound after a fall in the same area.  The wound had progressed, and she was  pending and appt with surgeon Dr. Karlo Flores today.  The nurse from the rehab facility where patient was being discharged sent a picture to the clinic, and patient was advised to come to the hospital instead for management of wound. \" 
 
H/o Afib. [address] stopped by Dr. Víctor Carpio for advanced age, falls, frailty and anemia. Social History Socioeconomic History  Marital status:  Spouse name: Not on file  Number of children: Not on file  Years of education: Not on file  Highest education level: Not on file Occupational History  Not on file Social Needs  Financial resource strain: Not on file  Food insecurity Worry: Not on file Inability: Not on file  Transportation needs Medical: Not on file Non-medical: Not on file Tobacco Use  Smoking status: Former Smoker Types: Cigarettes Quit date: 1955 Years since quittin.4  Smokeless tobacco: Never Used Substance and Sexual Activity  Alcohol use: Not Currently Comment: rare  Drug use: Never  Sexual activity: Not Currently Lifestyle  Physical activity Days per week: Not on file Minutes per session: Not on file  Stress: Not on file Relationships  Social connections Talks on phone: Not on file Gets together: Not on file Attends Hinduism service: Not on file Active member of club or organization: Not on file Attends meetings of clubs or organizations: Not on file Relationship status: Not on file  Intimate partner violence Fear of current or ex partner: Not on file Emotionally abused: Not on file Physically abused: Not on file Forced sexual activity: Not on file Other Topics Concern  Not on file Social History Narrative  Not on file Past Medical History:  
Diagnosis Date  Arthritis  [facility] breast    (leg,ear,arm-skin cancer)  GERD (gastroesophageal reflux disease)  Hypertension  Other ill-defined conditions(799.89)   
 glaucoma  Other ill-defined conditions(799.89)   
 cellulitis left arm  Other ill-defined conditions(799.89)   
 carpal tunnel syndrome left hand  Other ill-defined conditions(799.89)   
 vertigo  Ovarian cancer ([facility] 2020 Review of Systems - General ROS: negative ENT ROS: negative Respiratory ROS: no cough, shortness of breath, or wheezing Cardiovascular ROS: no chest pain or dyspnea on exertion Gastrointestinal ROS: no abdominal pain, change in bowel habits, or black or bloody stools Medical history: 
Afib, ovarian Ca, active with indwelling PORT for chemo, HTN, GERD, BrCa, HLD, Rudolph Lefort, PA-C Eve Bras, MD

## 2021-05-17 NOTE — PROGRESS NOTES
TRANSFER - OUT REPORT: 
 
Verbal report given to RN(name) on Eilleen Lola  being transferred to [facility]) for routine progression of care Report consisted of patients Situation, Background, Assessment and  
Recommendations(SBAR). Information from the following report(s) SBAR, Kardex, Intake/Output, MAR, Recent Results, Cardiac Rhythm A-Fib and Alarm Parameters  was reviewed with the receiving nurse. Lines:  
Venous Access Device 05/11/21 Upper chest (subclavicular area, right (Active) Central Line Being Utilized Yes 05/17/21 0800 Criteria for Appropriate Use Irritant/vesicant medication 05/17/21 0800 Site Assessment Clean, dry, & intact 05/17/21 0800 Date of Last Dressing Change 05/11/21 05/16/21 2000 Dressing Status Clean, dry, & intact 05/17/21 0800 Dressing Type Disk with Chlorhexadine gluconate (CHG); Stabilization/securement device;Tape;Transparent 05/17/21 0800 Action Taken Open ports on tubing capped 05/17/21 0800 Date Accessed (Medial Site) 05/11/21 05/14/21 1200 Access Time (Medial Site) 2100 05/14/21 0400 Access Needle Size (Site #1) 20 G 05/14/21 0400 Access Needle Length (Medial Site) 0.75 inches 05/14/21 0400 Positive Blood Return (Medial Site) No 05/16/21 1600 Action Taken (Medial Site) Flushed; Infusing 05/17/21 0800 Date Needle Changed (Medial Site) 05/11/21 05/14/21 1200 Alcohol Cap Used Yes 05/17/21 0800 Peripheral IV 05/13/21 Anterior;Distal;Right Forearm (Active) Site Assessment Clean, dry, & intact 05/17/21 0800 Phlebitis Assessment 0 05/17/21 0800 Infiltration Assessment 0 05/17/21 0800 Dressing Status Clean, dry, & intact 05/17/21 0800 Dressing Type Tape;Transparent 05/17/21 0800 Hub Color/Line Status Blue;End cap changed; Flushed;Capped 05/17/21 0800 Action Taken Open ports on tubing capped 05/17/21 0800 Alcohol Cap Used Yes 05/17/21 0800 Opportunity for questions and clarification was provided.    
 
Patient transported with: 
 Monitor O2 @ 2 liters Registered Nurse Tech  
1426: Patient arrived to room 423, staff notified, PCT at bedside, Patient left on 2L nasal cannula via travel O2 Tank due to flowmeter not set up in the room, staff notified. Patient's niece at bedside.

## 2021-05-17 NOTE — PROGRESS NOTES
Physician Progress Note Fabian Singh 
CSN #:                  [id number] :                       1927 ADMIT DATE:       2021 1:42 PM 
[address] [place] DATE: 
RESPONDING 
PROVIDER #:        Tone Lima NP 
 
 
 
 
QUERY TEXT: 
 
Pt admitted with LLE Wound s/p Surgical Debridement. Pt noted to have drop in Hgb from 10.4 down to 6.4, and became Hypotensive with BP down to 88/47, MAP 56. Pt now in ICU on Pressor support. If possible, please document in the progress notes and discharge summary if you are evaluating and/or treating any of the following: The medical record reflects the following: 
Risk Factors: s/p leg debridement and having large amounts bloody drainage with drop in Hgb and BP Clinical Indicators: pt with drop in Hgb from 10.4 down to 6.4, became hypotensive will BP down to 88/47, MAP down to 56. Pt now in ICU requiring Pressor support. Treatment: Levophed Drip, q2 hr neuro checks, ICU monitoring Thank you, if you have any questions please e-mail me at [email or web address]. 
[phone] Options provided: 
-- Cardiogenic Shock -- Hemorrhagic Shock -- Hypovolemic Shock -- Hypovolemia without Shock -- Hypotension without Shock 
-- Other - I will add my own diagnosis -- Disagree - Not applicable / Not valid -- Disagree - Clinically unable to determine / Unknown 
-- Refer to Clinical Documentation Reviewer PROVIDER RESPONSE TEXT: 
 
This patient has Hemorrhagic Shock. Query created by: Chilo Guerrier on 2021 7:52 AM 
 
 
QUERY TEXT: 
 
Patient admitted with LLE Wound, developed Acute Aphasia, with MRI showing Possible Punctate Left Cerebral Acute Infarct. Noted documentation of Acute TIA per Attending and Acute Left Hemispheric CVA per Neurology consultant. If possible, please document in progress notes and discharge summary if you are evaluating and /or treating any of the following: The medical record reflects the following: 
Risk Factors: Hx of Chronic A-Fib, has been off anticoagulation Clinical Indicators: pt developed Acute Aphasia, Head CT was negative, however MRI is showing Possible Punctate Left Cerebral Acute Infarct. Attending is documenting Acute TIA, however Neuro has documented Acute CVA. Treatment: ASA, Midodrine/ Levophed for BP support, Neuro consult, monitoring in ICU. Thank you, if you have any questions please e-mail me at [email or web address]. 
[phone] Options provided: 
-- Acute CVA confirmed and Acute TIA ruled out 
-- Acute TIA confirmed and Acute CVA ruled out 
-- Other - I will add my own diagnosis -- Disagree - Not applicable / Not valid -- Disagree - Clinically unable to determine / Unknown 
-- Refer to Clinical Documentation Reviewer PROVIDER RESPONSE TEXT: 
 
After study, Acute CVA confirmed and Acute TIA ruled out.  
 
Query created by: Molly Moon on 5/17/2021 8:24 AM 
 
 
Electronically signed by:  Nuvia Coley NP 5/17/2021 9:44 AM

## 2021-05-17 NOTE — PROGRESS NOTES
Transition of Care Plan  RUR-  High Risk  DISPOSITION: SNF for rehab  F/U with PCP/Specialist   
 Transport: Will need ambulance transport Care manager met with patient's niece to discuss transitions of care. She would like for the referral to be made to [facility]. Referral made through [facility]. [address] letter signed by niluis. Abbey Brannon RN,Care Management

## 2021-05-17 NOTE — PROGRESS NOTES
[facility] 
 
ICU TEAM Progress Note Name: Melanie Quiroz : 1927 MRN: [id number] Date: 2021 Subjective:  
Progress Note: 2021 Carlo Oakes a 80 y. o. female with hx aortic stenosis, pulm htn, GERD, glaucoma, remote breast CA, HTN, and recent ovarian cancer with port-a-cath s/p chemo times two who presented to the ED on  with complications of a traumatic wound.  She had skin cancer on her LLE and subsequently sustained a traumatic wound after a fall in the same area.  The wound had progressed, and she was  pending and appt with surgeon Dr. Yisel Miller today.  The nurse from the rehab facility where patient was being discharged sent a picture to the clinic, and patient was advised to come to the hospital instead for management of wound. 
  
In the ED, VSS. Labs are significant for lactate of 4, CRP 6.32, ESR 34, and normocytic anemia with Hgb 10.4. L tib/fib XR with pretibial soft tissue ulceration and no noted osseous abnormality. She was started on vancomycin and ceftriaxone.   
  
On 5/10 patient to OR for LLE arteriogram, L fem and popliteal angioplasty, and debridement of wound. Patient received 2 units of PRBCs given drop in hgb to 6.1. On , she developed acute aphasia and a code stroke was called. NIHSS was 7. CT head without acute infarction or perfusion deficit. She was transferred to the ICU later on  for BP management to keep SBP >160 with vasopressors. MRI on  with possible punctate L cerebral acute infarct. She has been off levophed since the am of 5/15. Diuresed overnight 5/15-. 
 
 patient developed acute onset of dyspnea with new R sided infiltrate. Antibiotics broadened to cefepime empirically. Diuresed with bumex and diuril. Active Problem List:  
 
Problem List  Date Reviewed: 3/29/2021 Codes Class Goals of care, counseling/discussion ICD-10-CM: Z71.89 ICD-9-CM: V65.49  Pain ICD-10-CM: R52 ICD-9-CM: 780.96 Weakness ICD-10-CM: R53.1 ICD-9-CM: 780.79 Advanced age ICD-10-CM: R48 
ICD-9-CM: 763 Leg ulcer, left ([facility] ICD-10-CM: T80.888 ICD-9-CM: 707.10 Hyponatremia ICD-10-CM: E87.1 ICD-9-CM: 276.1 Permanent atrial fibrillation (HCC) ICD-10-CM: O39.50 ICD-9-CM: 427.31 Ovarian cancer ([facility] ICD-10-CM: C56.9 ICD-9-CM: 183.0 Anemia ICD-10-CM: D64.9 ICD-9-CM: 285.9 Mixed hyperlipidemia ICD-10-CM: E78.2 ICD-9-CM: 272.2 Nonrheumatic aortic valve stenosis ICD-10-CM: I35.0 ICD-9-CM: 424.1 Bicuspid aortic valve ICD-10-CM: Q23.1 ICD-9-CM: 746.4 PAF (paroxysmal atrial fibrillation) (HCC) ICD-10-CM: I48.0 ICD-9-CM: 427.31 Basilar artery aneurysm ([facility] ICD-10-CM: I72.5 ICD-9-CM: 437.3 Orthostatic hypotension ICD-10-CM: I95.1 ICD-9-CM: 458.0 Leg edema ICD-10-CM: R60.0 ICD-9-CM: 782.3 Hemorrhoids ICD-10-CM: K64.9 ICD-9-CM: 455.6 BRBPR (bright red blood per rectum) ICD-10-CM: K62.5 ICD-9-CM: 569.3 PAD (peripheral artery disease) (HCC) ICD-10-CM: I73.9 ICD-9-CM: 443.9 Overview Signed 8/1/2013  8:11 AM by Sudheer Ruffin NP  
  7/31/13 Left anterior tibial artery laser atherectomy and balloon angioplasty. Atherosclerosis of native arteries of the extremities with intermittent claudication ICD-10-CM: I70.219 ICD-9-CM: 440.21 Paroxysmal ventricular tachycardia (HCC) ICD-10-CM: I47.2 ICD-9-CM: 427.1 Shortness of breath ICD-10-CM: R06.02 
ICD-9-CM: 786.05   
   
 PVC's (premature ventricular contractions) ICD-10-CM: I49.3 ICD-9-CM: 427.69 Colitis ICD-10-CM: K52.9 ICD-9-CM: 558.9 Overview Signed 12/18/2012  3:51 PM by Ani Tucker MD  
  Collagenous- Dr. Herber Silverio First degree AV block ICD-10-CM: I44.0 ICD-9-CM: 426.11   
   
 HTN (hypertension) ICD-10-CM: I10 
ICD-9-CM: 401.9  Glaucoma ICD-10-CM: H40.9 ICD-9-CM: 365.9 Vitamin D deficiency ICD-10-CM: E55.9 ICD-9-CM: 268.9 Hypercholesteremia ICD-10-CM: E78.00 ICD-9-CM: 272.0 Arthritis ICD-10-CM: M19.90 ICD-9-CM: 716.90 History  of basal cell carcinoma ICD-9-CM: V10.83 History of breast cancer ICD-10-CM: Z85.3 ICD-9-CM: V10.3 Vertigo ICD-10-CM: L60 ICD-9-CM: 780.4 Wears hearing aid ICD-10-CM: Z97.4 ICD-9-CM: V45.89 Past Medical History:  
 
 has a past medical history of Arthritis, Cancer ([facility] GERD (gastroesophageal reflux disease), Hypertension, Other ill-defined conditions(799.89), Other ill-defined conditions(799.89), Other ill-defined conditions(799.89), Other ill-defined conditions(799.89), and [facility] (11/[facility]). Past Surgical History:  
 
 has a past surgical history that includes hx efren and bso; hx cataract removal; hx tonsillectomy; hx knee arthroscopy; hx appendectomy; hx other surgical; hx colonoscopy (4/2012); hx other surgical; hx carpal tunnel release; cardiac catheterization (1/10/2013); vascular surgery procedure unlist; hx partial hysterectomy; and hx bilateral mastectomy (Bilateral). Home Medications:  
 
Prior to Admission medications Medication Sig Start Date End Date Taking? Authorizing Provider  
rosuvastatin (CRESTOR) 20 mg tablet TAKE 1 TABLET NIGHTLY 4/19/21   Ajay Bucio MD  
albuterol-ipratropium (DUO-NEB) 2.5 mg-0.5 mg/3 ml nebu 3 mL by Nebulization route every four (4) hours as needed for Wheezing or Shortness of Breath. 4/12/21   Chad Espinosa MD  
aspirin delayed-release 81 mg tablet Take 1 Tab by mouth daily. 4/13/21   Chad Espinosa MD  
metoprolol succinate (TOPROL-XL) 25 mg XL tablet Take 0.5 Tabs by mouth daily. 4/13/21   Chad Espinosa MD  
dexamethasone in 0.9 % sod chl (DEXAMETHASONE-0.9 % SOD. CHLOR IV) 10 mg by IntraVENous route.  Every 28 days pre treatment    Provider, Shawn Kaminski 200-25 mcg/dose inhaler TAKE 1 PUFF DAILY 3/3/21   Provider, Historical  
diclofenac (VOLTAREN) 1 % gel APPLY 1 APPLICATION TOPICALLY 4 (FOUR) TIMES A DAY AS NEEDED (PAIN) 20   Provider, Historical  
timolol (TIMOPTIC-XE) 0.5 % ophthalmic gel-forming Administer 1 Drop to both eyes daily. Provider, Historical  
CYANOCOBALAMIN, VITAMIN B-12, (VITAMIN B-12 PO) Take 1,000 mcg by mouth daily. Provider, Historical  
latanoprost (XALATAN) 0.005 % ophthalmic solution Administer 1 Drop to both eyes nightly. 6/8/15   Provider, Historical  
cycloSPORINE (RESTASIS) 0.05 % ophthalmic emulsion Administer 1 Drop to both eyes two (2) times a day. Provider, Historical  
esomeprazole (NEXIUM) 40 mg capsule Take 1 Cap by mouth Daily (before breakfast). 14   Sarahy Gaytan MD  
Cholecalciferol, Vitamin D3, (VITAMIN D3) 1,000 unit cap Take 1 Cap by mouth two (2) times a day. Provider, Historical  
omega-3 fatty acids-vitamin e (FISH OIL) 1,000 mg Cap Take 1 Cap by mouth two (2) times a day. Provider, Historical  
multivitamins-minerals-lutein (CENTRUM SILVER) Tab Take 1 Tab by mouth daily. Provider, Historical  
 
 
Allergies/Social/Family History: Allergies Allergen Reactions  Augmentin [Amoxicillin-Pot Clavulanate] Hives and Itching  Doxycycline Hives and Itching  Keflex [Cephalexin] Hives and Itching  Monodox [Doxycycline Monohydrate] Other (comments)  Sulfa (Sulfonamide Antibiotics) Rash Social History Tobacco Use  Smoking status: Former Smoker Types: Cigarettes Quit date: 1955 Years since quittin.4  Smokeless tobacco: Never Used Substance Use Topics  Alcohol use: Not Currently Comment: rare Family History Problem Relation Age of Onset  Heart Disease Mother  Heart Disease Father   
      age 55  
 Heart Disease Brother  Stroke Brother  Cancer Sister Colon Cancer  Diabetes Sister  Heart Disease Sister  Heart Disease Sister  Diabetes Sister  Other Brother MVA  Heart Disease Brother  Heart Disease Son   
      age 52 Review of Systems:  
 
Per HPI Objective:  
Vital Signs: 
Visit Vitals BP 94/79 Pulse 95 Temp 97.3 °F (36.3 °C) Resp 14 Ht 5' 4\" (1.626 m) Wt 70 kg (154 lb 5.2 oz) SpO2 95% BMI 26.49 kg/m² O2 Flow Rate (L/min): 2 l/min O2 Device: Nasal cannula Temp (24hrs), Av.6 °F (36.4 °C), Min:96.2 °F (35.7 °C), Max:98.1 °F (36.7 °C) Intake/Output:  
 
Intake/Output Summary (Last 24 hours) at 2021 1633 Last data filed at 2021 0900 Gross per 24 hour Intake 340 ml Output 1990 ml Net -1650 ml Physical Exam: 
 
General:  alert, cooperative, no distress, appears stated age Eye: PERRLA Neurologic: Alert and oriented x3, but exhibits forgetfulness Neck:Supple, no JVD Lungs:CTAB Heart: Irregular rate and rhythm, 2+ pulses, no JVD Abdomen: Soft, nontender, nondistended Skin: c/d/i Verified  LABS AND  DATA: Personally reviewed Recent Labs 21 
2332 21 
0423 WBC 11.2* 8.3 HGB 7.2* 7.2* HCT 23.7* 23.7*  
* 145* Recent Labs 21 
0334 21 
1152 21 
0423 * 132* 132* K 3.4* 4.1 3.4*  
CL 96* 96* 98  
CO2 26 20* 25 BUN 23* 18 17 CREA 1.42* 1.39* 1.04*  150* 84  
CA 8.1* 7.5* 7.9*  
MG 1.8  --  1.9 PHOS 3.2  --  2.6 Recent Labs 05/15/21 [address]   
TP 5.2* ALB 2.9*  
GLOB 2.3 No results for input(s): INR, PTP, APTT, INREXT, INREXT in the last 72 hours. No results for input(s): PHI, PCO2I, PO2I, FIO2I in the last 72 hours. Recent Labs 21 
0334 21 
1823 TROIQ 0.19* 0.26* Airway: 2l/min NC 
 
MEDS: Reviewed Chest X-Ray: personally reviewed and report checked · LV: Estimated LVEF is 25 - 30%. Visually measured ejection fraction. Normal cavity size. Upper normal wall thickness.  Severely reduced systolic function. Severe hypokinesis of the mid anterior, mid inferior, apical anterior, apical septal and apical inferior wall(s). Left ventricular diastolic dysfunction. Assessment and Plan:  
  
Concern for pneumonia: New development of airspace opacities, R >L, concern for aspiration pneumonia. Broadened abx to cefepime. Had been diuresing prior to this, no episode of HTN prior to this, so less likely flash pulmonary edema.  
- Holding on further diuresis due to AVNI 
- Continue cefepime L leg wound s/p L leg arteriogram w balloon angioplasty and debridement: 
- Care per vascular surgery - Continue ceftriaxone - Continue wound vac (placed on 5/14) HFrEF c/b severe pulmonary HTN w/o exacerbation: Underwent diuresis overnight with Bumex + diuril 
- Holding BB due to hypotension 
- Holding on further diuresis due to AVNI AVNI: Baseline normal BUN/Cr, currently 23/1.42 
- Holding on further diuresis Acute ischemic CVA: MRI with possible punctate L cerebral acute infarct on 5/12 
- Neurology following - BP support with midodrine 
- PT/OT 
- Stroke eduction 
- NIS consulted for basilar artery aneurysm, no intervention recommended Acute on chronic anemia: Baseline NIKKI, superimosed anemia of critical illness  
- Daily CBC Reactive airway disease: Continue brovana and pulmicort Chronic atrial fibrillation: Holding BB in setting of hypotension Anxiety: Xanax 0.25mg HS Glaucoma: Xalantan and timolol Deconditioning: PT/OT Hx Breast cancer and active ovarian cancer: No acute inpatient management Multidisciplinary Rounds Completed: Rounded w bedside RN 
 
ABCDEF Bundle/Checklist 
Pain Medications:PRN fentanyl Target RASS: 0 Sedation Medications:N 
CAM-ICU:  Neg 
Mobility:Poor PT/OT: Bibi Nicholson Restraints:N 
Discussed Plan of Care (goals of care): Bibi Nicholson Addressed Code Status: DNR 
 
CARDIOVASCULAR Cardiac Gtts:Levo SBP Goal of: >80 
MAP Goal of: >60 Transfusion Trigger (Hgb): <7 g/dL RESPIRATORY Vent Goals:  
Chlorhexidine DVT Prophylaxis (if no, list reason): Heparin SPO2 Goal: > 92% Pulmonary toilet: Albuterol GI/ [place] Catheter Present: No 
GI Prophylaxis: Protonix (pantoprazole) Nutrition: Yes Diet IVFs: N Bowel Movement: Y Bowel Regimen:Y Insulin: Y 
 
ANTIBIOTICS Antibiotics: 
Cefepime T/L/D Tubes: N Lines: [facility] Drains: N 
 
SPECIAL EQUIPMENT None DISPOSITION Transfer to floor later today CRITICAL CARE CONSULTANT NOTE I had a face to face encounter with the patient, reviewed and interpreted patient data including clinical events, labs, images, vital signs, I/O's, and examined patient. I have discussed the case and the plan and management of the patient's care with the consulting services, the bedside nurses and the respiratory therapist.   
 
Level 3 Lorenzo Pollard NP [facility] Critical [facility] 5/17/2021

## 2021-05-17 NOTE — PROGRESS NOTES
Neurology Progress Note Odette Omalley NP Admit Date: 5/6/2021 LOS: 11 days Daily Progress Note: 5/17/2021 S/P: Procedure(s):DEBRIDEMENT LEFT LOWER LEG WOUND. LEFT LEG ARTERIOGRAM, LEFT FEMORAL AND POPLITEAL ANGIOPLASTY Robby Eduardo is a 80 y.o. female with a past medical history of HTN, moderate pulmonary hypertension, chronic atrial fibrillation, moderate aortic stenosis, hyperlipidemia, glaucoma, remote breast cancer and more recent ovarian cancer. Patient had fall approximately 5 weeks ago over her walker obtaining a left lower shin wound, which had  not been healing properly with signs and symptoms of infection, so she was admitted to the hospital on 5/6/21 for treatment. She underwent a left leg arteriogram with balloon angioplasty of the superficial femoral and popliteal arteries and debridement of the left leg wound by Dr. Faith Hatfield on 5/10/21. She had successful revascularization with recanalization of the occluded left popliteal artery. On the morning of 5/11/21, a Code Stroke was called due to new onset expressive aphasia. 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 CTP was negative for significant perfusion defect. NIS was consulted and recommended permissive HTN with -180 as patient was likely having acute ongoing ischemic stroke with mild to moderate narrowing of the left MCA. Patient was sent to the ICU due to hypotension requiring Levophed and acute anemia post procedure Subjective: No neuro events overnight. Blood pressure are stable on midodrine. Allergies Allergen Reactions  Augmentin [Amoxicillin-Pot Clavulanate] Hives and Itching  Doxycycline Hives and Itching  Keflex [Cephalexin] Hives and Itching  Monodox [Doxycycline Monohydrate] Other (comments)  Sulfa (Sulfonamide Antibiotics) Rash Review of Systems: 
Pertinent items are noted in the History of Present Illness. Objective:  
Vital signs Temp (24hrs), Av.6 °F (36.4 °C), Min:96.2 °F (35.7 °C), Max:98.1 °F (36.7 °C) 
  07 - 1900 In: 400 [P.O.:300; I.V.:100] Out: 350 [Urine:350]  05/15 1901 -  07 In: 4376 [P.O.:420; I.V.:650] Out: 4284 [[id number]] Visit Vitals /61 Pulse 83 Temp 97.3 °F (36.3 °C) Resp 25 Ht 5' 4\" (1.626 m) Wt 154 lb 5.2 oz (70 kg) SpO2 100% BMI 26.49 kg/m² O2 Flow Rate (L/min): 2 l/min O2 Device: Nasal cannula Vitals:  
 21 0800 21 0900 21 1000 21 1100 BP: (!) 112/52 94/79 111/67 101/61 Pulse: 82 95 81 83 Resp: 20  Temp: 97.3 °F (36.3 °C) SpO2: 94% 95%  100% Weight:      
Height:      
  
Physical Exam: 
GENERAL: Calm, cooperative, NAD SKIN: Warm, dry, color appropriate for ethnicity. LLE wound present. Neurologic Exam: 
Mental Status:  Alert and oriented x 4. Appropriate affect, mood and behavior. Language:    Normal fluency, repetition, comprehension and naming. Cranial Nerves:   Pupils 3 mm, equal, round and reactive to light. Visual fields full to confrontation. Extraocular movements intact. Facial sensation intact. Full facial strength, no asymmetry. [place] (baseline). No dysarthria. Tongue protrudes to midline, palate elevates symmetrically. Shoulder shrug 5/5 bilaterally. Motor:    No pronator drift. Bulk and tone normal.  
   5/5 power in all extremities proximally and distally. No involuntary movements. Sensation:    Sensation intact throughout to light touch. Coordination & Gait: Gait deferred. Labs: 
Lab Results Component Value Date/Time WBC 11.2 (H) 2021 03:34 AM  
 HGB 7.2 (L) 2021 03:34 AM  
 HCT 23.7 (L) 2021 03:34 AM  
 PLATELET 491 (L)  03:34 AM  
 MCV 87.5 2021 03:34 AM  
 
Lab Results Component Value Date/Time  Sodium 132 (L) 2021 03:34 AM  
 Potassium 3.4 (L) 2021 03:34 AM  
 Chloride 96 (L) 05/17/2021 03:34 AM  
 CO2 26 05/17/2021 03:34 AM  
 Anion gap 10 05/17/2021 03:34 AM  
 Glucose 100 05/17/2021 03:34 AM  
 BUN 23 (H) 05/17/2021 03:34 AM  
 Creatinine 1.42 (H) 05/17/2021 03:34 AM  
 BUN/Creatinine ratio 16 05/17/2021 03:34 AM  
 GFR est AA 42 (L) 05/17/2021 03:34 AM  
 GFR est non-AA 34 (L) 05/17/2021 03:34 AM  
 Calcium 8.1 (L) 05/17/2021 03:34 AM  
 
Imaging: CT Results (maximum last 3): [place] [facility] [place]21 CT CODE NEURO PERF W CBF Narrative CLINICAL HISTORY: Code stroke EXAMINATION:  CT ANGIOGRAPHY HEAD AND NECK COMPARISON: CT head 5/11/2021, CTA 11/12/2018 TECHNIQUE:  Following the uneventful administration of iodinated contrast 
material, axial CT angiography of the head and neck was performed. Delayed axial 
images through the head were also obtained. Coronal and sagittal reconstructions 
were obtained. Manual postprocessing of images was performed. 3-D  Sagittal 
maximal intensity projection images were obtained. 3-D Coronal maximal 
intensity projections were obtained. CT dose reduction was achieved through use 
of a standardized protocol tailored for this examination and automatic exposure 
control for dose modulation. CT perfusion analysis was performed using CT with contrast administration, 
including postprocessing of parametric maps with the determination of cerebral 
blood flow, cerebral blood volume, and mean transit time. This study was analyzed by the [id number] ai algorithm FINDINGS: 
 
Delayed contrast-enhanced head CT: The ventricles are midline without hydrocephalus. There is no acute intra or 
extra-axial hemorrhage. Mild to moderate bilateral subcortical and 
periventricular areas of patchy low attenuation is nonspecific but likely 
related to changes of chronic small vessel ischemic disease. The basal cisterns 
are clear. The paranasal sinuses are clear.  
 
CTA NECK: 
 
Great vessels: Patent origins Right subclavian artery: Patent with mild atherosclerosis Left subclavian artery: Patent with mild to moderate atherosclerosis Right common carotid artery: Patent with mild atherosclerosis Left common carotid artery: Patent with mild atherosclerosis Cervical right internal carotid artery: Patent with proximal atherosclerosis 
causing no significant stenosis by NASCET criteria. Cervical left internal carotid artery: Patent with proximal atherosclerosis 
causing no significant stenosis by NASCET criteria. Right vertebral artery: Patent with mild atherosclerosis Left vertebral artery: Patent with mild atherosclerosis Severe cervical spondylosis Subcentimeter low-density thyroid nodules Right chest port. Moderate emphysema CTA HEAD: 
 
Right cavernous internal carotid artery: Patent with mild to moderate 
atherosclerosis Left cavernous internal carotid artery: Patent with mild to moderate 
atherosclerosis Anterior cerebral arteries: Patent with mild to moderate atherosclerosis. Proximal stenosis in the left A1 segment due to noncalcified atheroma Anterior communicating artery: Patent Right middle cerebral artery: Patent with mild atherosclerosis Left middle cerebral artery: Patent with mild atherosclerosis Posterior communicating arteries: Patent Posterior cerebral arteries: Right is diminutive in caliber with severe 
atherosclerotic changes. Left is patent with fetal origin and mild 
atherosclerosis Basilar artery: Patent and slightly diminutive in caliber with mild to moderate 
atherosclerosis. 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 
atherosclerosis CT perfusion brain: No significant cerebral perfusion abnormality or mismatch Measurements use NASCET criteria. Impression No evidence for acute large vessel arterial occlusion.  
 
No significant cerebral perfusion abnormality or mismatch. Stable atherosclerotic disease. 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 CT CODE NEURO HEAD WO CONTRAST Narrative EXAM: CT CODE NEURO HEAD WO CONTRAST INDICATION: code neuro COMPARISON: None. CONTRAST: None. TECHNIQUE: Unenhanced CT of the head was performed using 5 mm images. Brain and 
bone windows were generated. Coronal and sagittal reformats. CT dose reduction 
was achieved through use of a standardized protocol tailored for this 
examination and automatic exposure control for dose modulation. FINDINGS: 
Ventricles and sulci are stable. Mild changes small vessel disease is unchanged. No hemorrhage mass or acute infarction identified. The bone windows demonstrate 
no abnormalities. The visualized portions of the paranasal sinuses and mastoid 
air cells are clear. Impression No acute abnormality identified.-Result called to the nurse. CTA CODE NEURO HEAD AND NECK W CONT Narrative CLINICAL HISTORY: Code stroke EXAMINATION:  CT ANGIOGRAPHY HEAD AND NECK COMPARISON: CT head 5/11/2021, CTA 11/12/2018 TECHNIQUE:  Following the uneventful administration of iodinated contrast 
material, axial CT angiography of the head and neck was performed. Delayed axial 
images through the head were also obtained. Coronal and sagittal reconstructions 
were obtained. Manual postprocessing of images was performed. 3-D  Sagittal 
maximal intensity projection images were obtained. 3-D Coronal maximal 
intensity projections were obtained. CT dose reduction was achieved through use 
of a standardized protocol tailored for this examination and automatic exposure 
control for dose modulation. CT perfusion analysis was performed using CT with contrast administration, 
including postprocessing of parametric maps with the determination of cerebral 
blood flow, cerebral blood volume, and mean transit time. This study was analyzed by the [address] ai algorithm FINDINGS: 
 
Delayed contrast-enhanced head CT: The ventricles are midline without hydrocephalus. There is no acute intra or 
extra-axial hemorrhage. Mild to moderate bilateral subcortical and 
periventricular areas of patchy low attenuation is nonspecific but likely 
related to changes of chronic small vessel ischemic disease. The basal cisterns 
are clear. The paranasal sinuses are clear. CTA NECK: 
 
Great vessels: Patent origins Right subclavian artery: Patent with mild atherosclerosis Left subclavian artery: Patent with mild to moderate atherosclerosis Right common carotid artery: Patent with mild atherosclerosis Left common carotid artery: Patent with mild atherosclerosis Cervical right internal carotid artery: Patent with proximal atherosclerosis 
causing no significant stenosis by NASCET criteria. Cervical left internal carotid artery: Patent with proximal atherosclerosis 
causing no significant stenosis by NASCET criteria. Right vertebral artery: Patent with mild atherosclerosis Left vertebral artery: Patent with mild atherosclerosis Severe cervical spondylosis Subcentimeter low-density thyroid nodules Right chest port. Moderate emphysema CTA HEAD: 
 
Right cavernous internal carotid artery: Patent with mild to moderate 
atherosclerosis Left cavernous internal carotid artery: Patent with mild to moderate 
atherosclerosis Anterior cerebral arteries: Patent with mild to moderate atherosclerosis. Proximal stenosis in the left A1 segment due to noncalcified atheroma Anterior communicating artery: Patent Right middle cerebral artery: Patent with mild atherosclerosis Left middle cerebral artery: Patent with mild atherosclerosis Posterior communicating arteries: Patent Posterior cerebral arteries: Right is diminutive in caliber with severe 
atherosclerotic changes. Left is patent with fetal origin and mild 
atherosclerosis Basilar artery: Patent and slightly diminutive in caliber with mild to moderate 
atherosclerosis. 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 
atherosclerosis CT perfusion brain: No significant cerebral perfusion abnormality or mismatch Measurements use NASCET criteria. Impression No evidence for acute large vessel arterial occlusion. No significant cerebral perfusion abnormality or mismatch. Stable atherosclerotic disease. 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 Assessment:  
Active Problems: 
  Leg ulcer, left ([address] (5/6/2021) Goals of care, counseling/discussion () Pain () Weakness () Advanced age () Plan:  
 
1.) Acute ischemic CVA 
 - In patient with multiple medical problems including afib off of anticoagulation, blood loss anemia, active ovarian cancer, s/p left leg wound s/p balloon angioplasty and debridement - MRI brain 5/12/21 showed possible punctate left cerebral acute infarct - A1C ok at 5.2 
 - Lipid panel showing LDL of 11, at goal <70, Cont Crestor 20 mg 
 - ECHO with LVEF of 25-35%, severely reduced systolic function. Left ventricular diastolic dysfunction. Severe pulmonary HTN. Valve dx throughout. 
            - PT/OT/SLP evals - Stroke education 
 - SBP goals 110-120 
 - Continue ASA 81mg daily - Call Code S with any acute neuro changes 2.) Basilar artery aneurysm 
 - NIS consulted, no intervention recommended 3. )HX A-fib  
 -would consult cardiology to see if she needs to be placed back on her Eliquis when deem safe by surgery. ( creatinine 1.42). Thank you for allowing the Neurology Service the pleasure of participating in the care of your patient. This patient will be discussed with my collaborating care team physician  and she may have further recommendations regarding this patient's care. Jignesh Francis NP Neurocritical Care Nurse Practitioner

## 2021-05-17 NOTE — PROGRESS NOTES
Palliative Medicine Consult Juan: [phone]) Patient Name: Igor Torres YOB: 1927 Date of Initial Consult: 5/12/21 Reason for Consult: Care decisions Requesting Provider: Dr. Natalia Hernandez Primary Care Physician: Jeffrey Velazco MD 
 
 SUMMARY:  
Igor Torres is a 80 y.o. female with a past history of aortic stenosis, pulmonary hypertension, GERD, glaucoma, breast cancer, ovarian cancer, skin cancer, s/p chemo, hypertension, dyslipidemia, chronic atrial fib, reactive airway disease, and anxiety, who was admitted on 5/6/2021 from home with a diagnosis of left lower extremity wound and sepsis. She presented with an infected left lower leg wound. She underwent an arteriogram, balloon angioplasty, and debridement. Post-operatively, she had some bleeding and became anemic. She then became hypotensive and aphasic. She was moved to ICU and started on pressor support. Her aphasia resolved and she is now back to baseline. Current medical issues leading to Palliative Medicine involvement include: goals of care, ovarian cancer, a-fib, likely new stroke, hypotensive. Social: She lives with her niece Linh Cee, who is her main support person. PALLIATIVE DIAGNOSES:  
1. Goals of care 2. Aphasia, now resolved 3. Hypotension 4. Anemia 5. Left lower leg wound 6. Pain 7. Weakness 8. Impaired mobility 9. Advanced age 10. Constipation PLAN:  
Patient seen at the bedside. She is awake and alert, but appears weak and fatigued. She denies pain at this time. She does complain of constipation. We talked about the current plan of care and the wound vac that is being used for her leg wound. We talked about the possible need to go back to a skilled nursing facility for rehab at discharge. She is open to this, but says that she does not really want to go back to rehab. I explained that hospice could be an option for her if she does not want to go to rehab again.  I told her that hospice care would be a way for her to go home and focus on comfort and making each day the best that it can be. She says that she is not interested in hospice care. I further explained that going into hospice care does not necessarily mean that she is on her death bed, but that it is a way to care for her that focuses on maximizing quality of life at home instead of more aggressive medical care in the hospital and at rehab. I encouraged her to think about it and know that this is an option for her. MAR reviewed and bowel regimen discussed with patient and nursing. She is currently on docusate and Miralax twice daily. The frequency of these medications was increased yesterday in an attempt to resolve her constipation. Nursing will monitor her response and discuss changing the regimen with the primary medical team as needed. Will continue to follow patient peripherally and be available for support and/or assistance with care decisions. Communicated plan of care with: Palliative [facility] Team 
 
 GOALS OF CARE / TREATMENT PREFERENCES:  
 
GOALS OF CARE: 
Patient/Health Care Proxy Stated Goals: Prolong life TREATMENT PREFERENCES:  
Code Status: DNR Advance Care Planning: 
[x] [facility] Interdisciplinary Team has updated the ACP Navigator with [address] and Patient Capacity Primary Decision Maker: LloydCarmen - Other Relative - [phone] Advance Care Planning 5/8/2021 Patient's Healthcare Decision Maker is: - Confirm Advance Directive Yes, on file Medical Interventions: Limited additional interventions Other Instructions: Other: As far as possible, the palliative care team has discussed with patient / health care proxy about goals of care / treatment preferences for patient. HISTORY:  
 
History obtained from: patient, chart CHIEF COMPLAINT: Left leg wound, bleeding, aphasia HPI/SUBJECTIVE: The patient is: [x] Verbal and participatory [] Non-participatory due to: She reports feeling okay. She feels back to her normal baseline this morning. She has some pain in her left lower leg. She denies nausea or shortness of breath. Her appetite is okay, she complains that the food is not good here. She is alert and oriented x 4. Clinical Pain Assessment (nonverbal scale for severity on nonverbal patients):  
Clinical Pain Assessment Severity: 0 Location: left lower leg Character: aching Duration: days Frequency: intermittent Duration: for how long has pt been experiencing pain (e.g., 2 days, 1 month, years) Frequency: how often pain is an issue (e.g., several times per day, once every few days, constant) FUNCTIONAL ASSESSMENT:  
 
Palliative Performance Scale (PPS): PPS: 50 PSYCHOSOCIAL/SPIRITUAL SCREENING:  
 
Palliative IDT has assessed this patient for cultural preferences / practices and a referral made as appropriate to needs (Cultural Services, Patient Advocacy, Ethics, etc.) Any spiritual / Protestant concerns: 
[] Yes /  [x] No 
 
Caregiver Burnout: 
[] Yes /  [x] No /  [] No Caregiver Present Anticipatory grief assessment:  
[x] Normal  / [] Maladaptive ESAS Anxiety: ESAS Depression:    
 
 
 REVIEW OF SYSTEMS:  
 
Positive and pertinent negative findings in ROS are noted above in HPI. The following systems were [x] reviewed / [] unable to be reviewed as noted in HPI Other findings are noted below. Systems: constitutional, ears/nose/mouth/throat, respiratory, gastrointestinal, genitourinary, musculoskeletal, integumentary, neurologic, psychiatric, endocrine. Positive findings noted below. Modified ESAS Completed by: provider Fatigue: 6 Drowsiness: 0 Pain: 0 Nausea: 0 Anorexia: 4 Dyspnea: 0 Constipation: Yes PHYSICAL EXAM:  
 
From RN flowsheet: 
Wt Readings from Last 3 Encounters:  
05/14/21 154 lb 5.2 oz (70 kg) 05/12/21 145 lb 8.1 oz (66 kg) 04/08/21 149 lb (67.6 kg) Blood pressure (!) 142/74, pulse (!) 102, temperature 97.6 °F (36.4 °C), resp. rate 24, height 5' 4\" (1.626 m), weight 154 lb 5.2 oz (70 kg), SpO2 98 %. Pain Scale 1: Numeric (0 - 10) Pain Intensity 1: 0 Pain Onset 1: ongoing Pain Location 1: Leg 
Pain Orientation 1: Left Pain Description 1: Aching Pain Intervention(s) 1: Medication (see MAR) Last bowel movement, if known:  
 
Constitutional: awake, pleasant, sitting up in bed Eyes: pupils equal, anicteric ENMT: no nasal discharge, dry mucous membranes Cardiovascular: regular rhythm Respiratory: breathing not labored, symmetric Musculoskeletal: no deformity, no tenderness to palpation Skin: warm, dry, dressing dry/intact to left lower leg Neurologic: following commands, moving all extremities, oriented x 3 Psychiatric: full affect, no hallucinations HISTORY:  
 
Active Problems: 
  Leg ulcer, left ([address] (5/6/2021) Goals of care, counseling/discussion () Pain () Weakness () Advanced age () Constipation, unspecified constipation type () Past Medical History:  
Diagnosis Date  Arthritis  Cancer ([address] breast    (leg,ear,arm-skin cancer)  GERD (gastroesophageal reflux disease)  Hypertension  Other ill-defined conditions(799.89)   
 glaucoma  Other ill-defined conditions(799.89)   
 cellulitis left arm  Other ill-defined conditions(799.89)   
 carpal tunnel syndrome left hand  Other ill-defined conditions(799.89)   
 vertigo  Ovarian cancer ([facility] 11/2020 Past Surgical History:  
Procedure Laterality Date  CARDIAC CATHETERIZATION  1/10/2013  HX APPENDECTOMY  HX BILATERAL MASTECTOMY Bilateral   
 HX CARPAL TUNNEL RELEASE    
 left hand  HX CATARACT REMOVAL    
 bilateral  
 HX COLONOSCOPY  4/2012  HX KNEE ARTHROSCOPY    
 left  HX OTHER SURGICAL  Basal cell skin cancer removed from left leg, left arm and neck  HX OTHER SURGICAL    
 cyst removed from left thigh  HX PARTIAL HYSTERECTOMY  HX OREN AND BSO  HX TONSILLECTOMY  VASCULAR SURGERY PROCEDURE UNLIST    
 vascular blockages removed. Family History Problem Relation Age of Onset  Heart Disease Mother  Heart Disease Father   
      age 55  
 Heart Disease Brother  Stroke Brother  Cancer Sister Colon Cancer  Diabetes Sister  Heart Disease Sister  Heart Disease Sister  Diabetes Sister  Other Brother MVA  Heart Disease Brother  Heart Disease Son   
      age 52 History reviewed, no pertinent family history. Social History Tobacco Use  Smoking status: Former Smoker Types: Cigarettes Quit date: 1955 Years since quittin.4  Smokeless tobacco: Never Used Substance Use Topics  Alcohol use: Not Currently Comment: rare Allergies Allergen Reactions  Augmentin [Amoxicillin-Pot Clavulanate] Hives and Itching  Doxycycline Hives and Itching  Keflex [Cephalexin] Hives and Itching  Monodox [Doxycycline Monohydrate] Other (comments)  Sulfa (Sulfonamide Antibiotics) Rash Current Facility-Administered Medications Medication Dose Route Frequency  docusate (COLACE) 50 mg/5 mL oral liquid 100 mg  100 mg Oral BID  polyethylene glycol (MIRALAX) packet 17 g  17 g Oral BID  albuterol-ipratropium (DUO-NEB) 2.5 MG-0.5 MG/3 ML  3 mL Nebulization Q6H PRN  
 cefepime (MAXIPIME) 2 g in 0.9% sodium chloride (MBP/ADV) 100 mL MBP  2 g IntraVENous Q24H  
 midodrine (PROAMATINE) tablet 20 mg  20 mg Oral TID  multivitamin, tx-iron-ca-min (THERA-M w/ IRON) tablet 1 Tab  1 Tab Oral DAILY  oxyCODONE IR (ROXICODONE) tablet 5 mg  5 mg Oral Q4H PRN  
 aspirin chewable tablet 81 mg  81 mg Oral DAILY  insulin lispro (HUMALOG) injection   SubCUTAneous AC&HS  
 glucose chewable tablet 16 g  4 Tab Oral PRN  
 dextrose (D50W) injection syrg 12.5-25 g  12.5-25 g IntraVENous PRN  
 glucagon (GLUCAGEN) injection 1 mg  1 mg IntraMUSCular PRN  pantoprazole (PROTONIX) tablet 40 mg  40 mg Oral ACB  
 HYDROcodone-acetaminophen (NORCO) 5-325 mg per tablet 1 Tab  1 Tab Oral Q6H PRN  
 0.9% sodium chloride infusion 250 mL  250 mL IntraVENous PRN  
 fentaNYL citrate (PF) injection 12.5 mcg  12.5 mcg IntraVENous Q4H PRN  
 [Held by provider] metoprolol tartrate (LOPRESSOR) tablet 12.5 mg  12.5 mg Oral BID  
 0.9% sodium chloride infusion 250 mL  250 mL IntraVENous PRN  
 ALPRAZolam (XANAX) tablet 0.25 mg  0.25 mg Oral QHS PRN  
 rosuvastatin (CRESTOR) tablet 20 mg  20 mg Oral QHS  arformoteroL (BROVANA) neb solution 15 mcg  15 mcg Nebulization BID RT And  
 budesonide (PULMICORT) 500 mcg/2 ml nebulizer suspension  500 mcg Nebulization BID RT  
 timolol (TIMOPTIC) 0.5 % ophthalmic solution 1 Drop  1 Drop Both Eyes BID  latanoprost (XALATAN) 0.005 % ophthalmic solution 1 Drop  1 Drop Both Eyes QPM  
 melatonin tablet 3 mg  3 mg Oral QHS  sodium chloride (NS) flush 5-40 mL  5-40 mL IntraVENous Q8H  
 sodium chloride (NS) flush 5-40 mL  5-40 mL IntraVENous PRN  
 acetaminophen (TYLENOL) tablet 650 mg  650 mg Oral Q6H PRN Or  
 acetaminophen (TYLENOL) suppository 650 mg  650 mg Rectal Q6H PRN  
 heparin (porcine) injection 5,000 Units  5,000 Units SubCUTAneous Q8H  
 
 
 
 LAB AND IMAGING FINDINGS:  
 
Lab Results Component Value Date/Time WBC 11.2 (H) 05/17/2021 03:34 AM  
 HGB 7.2 (L) 05/17/2021 03:34 AM  
 PLATELET 286 (L) 98/96/5181 03:34 AM  
 
Lab Results Component Value Date/Time  Sodium 132 (L) 05/17/2021 03:34 AM  
 Potassium 3.4 (L) 05/17/2021 03:34 AM  
 Chloride 96 (L) 05/17/2021 03:34 AM  
 CO2 26 05/17/2021 03:34 AM  
 BUN 23 (H) 05/17/2021 03:34 AM  
 Creatinine 1.42 (H) 05/17/2021 03:34 AM  
 Calcium 8.1 (L) 05/17/2021 03:34 AM  
 Magnesium 1.8 05/17/2021 03:34 AM  
 Phosphorus 3.2 05/17/2021 03:34 AM  
 Lab Results Component Value Date/Time Alk. phosphatase 106 05/15/2021 03:17 PM  
 Protein, total 5.2 (L) 05/15/2021 03:17 PM  
 Albumin 2.9 (L) 05/15/2021 03:17 PM  
 Globulin 2.3 05/15/2021 03:17 PM  
 
Lab Results Component Value Date/Time INR 1.5 (H) 04/06/2021 03:31 AM  
 Prothrombin time 15.8 (H) 04/06/2021 03:31 AM  
 aPTT 37.6 (H) 04/06/2021 03:31 AM  
  
Lab Results Component Value Date/Time Iron 25 (L) 05/12/2021 03:39 AM  
 TIBC 251 05/12/2021 03:39 AM  
 Iron % saturation 10 (L) 05/12/2021 03:39 AM  
 Ferritin 65 04/07/2021 10:23 AM  
  
Lab Results Component Value Date/Time pH 7.45 05/16/2021 12:06 PM  
 PCO2 23 (L) 05/16/2021 12:06 PM  
 PO2 363 (H) 05/16/2021 12:06 PM  
 
No components found for: [place] Lab Results Component Value Date/Time CK 53 11/25/2014 12:00 PM  
 CK - MB 1.2 11/25/2014 12:00 PM  
  
 
 
   
 
Total time: 25 min Counseling / coordination time, spent as noted above: 15 min 
> 50% counseling / coordination?: yes Prolonged service was provided for  []30 min   []75 min in face to face time in the presence of the patient, spent as noted above. Time Start:  
Time End:  
Note: this can only be billed with 61013 (initial) or 38103 (follow up). If multiple start / stop times, list each separately.

## 2021-05-18 PROBLEM — I50.20 HFREF (HEART FAILURE WITH REDUCED EJECTION FRACTION) (HCC): Status: ACTIVE | Noted: 2021-01-01

## 2021-05-18 NOTE — PROGRESS NOTES
2115 Rapid response called because pt became tachycardic, tachypnic, and in respiratory distress, while eating applesauce. Unable to get a good pleth. Labs sent, morphine, bumex and metroplol given. Hi flow applied. ABG done. Will continue to monitor. Crictal c02, 14. White, brynn paged and bicarb ordered and administered. Bedside shift change report given to [facility] (oncoming nurse) by Suzy Washburn (offgoing nurse). Report included the following information SBAR, Intake/Output, MAR, Recent Results and Cardiac Rhythm Afib.

## 2021-05-18 NOTE — PROGRESS NOTES
Hospitalist Progress Note Aaron Brown MD 
Answering service: [phone] from in house phone Date of Service:  2021 NAME:  Saima Bright :  [id number] MRN:  [id number] Admission Summary:  
Per ICU Note: Progress Note: 2021   
  
\" Mona Gomesri a 80 y. o. female with hx aortic stenosis, pulm htn, GERD, glaucoma, remote breast CA, HTN, and recent ovarian cancer with port-a-cath s/p chemo times two who presented to the ED on  with complications of a traumatic wound.  She had skin cancer on her LLE and subsequently sustained a traumatic wound after a fall in the same area.  The wound had progressed, and she was  pending and appt with surgeon Dr. Jimmy Saenz today.  The nurse from the rehab facility where patient was being discharged sent a picture to the clinic, and patient was advised to come to the hospital instead for management of wound. 
  
In the ED, VSS. Labs are significant for lactate of 4, CRP 6.32, ESR 34, and normocytic anemia with Hgb 10.4. L tib/fib XR with pretibial soft tissue ulceration and no noted osseous abnormality. She was started on vancomycin and ceftriaxone.   
  
On 5/10 patient to OR for LLE arteriogram, L fem and popliteal angioplasty, and debridement of wound. Patient received 2 units of PRBCs given drop in hgb to 6. 1.   
  
On , she developed acute aphasia and a code stroke was called. NIHSS was 7. CT head without acute infarction or perfusion deficit.  She was transferred to the ICU later on  for BP management to keep SBP >160 with vasopressors. MRI on  with possible punctate L cerebral acute infarct. She has been off levophed since the am of 5/15. Diuresed overnight 5/15-. 
  
 patient developed acute onset of dyspnea with new R sided infiltrate.  Antibiotics broadened to cefepime empirically.  Diuresed with bumex and diuril.  \" 
  
Hospitalist:   
: Seen and examined patient sitting up in bed. States that she is doing fine today but has not had a bowel movement in more than a month. Left lower extremity dressing noted with wound VAC that is intact. She states that she is in the hospital because she fell out of the bed and cut her leg. SBP >  90 on monitor (goal of SBP greater than 80). Patient to be transferred out of the ICU today Interval history / Subjective:  
  Patient seen for Follow up of leg wound Sub: 5/18/20 Received a message from nursing staff that patient has been transitioned to comfort measures and hospice has been consulted Patient is high risk of demise Assessment & Plan: As per transfer note (5/17) Concern for pneumonia: New development of airspace opacities, R >L, concern for aspiration pneumonia. Broadened abx to cefepime.  Had been diuresing prior to this, no episode of HTN prior to this, so less likely flash pulmonary edema.  
- Holding on further diuresis due to AVNI 
- Continue cefepime - Monitor O2 sats and titrate O2 to keep sats >90%  
  
L leg wound s/p L leg arteriogram w balloon angioplasty and debridement: 
- Care per vascular surgery - Continue cefepime  
- Continue wound vac (placed on 5/14) 
- consider ID consult for antibiotic management. 
  
Hypotension 
- Required levo which has been stopped since 05/15 
- Hold BB and further diuresis - Continue midodrine - Monitor, goal SBP >80 
  
HFrEF c/b severe pulmonary HTN w/o exacerbation: Underwent diuresis overnight with Bumex + diuril 
- Holding BB due to hypotension 
- Holding on further diuresis due to AVNI 
  
AVNI: Baseline normal BUN/Cr, currently 23/1.42 
- Holding on further diuresis 
  
Acute ischemic CVA: MRI with possible punctate L cerebral acute infarct on 5/12 
- Neurology following - BP support with midodrine 
- PT/OT 
- Stroke eduction 
- NIS consulted for basilar artery aneurysm, no intervention recommended 
  
Acute on chronic anemia: Baseline NIKKI, superimosed anemia of critical illness - 2 units pRBCs transfused this admission - Monitor CBC. Transfuse for hgb <7.0  
  
Reactive airway disease: Continue brovana and pulmicort 
  
Chronic atrial fibrillation: Holding BB in setting of hypotension 
  
Anxiety: Xanax 0.25mg HS 
  
Glaucoma: Louvella Showman and timolol 
  
Deconditioning: PT/OT 
- Consult case management for discharge planning.  
  
Hx Breast cancer and active ovarian cancer: No acute inpatient management Code status: dnr (comform measures) DVT prophylaxis:heparin sc (held due to comfort measures) Care Plan discussed with: Nurse Disposition: TBD Hospital Problems  Date Reviewed: 3/29/2021 Codes Class Noted POA Confusion ICD-10-CM: R41.0 ICD-9-CM: 298.9  Unknown Unknown Constipation, unspecified constipation type ICD-10-CM: K59.00 ICD-9-CM: 564.00  Unknown Unknown Goals of care, counseling/discussion ICD-10-CM: Z71.89 ICD-9-CM: V65.49  Unknown Unknown Pain ICD-10-CM: R52 ICD-9-CM: 780.96  Unknown Unknown Weakness ICD-10-CM: R53.1 ICD-9-CM: 780.79  Unknown Unknown Advanced age ICD-10-CM: R48 
ICD-9-CM: 56  Unknown Unknown Leg ulcer, left ([facility] ICD-10-CM: H51.170 ICD-9-CM: 707.10  5/6/2021 Unknown Review of Systems: A comprehensive review of systems was negative except for that written in the HPI. Vital Signs:  
 Last 24hrs VS reviewed since prior progress note. Most recent are: 
Visit Vitals BP (!) 101/42 (BP 1 Location: Right upper arm, BP Patient Position: At rest) Pulse 88 Temp 97.8 °F (36.6 °C) Resp 17 Ht 5' 4\" (1.626 m) Wt 78.3 kg (172 lb 9.6 oz) SpO2 (!) 77% BMI 29.63 kg/m² Intake/Output Summary (Last 24 hours) at 5/18/2021 1449 Last data filed at 5/18/2021 1506 Gross per 24 hour Intake  Output 200 ml Net -200 ml Physical Examination:  
I had a face to face encounter with this patient and independently examined them on May 18, 2021 as outlined below: 
     
General:          patient confused , sick looking patient HEENT:           Atraumatic, MMM           
Neck:               Supple, symmetrical,  thyroid: non tender Lungs:             Clear to auscultation bilaterally.  No Wheezing or Rhonchi. No rales. Heart:              Regular  rhythm,  No  murmur   No edema Abdomen:       Soft, non-tender. Not distended.  Bowel sounds normal 
 
 
  
 
Data Review:  
 Review and/or order of clinical lab test 
Review and/or order of tests in the radiology section of CPT Review and/or order of tests in the medicine section of [facility] Labs:  
 
Recent Labs 05/18/21 0250 05/17/21 2216 WBC 22.1* 17.9* HGB 8.3* 8.6* HCT 26.8* 28.3*  
 199 Recent Labs 05/18/21 0250 05/17/21 2216 05/17/21 
1297 * 127* 132* K 4.2 4.9 3.4*  
CL 94* 93* 96* CO2 18* 14* 26 BUN 31* 29* 23* CREA 1.97* 1.82* 1.42* GLU 71 176* 100 CA 8.5 8.5 8.1*  
MG 2.0 2.1 1.8 PHOS 4.0 4.2 3.2 Recent Labs 05/18/21 
0250 05/17/21 
2216 05/15/21 [place] 89 ALT 76 76 119* * 140* 106 TBILI 1.4* 1.3* 0.8 TP 5.5* 5.7* 5.2* ALB 3.0* 3.1* 2.9*  
GLOB 2.5 2.6 2.3 LPSE 122  --   -- No results for input(s): INR, PTP, APTT, INREXT in the last 72 hours. No results for input(s): FE, TIBC, PSAT, FERR in the last 72 hours. Lab Results Component Value Date/Time Folate 37.2 (H) 04/08/2021 03:12 AM  
  
Recent Labs 05/18/21 
0006 05/16/21 
1206 PH 7.46* 7.45  
PCO2 22* 23* PO2 297* 363* Recent Labs 05/17/21 
0334 05/16/21 
1823 05/16/21 
1152 TROIQ 0.19* 0.26* 0.20* Lab Results Component Value Date/Time Cholesterol, total 54 05/12/2021 03:39 AM  
 HDL Cholesterol 28 05/12/2021 03:39 AM  
 LDL,Direct 85 10/30/2014 09:36 AM  
 LDL, calculated 11 05/12/2021 03:39 AM  
 Triglyceride 75 05/12/2021 03:39 AM  
 CHOL/HDL Ratio 1.9 05/12/2021 03:39 AM  
 
Lab Results Component Value Date/Time Glucose (POC) 132 (H) 05/17/2021 09:16 PM  
 Glucose (POC) 124 (H) 05/17/2021 04:47 PM  
 Glucose (POC) 155 (H) 05/17/2021 12:07 PM  
 Glucose (POC) 104 05/17/2021 08:50 AM  
 Glucose (POC) 143 (H) 05/16/2021 09:45 PM  
 
Lab Results Component Value Date/Time Color YELLOW/STRAW 05/12/2021 07:38 AM  
 Appearance CLEAR 05/12/2021 07:38 AM  
 Specific gravity 1.012 05/12/2021 07:38 AM  
 pH (UA) 5.0 05/12/2021 07:38 AM  
 Protein Negative 05/12/2021 07:38 AM  
 Glucose Negative 05/12/2021 07:38 AM  
 Ketone Negative 05/12/2021 07:38 AM  
 Bilirubin Negative 05/12/2021 07:38 AM  
 Urobilinogen 0.2 05/12/2021 07:38 AM  
 Nitrites Negative 05/12/2021 07:38 AM  
 Leukocyte Esterase Negative 05/12/2021 07:38 AM  
 Epithelial cells FEW 05/12/2021 07:38 AM  
 Bacteria Negative 05/12/2021 07:38 AM  
 WBC 0-4 05/12/2021 07:38 AM  
 RBC 0-5 05/12/2021 07:38 AM  
 
 
 
Medications Reviewed:  
 
Current Facility-Administered Medications Medication Dose Route Frequency  morphine injection 2 mg  2 mg IntraVENous Q4H PRN  
 LORazepam (ATIVAN) injection 1 mg  1 mg IntraVENous Q4H PRN  
 albuterol-ipratropium (DUO-NEB) 2.5 MG-0.5 MG/3 ML  3 mL Nebulization Q6H PRN  
 glucose chewable tablet 16 g  4 Tab Oral PRN  
 dextrose (D50W) injection syrg 12.5-25 g  12.5-25 g IntraVENous PRN  
 glucagon (GLUCAGEN) injection 1 mg  1 mg IntraMUSCular PRN  
 0.9% sodium chloride infusion 250 mL  250 mL IntraVENous PRN  
 [Held by provider] metoprolol tartrate (LOPRESSOR) tablet 12.5 mg  12.5 mg Oral BID  
 0.9% sodium chloride infusion 250 mL  250 mL IntraVENous PRN  
 sodium chloride (NS) flush 5-40 mL  5-40 mL IntraVENous PRN  
 acetaminophen (TYLENOL) tablet 650 mg  650 mg Oral Q6H PRN  Or  
 acetaminophen (TYLENOL) suppository 650 mg  650 mg Rectal Q6H PRN  
 [Held by provider] heparin (porcine) injection 5,000 Units  5,000 Units SubCUTAneous Q8H  
 ______________________________________________________________________ EXPECTED LENGTH OF STAY: 5d 4h 
ACTUAL LENGTH OF STAY:          Dana Jacobs MD

## 2021-05-18 NOTE — PROGRESS NOTES
0405: Nighttime hospitalist NP notified of critical lactic acid level of 11.2. new orders received. Will continue to monitor. 0800: Bedside shift change report given to Mo (oncoming nurse) by Tony Garcia (offgoing nurse). Report included the following information SBAR, Kardex, MAR, Recent Results and Cardiac Rhythm NS.

## 2021-05-18 NOTE — PROGRESS NOTES
Palliative Medicine Consult Juan: [phone] ([phone]) Patient Name: Amrik Martines YOB: 1927 Date of Initial Consult: 5/12/21 Reason for Consult: Care decisions Requesting Provider: Dr. Zan Jang Primary Care Physician: Baiely Calvillo MD 
 
 SUMMARY:  
Amrik Martines is a 80 y.o. female with a past history of aortic stenosis, pulmonary hypertension, GERD, glaucoma, breast cancer, ovarian cancer, skin cancer, s/p chemo, hypertension, dyslipidemia, chronic atrial fib, reactive airway disease, and anxiety, who was admitted on 5/6/2021 from home with a diagnosis of left lower extremity wound and sepsis. She presented with an infected left lower leg wound. She underwent an arteriogram, balloon angioplasty, and debridement. Post-operatively, she had some bleeding and became anemic. She then became hypotensive and aphasic. She was moved to ICU and started on pressor support. Her aphasia resolved and she is now back to baseline. Current medical issues leading to Palliative Medicine involvement include: goals of care, ovarian cancer, a-fib, likely new stroke, hypotensive. Social: She lives with her niece Triny Pettit, who is her main support person. PALLIATIVE DIAGNOSES:  
1. Goals of care 2. Aphasia, now resolved 3. Hypotension 4. Anemia 5. Left lower leg wound 6. Pain 7. Weakness 8. Impaired mobility 9. Advanced age 10. Constipation 11. Confusion PLAN:  
Chart reviewed and updates from overnight noted. Her lactic acid is markedly elevated and she is not improving despite being treated for HAP. She is having pain with wound care and worsening altered mental status. The family is interested in transitioning to hospice/comfort care. Patient seen at the bedside with her niece Lizbet Rosas present. She appears confused, but calm. She is not in distress. Discussed the plan of care with Lizbet Rosas. The family would like to move forward with transitioning to hospice care.  We talked about how this would work. They would like to get her home if possible. I explained that we could work towards this goal and see how she does. Nafisa Adams was in agreement with this. Hospice consult placed. I also ordered Morphine 2mg IV PRN and Ativan 1mg IV PRN for comfort meds for now. Will check back with patient and family later today. Communicated plan of care with: Palliative IDT Addendum: 
I followed up with the patient this afternoon to check in. She was sleeping quietly and appeared comfortable. No family at the bedside. GOALS OF CARE / TREATMENT PREFERENCES:  
 
GOALS OF CARE: 
Patient/Health Care Proxy Stated Goals: Comfort TREATMENT PREFERENCES:  
Code Status: DNR Advance Care Planning: 
[x] [facility] Med Interdisciplinary Team has updated the ACP Navigator with [address] and Patient Capacity Primary Decision Maker: LloydCarmen - Other Relative - [phone] Advance Care Planning 5/8/2021 Patient's Healthcare Decision Maker is: - Confirm Advance Directive Yes, on file Medical Interventions: Comfort measures Other Instructions: Other: As far as possible, the palliative care team has discussed with patient / health care proxy about goals of care / treatment preferences for patient. HISTORY:  
 
History obtained from: patient, chart CHIEF COMPLAINT: Left leg wound, bleeding, aphasia HPI/SUBJECTIVE: The patient is:  
[x] Verbal and participatory [] Non-participatory due to: She reports feeling okay. She feels back to her normal baseline this morning. She has some pain in her left lower leg. She denies nausea or shortness of breath. Her appetite is okay, she complains that the food is not good here. She is alert and oriented x 4. Clinical Pain Assessment (nonverbal scale for severity on nonverbal patients):  
Clinical Pain Assessment Severity: 0 Location: left lower leg Character: aching Duration: days Frequency: intermittent Duration: for how long has pt been experiencing pain (e.g., 2 days, 1 month, years) Frequency: how often pain is an issue (e.g., several times per day, once every few days, constant) FUNCTIONAL ASSESSMENT:  
 
Palliative Performance Scale (PPS): PPS: 40 PSYCHOSOCIAL/SPIRITUAL SCREENING:  
 
Palliative IDT has assessed this patient for cultural preferences / practices and a referral made as appropriate to needs (Cultural Services, Patient Advocacy, Ethics, etc.) Any spiritual / Mormonism concerns: 
[] Yes /  [x] No 
 
Caregiver Burnout: 
[] Yes /  [x] No /  [] No Caregiver Present Anticipatory grief assessment:  
[x] Normal  / [] Maladaptive ESAS Anxiety: Anxiety: 1 ESAS Depression:    
 
 
 REVIEW OF SYSTEMS:  
 
Positive and pertinent negative findings in ROS are noted above in HPI. The following systems were [x] reviewed / [] unable to be reviewed as noted in HPI Other findings are noted below. Systems: constitutional, ears/nose/mouth/throat, respiratory, gastrointestinal, genitourinary, musculoskeletal, integumentary, neurologic, psychiatric, endocrine. Positive findings noted below. Modified ESAS Completed by: provider Fatigue: 7 Drowsiness: 2 Pain: 0 Anxiety: 1 Nausea: 0 Anorexia: 4 Dyspnea: 0 Constipation: Yes PHYSICAL EXAM:  
 
From RN flowsheet: 
Wt Readings from Last 3 Encounters:  
05/18/21 172 lb 9.6 oz (78.3 kg) 05/12/21 145 lb 8.1 oz (66 kg) 04/08/21 149 lb (67.6 kg) Blood pressure (!) 101/42, pulse 88, temperature 97.8 °F (36.6 °C), resp. rate 17, height 5' 4\" (1.626 m), weight 172 lb 9.6 oz (78.3 kg), SpO2 (!) 77 %. Pain Scale 1: Numeric (0 - 10) Pain Intensity 1: 8 Pain Onset 1: ongoing Pain Location 1: Leg 
Pain Orientation 1: Left Pain Description 1: Aching Pain Intervention(s) 1: Medication (see MAR) Last bowel movement, if known:  
 
Constitutional: awake, fatigued, no acute distress Eyes: pupils equal, anicteric ENMT: no nasal discharge, dry mucous membranes Cardiovascular: regular rhythm Respiratory: breathing not labored, symmetric Musculoskeletal: no deformity, no tenderness to palpation Skin: warm, dry, dressing dry/intact to left lower leg Neurologic: following commands, moving all extremities, confused Psychiatric: full affect, no hallucinations HISTORY:  
 
Active Problems: 
  Leg ulcer, left ([facility] (2021) Goals of care, counseling/discussion () Pain () Weakness () Advanced age () Constipation, unspecified constipation type () Past Medical History:  
Diagnosis Date  Arthritis  Cancer ([facility] breast    (leg,ear,arm-skin cancer)  GERD (gastroesophageal reflux disease)  Hypertension  Other ill-defined conditions(799.89)   
 glaucoma  Other ill-defined conditions(799.89)   
 cellulitis left arm  Other ill-defined conditions(799.89)   
 carpal tunnel syndrome left hand  Other ill-defined conditions(799.89)   
 vertigo  Ovarian cancer ([facility] 2020 Past Surgical History:  
Procedure Laterality Date  CARDIAC CATHETERIZATION  1/10/2013  HX APPENDECTOMY  HX BILATERAL MASTECTOMY Bilateral   
 HX CARPAL TUNNEL RELEASE    
 left hand  HX CATARACT REMOVAL    
 bilateral  
 HX COLONOSCOPY  2012  HX KNEE ARTHROSCOPY    
 left  HX OTHER SURGICAL Basal cell skin cancer removed from left leg, left arm and neck  HX OTHER SURGICAL    
 cyst removed from left thigh  HX PARTIAL HYSTERECTOMY  HX OREN AND BSO  HX TONSILLECTOMY  VASCULAR SURGERY PROCEDURE UNLIST    
 vascular blockages removed. Family History Problem Relation Age of Onset  Heart Disease Mother  Heart Disease Father   
      age 55  
 Heart Disease Brother  Stroke Brother  Cancer Sister Colon Cancer  Diabetes Sister  Heart Disease Sister  Heart Disease Sister  Diabetes Sister  Other Brother MVA  Heart Disease Brother  Heart Disease Son   
      age 52 History reviewed, no pertinent family history. Social History Tobacco Use  Smoking status: Former Smoker Types: Cigarettes Quit date: 1955 Years since quittin.4  Smokeless tobacco: Never Used Substance Use Topics  Alcohol use: Not Currently Comment: rare Allergies Allergen Reactions  Augmentin [Amoxicillin-Pot Clavulanate] Hives and Itching  Doxycycline Hives and Itching  Keflex [Cephalexin] Hives and Itching  Monodox [Doxycycline Monohydrate] Other (comments)  Sulfa (Sulfonamide Antibiotics) Rash Current Facility-Administered Medications Medication Dose Route Frequency  morphine injection 2 mg  2 mg IntraVENous Q4H PRN  
 LORazepam (ATIVAN) injection 1 mg  1 mg IntraVENous Q4H PRN  
 albuterol-ipratropium (DUO-NEB) 2.5 MG-0.5 MG/3 ML  3 mL Nebulization Q6H PRN  
 glucose chewable tablet 16 g  4 Tab Oral PRN  
 dextrose (D50W) injection syrg 12.5-25 g  12.5-25 g IntraVENous PRN  
 glucagon (GLUCAGEN) injection 1 mg  1 mg IntraMUSCular PRN  
 0.9% sodium chloride infusion 250 mL  250 mL IntraVENous PRN  
 [Held by provider] metoprolol tartrate (LOPRESSOR) tablet 12.5 mg  12.5 mg Oral BID  
 0.9% sodium chloride infusion 250 mL  250 mL IntraVENous PRN  
 sodium chloride (NS) flush 5-40 mL  5-40 mL IntraVENous PRN  
 acetaminophen (TYLENOL) tablet 650 mg  650 mg Oral Q6H PRN Or  
 acetaminophen (TYLENOL) suppository 650 mg  650 mg Rectal Q6H PRN  
 [Held by provider] heparin (porcine) injection 5,000 Units  5,000 Units SubCUTAneous Q8H  
 
 
 
 LAB AND IMAGING FINDINGS:  
 
Lab Results Component Value Date/Time WBC 22.1 (H) 2021 02:50 AM  
 HGB 8.3 (L) 2021 02:50 AM  
 PLATELET 389  02:50 AM  
 
Lab Results Component Value Date/Time  Sodium 131 (L) 2021 02:50 AM  
 Potassium 4.2 05/18/2021 02:50 AM  
 Chloride 94 (L) 05/18/2021 02:50 AM  
 CO2 18 (L) 05/18/2021 02:50 AM  
 BUN 31 (H) 05/18/2021 02:50 AM  
 Creatinine 1.97 (H) 05/18/2021 02:50 AM  
 Calcium 8.5 05/18/2021 02:50 AM  
 Magnesium 2.0 05/18/2021 02:50 AM  
 Phosphorus 4.0 05/18/2021 02:50 AM  
  
Lab Results Component Value Date/Time Alk. phosphatase 135 (H) 05/18/2021 02:50 AM  
 Protein, total 5.5 (L) 05/18/2021 02:50 AM  
 Albumin 3.0 (L) 05/18/2021 02:50 AM  
 Globulin 2.5 05/18/2021 02:50 AM  
 
Lab Results Component Value Date/Time INR 1.5 (H) 04/06/2021 03:31 AM  
 Prothrombin time 15.8 (H) 04/06/2021 03:31 AM  
 aPTT 37.6 (H) 04/06/2021 03:31 AM  
  
Lab Results Component Value Date/Time Iron 25 (L) 05/12/2021 03:39 AM  
 TIBC 251 05/12/2021 03:39 AM  
 Iron % saturation 10 (L) 05/12/2021 03:39 AM  
 Ferritin 65 04/07/2021 10:23 AM  
  
Lab Results Component Value Date/Time pH 7.46 (H) 05/18/2021 12:06 AM  
 PCO2 22 (L) 05/18/2021 12:06 AM  
 PO2 297 (H) 05/18/2021 12:06 AM  
 
No components found for: [place] Lab Results Component Value Date/Time CK 53 11/25/2014 12:00 PM  
 CK - MB 1.2 11/25/2014 12:00 PM  
  
 
 
   
 
Total time: 25 min Counseling / coordination time, spent as noted above: 15 min 
> 50% counseling / coordination?: yes Prolonged service was provided for  []30 min   []75 min in face to face time in the presence of the patient, spent as noted above. Time Start:  
Time End:  
Note: this can only be billed with 01990 (initial) or 99568 (follow up). If multiple start / stop times, list each separately.

## 2021-05-18 NOTE — PROGRESS NOTES
Problem: Falls - Risk of 
Goal: *Absence of Falls Description: Document Kyra Ashok Fall Risk and appropriate interventions in the flowsheet. Outcome: Progressing Towards Goal 
Note: Fall Risk Interventions: 
Mobility Interventions: OT consult for ADLs, PT Consult for mobility concerns, Bed/chair exit alarm Mentation Interventions: Door open when patient unattended, More frequent rounding Medication Interventions: Evaluate medications/consider consulting pharmacy Elimination Interventions: Call light in reach, Toileting schedule/hourly rounds History of Falls Interventions: Door open when patient unattended, Room close to nurse's station, Investigate reason for fall, Evaluate medications/consider consulting pharmacy Problem: Pressure Injury - Risk of 
Goal: *Prevention of pressure injury Description: Document Dick Scale and appropriate interventions in the flowsheet. Outcome: Progressing Towards Goal 
Note: Pressure Injury Interventions: 
Sensory Interventions: Assess changes in LOC, Assess need for specialty bed, Float heels, Minimize linen layers Moisture Interventions: Absorbent underpads, Maintain skin hydration (lotion/cream) Activity Interventions: PT/OT evaluation, Assess need for specialty bed Mobility Interventions: Float heels, Turn and reposition approx. every two hours(pillow and wedges) Nutrition Interventions: Document food/fluid/supplement intake Friction and Shear Interventions: Minimize layers, Lift sheet

## 2021-05-18 NOTE — PROGRESS NOTES
[facility] [address], [address] Keith Laguerre 
 [phone] Fawn Frazier 5/18/2021 
11:31 AM 
 
 
Occult +. D/w patient's niece at the bedside. Moving towards palliative Care. Most appropriate in this situation. No further cardiac recommendations. Will see again as needed.  
 
Sonny Rosas PA-C

## 2021-05-18 NOTE — CONSULTS
[facility] 
[address] [place], [address] 
[phone] GI CONSULTATION NOTE 
 
 
NAME:  Marina Jarquin :   1927 MRN:   [id number] Consult Date: 2021 Chief Complaint: Occult blood positive stool History of Present Illness:  Patient is a 80 y. o. who is seen in consultation at the request of Theta Snellen, NP for the above-mentioned problem. Ms. Lisset Martin is a 70-year-old WF who was admitted to the hospital  for complications of a traumatic wound. She has a history of LLE skin cancer and fell at home. She is status post LLE arteriogram, left femoropopliteal angio and debridement 5/10. Her hospital stay has been complicated by acute ischemic CVA, pneumonia, and exacerbation pulmonary edema for which she had been in the ICU, discharged yesterday. Overnight, an RRT was called for complaint of increased work of breathing, respiratory rate in the 30s desaturation, tachycardia. Morning, appears and patient has chosen stop all interventions, initiate comfort care, and hospice has been consulted She was asleep during consultation today and when awakened. Confused and unable to answer simple questions. Her history was per chart and are outside records. She is seen in our offices (Dr. Naseem Plascencia) for longstanding GERD and collagenous colitis diagnosed on colonoscopy in . She was last seen 2019. PMH:  
Past Medical History:  
Diagnosis Date  Arthritis  Cancer ([address] breast    (leg,ear,arm-skin cancer)  GERD (gastroesophageal reflux disease)  Hypertension  Other ill-defined conditions(799.89)   
 glaucoma  Other ill-defined conditions(799.89)   
 cellulitis left arm  Other ill-defined conditions(799.89)   
 carpal tunnel syndrome left hand  Other ill-defined conditions(799.89)   
 vertigo  Ovarian cancer ([address] 2020 PSH: 
Past Surgical History:  
Procedure Laterality Date [facility] CARDIAC CATHETERIZATION  1/10/2013  HX APPENDECTOMY  HX BILATERAL MASTECTOMY Bilateral   
 HX CARPAL TUNNEL RELEASE    
 left hand  HX CATARACT REMOVAL    
 bilateral  
 HX COLONOSCOPY  4/2012  HX KNEE ARTHROSCOPY    
 left  HX OTHER SURGICAL Basal cell skin cancer removed from left leg, left arm and neck  HX OTHER SURGICAL    
 cyst removed from left thigh  HX PARTIAL HYSTERECTOMY  HX OREN AND BSO  HX TONSILLECTOMY  VASCULAR SURGERY PROCEDURE UNLIST    
 vascular blockages removed. Allergies: Allergies Allergen Reactions  Augmentin [Amoxicillin-Pot Clavulanate] Hives and Itching  Doxycycline Hives and Itching  Keflex [Cephalexin] Hives and Itching  Monodox [Doxycycline Monohydrate] Other (comments)  Sulfa (Sulfonamide Antibiotics) Rash Home Medications: 
Prior to Admission Medications Prescriptions Last Dose Informant Patient Reported? Taking? Breo Ellipta 200-25 mcg/dose inhaler  Family Member Yes No  
Sig: TAKE 1 PUFF DAILY CYANOCOBALAMIN, VITAMIN B-12, (VITAMIN B-12 PO)  Family Member Yes No  
Sig: Take 1,000 mcg by mouth daily. Cholecalciferol, Vitamin D3, (VITAMIN D3) 1,000 unit cap  Family Member Yes No  
Sig: Take 1 Cap by mouth two (2) times a day. albuterol-ipratropium (DUO-NEB) 2.5 mg-0.5 mg/3 ml nebu   No No  
Sig: 3 mL by Nebulization route every four (4) hours as needed for Wheezing or Shortness of Breath. aspirin delayed-release 81 mg tablet   No No  
Sig: Take 1 Tab by mouth daily. cycloSPORINE (RESTASIS) 0.05 % ophthalmic emulsion  Family Member Yes No  
Sig: Administer 1 Drop to both eyes two (2) times a day. dexamethasone in 0.9 % sod chl (DEXAMETHASONE-0.9 % SOD. CHLOR IV)  Other Yes No  
Sig: 10 mg by IntraVENous route. Every 28 days pre treatment  
diclofenac (VOLTAREN) 1 % gel  Family Member Yes No  
Sig: APPLY 1 APPLICATION TOPICALLY 4 (FOUR) TIMES A DAY AS NEEDED (PAIN) esomeprazole (NEXIUM) 40 mg capsule  Family Member No No  
Sig: Take 1 Cap by mouth Daily (before breakfast). latanoprost (XALATAN) 0.005 % ophthalmic solution  Family Member Yes No  
Sig: Administer 1 Drop to both eyes nightly. metoprolol succinate (TOPROL-XL) 25 mg XL tablet   No No  
Sig: Take 0.5 Tabs by mouth daily. multivitamins-minerals-lutein (CENTRUM SILVER) Tab  Family Member Yes No  
Sig: Take 1 Tab by mouth daily. omega-3 fatty acids-vitamin e (FISH OIL) 1,000 mg Cap  Family Member Yes No  
Sig: Take 1 Cap by mouth two (2) times a day. rosuvastatin (CRESTOR) 20 mg tablet   No No  
Sig: TAKE 1 TABLET NIGHTLY  
timolol (TIMOPTIC-XE) 0.5 % ophthalmic gel-forming  Family Member Yes No  
Sig: Administer 1 Drop to both eyes daily. Facility-Administered Medications: None Hospital Medications: 
Current Facility-Administered Medications Medication Dose Route Frequency  albuterol-ipratropium (DUO-NEB) 2.5 MG-0.5 MG/3 ML  3 mL Nebulization Q6H PRN  
 oxyCODONE IR (ROXICODONE) tablet 5 mg  5 mg Oral Q4H PRN  
 glucose chewable tablet 16 g  4 Tab Oral PRN  
 dextrose (D50W) injection syrg 12.5-25 g  12.5-25 g IntraVENous PRN  
 glucagon (GLUCAGEN) injection 1 mg  1 mg IntraMUSCular PRN  
 HYDROcodone-acetaminophen (NORCO) 5-325 mg per tablet 1 Tab  1 Tab Oral Q6H PRN  
 0.9% sodium chloride infusion 250 mL  250 mL IntraVENous PRN  
 fentaNYL citrate (PF) injection 12.5 mcg  12.5 mcg IntraVENous Q4H PRN  
 [Held by provider] metoprolol tartrate (LOPRESSOR) tablet 12.5 mg  12.5 mg Oral BID  
 0.9% sodium chloride infusion 250 mL  250 mL IntraVENous PRN  
 ALPRAZolam (XANAX) tablet 0.25 mg  0.25 mg Oral QHS PRN  
 sodium chloride (NS) flush 5-40 mL  5-40 mL IntraVENous PRN  
 acetaminophen (TYLENOL) tablet 650 mg  650 mg Oral Q6H PRN  Or  
 acetaminophen (TYLENOL) suppository 650 mg  650 mg Rectal Q6H PRN  
 [Held by provider] heparin (porcine) injection 5,000 Units  5,000 Units SubCUTAneous Q8H  
 
 Social History: 
Social History Tobacco Use  Smoking status: Former Smoker Types: Cigarettes Quit date: 1955 Years since quittin.4  Smokeless tobacco: Never Used Substance Use Topics  Alcohol use: Not Currently Comment: rare Family History: 
Family History Problem Relation Age of Onset  Heart Disease Mother  Heart Disease Father   
      age 55  
 Heart Disease Brother  Stroke Brother  Cancer Sister Colon Cancer  Diabetes Sister  Heart Disease Sister  Heart Disease Sister  Diabetes Sister  Other Brother MVA  Heart Disease Brother  Heart Disease Son   
      age 52 Review of Systems: 
 
Constitutional: negative fever, negative chills, negative weight loss Eyes:   negative visual changes ENT:   negative sore throat, tongue or lip swelling Respiratory:  negative cough, negative dyspnea Cards:  negative for chest pain, palpitations, lower extremity edema GI:   See HPI 
:  negative for frequency, dysuria Integument:  negative for rash and pruritus Heme:  negative for easy bruising and gum/nose bleeding Musculoskel: negative for myalgias,  back pain and muscle weakness Neuro: negative for headaches, dizziness, vertigo Psych:  negative for feelings of anxiety, depression Objective:  
 
Patient Vitals for the past 8 hrs: 
 BP Temp Pulse Resp SpO2 Weight 21 0904      78.3 kg (172 lb 9.6 oz) 21 0836 (!) 109/58 97.9 °F (36.6 °C) (!) 112 26 (!) 83 %   
21 0427 (!) 94/59 97.2 °F (36.2 °C) (!) 110 28 99 %   
 
 0701 -  1900 In: -  
Out: 200 [Urine:200] 1901 -  0700 In: 400 [P.O.:300; I.V.:100] Out: 1530 [KHGUX:2644] PHYSICAL EXAM: 
General appearance: sleeping, confused when awakened Skin: Pale HEENT: Sclerae anicteric. Adonna Dayhoff Respiratory: Comfortable breathing with no accessory muscle use GI: Abdomen nondistended, soft, and nontender. Normal active bowel sounds. Neurological: Disoriented confused upon awakening Psychiatric: Confused Data Review Recent Labs 05/18/21 
0250 05/17/21 2216 WBC 22.1* 17.9* HGB 8.3* 8.6* HCT 26.8* 28.3*  
 199 Recent Labs 05/18/21 
0250 05/17/21 2216 * 127* K 4.2 4.9  
CL 94* 93* CO2 18* 14*  
BUN 31* 29* CREA 1.97* 1.82* GLU 71 176* PHOS 4.0 4.2 CA 8.5 8.5 Recent Labs 05/18/21 
0250 05/17/21 2216 * 140* TP 5.5* 5.7* ALB 3.0* 3.1*  
GLOB 2.5 2.6 LPSE 122  -- No results for input(s): INR, PTP, APTT, INREXT in the last 72 hours. Imaging studies reviewed Assessment / Plan :  
 
Occult positive stool Ms. Tessa Charles was seen today in consultation for occult positive stool. She is confused this morning and unable to answer simple questions. Was last seen in our offices (last appt 4/2019 with Dr. Radha Cristina) for longstanding GERD and collagenous colitis diagnosed on colonoscopy in 2012. Symtpoms mostly controlled on fiber supplement. The colonoscopy in 2012 was also remarkable for internal hemorrhoids which may be etiology of occult blood. Her GERD is typcally controlled with PPI and carafate as needed. There has been no chava blood pr rectum; hgb today is 8.3 (8.6, 7.2 prior). This morning, decision was made to provide only comfort measures and hospice is consulted. Our role in pursuing further evaluation is therefore limited. If there is a change in the decision to pursue hospice, we would continue to monitor hemoglobin and for signs and symptoms of active bleeding. We could consider endoscopic evaluation although given her age and medical condition, the benefits vs risks would need to be carefully weighed. Thank you for the kind consultation. We will sign off at this time however please do not hesitate to contact us if there are any other GI issues this admission.   
 
Patient Active Hospital Problem List:  
Active Problems: 
  [facility] (5/6/2021) Goals of care, counseling/discussion () Pain () Weakness () Advanced age () Constipation, unspecified constipation type () I have examined the patient. I have reviewed the chart and agree with the documentation recorded by the NP, including the assessment, treatment plan, and disposition.  
 
 
Nadeen Rodriguez MD

## 2021-05-18 NOTE — WOUND CARE
WOCN Note:  
  
Follow up for VAC change. 5.10.2021 S/P Left leg arteriogram with angioplasty; debridement of left leg wound. 
  
Assessed in room 423 with Dr Caron Mcgee reviewed. Admitted DX:  Leg ulcer, left 
       
Past Medical History:  
Diagnosis Date  Arthritis    
 Cancer [facility])    
  breast    (leg,ear,arm-skin cancer)  GERD (gastroesophageal reflux disease)    
 Hypertension    
 Other ill-defined conditions(799.89)    
  glaucoma  Other ill-defined conditions(799.89)    
  cellulitis left arm  Other ill-defined conditions(799.89)    
  carpal tunnel syndrome left hand  Other ill-defined conditions(799.89)    
  vertigo  Ovarian cancer ([facility] [address] 11/2020  
  
Assessment:  
Patient is alert, restless and requires assist of 2 with repositioning.   
Bed: foam mattress Patient reports pain to wound.  RN premedicated. Heels offloaded with pillows. Heels intact and boggy with blanching erythema. 
  
1. POA Left low leg, full thickness wound: 20 x 9 x 0.8 cm  50% yellow 50% pink with tibia, muscle and tendon exposure.  Moderate serosang exudate; no odor. Periwound without erythema. There is also a 1.5 x 1 x 0 cm black ulceration distally.  Margins are open.  Cleansed with saline; skin prep the erick wound; applied Puracol Plus (collagen with silver); non adherent; initiated NPWT at 100 mmHg using 5 white foam and 1 black to offload track pad. 
  
 
 
Wound, Pressure Prevention & Skin Care Recommendations:   
1. Minimize layers of linen/pads under patient to optimize support surface.   
2.  Turn/reposition approximately every 2 hours and offload heels. 3.  Manage moisture/ Keep skin folds clean and dry.  
4.  Left low leg wound: Continue NPWT at 100 mmHg; change twice weekly. 5.  Heels:  Venelex TID 6.   Specialty bed:  Delta [facility] 
  
Transition of Care: 
Plan to follow Friday for [address] YUMIKO Damon RN [address] Inpatient Wound Care 
Available on Perfect Serve Pager [phone] Office [phone]

## 2021-05-18 NOTE — PROGRESS NOTES
Vascular: 
 
Leg wound evaluated. Looks OK but not improving in the way one would expect or hope - consistent with her overall condition. Plan to continue VAC.

## 2021-05-18 NOTE — PROGRESS NOTES
SAGAR PLAN: 
RUR-33% Disposition-TBD Transport-[facility] 
F/U-PCP/Specialist 
 
Referral sent to [facility]. Patient to transition to comfort care. Fareed Gomez MSA, RN, CRM 
 
4:15 pm CM met with patient's gamal Ngo [phone] to discuss transition of care. She said she would prefer that patient remains at [facility] for hospice care to hospice at home. CM informed her that [facility] will be the one to evaluate patient and determines if patient meets the criteria for Inpatient Hospice vs Home Hospice. CM provided support.   
Fareed Gomez MSA, RN, CRM

## 2021-05-18 NOTE — PROGRESS NOTES
Pharmacist Note - Vancomycin Dosing Consult provided for this 80 y.o. female for indication of sepsis. Antibiotic regimen(s): Cefepime and Vancomycin Patient on vancomycin PTA? NO Recent Labs 21 
0250 21 
2216 21 
4829 WBC 22.1* 17.9* 11.2*  
CREA 1.97* 1.82* 1.42* BUN 31* 29* 23* Frequency of BMP: daily Height: 162.6 cm Weight: 72.9 kg Est CrCl: 17 ml/min Temp (24hrs), Av.5 °F (36.4 °C), Min:97.2 °F (36.2 °C), Max:97.7 °F (36.5 °C) Cultures:blood Goal trough = 15 - 20 mcg/mL Therapy will be initiated with a loading dose of 1750 mg IV x 1. Pharmacy to follow patient daily and order levels / make dose adjustments as appropriate.

## 2021-05-18 NOTE — PROGRESS NOTES
Physical Therapy Chart reviewed and note that goals of care is shifting to comfort measures with hospice consult. We will sign off at this time. Please reconsult if her needs change and we will be happy to follow up.  
 
Xavier Soliman, PT

## 2021-05-18 NOTE — PROGRESS NOTES
Critical Care Documentation Name: Obie Rodriguez YOB: 1927 MRN: [id number] Admission Date: 5/6/2021  1:42 PM 
 
Date of service: 5/17/2021 Active Diagnoses: 
 
Hospital Problems  Date Reviewed: 3/29/2021 Codes Class Noted POA Constipation, unspecified constipation type ICD-10-CM: K59.00 ICD-9-CM: 564.00  Unknown Unknown Goals of care, counseling/discussion ICD-10-CM: Z71.89 ICD-9-CM: V65.49  Unknown Unknown Pain ICD-10-CM: R52 ICD-9-CM: 780.96  Unknown Unknown Weakness ICD-10-CM: R53.1 ICD-9-CM: 780.79  Unknown Unknown Advanced age ICD-10-CM: R48 
ICD-9-CM: 56  Unknown Unknown Leg ulcer, left ([facility] [address] ICD-10-CM: Q10.922 ICD-9-CM: 707.10  5/6/2021 Unknown Critical Illness Complaint: Hypoxia, respiratory distress Clinical Presentation: 
Rapid response called 2143 for increased work of breathing RR 30-40, hypoxia to mid 70s despite supplemental oxygen. On arrival of rapid response team HR afib 140s, RR 38, SPO2 74 - though unreliable waveform. Transferred out of ICU today where she was being monitor for hypotension. Yesterday developed increased oxygen demand, diagnosed with bilateral pneumonia, likely pulmonary edema. Had been given bumex past two days. Physical Exam 
Vitals signs and nursing note reviewed. Constitutional:   
   General: She is in acute distress. Appearance: She is ill-appearing. She is not diaphoretic. HENT:  
   Mouth/Throat:  
   Mouth: Mucous membranes are moist.  
Eyes:  
   General: No scleral icterus. Extraocular Movements: Extraocular movements intact. Pupils: Pupils are equal, round, and reactive to light. Cardiovascular:  
   Rate and Rhythm: Tachycardia present. Rhythm irregular. Pulses: Normal pulses. Pulmonary:  
   Effort: Respiratory distress present. Breath sounds: No stridor. No wheezing.   
   Comments: Increased work of breathing, lungs diminished throughout Skin: 
   General: Skin is warm and dry. Capillary Refill: Capillary refill takes less than 2 seconds. Neurological:  
   Mental Status: She is alert and oriented to person, place, and time. Data Reviewed: All diagnostic labs and studies have been reviewed. Medications/Therapies Administered: Anxiolytics: [  ] yes [  ] no Antiarrhythmics: [  ] yes [  ] no Antihypertensives: [  ] yes [  ] no IVFs: [  ] yes [  ] no Blood Transfusion: [  ] yes [  ] no Imaging Ordered and Reviewed:  
[ x ] CXR   
[  ] CT Scan   
[  ] MRI   
[  ] Ultrasound Prognosis / Plan of Care Discussed With: 
[ x ] Patient [  ] Family Members / Surrogate Decision-makers (patient unable / incompetent to give history and/or make treatment decisions, and such discussion is medically necessary) [ x ] Nursing Staff [  ] Specialist Physicians Assessment and Plan: Hypoxia, respiratory distress - Increased work of breathing, tachypnea, unable to obtain oxygen saturation 
- Stat portable CXR - concern worsening pneumonia vs pulmonary edema 
- 1mg Bumex now - Metoprolol 2.5mg given for rapid afib - HR promptly improved to low 100s. No improvement in work of breathing 
- Stat labs - CBC, CMP, Mag, ABG, D-dimer - at risk for PE given admission, hx afib, off AC. If elevated will need to consider CTA PE study when patient stable. - Patient confirms that she is DNR status, does not wish to be intubated. Agreeable to NIPPV/HFNC if needed 
- 1mg morphine given for dyspnea, work of breathing improved, appears fatigued, remains unable to obtain reliable oxygen saturation Addendum: 
- Labs resulted: CBC with increase in WBC 17k, HGB improved from prior day. CMP with worsening AVNI 1.82, anion gap metabolic acidosis bicarb 14, gap 20. D-dimer mildly elevated. - Sig difference in bicarb from serum vs ABG, request to be repeated - Add lactic acid, KUB 
 
0100 
- Niece at bedside, understands current condition and concern for underlying infectious process - Patient currently appears much more comfortable, work of breathing has improved, oxygen saturation reading at 98% on NC. C/o ongoing abdominal pain - KUB pending 
 
0400 
- Lactic acid 11.2. Abx coverage expanded. Paired cultures and procalcitonin added - Discussed with Tracie Tineo NP with ICU, requested evaluation for severe lactic acidosis, recently transferred out of ICU. Concern for rapid decompensation. Critical Care Attestation: This patient is unstable and critically ill. Due to a high probability of clinically significant, life threatening deterioration, the patient required my highest level of preparedness to intervene emergently and I personally spent this critical care time directly and personally managing the patient, and was immediately available to the patient. This critical care time included obtaining a history; examining the patient; pulse oximetry; ordering and review of labs/studies; arranging urgent treatment with development of a management plan; evaluation of patient's response to treatment; frequent reassessment; and, discussions with other providers and/or family. This critical care time was performed to assess and manage the high probability of imminent, life-threatening deterioration that could result in multi-organ failure and death. Time Spent:  
 
I personally spent 21 minutes in providing critical care time. It was exclusive of separately billable procedures, treating other patients, and teaching time.  
 
Teri Gonzalez NP 
5/17/2021 
22:04 PM

## 2021-05-18 NOTE — HOSPICE
[facility] 
[phone] Patient Name: Debbie Sanchez YOB: 1927 Age: 80 y.o. [facility] LCS Note:  Hospice consult received, reviewing chart. Will follow up with Unit Nurse and Care Manager to discuss plan of care, patient status and discharge disposition within the hour. 16: 30 This LCSW met with pts gamal Sammyeloy Silva at bedside. Pt appears restless. Colleen العراقي rang call mcneil. Discussed Hospice philosophy, general plan of care, levels of care, services and on call procedures. Family meeting with Colleen العراقي and her sister pts THERESA Mccord ( via phone) is scheduled for tomorrow am between 10 and 10: 30 am to discuss hospice services and assess for level of care inpt vs home. Thank you for the opportunity to be of service to Ms. Radha Carias and her family. Jenifer Maldonado [facility], MSG [facility] [phone]

## 2021-05-18 NOTE — PROGRESS NOTES
ICU Brief: I was called to consul on Ms Bertha Quick as her lactic acid was 11, procal 2.93 and WBC was elevated. I have seen Ms Dianne Mondragon the last several days. Ms Dianne Mondragon had just had her wound changed and was in a lot of pain. Her niece was at her bedside. Ms Dianne Mondragon had a significantly altered mental status from the day prior. She was moaning in pain. Her niece said that she had been very altered and asking to die. We discussed her entire hospital course, wishes prior to hospitalization, and goals of care at length. She said that Ms Dianne Mondragon has been saying that she does not wish to continue on in her current state. We discussed that I had recently started treatment for HAP and that she was continuing to worsen despite this. We discussed re-admission to ICU for workup of increased procal/lactic acid. I explained concern for sepsis and potential etiologies. She said that she did not feel Ms Dianne Mondragon would want to continue on in this way and would not want to continue on suffering. The overall decision was made to transition to comfort measures. I placed a consult for hospice Dori Paul NP

## 2021-05-18 NOTE — PROGRESS NOTES
Physician Progress Note Lydia Mcclain 
CSN #:                  [id number] :                       1927 ADMIT DATE:       2021 1:42 PM 
[address] [place] DATE: 
RESPONDING 
PROVIDER #:        SADE Corrigan MD 
 
 
 
 
QUERY TEXT: 
 
Pt admitted with Leg Wound, CVA, and has developed PN. Noted documentation on  of Resp Distress with increased work of breathing. If possible, please document in the progress notes and discharge summary if you are evaluating and/or treating any of the following: The medical record reflects the following: 
Risk Factors: pt has developed PNA and Pulmonary edema Clinical Indicators: Rapid Response was called due to pt having increased SOB, tachycardic with RR 30-40, O2 Sat dropped down into 70's while on O2. Documentation of 'Resp Distress with increased work of breathing'. Treatment: Bumex IV for pulmonary edema, Duonebs, O2 of 15L per NRB, Ativan IV prn for anxiety, pt declined intubation. Thank you, if you have any questions please e-mail me at [email or web address] 
[phone] Options provided: 
-- Acute respiratory failure with hypoxia 
-- Acute on chronic respiratory failure with hypoxia 
-- Other - I will add my own diagnosis -- Disagree - Not applicable / Not valid -- Disagree - Clinically unable to determine / Unknown 
-- Refer to Clinical Documentation Reviewer PROVIDER RESPONSE TEXT: 
 
This patient is in acute respiratory failure with hypoxia.  
 
Query created by: Naa Vargas on 2021 10:52 AM 
 
 
Electronically signed by:  SADE Corrigan MD 2021 5:18 PM

## 2021-05-19 NOTE — PROGRESS NOTES
Hospitalist Progress Note Veronica Hopkins MD 
Answering service: [phone] OR [phone] from in house phone Date of Service:  2021 NAME:  Sophie Donnelly :  1927 MRN:  [id number] Admission Summary:  
Per ICU Note: Progress Note: 2021   
  
\" Linda Brady a 80 y. o. female with hx aortic stenosis, pulm htn, GERD, glaucoma, remote breast CA, HTN, and recent ovarian cancer with port-a-cath s/p chemo times two who presented to the ED on  with complications of a traumatic wound.  She had skin cancer on her LLE and subsequently sustained a traumatic wound after a fall in the same area.  The wound had progressed, and she was  pending and appt with surgeon Dr. Melina Godinez today.  The nurse from the rehab facility where patient was being discharged sent a picture to the clinic, and patient was advised to come to the hospital instead for management of wound. 
  
In the ED, VSS. Labs are significant for lactate of 4, CRP 6.32, ESR 34, and normocytic anemia with Hgb 10.4. L tib/fib XR with pretibial soft tissue ulceration and no noted osseous abnormality. She was started on vancomycin and ceftriaxone.   
  
On 5/10 patient to OR for LLE arteriogram, L fem and popliteal angioplasty, and debridement of wound. Patient received 2 units of PRBCs given drop in hgb to 6. 1.   
  
On , she developed acute aphasia and a code stroke was called. NIHSS was 7. CT head without acute infarction or perfusion deficit.  She was transferred to the ICU later on  for BP management to keep SBP >160 with vasopressors. MRI on  with possible punctate L cerebral acute infarct. She has been off levophed since the am of 5/15. Diuresed overnight 5/15-. 
  
 patient developed acute onset of dyspnea with new R sided infiltrate.  Antibiotics broadened to cefepime empirically.  Diuresed with bumex and diuril.  \" 
  
Hospitalist:   
: Seen and examined patient sitting up in bed. States that she is doing fine today but has not had a bowel movement in more than a month. Left lower extremity dressing noted with wound VAC that is intact. She states that she is in the hospital because she fell out of the bed and cut her leg. SBP >  90 on monitor (goal of SBP greater than 80). Patient to be transferred out of the ICU today Interval history / Subjective:  
  Patient seen for Follow up of leg wound Sub: 5/19/20 Patient comfortable 
talked with gamal blackburn is to go home tomorrow with hospice Assessment & Plan: As per transfer note (5/17) Concern for pneumonia: New development of airspace opacities, R >L, concern for aspiration pneumonia. Broadened abx to cefepime.  Had been diuresing prior to this, no episode of HTN prior to this, so less likely flash pulmonary edema.  
- Holding on further diuresis due to AVNI 
- Continue cefepime - Monitor O2 sats and titrate O2 to keep sats >90% Hospice now  
  
L leg wound s/p L leg arteriogram w balloon angioplasty and debridement: 
- Care per vascular surgery - s/p cefepime  
- Continue wound vac (placed on 5/14) 
  
Hypotension, 
- Required levo which has been stopped since 05/15 
- Hold BB and further diuresis  
 
  
HFrEF c/b severe pulmonary HTN w/o exacerbation: Underwent diuresis overnight with Bumex + diuril 
- Holding BB due to hypotension 
- Holding on further diuresis due to AVNI 
  
AVNI: Baseline normal BUN/Cr, currently 23/1.42 
- Holding on further diuresis 
  
Acute ischemic CVA: MRI with possible punctate L cerebral acute infarct on 5/12 
- Neurology following - BP support with midodrine 
- PT/OT 
- Stroke eduction 
- NIS consulted for basilar artery aneurysm, no intervention recommended 
  
Acute on chronic anemia: Baseline NIKKI, superimosed anemia of critical illness - 2 units pRBCs transfused this admission - Monitor CBC.  Transfuse for hgb <7.0  
  
Reactive airway disease: Continue brovana and pulmicort 
  
Chronic atrial fibrillation: Holding BB in setting of hypotension 
  
Anxiety: Xanax 0.25mg HS 
  
Glaucoma: Xalantan and timolol 
  
Deconditioning: PT/OT 
- Consult case management for discharge planning.  
  
Hx Breast cancer and active ovarian cancer: No acute inpatient management Code status: dnr (comform measures) DVT prophylaxis:heparin sc (held due to comfort measures) Care Plan discussed with: Nurse Disposition: TBD Hospital Problems  Date Reviewed: 3/29/2021 Codes Class Noted POA Confusion ICD-10-CM: R41.0 ICD-9-CM: 298.9  Unknown Unknown Constipation, unspecified constipation type ICD-10-CM: K59.00 ICD-9-CM: 564.00  Unknown Unknown Goals of care, counseling/discussion ICD-10-CM: Z71.89 ICD-9-CM: V65.49  Unknown Unknown Pain ICD-10-CM: R52 ICD-9-CM: 780.96  Unknown Unknown Weakness ICD-10-CM: R53.1 ICD-9-CM: 780.79  Unknown Unknown Advanced age ICD-10-CM: R48 
ICD-9-CM: 56  Unknown Unknown Leg ulcer, left ([facility] [address] ICD-10-CM: Y84.897 ICD-9-CM: 707.10  5/6/2021 Unknown Review of Systems: A comprehensive review of systems was negative except for that written in the HPI. Vital Signs:  
 Last 24hrs VS reviewed since prior progress note. Most recent are: 
Visit Vitals BP (!) 110/48 (BP 1 Location: Right upper arm, BP Patient Position: At rest;Lying) Pulse (!) 108 Temp 97.7 °F (36.5 °C) Resp 24 Ht 5' 4\" (1.626 m) Wt 71.1 kg (156 lb 11.2 oz) SpO2 99% BMI 26.90 kg/m² Intake/Output Summary (Last 24 hours) at 5/19/2021 1357 Last data filed at 5/19/2021 0630 Gross per 24 hour Intake  Output 400 ml Net -400 ml Physical Examination:  
I had a face to face encounter with this patient and independently examined them on May 19, 2021 as outlined below: 
     
General:          patient confused , sick looking patient HEENT:           Atraumatic, MMM           
Neck:               Supple, symmetrical,  thyroid: non tender Lungs:             Clear to auscultation bilaterally.  No Wheezing or Rhonchi. No rales. Heart:              Regular  rhythm,  No  murmur   No edema Abdomen:       Soft, non-tender. Not distended.  Bowel sounds normal 
 
 
  
 
Data Review:  
 Review and/or order of clinical lab test 
Review and/or order of tests in the radiology section of [facility] Review and/or order of tests in the medicine section of [facility] Labs:  
 
Recent Labs 05/18/21 0250 05/17/21 2216 WBC 22.1* 17.9* HGB 8.3* 8.6* HCT 26.8* 28.3*  
 199 Recent Labs 05/18/21 0250 05/17/21 2216 05/17/21 
1942 * 127* 132* K 4.2 4.9 3.4*  
CL 94* 93* 96* CO2 18* 14* 26 BUN 31* 29* 23* CREA 1.97* 1.82* 1.42* GLU 71 176* 100 CA 8.5 8.5 8.1*  
MG 2.0 2.1 1.8 PHOS 4.0 4.2 3.2 Recent Labs 05/18/21 0250 05/17/21 2216 ALT 76 76 * 140* TBILI 1.4* 1.3* TP 5.5* 5.7* ALB 3.0* 3.1*  
GLOB 2.5 2.6 LPSE 122  -- No results for input(s): INR, PTP, APTT, INREXT, INREXT in the last 72 hours. No results for input(s): FE, TIBC, PSAT, FERR in the last 72 hours. Lab Results Component Value Date/Time Folate 37.2 (H) 04/08/2021 03:12 AM  
  
Recent Labs 05/18/21 
0006 PH 7.46* PCO2 22* PO2 297* Recent Labs 05/17/21 
0334 05/16/21 
1823 TROIQ 0.19* 0.26* Lab Results Component Value Date/Time Cholesterol, total 54 05/12/2021 03:39 AM  
 HDL Cholesterol 28 05/12/2021 03:39 AM  
 LDL,Direct 85 10/30/2014 09:36 AM  
 LDL, calculated 11 05/12/2021 03:39 AM  
 Triglyceride 75 05/12/2021 03:39 AM  
 CHOL/HDL Ratio 1.9 05/12/2021 03:39 AM  
 
Lab Results Component Value Date/Time  Glucose (POC) 132 (H) 05/17/2021 09:16 PM  
 Glucose (POC) 124 (H) 05/17/2021 04:47 PM  
 Glucose (POC) 155 (H) 05/17/2021 12:07 PM  
 Glucose (POC) 104 05/17/2021 08:50 AM  
 Glucose (POC) 143 (H) 05/16/2021 09:45 PM  
 Lab Results Component Value Date/Time Color YELLOW/STRAW 05/12/2021 07:38 AM  
 Appearance CLEAR 05/12/2021 07:38 AM  
 Specific gravity 1.012 05/12/2021 07:38 AM  
 pH (UA) 5.0 05/12/2021 07:38 AM  
 Protein Negative 05/12/2021 07:38 AM  
 Glucose Negative 05/12/2021 07:38 AM  
 Ketone Negative 05/12/2021 07:38 AM  
 Bilirubin Negative 05/12/2021 07:38 AM  
 Urobilinogen 0.2 05/12/2021 07:38 AM  
 Nitrites Negative 05/12/2021 07:38 AM  
 Leukocyte Esterase Negative 05/12/2021 07:38 AM  
 Epithelial cells FEW 05/12/2021 07:38 AM  
 Bacteria Negative 05/12/2021 07:38 AM  
 WBC 0-4 05/12/2021 07:38 AM  
 RBC 0-5 05/12/2021 07:38 AM  
 
 
 
Medications Reviewed:  
 
Current Facility-Administered Medications Medication Dose Route Frequency  morphine injection 2 mg  2 mg IntraVENous Q4H PRN  
 LORazepam (ATIVAN) injection 1 mg  1 mg IntraVENous Q4H PRN  
 albuterol-ipratropium (DUO-NEB) 2.5 MG-0.5 MG/3 ML  3 mL Nebulization Q6H PRN  
 glucose chewable tablet 16 g  4 Tablet Oral PRN  
 dextrose (D50W) injection syrg 12.5-25 g  12.5-25 g IntraVENous PRN  
 glucagon (GLUCAGEN) injection 1 mg  1 mg IntraMUSCular PRN  
 0.9% sodium chloride infusion 250 mL  250 mL IntraVENous PRN  
 [Held by provider] metoprolol tartrate (LOPRESSOR) tablet 12.5 mg  12.5 mg Oral BID  
 0.9% sodium chloride infusion 250 mL  250 mL IntraVENous PRN  
 sodium chloride (NS) flush 5-40 mL  5-40 mL IntraVENous PRN  
 acetaminophen (TYLENOL) tablet 650 mg  650 mg Oral Q6H PRN Or  
 acetaminophen (TYLENOL) suppository 650 mg  650 mg Rectal Q6H PRN  
 [Held by provider] heparin (porcine) injection 5,000 Units  5,000 Units SubCUTAneous Q8H  
 
______________________________________________________________________ EXPECTED LENGTH OF STAY: 5d 4h 
ACTUAL LENGTH OF STAY:          Claven Scheuermann, MD

## 2021-05-19 NOTE — PROGRESS NOTES
Problem: Falls - Risk of 
Goal: *Absence of Falls Description: Document [place] Fall Risk and appropriate interventions in the flowsheet. Outcome: Progressing Towards Goal 
Note: Fall Risk Interventions: 
Mobility Interventions: Bed/chair exit alarm Mentation Interventions: Door open when patient unattended, More frequent rounding Medication Interventions: Patient to call before getting OOB, Teach patient to arise slowly Elimination Interventions: Call light in reach, Bed/chair exit alarm, Stay With Me (per policy), Patient to call for help with toileting needs, Toileting schedule/hourly rounds History of Falls Interventions: Door open when patient unattended, Bed/chair exit alarm, Room close to nurse's station, Evaluate medications/consider consulting pharmacy Problem: Patient Education: Go to Patient Education Activity Goal: Patient/Family Education Outcome: Progressing Towards Goal 
  
Problem: Patient Education: Go to Patient Education Activity Goal: Patient/Family Education Outcome: Progressing Towards Goal 
  
Problem: Patient Education: Go to Patient Education Activity Goal: Patient/Family Education Outcome: Progressing Towards Goal 
  
Problem: Pressure Injury - Risk of 
Goal: *Prevention of pressure injury Description: Document Dick Scale and appropriate interventions in the flowsheet. Outcome: Progressing Towards Goal 
Note: Pressure Injury Interventions: 
Sensory Interventions: Assess changes in LOC, Check visual cues for pain, Float heels, Keep linens dry and wrinkle-free, Minimize linen layers, Pad between skin to skin, Pressure redistribution bed/mattress (bed type) Moisture Interventions: Absorbent underpads, Check for incontinence Q2 hours and as needed, Internal/External urinary devices, Minimize layers Activity Interventions: PT/OT evaluation, Assess need for specialty bed Mobility Interventions: Float heels, Pressure redistribution bed/mattress (bed type), Turn and reposition approx. every two hours(pillow and wedges) Nutrition Interventions: Document food/fluid/supplement intake Friction and Shear Interventions: Lift sheet, Lift team/patient mobility team, Minimize layers Problem: Patient Education: Go to Patient Education Activity Goal: Patient/Family Education Outcome: Progressing Towards Goal 
  
Problem: Patient Education: Go to Patient Education Activity Goal: Patient/Family Education Outcome: Progressing Towards Goal 
  
Problem: Patient Education: Go to Patient Education Activity Goal: Patient/Family Education Outcome: Progressing Towards Goal 
  
Problem: TIA/CVA Stroke: 0-24 hours Goal: Minimize risk of bleeding post-thrombolytic infusion Outcome: Progressing Towards Goal 
Goal: Monitor for complications post-thrombolytic infusion Outcome: Progressing Towards Goal 
Goal: Psychosocial 
Outcome: Progressing Towards Goal 
Goal: *Hemodynamically stable Outcome: Progressing Towards Goal 
Goal: *Neurologically stable Description: Absence of additional neurological deficits Outcome: Progressing Towards Goal 
Goal: *Verbalizes anxiety and depression are reduced or absent Outcome: Progressing Towards Goal 
  
Problem: TIA/CVA Stroke: Day 2 Until Discharge Goal: Off Pathway (Use only if patient is Off Pathway) Outcome: Progressing Towards Goal 
Goal: Activity/Safety Outcome: Progressing Towards Goal 
Goal: Diagnostic Test/Procedures Outcome: Progressing Towards Goal 
Goal: Nutrition/Diet Outcome: Progressing Towards Goal 
Goal: Discharge Planning Outcome: Progressing Towards Goal 
Goal: Medications Outcome: Progressing Towards Goal 
Goal: Respiratory Outcome: Progressing Towards Goal 
Goal: Treatments/Interventions/Procedures Outcome: Progressing Towards Goal 
Goal: Psychosocial 
Outcome: Progressing Towards Goal 
Goal: *Verbalizes anxiety and depression are reduced or absent Outcome: Progressing Towards Goal 
Goal: *Absence of aspiration Outcome: Progressing Towards Goal 
Goal: *Absence of deep venous thrombosis signs and symptoms(Stroke Metric) Outcome: Progressing Towards Goal 
Goal: *Optimal pain control at patient's stated goal 
Outcome: Progressing Towards Goal 
Goal: *Tolerating diet Outcome: Progressing Towards Goal 
Goal: *Ability to perform ADLs and demonstrates progressive mobility and function Outcome: Progressing Towards Goal 
Goal: *Stroke education continued(Stroke Metric) Outcome: Progressing Towards Goal 
  
Problem: Ischemic Stroke: Discharge Outcomes Goal: *Verbalizes anxiety and depression are reduced or absent Outcome: Progressing Towards Goal 
Goal: *Verbalize understanding of risk factor modification(Stroke Metric) Outcome: Progressing Towards Goal 
Goal: *Hemodynamically stable Outcome: Progressing Towards Goal 
Goal: *Absence of aspiration pneumonia Outcome: Progressing Towards Goal 
Goal: *Aware of needed dietary changes Outcome: Progressing Towards Goal 
Goal: *Verbalize understanding of prescribed medications including anti-coagulants, anti-lipid, and/or anti-platelets(Stroke Metric) Outcome: Progressing Towards Goal 
Goal: *Tolerating diet Outcome: Progressing Towards Goal 
Goal: *Aware of follow-up diagnostics related to anticoagulants Outcome: Progressing Towards Goal 
Goal: *Ability to perform ADLs and demonstrates progressive mobility and function Outcome: Progressing Towards Goal 
Goal: *Absence of DVT(Stroke Metric) Outcome: Progressing Towards Goal 
Goal: *Absence of aspiration Outcome: Progressing Towards Goal 
Goal: *Optimal pain control at patient's stated goal 
Outcome: Progressing Towards Goal 
Goal: *Home safety concerns addressed Outcome: Progressing Towards Goal 
Goal: *Describes available resources and support systems Outcome: Progressing Towards Goal 
Goal: *Verbalizes understanding of activation of EMS(911) for stroke symptoms(Stroke Metric) Outcome: Progressing Towards Goal 
Goal: *Understands and describes signs and symptoms to report to providers(Stroke Metric) Outcome: Progressing Towards Goal 
Goal: *Neurolgocially stable (absence of additional neurological deficits) Outcome: Progressing Towards Goal 
Goal: *Verbalizes importance of follow-up with primary care physician(Stroke Metric) Outcome: Progressing Towards Goal 
Goal: *Smoking cessation discussed,if applicable(Stroke Metric) Outcome: Progressing Towards Goal 
Goal: *Depression screening completed(Stroke Metric) Outcome: Progressing Towards Goal 
  
Problem: Nutrition Deficit Goal: *Optimize nutritional status Outcome: Progressing Towards Goal 
  
Problem: Breathing Pattern - Ineffective Goal: *Absence of hypoxia Outcome: Progressing Towards Goal 
Goal: *Use of effective breathing techniques Outcome: Progressing Towards Goal 
Goal: *PALLIATIVE CARE:  Alleviation of Dyspnea Outcome: Progressing Towards Goal 
  
Problem: Patient Education: Go to Patient Education Activity Goal: Patient/Family Education Outcome: Progressing Towards Goal

## 2021-05-19 NOTE — HOSPICE
[facility] Good Help to Those in Need 
[phone] Patient Name: Dot Moore YOB: 1927 Age: 80 y.o. [facility] LCSW Note:  Hospice consult noted. Chart reviewed. Plan of care discussed with patients nurse & care manager. In to meet with pts gamal Garcia and [facility] MPOA. Discussed Hospice philosophy, general plan of care, levels of care, services and on call procedures. Pt to be admitted at home. Admission is scheduled for 3 pm [facility] AT [place] 5/20/2021.  has arranged transportation for 12 noon on [facility] AT [place] 5/20/2021. Consents Reviewed: Yes Person Reviewed/Signed with: [facility] Right to NCD Reviewed: Yes NCD Requested: Yes Admission Nurse/Intake Notified: Yes Planned Start of Care Date: 5/20/2021 Hospice Witness Representative: MSG Jenifer Macario LCSW, LCSW,  [facility] Worker [phone]

## 2021-05-19 NOTE — PROGRESS NOTES
SAGAR:  Anticipate discharge home with hospice care. Pt/family meeting with [facility] hospice at 10:30am with goal of bringing pt home once equipment secured. CM set up BLS transport home for 4pm today with [facility] to allow for hospice admission to be established.  
 
BRIGHT Lundberg

## 2021-05-19 NOTE — PROGRESS NOTES
Occupational Therapy Completed chart review. Patient is on comfort care and family is meeting with hospice today. Will complete OT orders at this time. Please re- consult if future needs for acute OT arise. Thank you, Randall Smith, OT

## 2021-05-19 NOTE — PROGRESS NOTES
05/18/21 2300 CPAP/BIPAP  
CPAP/BIPAP Start/Stop Off 
(pt comfort care, NARD noted) Device Mode BIPAP  
$$ Bipap Daily (no charge- No EIR)

## 2021-05-19 NOTE — PROGRESS NOTES
Palliative Medicine Consult Juan: [phone]) Patient Name: Gordon Watts YOB: 1927 Date of Initial Consult: 5/12/21 Reason for Consult: Care decisions Requesting Provider: Dr. Michael Parekh Primary Care Physician: Schuyler Shahid MD 
 
 SUMMARY:  
Gordon Watts is a 80 y.o. female with a past history of aortic stenosis, pulmonary hypertension, GERD, glaucoma, breast cancer, ovarian cancer, skin cancer, s/p chemo, hypertension, dyslipidemia, chronic atrial fib, reactive airway disease, and anxiety, who was admitted on 5/6/2021 from home with a diagnosis of left lower extremity wound and sepsis. She presented with an infected left lower leg wound. She underwent an arteriogram, balloon angioplasty, and debridement. Post-operatively, she had some bleeding and became anemic. She then became hypotensive and aphasic. She was moved to ICU and started on pressor support. Her aphasia resolved and she is now back to baseline. Current medical issues leading to Palliative Medicine involvement include: goals of care, ovarian cancer, a-fib, likely new stroke, hypotensive. Social: She lives with her niece Kinsey Martinez, who is her main support person. PALLIATIVE DIAGNOSES:  
1. Goals of care 2. Aphasia, now resolved 3. Hypotension 4. Anemia 5. Left lower leg wound 6. Pain 7. Weakness 8. Impaired mobility 9. Advanced age 10. Constipation 11. Confusion PLAN:  
Patient seen at the bedside without family present. She is resting quietly and awakens to her name being called. She denies pain and appears comfortable. MAR reviewed. She has not used any IV Ativan and required only two doses of IV morphine yesterday. Will continue these PRN for now. Noted per chart that the patient's family is meeting with hospice this morning with a tentative plan of discharging home once the medical equipment is delivered.  
 
Will continue to follow for additional palliative care needs prior to her admission to hospice. GOALS OF CARE / TREATMENT PREFERENCES:  
 
GOALS OF CARE: 
Patient/Health Care Proxy Stated Goals: Comfort TREATMENT PREFERENCES:  
Code Status: DNR Advance Care Planning: 
[x] [facility] Interdisciplinary Team has updated the ACP Navigator with Angelina and Patient Capacity Primary Decision Maker: Carmen Desai - Other Relative - [phone] Advance Care Planning 5/8/2021 Patient's Healthcare Decision Maker is: - Confirm Advance Directive Yes, on file Medical Interventions: Comfort measures Other Instructions:  
Artificially Administered Nutrition: No feeding tube Other: As far as possible, the palliative care team has discussed with patient / health care proxy about goals of care / treatment preferences for patient. HISTORY:  
 
History obtained from: patient, chart CHIEF COMPLAINT: Left leg wound, bleeding, aphasia HPI/SUBJECTIVE: The patient is:  
[x] Verbal and participatory [] Non-participatory due to: She reports feeling okay. She feels back to her normal baseline this morning. She has some pain in her left lower leg. She denies nausea or shortness of breath. Her appetite is okay, she complains that the food is not good here. She is alert and oriented x 4. Clinical Pain Assessment (nonverbal scale for severity on nonverbal patients):  
Clinical Pain Assessment Severity: 0 Location: left lower leg Character: aching Duration: days Frequency: intermittent Activity (Movement): Lying quietly, normal position Duration: for how long has pt been experiencing pain (e.g., 2 days, 1 month, years) Frequency: how often pain is an issue (e.g., several times per day, once every few days, constant) FUNCTIONAL ASSESSMENT:  
 
Palliative Performance Scale (PPS): PPS: 30 
 
 
 PSYCHOSOCIAL/SPIRITUAL SCREENING:  
 
Palliative IDT has assessed this patient for cultural preferences / practices and a referral made as appropriate to needs (Cultural Services, Patient Advocacy, Ethics, etc.) Any spiritual / Yarsanism concerns: 
[] Yes /  [x] No 
 
Caregiver Burnout: 
[] Yes /  [x] No /  [] No Caregiver Present Anticipatory grief assessment:  
[x] Normal  / [] Maladaptive ESAS Anxiety: Anxiety: 0 
 
ESAS Depression:    
 
 
 REVIEW OF SYSTEMS:  
 
Positive and pertinent negative findings in ROS are noted above in HPI. The following systems were [x] reviewed / [] unable to be reviewed as noted in HPI Other findings are noted below. Systems: constitutional, ears/nose/mouth/throat, respiratory, gastrointestinal, genitourinary, musculoskeletal, integumentary, neurologic, psychiatric, endocrine. Positive findings noted below. Modified ESAS Completed by: provider Fatigue: 8 Drowsiness: 6 Pain: 0 Anxiety: 0 Nausea: 0 Anorexia: 4 Dyspnea: 0 Constipation: Yes Stool Occurrence(s): 1 PHYSICAL EXAM:  
 
From RN flowsheet: 
Wt Readings from Last 3 Encounters:  
05/19/21 156 lb 11.2 oz (71.1 kg) 05/12/21 145 lb 8.1 oz (66 kg) 04/08/21 149 lb (67.6 kg) Blood pressure 121/69, pulse (!) 105, temperature 97.2 °F (36.2 °C), resp. rate 23, height 5' 4\" (1.626 m), weight 156 lb 11.2 oz (71.1 kg), SpO2 (P) 94 %. Pain Scale 1: Adult Nonverbal Pain Scale Pain Intensity 1: 0 Pain Onset 1: ongoing Pain Location 1: Leg 
Pain Orientation 1: Left Pain Description 1: Aching Pain Intervention(s) 1: Medication (see MAR) Last bowel movement, if known:  
 
Constitutional: sleeping quietly, appears comfortable, no acute distress Eyes: pupils equal, anicteric ENMT: no nasal discharge, dry mucous membranes Cardiovascular: regular rhythm Respiratory: breathing not labored, symmetric Musculoskeletal: no deformity, no tenderness to palpation Skin: warm, dry, dressing dry/intact to left lower leg Neurologic: following commands, moving all extremities, confused Psychiatric: calm HISTORY:  
 
Active Problems: 
  Leg ulcer, left ([facility] [address] (2021) Goals of care, counseling/discussion () Pain () Weakness () Advanced age () Constipation, unspecified constipation type () Confusion () Past Medical History:  
Diagnosis Date  Arthritis  Cancer ([facility] [address] breast    (leg,ear,arm-skin cancer)  GERD (gastroesophageal reflux disease)  Hypertension  Other ill-defined conditions(799.89)   
 glaucoma  Other ill-defined conditions(799.89)   
 cellulitis left arm  Other ill-defined conditions(799.89)   
 carpal tunnel syndrome left hand  Other ill-defined conditions(799.89)   
 vertigo  Ovarian cancer ([facility] [address] 2020 Past Surgical History:  
Procedure Laterality Date  CARDIAC CATHETERIZATION  1/10/2013  HX APPENDECTOMY  HX BILATERAL MASTECTOMY Bilateral   
 HX CARPAL TUNNEL RELEASE    
 left hand  HX CATARACT REMOVAL    
 bilateral  
 HX COLONOSCOPY  2012  HX KNEE ARTHROSCOPY    
 left  HX OTHER SURGICAL Basal cell skin cancer removed from left leg, left arm and neck  HX OTHER SURGICAL    
 cyst removed from left thigh  HX PARTIAL HYSTERECTOMY  HX OREN AND BSO  HX TONSILLECTOMY  VASCULAR SURGERY PROCEDURE UNLIST    
 vascular blockages removed. Family History Problem Relation Age of Onset  Heart Disease Mother  Heart Disease Father   
      age 55  
 Heart Disease Brother  Stroke Brother  Cancer Sister Colon Cancer  Diabetes Sister  Heart Disease Sister  Heart Disease Sister  Diabetes Sister  Other Brother MVA  Heart Disease Brother  Heart Disease Son   
      age 52 History reviewed, no pertinent family history. Social History Tobacco Use  Smoking status: Former Smoker Types: Cigarettes Quit date: 1955 Years since quittin.4  Smokeless tobacco: Never Used Substance Use Topics  Alcohol use: Not Currently Comment: rare Allergies Allergen Reactions  Augmentin [Amoxicillin-Pot Clavulanate] Hives and Itching  Doxycycline Hives and Itching  Keflex [Cephalexin] Hives and Itching  Monodox [Doxycycline Monohydrate] Other (comments)  Sulfa (Sulfonamide Antibiotics) Rash Current Facility-Administered Medications Medication Dose Route Frequency  morphine injection 2 mg  2 mg IntraVENous Q4H PRN  
 LORazepam (ATIVAN) injection 1 mg  1 mg IntraVENous Q4H PRN  
 albuterol-ipratropium (DUO-NEB) 2.5 MG-0.5 MG/3 ML  3 mL Nebulization Q6H PRN  
 glucose chewable tablet 16 g  4 Tablet Oral PRN  
 dextrose (D50W) injection syrg 12.5-25 g  12.5-25 g IntraVENous PRN  
 glucagon (GLUCAGEN) injection 1 mg  1 mg IntraMUSCular PRN  
 0.9% sodium chloride infusion 250 mL  250 mL IntraVENous PRN  
 [Held by provider] metoprolol tartrate (LOPRESSOR) tablet 12.5 mg  12.5 mg Oral BID  
 0.9% sodium chloride infusion 250 mL  250 mL IntraVENous PRN  
 sodium chloride (NS) flush 5-40 mL  5-40 mL IntraVENous PRN  
 acetaminophen (TYLENOL) tablet 650 mg  650 mg Oral Q6H PRN Or  
 acetaminophen (TYLENOL) suppository 650 mg  650 mg Rectal Q6H PRN  
 [Held by provider] heparin (porcine) injection 5,000 Units  5,000 Units SubCUTAneous Q8H  
 
 
 
 LAB AND IMAGING FINDINGS:  
 
Lab Results Component Value Date/Time WBC 22.1 (H) 05/18/2021 02:50 AM  
 HGB 8.3 (L) 05/18/2021 02:50 AM  
 PLATELET 198 05/39/1084 02:50 AM  
 
Lab Results Component Value Date/Time Sodium 131 (L) 05/18/2021 02:50 AM  
 Potassium 4.2 05/18/2021 02:50 AM  
 Chloride 94 (L) 05/18/2021 02:50 AM  
 CO2 18 (L) 05/18/2021 02:50 AM  
 BUN 31 (H) 05/18/2021 02:50 AM  
 Creatinine 1.97 (H) 05/18/2021 02:50 AM  
 Calcium 8.5 05/18/2021 02:50 AM  
 Magnesium 2.0 05/18/2021 02:50 AM  
 Phosphorus 4.0 05/18/2021 02:50 AM  
  
Lab Results Component Value Date/Time Alk.  phosphatase 135 (H) 05/18/2021 02:50 AM  
 Protein, total 5.5 (L) 05/18/2021 02:50 AM  
 Albumin 3.0 (L) 05/18/2021 02:50 AM  
 Globulin 2.5 05/18/2021 02:50 AM  
 
Lab Results Component Value Date/Time INR 1.5 (H) 04/06/2021 03:31 AM  
 Prothrombin time 15.8 (H) 04/06/2021 03:31 AM  
 aPTT 37.6 (H) 04/06/2021 03:31 AM  
  
Lab Results Component Value Date/Time Iron 25 (L) 05/12/2021 03:39 AM  
 TIBC 251 05/12/2021 03:39 AM  
 Iron % saturation 10 (L) 05/12/2021 03:39 AM  
 Ferritin 65 04/07/2021 10:23 AM  
  
Lab Results Component Value Date/Time pH 7.46 (H) 05/18/2021 12:06 AM  
 PCO2 22 (L) 05/18/2021 12:06 AM  
 PO2 297 (H) 05/18/2021 12:06 AM  
 
No components found for: [place] Lab Results Component Value Date/Time CK 53 11/25/2014 12:00 PM  
 CK - MB 1.2 11/25/2014 12:00 PM  
  
 
 
   
 
Total time: 15 min Counseling / coordination time, spent as noted above: 10 min 
> 50% counseling / coordination?: yes Prolonged service was provided for  []30 min   []75 min in face to face time in the presence of the patient, spent as noted above. Time Start:  
Time End:  
Note: this can only be billed with 35465 (initial) or 02675 (follow up). If multiple start / stop times, list each separately.

## 2021-05-19 NOTE — PROGRESS NOTES
Attempted a follow up visit with pt who was sleeping and did not awake. Chaplain Patel MDiv, MS, [facility] 
[phone])

## 2021-05-19 NOTE — PROGRESS NOTES
Problem: Falls - Risk of 
Goal: *Absence of Falls Description: Document Darell [place] Fall Risk and appropriate interventions in the flowsheet. Outcome: Progressing Towards Goal 
Note: Fall Risk Interventions: 
Mobility Interventions: Bed/chair exit alarm Mentation Interventions: Adequate sleep, hydration, pain control, Door open when patient unattended Medication Interventions: Evaluate medications/consider consulting pharmacy Elimination Interventions: Call light in reach, Toileting schedule/hourly rounds, Toilet paper/wipes in reach History of Falls Interventions: Bed/chair exit alarm, Evaluate medications/consider consulting pharmacy Problem: Pressure Injury - Risk of 
Goal: *Prevention of pressure injury Description: Document Dick Scale and appropriate interventions in the flowsheet. Outcome: Progressing Towards Goal 
Note: Pressure Injury Interventions: 
Sensory Interventions: Assess changes in LOC, Assess need for specialty bed, Keep linens dry and wrinkle-free, Minimize linen layers Moisture Interventions: Absorbent underpads, Internal/External urinary devices, Minimize layers Activity Interventions: Assess need for specialty bed, Pressure redistribution bed/mattress(bed type), PT/OT evaluation Mobility Interventions: Float heels, Turn and reposition approx. every two hours(pillow and wedges), HOB 30 degrees or less Nutrition Interventions: Document food/fluid/supplement intake Friction and Shear Interventions: Minimize layers, Apply protective barrier, creams and emollients

## 2021-05-19 NOTE — PROGRESS NOTES
Transition Plan of Care RUR 33%-High Disposition family would like to have patient discharge home today followed by Hospice. Jesusita BRUSH spoke with Kiley Richey RN liaison for REHABILITATION [facility] and they will admit her to their service tomorrow. Transport set for 1200 transport time. Family aware and they are agreeable to wait. Equipment will be delivered and they can admit her 3pm tomorrow. Michael Borges RN CRM Ext [phone]

## 2021-05-20 NOTE — PROGRESS NOTES
Bedside shift change report given to Jessica Valladares RN (oncoming nurse) by Samia Escalante RN (offgoing nurse). Report included the following information SBAR, Kardex, ED Summary, Intake/Output, MAR, Recent Results and [facility]. Hourly rounding done, pt in a-fib, on 5L NC, O2 saturation greater than 90%, has brooks, had small bowel movement, CHG bath given, oral care given, no complaints of pain throughout shift, call bell within reach, use explained to pt Problem: Falls - Risk of 
Goal: *Absence of Falls Description: Document Thaddeus Walter Fall Risk and appropriate interventions in the flowsheet. Outcome: Progressing Towards Goal 
Note: Fall Risk Interventions: 
Mobility Interventions: Communicate number of staff needed for ambulation/transfer Mentation Interventions: Adequate sleep, hydration, pain control Medication Interventions: Patient to call before getting OOB Elimination Interventions: Toileting schedule/hourly rounds History of Falls Interventions: Room close to nurse's station Problem: Pressure Injury - Risk of 
Goal: *Prevention of pressure injury Description: Document Dick Scale and appropriate interventions in the flowsheet. Outcome: Progressing Towards Goal 
Note: Pressure Injury Interventions: 
Sensory Interventions: Assess changes in LOC, Avoid rigorous massage over bony prominences, Float heels, Keep linens dry and wrinkle-free, Minimize linen layers Moisture Interventions: Absorbent underpads, Minimize layers, Maintain skin hydration (lotion/cream) Activity Interventions: Pressure redistribution bed/mattress(bed type) Mobility Interventions: HOB 30 degrees or less, Float heels Nutrition Interventions: Document food/fluid/supplement intake Friction and Shear Interventions: HOB 30 degrees or less, Minimize layers Problem: Patient Education: Go to Patient Education Activity Goal: Patient/Family Education Outcome: Progressing Towards Goal 
 Problem: Breathing Pattern - Ineffective Goal: *Absence of hypoxia Outcome: Progressing Towards Goal 
Goal: *PALLIATIVE CARE:  Alleviation of Dyspnea Outcome: Progressing Towards Goal

## 2021-05-20 NOTE — PROGRESS NOTES
Vascular: 
 
Hospice plans noted. Agree with plans to DC VAC and do saline gauze dressings. Available if needed.

## 2021-05-20 NOTE — PROGRESS NOTES
I have reviewed discharge instructions with the caregiver. The caregiver verbalized understanding. Discussed with the daughter and all questioned fully answered. She will call me if any problems arise. Current Discharge Medication List  
  
CONTINUE these medications which have NOT CHANGED Details  
rosuvastatin (CRESTOR) 20 mg tablet TAKE 1 TABLET NIGHTLY Qty: 90 Tab, Refills: 1  
  
albuterol-ipratropium (DUO-NEB) 2.5 mg-0.5 mg/3 ml nebu 3 mL by Nebulization route every four (4) hours as needed for Wheezing or Shortness of Breath. Qty: 30 Nebule, Refills: 3  
  
aspirin delayed-release 81 mg tablet Take 1 Tab by mouth daily. Qty: 30 Tab, Refills: 5 Breo Ellipta 200-25 mcg/dose inhaler TAKE 1 PUFF DAILY  
  
diclofenac (VOLTAREN) 1 % gel APPLY 1 APPLICATION TOPICALLY 4 (FOUR) TIMES A DAY AS NEEDED (PAIN) timolol (TIMOPTIC-XE) 0.5 % ophthalmic gel-forming Administer 1 Drop to both eyes daily. CYANOCOBALAMIN, VITAMIN B-12, (VITAMIN B-12 PO) Take 1,000 mcg by mouth daily. Associated Diagnoses: Essential hypertension  
  
latanoprost (XALATAN) 0.005 % ophthalmic solution Administer 1 Drop to both eyes nightly. cycloSPORINE (RESTASIS) 0.05 % ophthalmic emulsion Administer 1 Drop to both eyes two (2) times a day. Associated Diagnoses: Orthostatic hypotension; Basilar artery aneurysm (HCC)  
  
esomeprazole (NEXIUM) 40 mg capsule Take 1 Cap by mouth Daily (before breakfast). Qty: 90 Cap, Refills: 1 Associated Diagnoses: Nausea; Acid reflux Cholecalciferol, Vitamin D3, (VITAMIN D3) 1,000 unit cap Take 1 Cap by mouth two (2) times a day. omega-3 fatty acids-vitamin e (FISH OIL) 1,000 mg Cap Take 1 Cap by mouth two (2) times a day. multivitamins-minerals-lutein (CENTRUM SILVER) Tab Take 1 Tab by mouth daily.   
  
  
STOP taking these medications  
  
 metoprolol succinate (TOPROL-XL) 25 mg XL tablet Comments:  
Reason for Stopping:   
   
 dexamethasone in 0.9 % sod chl (DEXAMETHASONE-0.9 % SOD. CHLOR IV) Comments:  
Reason for Stopping:

## 2021-05-20 NOTE — HOSPICE
[employer] Good Help to Those in Need 
[phone] Patient Name: Yemi Camilo YOB: 1927 Age: 80 y.o. [employer] RN Note: Wound vac to be discontinued this am in preparation for patient to go home with hospice. Will now be a daily NS wet to dry dressing. Thank you for the opportunity to be of service to this patient. Yoel Carlos RN Clinical Nurse Liaison Gabe Collins [phone]

## 2021-05-20 NOTE — DISCHARGE SUMMARY
Discharge Summary PATIENT ID: Robin Fraser MRN: [id number] YOB: 1927 DATE OF ADMISSION: 5/6/2021  1:42 PM   
DATE OF DISCHARGE: 05/20/21 PRIMARY CARE PROVIDER: Stephanie Schuler MD  
 
 
DISCHARGING PROVIDER: Aura Matthews MD   
To contact this individual call [phone] and ask the  to page. If unavailable ask to be transferred the Adult Hospitalist Department. CONSULTATIONS: IP CONSULT TO GENERAL SURGERY 
IP CONSULT TO ORTHOPEDIC SURGERY 
IP CONSULT TO PALLIATIVE CARE - PROVIDER 
IP CONSULT TO NEUROLOGY 
IP CONSULT TO GASTROENTEROLOGY 
IP CONSULT TO HOSPITALIST 
IP CONSULT TO CARDIOLOGY PROCEDURES/SURGERIES: Procedure(s) with comments: DEBRIDEMENT LEFT LOWER LEG WOUND. LEFT LEG ARTERIOGRAM, LEFT FEMORAL AND POPLITEAL ANGIOPLASTY - Right groin utilized as puncture site for left leg arteriogram 
 
IMAGING 
XR TIB/FIB LT Result Date: 5/6/2021 Pretibial soft tissue ulceration without acute osseous abnormality. XR ABD (KUB) Result Date: 5/18/2021 Large stool burden MRI BRAIN WO CONT Result Date: 5/12/2021 Possible punctate left cerebral acute infarct. No sizable acute infarct. Chronic microangiopathy. No bleed or shift. XR CHEST PORT Result Date: 5/17/2021 Stable interstitial pulmonary edema and small pleural effusions. XR CHEST PORT Result Date: 5/16/2021 Development of bilateral airspace disease and a small right pleural effusion, most likely due to pulmonary edema. The differential diagnosis includes pneumonia. XR CHEST PORT Result Date: 5/12/2021 No acute findings. XR CHEST PORT Result Date: 5/6/2021 No acute process. CTA CODE NEURO HEAD AND NECK W CONT Result Date: 5/11/2021 No evidence for acute large vessel arterial occlusion. No significant cerebral perfusion abnormality or mismatch. Stable atherosclerotic disease.  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 CT CODE NEURO HEAD WO CONTRAST Result Date: 5/11/2021 No acute abnormality identified.-Result called to the nurse. ECHO ADULT COMPLETE Addendum Date: 5/12/2021 · Saline contrast was given to evaluate for intracardiac shunt. · LV: Estimated LVEF is 25 - 30%. Visually measured ejection fraction. Normal cavity size. Upper normal wall thickness. Severely reduced systolic function. Severe hypokinesis of the mid anterior, mid inferior, apical anterior, apical septal and apical inferior wall(s). Left ventricular diastolic dysfunction. · LA: Severely dilated left atrium. · RV: Moderately dilated right ventricle. Mildly reduced systolic function. · RA: Severely dilated right atrium. · AV: Aortic valve leaflet calcification present with reduced excursion. Aortic valve mean gradient is 17 mmHg. Aortic valve area is 1.6 cm2. Moderate aortic valve stenosis is present. · MV: Mitral valve leaflet calcification. Moderate mitral annular calcification. Mitral valve mean gradient is 5 mmHg. Mild mitral valve stenosis is present. Mild to moderate mitral valve regurgitation is present. · TV: Non-specific thickening of the tricuspid valve. Mild to moderate tricuspid valve regurgitation is present. · PV: Mild to moderate pulmonic valve regurgitation is present. · PA: Severe pulmonary hypertension. Pulmonary arterial systolic pressure is 199 mmHg. · IAS: No atrial septal defect present. Agitated saline contrast study was performed. CT CODE NEURO PERF W CBF Result Date: 5/11/2021 No evidence for acute large vessel arterial occlusion. No significant cerebral perfusion abnormality or mismatch. Stable atherosclerotic disease. 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 [address] COURSE:  
Concern for pneumonia:  
New development of airspace opacities, R >L, concern for aspiration pneumonia. - Continue cefepime Had been diuresing prior to this, no episode of HTN prior to this, so less likely flash pulmonary edema. S/p diuresis but holding on further diuresis due to AVNI 
- Monitor O2 sats and titrate O2 to keep sats >90%  
  
Left leg wound s/p Left leg arteriogram w balloon angioplasty and debridement: 
- Care per vascular surgery - s/p cefepime  
- On wound vac (placed on 5/14) 
  
Hypotension 
- Required levophed which has stopped on 05/15 
- Hold BB and further diuresis  
  
  
HFrEF c/b severe pulmonary HTN w/o exacerbation: Underwent diuresis with Bumex + diuril 
- Held BB due to hypotension 
- Held on further diuresis due to AVNI 
  
AVNI: Baseline normal BUN/Cr, currently 23/1.42 
- Held on further diuresis 
  
Acute ischemic CVA: MRI with possible punctate Left cerebral acute infarct on 5/12 
- Neurology followed - BP support with midodrine 
- PT/OT 
- Stroke eduction 
- NIS consulted for basilar artery aneurysm, no intervention recommended 
  
Acute on chronic anemia: Baseline NIKKI, superimosed anemia of critical illness  
- s/p 2 units of pRBCs transfusion this admission  
  
Reactive airway disease: Continue brovana and pulmicort 
  
Chronic atrial fibrillation: Holding BB in setting of hypotension 
  
Anxiety: Xanax 0.25mg HS 
  
Glaucoma: Xalantan and timolol 
  
Deconditioning: PT/OT Hospice involved DISCHARGE DIAGNOSES / PLAN:   
# Left leg wound s/p L leg arteriogram w balloon angioplasty and debridement # Concern for pneumonia # Hypotension # HFrEF c/b severe pulmonary HTN w/o exacerbation  # Acute kidney injury # Acute ischemic CVA # Acute on chronic anemia # Reactive airway disease # Chronic atrial fibrillation # Anxiety # Glaucoma # Deconditioning Patient discharged home with hospice Patient Active Problem List  
Diagnosis Code  Glaucoma H40.9  Vitamin D deficiency E55.9  Hypercholesteremia E78.00  Arthritis M19.90  
 History  of basal cell carcinoma  History of breast cancer Z85.3  Vertigo R42  Wears hearing aid Z97.4  HTN (hypertension) I10  
 PVC's (premature ventricular contractions) I49.3  Colitis K52.9  First degree AV block I44.0  Paroxysmal ventricular tachycardia (HCC) I47.2  Shortness of breath R06.02  
 PAD (peripheral artery disease) ([facility]) I73.9  Atherosclerosis of native arteries of the extremities with intermittent claudication I70.219  
 BRBPR (bright red blood per rectum) K62.5  Hemorrhoids K64.9  Leg edema R60.0  Orthostatic hypotension I95.1  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  Bicuspid aortic valve Q23.1  PAF (paroxysmal atrial fibrillation) ([facility]) I48.0  Mixed hyperlipidemia E78.2  Nonrheumatic aortic valve stenosis I35.0  Hyponatremia E87.1  Permanent atrial fibrillation (HCC) I48.21  
 Ovarian cancer ([facility]) C56.9  Anemia D64.9  Leg ulcer, left ([facility]) L97.929  
 Goals of care, counseling/discussion Z71.89  
 Pain R52  Weakness R53.1 Flosachie Song Advanced age R48  Constipation, unspecified constipation type K59.00  
 HFrEF (heart failure with reduced ejection fraction) ([facility]) I50.20  Confusion R41.0 PENDING TEST RESULTS:  
At the time of discharge the following test results are still pending: None FOLLOW UP APPOINTMENTS:   
Follow-up Information Follow up With Specialties Details Why Contact [facility] today   Call today  will be admitted by [address] at 15:00PM   
 Jada Arambula MD Family Medicine   [address] [facility] [phone] Eri Pappas MD Hematology and Oncology, Hematology, Oncology   [address] [facility] [phone] ADDITIONAL CARE RECOMMENDATIONS:  
· It is important that you take the medication exactly as they are prescribed. · Keep your medication in the bottles provided by the pharmacist and keep a list of the medication names, dosages, and times to be taken in your wallet.   
· Do not take other medications without consulting your doctor. · No drinking alcohol or driving car or operating machinery if you are on narcotic pain medications. Donot take sedating mediations if you are sleepy or confused. · Fall Precautions · Keep Well Hydrated · Report to your medical provider if you feel you have  developed allergies to medications · Follow up with your PCP or Consultant for medication adjustments and refills · Monitor for signs of fevers,chills,bleeding,chest pain and seek medical attention if you do so. DIET: Regular with added supplement with each meal  
 
ACTIVITY: As tolerated WOUND CARE: Clean with saline; apply mepitel one; wound gel and saline moist roll gauze; cover with abd pads and stretch gauze EQUIPMENT needed: None DISCHARGE MEDICATIONS: 
Current Discharge Medication List  
  
CONTINUE these medications which have NOT CHANGED Details  
rosuvastatin (CRESTOR) 20 mg tablet TAKE 1 TABLET NIGHTLY Qty: 90 Tab, Refills: 1  
  
albuterol-ipratropium (DUO-NEB) 2.5 mg-0.5 mg/3 ml nebu 3 mL by Nebulization route every four (4) hours as needed for Wheezing or Shortness of Breath. Qty: 30 Nebule, Refills: 3  
  
aspirin delayed-release 81 mg tablet Take 1 Tab by mouth daily. Qty: 30 Tab, Refills: 5 Breo Ellipta 200-25 mcg/dose inhaler TAKE 1 PUFF DAILY  
  
diclofenac (VOLTAREN) 1 % gel APPLY 1 APPLICATION TOPICALLY 4 (FOUR) TIMES A DAY AS NEEDED (PAIN) timolol (TIMOPTIC-XE) 0.5 % ophthalmic gel-forming Administer 1 Drop to both eyes daily. CYANOCOBALAMIN, VITAMIN B-12, (VITAMIN B-12 PO) Take 1,000 mcg by mouth daily. Associated Diagnoses: Essential hypertension  
  
latanoprost (XALATAN) 0.005 % ophthalmic solution Administer 1 Drop to both eyes nightly. cycloSPORINE (RESTASIS) 0.05 % ophthalmic emulsion Administer 1 Drop to both eyes two (2) times a day. Associated Diagnoses: Orthostatic hypotension; Basilar artery aneurysm (HCC) esomeprazole (NEXIUM) 40 mg capsule Take 1 Cap by mouth Daily (before breakfast). Qty: 90 Cap, Refills: 1 Associated Diagnoses: Nausea; Acid reflux Cholecalciferol, Vitamin D3, (VITAMIN D3) 1,000 unit cap Take 1 Cap by mouth two (2) times a day. omega-3 fatty acids-vitamin e (FISH OIL) 1,000 mg Cap Take 1 Cap by mouth two (2) times a day. multivitamins-minerals-lutein (CENTRUM SILVER) Tab Take 1 Tab by mouth daily. STOP taking these medications  
  
 metoprolol succinate (TOPROL-XL) 25 mg XL tablet Comments:  
Reason for Stopping:   
   
 dexamethasone in 0.9 % sod chl (DEXAMETHASONE-0.9 % SOD. CHLOR IV) Comments:  
Reason for Stopping:   
   
  
 
 
All Micro Results Procedure Component Value Units Date/Time CULTURE, BLOOD, PAIRED [[id number]] Collected: 05/18/21 0506 Order Status: Completed Specimen: Blood Updated: 05/20/21 0545 Special Requests: NO SPECIAL REQUESTS Culture result: NO GROWTH 2 DAYS     
 CULTURE, BLOOD, PAIRED [[id number]] Collected: 05/14/21 1155 Order Status: Completed Specimen: Blood Updated: 05/19/21 9282 Special Requests: NO SPECIAL REQUESTS Culture result: NO GROWTH 5 DAYS URINE CULTURE HOLD SAMPLE [[id number]] Collected: 05/06/21 1809 Order Status: Completed Specimen: Serum Updated: 05/06/21 1821 Urine culture hold Urine on hold in Microbiology dept for 2 days. If unpreserved urine is submitted, it cannot be used for addtional testing after 24 hours, recollection will be required. CULTURE, BLOOD, PAIRED [[id number]] Collected: 05/06/21 1818 Order Status: Canceled Specimen: Blood Recent Results (from the past 24 hour(s)) GLUCOSE, POC Collection Time: 05/20/21  8:43 AM  
Result Value Ref Range Glucose (POC) 127 (H) 65 - 117 mg/dL Performed by Bipin GÓMEZ   
 
 
 
 
NOTIFY YOUR PHYSICIAN FOR ANY OF THE FOLLOWING:  
Fever over 101 degrees for 24 hours.   
Chest pain, shortness of breath, fever, chills, nausea, vomiting, diarrhea, change in mentation, falling, weakness, bleeding. Severe pain or pain not relieved by medications. Or, any other signs or symptoms that you may have questions about. DISPOSITION: 
  Home With: 
 OT  PT  HH  RN  
  
 Long term SNF/Inpatient Rehab Independent/assisted living X Hospice: [facility] Other:  
 
 
PATIENT CONDITION AT DISCHARGE:  
 
Functional status Poor Deconditioned Independent Cognition Evalee Solid Forgetful Dementia Catheters/lines (plus indication) [place] PICC   
 PEG None Code status Full code DNR   
 
PHYSICAL EXAMINATION AT DISCHARGE: 
Gen:  Chronically sick looking, confused HEENT:  Normal cephalic atraumatic, extra-occular movements are intact. Neck:  Supple, No JVD Lungs:  Clear bilaterally, no wheeze, no rales, normal effort Heart:  Regular Rate and Rhythm, normal S1 and S2, no edema Abdomen:  Soft, non tender, normal bowel sounds, no guarding. Extremities:  no cyanosis or edema Neurological:   
 
 
CHRONIC MEDICAL DIAGNOSES: 
Problem List as of 5/20/2021 Date Reviewed: 3/29/2021 Codes Class Noted - Resolved HFrEF (heart failure with reduced ejection fraction) (HCC) ICD-10-CM: I50.20 ICD-9-CM: 428.20  5/18/2021 - Present Confusion ICD-10-CM: R41.0 ICD-9-CM: 298.9  Unknown - Present Constipation, unspecified constipation type ICD-10-CM: K59.00 ICD-9-CM: 564.00  Unknown - Present Goals of care, counseling/discussion ICD-10-CM: Z71.89 ICD-9-CM: V65.49  Unknown - Present Pain ICD-10-CM: R52 ICD-9-CM: 780.96  Unknown - Present Weakness ICD-10-CM: R53.1 ICD-9-CM: 780.79  Unknown - Present Advanced age ICD-10-CM: R48 
ICD-9-CM: 56  Unknown - Present Leg ulcer, left ([facility] [address] ICD-10-CM: C66.403 ICD-9-CM: 707.10  5/6/2021 - Present Hyponatremia ICD-10-CM: E87.1 ICD-9-CM: 276.1  4/6/2021 - Present Permanent atrial fibrillation (HCC) ICD-10-CM: G29.90 ICD-9-CM: 427.31  4/6/2021 - Present Ovarian cancer ([facility] ICD-10-CM: C56.9 ICD-9-CM: 183.0  4/6/2021 - Present Anemia ICD-10-CM: D64.9 ICD-9-CM: 285.9  4/6/2021 - Present Mixed hyperlipidemia ICD-10-CM: E78.2 ICD-9-CM: 272.2  10/15/2020 - Present Nonrheumatic aortic valve stenosis ICD-10-CM: I35.0 ICD-9-CM: 424.1  10/15/2020 - Present Bicuspid aortic valve ICD-10-CM: Q23.1 ICD-9-CM: 746.4  2/1/2018 - Present PAF (paroxysmal atrial fibrillation) (HCC) ICD-10-CM: I48.0 ICD-9-CM: 427.31  2/1/2018 - Present Basilar artery aneurysm ([facility] ICD-10-CM: I72.5 ICD-9-CM: 437.3  9/15/2015 - Present Orthostatic hypotension ICD-10-CM: I95.1 ICD-9-CM: 458.0  6/5/2015 - Present Leg edema ICD-10-CM: R60.0 ICD-9-CM: 782.3  1/30/2014 - Present Hemorrhoids ICD-10-CM: K64.9 ICD-9-CM: 455.6  10/29/2013 - Present BRBPR (bright red blood per rectum) ICD-10-CM: K62.5 ICD-9-CM: 569.3  10/17/2013 - Present PAD (peripheral artery disease) (HCC) ICD-10-CM: I73.9 ICD-9-CM: 443.9  6/4/2013 - Present Overview Signed 8/1/2013  8:11 AM by Molly Webster NP  
  7/31/13 Left anterior tibial artery laser atherectomy and balloon angioplasty. Atherosclerosis of native arteries of the extremities with intermittent claudication ICD-10-CM: I70.219 ICD-9-CM: 440.21  6/4/2013 - Present Paroxysmal ventricular tachycardia (HCC) ICD-10-CM: I47.2 ICD-9-CM: 427.1  1/10/2013 - Present Shortness of breath ICD-10-CM: R06.02 
ICD-9-CM: 786.05  1/10/2013 - Present PVC's (premature ventricular contractions) ICD-10-CM: I49.3 ICD-9-CM: 427.69  12/18/2012 - Present Colitis ICD-10-CM: K52.9 ICD-9-CM: 558.9  12/18/2012 - Present Overview Signed 12/18/2012  3:51 PM by Ruby Easton MD  
  Collagenous- Dr. Blu Leggett  First degree AV block ICD-10-CM: I44.0 ICD-9-CM: 426.11  12/18/2012 - Present HTN (hypertension) ICD-10-CM: I10 
ICD-9-CM: 401.9  12/5/2012 - Present Glaucoma ICD-10-CM: H40.9 ICD-9-CM: 365.9  11/7/2012 - Present Vitamin D deficiency ICD-10-CM: E55.9 ICD-9-CM: 268.9  11/7/2012 - Present Hypercholesteremia ICD-10-CM: E78.00 ICD-9-CM: 272.0  11/7/2012 - Present Arthritis ICD-10-CM: M19.90 ICD-9-CM: 716.90  11/7/2012 - Present History  of basal cell carcinoma ICD-9-CM: V10.83  11/7/2012 - Present History of breast cancer ICD-10-CM: Z85.3 ICD-9-CM: V10.3  11/7/2012 - Present Vertigo ICD-10-CM: J54 ICD-9-CM: 780.4  11/7/2012 - Present Wears hearing aid ICD-10-CM: Z97.4 ICD-9-CM: V45.89  11/7/2012 - Present RESOLVED: Angina, class III (HCC) ICD-10-CM: I20.9 ICD-9-CM: 413.9  12/18/2012 - 10/15/2020 Discussed with patient and family. Explained the importance of following up, Compliance with medications and recommendations on discharge,Side effect profile of medications were explained. Safety precautions at home and while taking pain medications also explained. All questions answered to the satisfaction of the patient/family. Discussed with consultant(s) who are agreeable to the discharge. Verbal and written instructions on discharge given. Explained about Discharge medications and side effect profile. Advised patient/family to followup with their pcp for medication refills and preauthorization of medications, Home health orders. checkups,screenign programs as appropriate for age. Thank you Bailey Calvillo MD for taking care of your patient, Please call with any questions. Greater than 35 minutes were spent with the patient on counseling and coordination of care Signed:  
Joe Vick MD 
5/20/2021 
11:29 AM 
 
Please note that this dictation was completed with [employer], the computer voice recognition software.   Quite often unanticipated grammatical, syntax, homophones, and other interpretive errors are inadvertently transcribed by the computer software. Please disregard these errors. Please excuse any errors that have escaped final proofreading. Thank you.

## 2021-05-20 NOTE — WOUND CARE
WOCN Note:  
  
Follow up for VAC removal for discharge. Patient is going home with hospice. 
  
Left low leg:  Removed VAC, white foam and mepitel one. Cleansed with saline; applied mepitel one; wound gel and saline moist roll gauze; covered with abd pads and stretch gauze. 
  
DANNIELLE StinsonN RN [facility] Available on [employer] Pager [phone] Office [phone]

## 2021-05-21 NOTE — PROGRESS NOTES
No transition of care outreach indicated due to patient discharge to hospice care. Per EMR patient's care will be Managed by [facility].

## 2021-05-21 NOTE — HOSPICE
Erica Sera 1927 
[facility] Diagnosis: malignant neoplasm of ovary Diagnoses RELATED to the terminal prognosis:  
 
Other Diagnoses:  
 
Date of Hospice Admission: 2021 Hospice Attending Elected by Patient: Jeanne Byrd Primary Care Physician:  
 
Admitting RN: Bri Botello CM: Wilfred Liang : Jose Mcgee :    Maria Licona Durable DNR:  Yes  Home: 
 
 
Direct Observation: pt lying in hospital, c/o pain, medicated with morphine 5 ml SL.  o2 on at 4L via nc. resp even and unlabored. bowel sounds active. Carlton patent and intact, draining clear, yellow urine. incontinent of large amount of black, liquid stool. while cleaning pt, pt expelled large, hard BM with blood in it.  pt has bruising on abdomen and legs. large wound on left lower leg. daily wound care with wet to dry dressing. pressure stage 2 on sacrum. barrier cream used, turned and repositioned to left side. nieces verbalized understanding with turning anf repositioning pt. ER Visits/ Hospitalizations in past year: 3 Onset Date of Hospice Diagnosis:  Summary of Disease Progression Leading to Hospice Diagnosis:    
Admission Summary:  
Per ICU Note: Progress Note: 2021  
\" Scott Partida is a 80 y.o. female with hx aortic stenosis, pulm htn, GERD, glaucoma, remote breast CA, HTN, and recent ovarian cancer with port-a-cath s/p chemo times two who presented to the ED on  with complications of a traumatic wound. She had skin cancer on her LLE and subsequently sustained a traumatic wound after a fall in the same area. The wound had progressed, and she was pending and appt with surgeon Dr. Phyllis Chaparro today. The nurse from the rehab facility where patient was being discharged sent a picture to the clinic, and patient was advised to come to the hospital instead for management of wound. In the ED, VSS.  Labs are significant for lactate of 4, CRP 6.32, ESR 34, and normocytic anemia with Hgb 10.4. L tib/fib XR with pretibial soft tissue ulceration and no noted osseous abnormality. She was started on vancomycin and ceftriaxone. On 5/10 patient to OR for LLE arteriogram, L fem and popliteal angioplasty, and debridement of wound. Patient received 2 units of PRBCs given drop in hgb to 6.1. On 5/11, she developed acute aphasia and a code stroke was called. NIHSS was 7. CT head without acute infarction or perfusion deficit. She was transferred to the ICU later on 5/11 for BP management to keep SBP >160 with vasopressors. MRI on 5/12 with possible punctate L cerebral acute infarct. She has been off levophed since the am of 5/15. Diuresed overnight 5/15-5/16.  
5/16 patient developed acute onset of dyspnea with new R sided infiltrate. Antibiotics broadened to cefepime empirically. Diuresed with bumex and diuril. \"  
Hospitalist:  
05/17: Seen and examined patient sitting up in bed. States that she is doing fine today but has not had a bowel movement in more than a month. Left lower extremity dressing noted with wound VAC that is intact. She states that she is in the hospital because she fell out of the bed and cut her leg. SBP > 90 on monitor (goal of SBP greater than 80). Patient to be transferred out of the ICU today Use LCD Guidelines and list features: ___X_____  A. Disease with distant metastases at presentation OR 
 
___X_____  B. Progression from an earlier stage of disease to metastatic disease with either: 
                        ____X____  1. continued decline in spite of therapy 
                        ________  2. patient declines further disease directed therapy SPIRITUAL/Social/Emotional: David Liriano______________ contacted, agrees to serve as attending provider for hospice and provided verbal certification of terminal illness with prognosis of 6 months or less life xpectancy.  Orders for hospice admission, medications and plan of treatment received. Medication  
reconciliation completed. Currently this patient has: 
Supplemental O2:   yes PORT:   right Carlton Catheter:      yes MEDS: 
I have reviewed the patient's medication list with MD and identified the following: 
Nonformulary medications: none Unrelated medications: Latonoprost and timolol IDT communication to include MD, SN, SW, [address] [place] [address] and support team.

## 2021-05-21 NOTE — Clinical Note
Good morning Bette,   I closed your episode of care and removed your name. She will discharged home w/ [facility].

## 2021-05-22 NOTE — HOSPICE
Patient lying in bed, family at bedside. Oriented x person and place. Family reports confusion She has not eaten in 5 days. She will drink sips of water. She has trouble following commands. She has a right chest port that was last accessed during hospitalization EOL discussion had with family Wound care performed and patient tolerated fairly

## 2021-05-25 NOTE — PROGRESS NOTES
[address] [place] [address] Dr Discharge Note    *The information contained in this note was received during a weekly care coordination call with the post acute facility*      Patient was discharged from [address] [place] [address] ([facility]) on 5/6/2021.     PCP: MD ANNA Knight appointment:   Future Appointments   Date Time Provider Lisette Manju   5/26/2021 11:00 AM [place] Spatz [facility] [place] [address]   5/27/2021 To Be Determined Arvella Jointer, CNA [place] [address] [address]   5/27/2021 To Be Determined Alia Chaves RN [address]   5/28/2021 To Be Determined Arvella Jointer, CNA [address]   6/1/2021 To Be Determined Arvella Jointer, CNA [address]   6/1/2021 11:00 AM [facility] PSYCHIATRIC [place] RCR PET DOSE 1 One MEDNAX JULITO   6/1/2021 12:00 PM [facility] RCR PET 1 RYAN VANCE JULITO   6/3/2021 To Be Determined Arvella Jointer, CNA [address]   6/4/2021 To Be Determined Arvella Jointer, CNA [facility] [address]   6/8/2021 To Be Determined Arvella Jointer, CNA [address]   6/10/2021 To Be Determined Arvella Jointer, CNA [address]   6/11/2021 To Be Determined Arvella Jointer, CNA [address]   6/15/2021 To Be Determined Arvella Jointer, CNA [address]   6/17/2021 To Be Determined Arvella Jointer, CNA [address]   6/18/2021 To Be Determined Arvella Jointer, CNA [address] [place] [address] [address]   6/22/2021 To Be Determined Arvella Jointer, CNA [address] [facility] [address]   6/24/2021 To Be Determined [facility] Jointer, [facility] [address]   6/25/2021 To Be Determined Arvella Jointer, DANIEL [address] [facility] [address]   6/29/2021 To Be Determined Arvella Jointer, CNA [address] [facility] [address]   7/1/2021 To Be Determined Arvella Jointer, CNA [address]   7/2/2021 To Be Determined Arvella Jointer, DANIEL [address] [facility] [address]   7/6/2021 To Be Determined Arvella Jointer, DANIEL [address] [facility] [address]   7/8/2021 To Be Determined Libertad Carmen CNA [facility] [address]   7/9/2021 To Be Determined Libertad Carmen CNA [facility] [address]   7/13/2021 To Be Determined Libertad Carmen CNA [facility] [address]   7/15/2021 To Be Determined Libertad Carmen CNA [address] [facility] [address]   7/16/2021 To Be Determined Libertad Carmen CNA [address] [facility] [address]   7/20/2021 To Be Determined Libertad Carmen CNA [address] [facility] [address]   7/22/2021 To Be Determined Libertad Carmen CNA [address] [facility] [address]   7/23/2021 To Be Determined Libertad Carmen CNA [address] [facility] [address]   7/27/2021 To Be Determined Libertad Carmen CNA [address] [facility] [address]   7/29/2021 To Be Determined Libertad Carmen CNA [address] [facility] [address]   7/30/2021 To Be Determined Libertad Carmen CNA [address] [facility] [address]   8/3/2021 To Be Determined Libertad Carmen CNA [address] [facility] [address]   8/5/2021 To Be Determined Libertad Carmen CNA [address] [facility] [address]   8/6/2021 To Be Determined BALJIT VicenteA [address] [facility] [address]   8/10/2021 To Be Determined Libertad Carmen CNA [address] [facility] [address]   8/12/2021 To Be Determined Libertad Carmen CNA [address] [facility] [address]   8/13/2021 To Be Determined Libertad Carmen CNA [address] [facility] [address]   9/22/2021  2:00 PM Jose Rafael Camilo MD [facility] BS AMB       Home Health/Outpatient orders at discharge: home health care  [address] [place] [address] Way: At [facility] Team will sign off at this time. Medications were not reconciled and general patient assessment was not completed during this skilled nursing facility outreach.      Karen Eddy   BSN, RN  [employer]

## 2021-05-27 ENCOUNTER — HOME CARE VISIT (OUTPATIENT)
Dept: HOSPICE | Facility: HOSPICE | Age: 86
End: 2021-05-27
Payer: MEDICARE

## 2021-06-01 ENCOUNTER — HOME CARE VISIT (OUTPATIENT)
Dept: HOSPICE | Facility: HOSPICE | Age: 86
End: 2021-06-01
Payer: MEDICARE

## 2021-07-13 RX ORDER — HYDROCHLOROTHIAZIDE 12.5 MG/1
CAPSULE ORAL
Qty: 90 CAPSULE | Refills: 0 | OUTPATIENT
Start: 2021-07-13

## 2021-12-02 NOTE — PROGRESS NOTES
HPI Comments:   Here for bruising on anterior left lower leg. Onset after she rubbed the heel of her right shoe to scratch her left left. Noticed after a large bruise that hasnt improved. She tried warm rag without relief. Otherwise no pain or swelling in leg. Is on Eliquis 5mg and promotes does bruise easily. Denies any blood in stool, dizziness, bleeding from any other locations or headaches    Patient is a 80 y.o. female presenting with leg problem. Leg Problem          Past Medical History:   Diagnosis Date    Arthritis     Cancer ([facility] [address]     breast    (leg,ear,arm-skin cancer)    GERD (gastroesophageal reflux disease)     Hypertension     Other ill-defined conditions(799.89)     glaucoma    Other ill-defined conditions(799.89)     cellulitis left arm    Other ill-defined conditions(799.89)     carpal tunnel syndrome left hand    Other ill-defined conditions(799.89)     vertigo        Past Surgical History:   Procedure Laterality Date    CARDIAC CATHETERIZATION  1/10/2013         HX APPENDECTOMY      HX CARPAL TUNNEL RELEASE      left hand    HX CATARACT REMOVAL      bilateral    HX COLONOSCOPY  2012    HX KNEE ARTHROSCOPY      left    HX MASTECTOMY      bilateral    HX OTHER SURGICAL      Basal cell skin cancer removed from left leg, left arm and neck    HX OTHER SURGICAL      cyst removed from left thigh    HX OREN AND BSO      HX TONSILLECTOMY      VASCULAR SURGERY PROCEDURE UNLIST      vascular blockages removed.          Family History   Problem Relation Age of Onset    Heart Disease Mother     Heart Disease Father       age 48    Heart Disease Brother     Stroke Brother     Cancer Sister      Colon Cancer    Diabetes Sister     Heart Disease Sister     Heart Disease Sister     Diabetes Sister     Other Brother      MVA    Heart Disease Brother     Heart Disease Son       age 52        Social History     Social History    Marital status:      Spouse [Father] : father name: N/A    Number of children: N/A    Years of education: N/A     Occupational History    Not on file. Social History Main Topics    Smoking status: Former Smoker     Quit date: 1/1/1955    Smokeless tobacco: Never Used    Alcohol use Yes      Comment: rare    Drug use: No    Sexual activity: No     Other Topics Concern    Not on file     Social History Narrative                ALLERGIES: Augmentin [amoxicillin-pot clavulanate]; Doxycycline; Keflex [cephalexin]; Monodox [doxycycline monohydrate]; and Sulfa (sulfonamide antibiotics)    Review of Systems   Constitutional: Negative for fatigue. Neurological: Negative for dizziness and numbness. All other systems reviewed and are negative. Vitals:    06/06/18 1537   BP: 190/73   Pulse: 66   Resp: 18   Temp: 97.8 °F (36.6 °C)   SpO2: 97%   Weight: 162 lb (73.5 kg)   Height: 5' 4\" (1.626 m)       Physical Exam   Constitutional: She is oriented to person, place, and time. No distress. appears well   HENT:   Mouth/Throat: Oropharynx is clear and moist.   No bleeding gumline. OP clear. Eyes: Conjunctivae and EOM are normal. Pupils are equal, round, and reactive to light. Cardiovascular: Normal rate, regular rhythm and normal heart sounds. Pulmonary/Chest: Effort normal and breath sounds normal.   Neurological: She is alert and oriented to person, place, and time. No cranial nerve deficit. Skin: Skin is warm and dry. No rash noted. She is not diaphoretic. Psychiatric: She has a normal mood and affect. Her behavior is normal. Thought content normal.       MDM     Differential Diagnosis; Clinical Impression; Plan:       CLINICAL IMPRESSION:  (L34.28ZI) Traumatic ecchymosis of left lower leg, initial encounter  (primary encounter diagnosis)      Plan:  Bruising  You are more prone as you are on a blood thinner  This will likely take many weeks to completely resolve. Follow up with PCP in 1 week for re evaluation.  Sooner/immediatly for any new, worsening or other signs/symptoms of bleeding      We have reviewed concerning signs/symptoms, normal vs abnormal progression of medical condition and when to seek immediate medical attention. Schedule with PCP or Urgent Care immediately for worsening or new symptoms.     Risk of Significant Complications, Morbidity, and/or Mortality:   Presenting problems:  Low  Diagnostic procedures:  Low  Management options:  Low  Progress:   Patient progress:  Stable      Procedures

## 2022-12-01 NOTE — PROGRESS NOTES
Transition Plan of Care RUR 33%-High Disposition plan to discharge home today and will be admitted by [address] at 1500. [facility] transport is set for 1200. Franci Parham RN CRM Ext [phone] Update-spoke with daughter Jared Goff [phone] and she is in agreeable to disposition. Medicare pt has received, reviewed, and signed 2nd IM letter informing them of their right to appeal the discharge. Signed copy has been placed on pt bedside chart. Reviewed with daughter by phone. Initial Size Of Lesion: 0.8

## 2023-04-04 NOTE — PROGRESS NOTES
No additional comment Pt is a 81y Female with history of Ovarain cancer s/p LYNN/KATHIE 13 years ago. Presenting with     # Necrotic lymph nodes, Right Groin Abscess  # Dissection of right inguinal region with biopsy of lymph node, removal of groin abscess and Gracilis flap reconstruction  # left inguinal necrotic lymph node excision    POD 0    Neuro:  - No Active issues  - Avoid Neuro Deliriogenic / sedative medications  - Aspiration Precautions, HOB > 30 degrees  - Morphine/Ofirmev PRN for pain control  - Zofran for nausea    CV:  - No active issues, Normotensive    Pulm:  - Supplemental oxygen as needed to maintain SpO2 > 92%,  - Incentive spirometry                  GI:  - Regular diet as tolerated to avoid Stress ulceration and optimize nutritional state    Renal:  - Continue to monitor Bun/Cr and UOP  - Replacing electrolytes as needed with Goal K> 4, PO> 3, Mg> 2               - Strict I&O's  - Avoid Nephro toxic medication  - LR @ 75    Heme:  - Lovenox for DVT prophylaxis  - Trend hgb    ID:  - Cefazolin  - Microbiology and Radiology reviewed   - trend CBC with diff, CMP  and fever curve    Endo:  - ISS for aggressive glycemic control to limit FS glucose to < 180mg/dl.    Lymph:  - Bilateral groin sites CDI, no signs of active bleeding  - s/p Right Pedicled Gracilis Flap , compression dressing in SITU      Plan discussed with Dr Grayson Pt is a 81y Female with history of Ovarain cancer s/p LYNN/KATHIE 13 years ago. Presenting with     # Dissection of right inguinal region with biopsy of lymph node, removal of groin abscess and Gracilis flap reconstruction  # left inguinal necrotic lymph node excision    POD 0    Neuro:  - No Active issues  - Avoid Neuro Deliriogenic / sedative medications  - Aspiration Precautions, HOB > 30 degrees  - Morphine/Ofirmev PRN for pain control  - Zofran for nausea    CV:  - No active issues, Normotensive    Pulm:  - Supplemental oxygen as needed to maintain SpO2 > 92%,  - Incentive spirometry                  GI:  - Regular diet as tolerated to avoid Stress ulceration and optimize nutritional state    Renal:  - Continue to monitor Bun/Cr and UOP  - Replacing electrolytes as needed with Goal K> 4, PO> 3, Mg> 2               - Strict I&O's  - Avoid Nephro toxic medication  - LR @ 75    Heme:  - Lovenox for DVT prophylaxis  - Trend hgb    ID:  - Cefazolin  - Microbiology and Radiology reviewed   - trend CBC with diff, CMP  and fever curve    Endo:  - ISS for aggressive glycemic control to limit FS glucose to < 180mg/dl.    Lymph:  - Bilateral groin sites CDI, no signs of active bleeding  - s/p Right Pedicled Gracilis Flap , compression dressing in SITU      Plan discussed with Dr Grayson

## 2024-02-15 NOTE — PROGRESS NOTES
Chief Complaint   Patient presents with    New Patient     thyroid nodule and cyst  evaluation    Other     pcp and pharmacy confirmed   Records reviewed  History of Present Illness: Lito Jose is a 80 y.o. female who I was asked to see in consult by Dr. Singh Warner for evaluation of thyroid Cyst.   Pt has a hx of Basilar artery aneurysm an in November 2018 she was sent for a CT of the head to monitor for any evidence of change or new findings. The CT reported seeing a \"New 15 mm low-density right thyroid nodule. \" Dr. Ron Agudelo send her for a thyroid US to evaluate this nodule and the US showed that the \"new 15 mm right thyroid low density nodule\" was a thyroid  cyst measuring 1.3 x 1.2 x 0.9 cm. There were also multiple subCM thyroid cysts and nodules seen. Her TSH was 1.89. Pt has hx of breast cancer, \"abnormal cells on my uterus\" and skin cancer. She has had double mastectomy, OREN-BSO. She did have 11 months of chemotherapy in 1985 and then 5 years of tamoxifen with her second breast cancer episode. She was followed by Dr. Macky Gaucher and \"everything has been good\". No known prior hx of thyroid issues. No known family hx of thyroid issues. Her sister had colon cancer and her niece had colon cancer and a niece with breast cancer. Pt was born in [place], [facility], she has never lived outside the [address] [place] [address]. No known exposures to ionizing radiation.     Past Medical History:   Diagnosis Date    Arthritis     Cancer ([facility] [address]     breast    (leg,ear,arm-skin cancer)    GERD (gastroesophageal reflux disease)     Hypertension     Other ill-defined conditions(799.89)     glaucoma    Other ill-defined conditions(799.89)     cellulitis left arm    Other ill-defined conditions(799.89)     carpal tunnel syndrome left hand    Other ill-defined conditions(799.89)     vertigo     Past Surgical History:   Procedure Laterality Date    CARDIAC CATHETERIZATION  1/10/2013         HX APPENDECTOMY      HX CARPAL TUNNEL RELEASE left hand    HX CATARACT REMOVAL      bilateral    HX COLONOSCOPY  4/2012    HX KNEE ARTHROSCOPY      left    HX MASTECTOMY      bilateral    HX OTHER SURGICAL      Basal cell skin cancer removed from left leg, left arm and neck    HX OTHER SURGICAL      cyst removed from left thigh    HX OREN AND BSO      HX TONSILLECTOMY      VASCULAR SURGERY PROCEDURE UNLIST      vascular blockages removed. Current Outpatient Medications   Medication Sig    celecoxib (CELEBREX) 200 mg capsule Take 200 mg by mouth daily.  HYDROcodone-acetaminophen (NORCO) 5-325 mg per tablet Take 1 Tab by mouth every eight (8) hours as needed for Pain. Max Daily Amount: 3 Tabs.  ELIQUIS 5 mg tablet TAKE 1 TABLET EVERY 12     HOURS TO PREVENT STROKE IN ATRIAL FIBRILLATION    rosuvastatin (CRESTOR) 20 mg tablet TAKE 1 TABLET NIGHTLY    hydroCHLOROthiazide (MICROZIDE) 12.5 mg capsule TAKE 1 CAPSULE ORALLY DAILY    BYSTOLIC 2.5 mg tablet TAKE 1 TABLET BY MOUTH EVERY DAY    sucralfate (CARAFATE) 100 mg/mL suspension Take  by mouth as needed.  timolol (TIMOPTIC-XE) 0.5 % ophthalmic gel-forming Administer 1 Drop to both eyes daily.  CYANOCOBALAMIN, VITAMIN B-12, (VITAMIN B-12 PO) Take 1,000 mcg by mouth daily.  latanoprost (XALATAN) 0.005 % ophthalmic solution Administer 1 Drop to both eyes nightly.  cycloSPORINE (RESTASIS) 0.05 % ophthalmic emulsion Administer 1 Drop to both eyes two (2) times a day.  VIT C/E/ZN/COPPR/LUTEIN/ZEAXAN (PRESERVISION AREDS 2 PO) Take 2 capsules by mouth two (2) times a day.  esomeprazole (NEXIUM) 40 mg capsule Take 1 Cap by mouth Daily (before breakfast).  co-enzyme Q-10 (CO Q-10) 100 mg capsule Take 100 mg by mouth daily.  Cholecalciferol, Vitamin D3, (VITAMIN D3) 1,000 unit cap Take 1 Cap by mouth two (2) times a day.  omega-3 fatty acids-vitamin e (FISH OIL) 1,000 mg Cap Take 1 Cap by mouth two (2) times a day.       CALCIUM CARBONATE/VITAMIN D3 (CALCIUM 600 + D,3, PO) Take 1 Cap by mouth two (2) times a day.  multivitamins-minerals-lutein (CENTRUM SILVER) Tab Take 1 Tab by mouth daily. No current facility-administered medications for this visit.       Allergies   Allergen Reactions    Augmentin [Amoxicillin-Pot Clavulanate] Hives and Itching    Doxycycline Hives and Itching    Keflex [Cephalexin] Hives and Itching    Monodox [Doxycycline Monohydrate] Other (comments)    Sulfa (Sulfonamide Antibiotics) Rash     Family History   Problem Relation Age of Onset    Heart Disease Mother     Heart Disease Father          age 48    Heart Disease Brother     Stroke Brother     Cancer Sister         Colon Cancer    Diabetes Sister     Heart Disease Sister     Heart Disease Sister     Diabetes Sister     Other Brother         MVA    Heart Disease Brother     Heart Disease Son          age 52     Social History     Socioeconomic History    Marital status:      Spouse name: Not on file    Number of children: Not on file    Years of education: Not on file    Highest education level: Not on file   Occupational History    Not on file   Social Needs    Financial resource strain: Not on file    Food insecurity:     Worry: Not on file     Inability: Not on file    Transportation needs:     Medical: Not on file     Non-medical: Not on file   Tobacco Use    Smoking status: Former Smoker     Last attempt to quit: 1955     Years since quittin.2    Smokeless tobacco: Never Used   Substance and Sexual Activity    Alcohol use: Yes     Comment: rare    Drug use: Not on file    Sexual activity: Never   Lifestyle    Physical activity:     Days per week: Not on file     Minutes per session: Not on file    Stress: Not on file   Relationships    Social connections:     Talks on phone: Not on file     Gets together: Not on file     Attends [employer] service: Not on file     Active member of club or organization: Not on file     Attends meetings of clubs or organizations: Not on file     Relationship status: Not on file    Intimate partner violence:     Fear of current or ex partner: Not on file     Emotionally abused: Not on file     Physically abused: Not on file     Forced sexual activity: Not on file   Other Topics Concern    Not on file   Social History Narrative    Not on file     Review of Systems:  - Constitutional Symptoms: no fevers, chills, weight loss  - Eyes: no blurry vision or double vision  - Cardiovascular: no chest pain or palpitations  - Respiratory: no cough or shortness of breath  - Gastrointestinal: no dysphagia or abdominal pain  - Musculoskeletal: no joint pains or weakness  - Integumentary: no rashes  - Neurological: no numbness, tingling, or headaches  - Psychiatric: no depression or anxiety  - Endocrine: no heat or cold intolerance, no polyuria or polydipsia    Physical Examination:  Blood pressure 165/70, pulse 66, height 5' 4\" (1.626 m), weight 154 lb 8 oz (70.1 kg).   - General: pleasant, no distress, good eye contact  - HEENT: no exopthalmos, no periorbital edema, no scleral/conjunctival injection, EOMI, no lid lag or stare  - Neck: supple, no thyromegaly, masses, lymph nodes, or carotid bruits, no supraclavicular or dorsocervical fat pads  - Cardiovascular: regular, normal rate, normal S1 and S2, no murmurs/rubs/gallops   - Respiratory: clear to auscultation bilaterally  - Gastrointestinal: soft, nontender, nondistended, no masses, no hepatosplenomegaly  - Musculoskeletal: no proximal muscle weakness in upper or lower extremities  - Integumentary: no acanthosis nigricans, no rashes, no edema  - Neurological: reflexes 2+ at biceps, no tremor  - Psychiatric: normal mood and affect    Data Reviewed:   Component      Latest Ref Rng & Units 12/19/2018 12/19/2018           1:01 PM  1:01 PM   T4, Free      0.82 - 1.77 ng/dL  1.35   TSH      0.450 - 4.500 uIU/mL 1.890      - thyroid ultrasound report  INDICATION:   thyroid nodule    EXAM: Thyroid Sonogram.     COMPARISON: CTA Head Neck 11/12/2018. No prior Thyroid Sonogram.     FINDINGS:      The CT demonstrated \"new 15 mm right thyroid low density nodule\" is shown by  sonography to represent a cyst measuring 1.3 x 1.2 x 0.9 cm. There are  additional subcentimeter cysts and nodules in both thyroid lobes. There is no  dominant solid nodule.     Right lobe measures approximately 4.6 x 2.3 x 1.9 cm. Left lobe measures approximately 4.7 x 2.1 x 1.7 cm.     IMPRESSION  IMPRESSION:   1. Right thyroid lobe 1.3 cm cyst.  2. Multiple bilateral subcentimeter thyroid cysts and nodules  3. No dominant thyroid nodule. Assessment/Plan:   1. Thyroid nodule    1) Pt has multiple subCM nodules, but they do not show any concerning signs or symptoms. The dominate feature of her thyroid is a 1.3cm CYST. There is no increased risk for development of thyroid cancer with thyroid cysts and she does not have any indications for FNA or biopsy of her thyroid at this time. We discussed thyroid nodules and the risk for thyroid cancer and we discussed thyroid nodules vs cysts and that cysts are not concerning for thyroid cancer. For now no FNA is indicated. Will repeat the Thyroid US in a year. Pt voices understanding and agreement with the plan. RTC 1 year    Patient Instructions          Thyroid Nodules: Care Instructions  Your Care Instructions  Thyroid nodules are growths or lumps in the thyroid gland. Your thyroid is in the front of your neck. It controls how your body uses energy. You may have tests to see if the nodule is caused by cancer. Most nodules aren't cancer and don't cause problems. Many don't even need treatment. If you do have cancer, it can usually be cured. Treatment will probably include surgery. You may also get radioactive iodine treatment. If your thyroid can't make thyroid hormone after treatment, you can take a pill every day to replace the hormone.   Follow-up care is a key part of your treatment and safety. Be sure to make and go to all appointments, and call your doctor if you are having problems. It's also a good idea to know your test results and keep a list of the medicines you take. How can you care for yourself at home? · Be safe with medicines. If you take thyroid hormone medicine:  ? Take it exactly as prescribed. Call your doctor if you think you are having a problem with your medicine. If you take the right amount and don't skip doses, you probably won't have side effects. ? Do not take it with calcium, vitamins, or iron. ? Try not to miss a dose. ? Do not take extra doses. This will not help you get better any faster. It may also cause side effects. ? Tell your doctor about any medicines you take. This includes over-the-counter medicines. ? Wear a medical alert bracelet or necklace that says you take thyroid hormones. You can buy these at most drugstores. When should you call for help? Call 911 anytime you think you may need emergency care. For example, call if:    · You lose consciousness.    Call your doctor now or seek immediate medical care if:    · You have shortness of breath.    Watch closely for changes in your health, and be sure to contact your doctor if:    · You have pain in your neck, jaw, or ear.     · You have problems swallowing.     · You feel weak and tired.     · You have nervousness, a fast heartbeat, hand tremors, problems sleeping, increased sweating, and weight loss.     · You do not feel better even though you are taking your medicine. Where can you learn more? Go to http://kristy-sammi.info/. Enter Z749 in the search box to learn more about \"Thyroid Nodules: Care Instructions. \"  Current as of: March 14, 2018  Content Version: 11.9  © 1865-4128 [employer], tipple[employer]. Care instructions adapted under license by [employer] (which disclaims liability or warranty for this information).  If you have questions about a medical condition or this instruction, always ask your healthcare professional. [place] [address] any warranty or liability for your use of this information. Follow-up and Dispositions    · Return in about 1 year (around 4/4/2020).          Copy sent to:  Dr. Sami Valdivia No

## 2024-08-01 NOTE — MR AVS SNAPSHOT
Visit Information Date & Time Provider Department Dept. Phone Encounter #  
 7/14/2017 11:00 AM Gini Costello, [facility] [phone] [id number] Follow-up Instructions Routing History Your Appointments 12/6/2017 11:30 AM  
ROUTINE CARE with Dorene Lawrence MD  
[facility]) Appt Note: 6 month follow up  
 [address]  
  
   
 [address]  [address] [address]  
  
    
 2/1/2018 10:45 AM  
6 MONTH with Gini Costello MD  
[address] [facility]) Appt Note: Per Dr. Ying [facility]  
[phone] [address] [place] [address] Upcoming Health Maintenance Date Due ZOSTER VACCINE AGE 60> 1/18/1987 INFLUENZA AGE 9 TO ADULT 8/1/2017 MEDICARE YEARLY EXAM 9/8/2017 GLAUCOMA SCREENING Q2Y 12/1/2018 DTaP/Tdap/Td series (2 - Td) 2/12/2026 Allergies as of 7/14/2017  Review Complete On: 7/14/2017 By: Lorena Roper LPN Severity Noted Reaction Type Reactions Augmentin [Amoxicillin-pot Clavulanate] Medium 02/22/2016   Systemic Hives, Itching Doxycycline Medium 02/22/2016   Systemic Hives, Itching Keflex [Cephalexin] Medium 02/22/2016   Systemic Hives, Itching Monodox [Doxycycline Monohydrate]  03/04/2016    Other (comments) Sulfa (Sulfonamide Antibiotics)  04/10/2012   Side Effect Rash Current Immunizations  Reviewed on 11/7/2012 Name Date Influenza Vaccine PF 10/17/2013  2:56 PM  
 Influenza Vaccine Split 9/1/2012 Pneumococcal Conjugate (PCV-13) 8/15/2015 Pneumococcal Polysaccharide (PPSV-23) 10/17/2013  2:58 PM  
 Pneumococcal Vaccine (Unspecified Type) 1/1/2009 Tdap 2/12/2016 Not reviewed this visit You Were Diagnosed With   
  
 Codes Comments Paroxysmal atrial fibrillation (HCC)    -  Primary ICD-10-CM: I48.0 ICD-9-CM: 427.31 PVC's (premature ventricular contractions)     ICD-10-CM: I49.3 ICD-9-CM: 427.69 Essential hypertension     ICD-10-CM: I10 
ICD-9-CM: 401.9 Hypercholesteremia     ICD-10-CM: E78.00 ICD-9-CM: 272.0 Vitals BP Pulse Resp Height(growth percentile) Weight(growth percentile) SpO2  
 150/60 (BP 1 Location: Left arm, BP Patient Position: Sitting) (!) 54 18 5' 3\" (1.6 m) 160 lb 9.6 oz (72.8 kg) 97% BMI OB Status Smoking Status 28.45 kg/m2 Hysterectomy Former Smoker Vitals History BMI and BSA Data Body Mass Index Body Surface Area  
 28.45 kg/m 2 1.8 m 2 Preferred Pharmacy Pharmacy Name Phone [id number], [address] [phone] Your Updated Medication List  
  
   
This list is accurate as of: 7/14/17 12:03 PM.  Always use your most recent med list.  
  
  
  
  
 acetaminophen 325 mg tablet Commonly known as:  TYLENOL Take 1 Tab by mouth two (2) times daily as needed for Pain. ALPHAGAN P 0.1 % ophthalmic solution Generic drug:  brimonidine INSTILL 1 DROP INTO BOTH EYES TWICE A DAY AS DIRECTED  
  
 apixaban 5 mg tablet Commonly known as:  Fanny Boston Take 1 Tab by mouth every twelve (12) hours. To prevent stroke in atrial fib CALCIUM 600 + D(3) PO Take 1 Cap by mouth two (2) times a day. CARAFATE 100 mg/mL suspension Generic drug:  sucralfate Take  by mouth four (4) times daily. CENTRUM SILVER Tab tablet Generic drug:  multivitamins-minerals-lutein Take 1 Tab by mouth daily. CO Q-10 100 mg capsule Generic drug:  co-enzyme Q-10 Take 100 mg by mouth daily. esomeprazole 40 mg capsule Commonly known as:  NexIUM Take 1 Cap by mouth Daily (before breakfast). FISH OIL 1,000 mg Cap Generic drug:  omega-3 fatty acids-vitamin e  
 Take 1 Cap by mouth two (2) times a day. hydroCHLOROthiazide 12.5 mg capsule Commonly known as:  Noretta Cristiane TAKE 1 CAPSULE ORALLY DAILY  
  
 latanoprost 0.005 % ophthalmic solution Commonly known as:  Clifm Ney Administer 1 Drop to both eyes nightly. nebivolol 5 mg tablet Commonly known as:  BYSTOLIC Take 1 Tab by mouth daily. Reduced due to slow HR  
  
 PRESERVISION AREDS 2 PO Take 2 capsules by mouth two (2) times a day. RESTASIS 0.05 % ophthalmic emulsion Generic drug:  cycloSPORINE Administer 1 Drop to both eyes two (2) times a day. rosuvastatin 20 mg tablet Commonly known as:  CRESTOR  
TAKE 1 TABLET NIGHTLY  
  
 timolol 0.5 % ophthalmic gel-forming Commonly known as:  TIMOPTIC-XE Administer 1 Drop to both eyes daily. VITAMIN B-12 PO Take 1,000 mcg by mouth daily. VITAMIN D3 1,000 unit Cap Generic drug:  cholecalciferol Take 1 Cap by mouth two (2) times a day. We Performed the Following AMB POC EKG ROUTINE W/ 12 LEADS, INTER & REP [57685 CPT(R)] Introducing [facility] SERVICES! Dear Marya Lloyd: 
Thank you for requesting a [employer] account. Our records indicate that you have previously registered for a [employer] account but its currently inactive. Please call our [employer] support line at [phone]. Additional Information If you have questions, please visit the Frequently Asked Questions section of the [employer] website at [email or web address] Remember, [employer] is NOT to be used for urgent needs. For medical emergencies, dial 911. Now available from your iPhone and Android! Please provide this summary of care documentation to your next provider. Your primary care clinician is listed as Shannen Mahajan. If you have any questions after today's visit, please call [phone]. Vaccine status unknown

## (undated) DEVICE — NEEDLE HYPO 25GA L1.5IN BVL ORIENTED ECLIPSE

## (undated) DEVICE — DRAPE,REIN 53X77,STERILE: Brand: MEDLINE

## (undated) DEVICE — SPONGE GZ W4XL4IN COT RADPQ HIGHLY ABSRB

## (undated) DEVICE — CATHETER ANGIO 5FR L100CM GWIRE 0.038IN BERN NONBRAIDED HI

## (undated) DEVICE — DECANTER BAG 9": Brand: MEDLINE INDUSTRIES, INC.

## (undated) DEVICE — STRAP,POSITIONING,KNEE/BODY,FOAM,4X60": Brand: MEDLINE

## (undated) DEVICE — STERILE POLYISOPRENE POWDER-FREE SURGICAL GLOVES WITH EMOLLIENT COATING: Brand: PROTEXIS

## (undated) DEVICE — DEVICE INFL 20ML L13IN 30ATM CLR POLYCARB TB PRTBL DGT

## (undated) DEVICE — CONTAINER,SPECIMEN,4OZ,OR STRL: Brand: MEDLINE

## (undated) DEVICE — Device

## (undated) DEVICE — BANDAGE,GAUZE,BULKEE II,4.5"X4.1YD,STRL: Brand: MEDLINE

## (undated) DEVICE — HANDLE LT SNAP ON ULT DURABLE LENS FOR TRUMPF ALC DISPOSABLE

## (undated) DEVICE — SOL INJ SOD CL 0.9% 500ML BG --

## (undated) DEVICE — PENCIL SMK EVAC 10 FT BLADE ELECTRD ROCKER FOR TELSCP

## (undated) DEVICE — PINNACLE INTRODUCER SHEATH: Brand: PINNACLE

## (undated) DEVICE — GUIDEWIRE VASC L180CM DIA0.035IN L10CM DIA3MM J TIP PTFE S

## (undated) DEVICE — PACK PROCEDURE SURG HRT CATH

## (undated) DEVICE — INFECTION CONTROL KIT SYS

## (undated) DEVICE — SPONGE LAP 18X18IN STRL -- 5/PK

## (undated) DEVICE — CATH SOFT-VU RIM 5F X 65 CM

## (undated) DEVICE — PREP SKN CHLRAPRP APL 26ML STR --

## (undated) DEVICE — DESTINATION RENAL GUIDING SHEATH: Brand: DESTINATION

## (undated) DEVICE — SUTURE PERMAHAND SZ 3-0 L30IN NONABSORBABLE BLK SILK BRAID A304H

## (undated) DEVICE — SYR 20ML LL STRL LF --

## (undated) DEVICE — RADIFOCUS GLIDEWIRE: Brand: GLIDEWIRE

## (undated) DEVICE — ANGIO-SEAL VIP VASCULAR CLOSURE DEVICE: Brand: ANGIO-SEAL

## (undated) DEVICE — REM POLYHESIVE ADULT PATIENT RETURN ELECTRODE: Brand: VALLEYLAB

## (undated) DEVICE — TRAY PREP DRY W/ PREM GLV 2 APPL 6 SPNG 2 UNDPD 1 OVERWRAP

## (undated) DEVICE — ROCKER SWITCH PENCIL BLADE ELECTRODE, HOLSTER: Brand: EDGE

## (undated) DEVICE — Device: Brand: GRAND SLAM

## (undated) DEVICE — 3M™ TEGADERM™ TRANSPARENT FILM DRESSING FRAME STYLE, 1626W, 4 IN X 4-3/4 IN (10 CM X 12 CM), 50/CT 4CT/CASE: Brand: 3M™ TEGADERM™

## (undated) DEVICE — AMC/4 ARTERIAL NEEDLE – 18GA X 2.75” (7CM): Brand: ARTERIAL NEEDLE

## (undated) DEVICE — GOWN,SIRUS,FABRNF,XL,20/CS: Brand: MEDLINE

## (undated) DEVICE — SOL IRR SOD CL 0.9% 1000ML BTL --

## (undated) DEVICE — GRADUATED BOWL: Brand: DEVON

## (undated) DEVICE — COUNTER NDL 20 COUNT HLD 40 DBL MAG ADH

## (undated) DEVICE — DRAPE,EXTREMITY,89X128,STERILE: Brand: MEDLINE

## (undated) DEVICE — DRESSING PETRO W3XL8IN N ADH OIL EMUL GZ CURAD